# Patient Record
Sex: FEMALE | Race: WHITE | NOT HISPANIC OR LATINO | Employment: FULL TIME | URBAN - METROPOLITAN AREA
[De-identification: names, ages, dates, MRNs, and addresses within clinical notes are randomized per-mention and may not be internally consistent; named-entity substitution may affect disease eponyms.]

---

## 2018-01-02 ENCOUNTER — OFFICE VISIT (OUTPATIENT)
Dept: FAMILY MEDICINE | Facility: CLINIC | Age: 53
End: 2018-01-02
Payer: COMMERCIAL

## 2018-01-02 ENCOUNTER — DOCUMENTATION ONLY (OUTPATIENT)
Dept: FAMILY MEDICINE | Facility: CLINIC | Age: 53
End: 2018-01-02

## 2018-01-02 VITALS
BODY MASS INDEX: 21.26 KG/M2 | DIASTOLIC BLOOD PRESSURE: 64 MMHG | HEIGHT: 65 IN | WEIGHT: 127.63 LBS | SYSTOLIC BLOOD PRESSURE: 118 MMHG

## 2018-01-02 DIAGNOSIS — E03.9 HYPOTHYROIDISM, UNSPECIFIED TYPE: ICD-10-CM

## 2018-01-02 DIAGNOSIS — K21.9 GASTROESOPHAGEAL REFLUX DISEASE, ESOPHAGITIS PRESENCE NOT SPECIFIED: ICD-10-CM

## 2018-01-02 DIAGNOSIS — I10 ESSENTIAL HYPERTENSION, BENIGN: ICD-10-CM

## 2018-01-02 DIAGNOSIS — M51.37 DDD (DEGENERATIVE DISC DISEASE), LUMBOSACRAL: ICD-10-CM

## 2018-01-02 DIAGNOSIS — J30.2 SEASONAL ALLERGIC RHINITIS, UNSPECIFIED CHRONICITY, UNSPECIFIED TRIGGER: ICD-10-CM

## 2018-01-02 DIAGNOSIS — Z12.39 ENCOUNTER FOR SCREENING FOR MALIGNANT NEOPLASM OF BREAST: ICD-10-CM

## 2018-01-02 DIAGNOSIS — Z00.00 WELLNESS EXAMINATION: Primary | ICD-10-CM

## 2018-01-02 PROBLEM — M51.379 DDD (DEGENERATIVE DISC DISEASE), LUMBOSACRAL: Status: ACTIVE | Noted: 2018-01-02

## 2018-01-02 PROCEDURE — 99999 PR PBB SHADOW E&M-EST. PATIENT-LVL III: CPT | Mod: PBBFAC,,, | Performed by: FAMILY MEDICINE

## 2018-01-02 PROCEDURE — 99396 PREV VISIT EST AGE 40-64: CPT | Mod: S$GLB,,, | Performed by: FAMILY MEDICINE

## 2018-01-02 RX ORDER — FLUTICASONE PROPIONATE 50 MCG
1 SPRAY, SUSPENSION (ML) NASAL DAILY
Qty: 1 BOTTLE | Refills: 3 | Status: SHIPPED | OUTPATIENT
Start: 2018-01-02 | End: 2019-01-21 | Stop reason: SDUPTHER

## 2018-01-02 RX ORDER — OMEPRAZOLE 40 MG/1
CAPSULE, DELAYED RELEASE ORAL
COMMUNITY
Start: 2017-12-01 | End: 2018-01-08 | Stop reason: SDUPTHER

## 2018-01-02 RX ORDER — FLUTICASONE PROPIONATE 50 MCG
1 SPRAY, SUSPENSION (ML) NASAL DAILY
COMMUNITY
End: 2018-01-02 | Stop reason: SDUPTHER

## 2018-01-02 RX ORDER — IBUPROFEN 800 MG/1
800 TABLET ORAL EVERY 6 HOURS PRN
Qty: 60 TABLET | Refills: 5 | Status: ON HOLD | OUTPATIENT
Start: 2018-01-02 | End: 2023-12-19 | Stop reason: HOSPADM

## 2018-01-02 NOTE — PROGRESS NOTES
Pre-Visit Chart Review  For Appointment Scheduled on (1/2    Health Maintenance Due   Topic Date Due    Influenza Vaccine  08/01/2017

## 2018-01-02 NOTE — PROGRESS NOTES
Subjective:       Patient ID: Marylin Morel is a 52 y.o. female.    Chief Complaint: Annual Exam    HPI     Establish Care  Pt reports to the clinic to establish care.   The pt has a medical history which includes HTN and hypothyroidism.   As far as smoking is concerned, the pt denies.   The pt attempts to maintain a healthy diet and engages in regular exercise.   Consistent seatbelt usage reported.   Pt has no symptoms of depression.   Health maintenance addressed and updated in chart.    Review of Systems   Constitutional: Negative for chills and fever.   HENT: Negative for sore throat.    Eyes: Negative for visual disturbance.   Respiratory: Negative for cough and shortness of breath.    Cardiovascular: Negative for chest pain and leg swelling.   Gastrointestinal: Negative for abdominal pain, blood in stool, constipation, diarrhea and vomiting.   Genitourinary: Negative for difficulty urinating, dysuria and hematuria.   Musculoskeletal: Positive for back pain. Negative for arthralgias.   Neurological: Negative for dizziness and weakness.       Objective:      Physical Exam   Constitutional: She appears well-developed and well-nourished. No distress.   HENT:   Head: Normocephalic and atraumatic.   Mouth/Throat: Oropharynx is clear and moist. No oropharyngeal exudate.   Eyes: EOM are normal. Pupils are equal, round, and reactive to light.   Neck: Normal range of motion. Neck supple. No thyromegaly present.   Cardiovascular: Normal rate, regular rhythm, normal heart sounds and intact distal pulses.    Pulmonary/Chest: Effort normal and breath sounds normal. No respiratory distress. She has no wheezes.   Abdominal: Soft. Bowel sounds are normal. She exhibits no distension and no mass. There is no tenderness.   Musculoskeletal: She exhibits no edema.   Lymphadenopathy:     She has no cervical adenopathy.   Neurological: She is alert.   Skin: Skin is warm. No rash noted. No erythema.   Psychiatric: She has a normal  mood and affect. Her behavior is normal.   Vitals reviewed.      Assessment:       1. Wellness examination    2. Essential hypertension, benign    3. Hypothyroidism, unspecified type        Plan:       1. Wellness examination  Patient has been advised to continue to maintain a healthy lifestyle, including regular exercise and consuming a well balanced diet.   - Comprehensive metabolic panel; Future  - Lipid panel; Future  - Microalbumin/creatinine urine ratio; Future    2. Essential hypertension, benign  Condition currently stable. No changes to medication regimen on today.     3. Hypothyroidism, unspecified type  - TSH; Future    4. Seasonal allergic rhinitis, unspecified chronicity, unspecified trigger  - fluticasone (FLONASE) 50 mcg/actuation nasal spray; 1 spray by Each Nare route once daily.  Dispense: 1 Bottle; Refill: 3  Condition currently stable. No changes to medication regimen on today.     5. Gastroesophageal reflux disease, esophagitis presence not specified  - omeprazole (PRILOSEC) 40 MG capsule;   Condition currently stable. No changes to medication regimen on today.     6. Encounter for screening for malignant neoplasm of breast  - Mammo Digital Screening Bilateral With CAD; Future    7. DDD (degenerative disc disease), lumbosacral  - ibuprofen (ADVIL,MOTRIN) 800 MG tablet; Take 1 tablet (800 mg total) by mouth every 6 (six) hours as needed for Pain.  Dispense: 60 tablet; Refill: 5    Patient readiness: acceptance and barriers:none    During the course of the visit the patient was educated and counseled about the following:     Hypertension:   Dietary sodium restriction.  Regular aerobic exercise.    Goals: Hypertension: Reduce Blood Pressure    Did patient meet goals/outcomes: Yes    The following self management tools provided: blood pressure log  excercise log    Patient Instructions (the written plan) was given to the patient/family.     Time spent with patient: 15 minutes    Portions of this  note were created using Dragon voice recognition software. There may be voice recognition errors found in the text, and attempts were made to correct these errors prior to signature    Doug Marvin MD    Family Medicine  1/2/2018

## 2018-01-06 ENCOUNTER — LAB VISIT (OUTPATIENT)
Dept: LAB | Facility: HOSPITAL | Age: 53
End: 2018-01-06
Attending: FAMILY MEDICINE
Payer: COMMERCIAL

## 2018-01-06 DIAGNOSIS — Z00.00 WELLNESS EXAMINATION: ICD-10-CM

## 2018-01-06 DIAGNOSIS — E03.9 HYPOTHYROIDISM, UNSPECIFIED TYPE: ICD-10-CM

## 2018-01-06 LAB
ALBUMIN SERPL BCP-MCNC: 3.5 G/DL
ALP SERPL-CCNC: 39 U/L
ALT SERPL W/O P-5'-P-CCNC: 10 U/L
ANION GAP SERPL CALC-SCNC: 12 MMOL/L
AST SERPL-CCNC: 20 U/L
BILIRUB SERPL-MCNC: 0.7 MG/DL
BUN SERPL-MCNC: 18 MG/DL
CALCIUM SERPL-MCNC: 9.5 MG/DL
CHLORIDE SERPL-SCNC: 104 MMOL/L
CHOLEST SERPL-MCNC: 197 MG/DL
CHOLEST/HDLC SERPL: 2 {RATIO}
CO2 SERPL-SCNC: 22 MMOL/L
CREAT SERPL-MCNC: 0.9 MG/DL
EST. GFR  (AFRICAN AMERICAN): >60 ML/MIN/1.73 M^2
EST. GFR  (NON AFRICAN AMERICAN): >60 ML/MIN/1.73 M^2
GLUCOSE SERPL-MCNC: 82 MG/DL
HDLC SERPL-MCNC: 97 MG/DL
HDLC SERPL: 49.2 %
LDLC SERPL CALC-MCNC: 75 MG/DL
NONHDLC SERPL-MCNC: 100 MG/DL
POTASSIUM SERPL-SCNC: 3.9 MMOL/L
PROT SERPL-MCNC: 7.1 G/DL
SODIUM SERPL-SCNC: 138 MMOL/L
TRIGL SERPL-MCNC: 125 MG/DL
TSH SERPL DL<=0.005 MIU/L-ACNC: 2.3 UIU/ML

## 2018-01-06 PROCEDURE — 84443 ASSAY THYROID STIM HORMONE: CPT

## 2018-01-06 PROCEDURE — 80053 COMPREHEN METABOLIC PANEL: CPT

## 2018-01-06 PROCEDURE — 36415 COLL VENOUS BLD VENIPUNCTURE: CPT | Mod: PO

## 2018-01-06 PROCEDURE — 80061 LIPID PANEL: CPT

## 2018-01-08 DIAGNOSIS — K21.9 GASTROESOPHAGEAL REFLUX DISEASE, ESOPHAGITIS PRESENCE NOT SPECIFIED: ICD-10-CM

## 2018-01-08 RX ORDER — OMEPRAZOLE 40 MG/1
40 CAPSULE, DELAYED RELEASE ORAL EVERY MORNING
Qty: 90 CAPSULE | Refills: 3 | Status: SHIPPED | OUTPATIENT
Start: 2018-01-08 | End: 2018-08-24

## 2018-01-09 ENCOUNTER — TELEPHONE (OUTPATIENT)
Dept: FAMILY MEDICINE | Facility: CLINIC | Age: 53
End: 2018-01-09

## 2018-01-09 RX ORDER — LISINOPRIL AND HYDROCHLOROTHIAZIDE 20; 25 MG/1; MG/1
1 TABLET ORAL DAILY
Qty: 90 TABLET | Refills: 3 | Status: SHIPPED | OUTPATIENT
Start: 2018-01-09 | End: 2019-01-21 | Stop reason: SDUPTHER

## 2018-01-09 NOTE — TELEPHONE ENCOUNTER
Please inform pt that I have discontinued Uniretic and started another blood pressure medication called Prinzide. If she has issues with this medication, she should contact the clinic and inform of this.

## 2018-01-09 NOTE — TELEPHONE ENCOUNTER
----- Message from Jeannette Torres LPN sent at 1/9/2018  4:00 PM CST -----      ----- Message -----  From: Omayra Aguirre  Sent: 1/9/2018   8:47 AM  To: [facility] Staff    Patient states that her pharmacy cannot get her blood pressure medication moexipril-hydrochlorothiazide (UNIRETIC) 7.5-12.5 mg per tablet. Pharmacy states that they cannot get this in any form so office needs to prescribe something different. Please remit to:      [id number] - [place] - [address]  [address] [address]  Phone: [phone] Fax: [phone]    Please call with questions or when completed at [phone].

## 2018-01-09 NOTE — TELEPHONE ENCOUNTER
----- Message from Jeannette Torres LPN sent at 1/9/2018  4:00 PM CST -----      ----- Message -----  From: Omayra Aguirre  Sent: 1/9/2018   8:47 AM  To: [facility] Staff    Patient states that her pharmacy cannot get her blood pressure medication moexipril-hydrochlorothiazide (UNIRETIC) 7.5-12.5 mg per tablet. Pharmacy states that they cannot get this in any form so office needs to prescribe something different. Please remit to:      [id number] - [place] - [address]  [address] [address]  Phone: [phone] Fax: [phone]    Please call with questions or when completed at [phone].

## 2018-01-09 NOTE — TELEPHONE ENCOUNTER
----- Message from Jeannette Torres LPN sent at 1/9/2018  4:00 PM CST -----      ----- Message -----  From: Omayra Aguirre  Sent: 1/9/2018   8:47 AM  To: [facility] Staff    Patient states that her pharmacy cannot get her blood pressure medication moexipril-hydrochlorothiazide (UNIRETIC) 7.5-12.5 mg per tablet. Pharmacy states that they cannot get this in any form so office needs to prescribe something different. Please remit to:      [id number] - [place] - [address]  [address] [address]  Phone: [phone] Fax: [phone]    Please call with questions or when completed at [phone].

## 2018-01-22 ENCOUNTER — TELEPHONE (OUTPATIENT)
Dept: FAMILY MEDICINE | Facility: CLINIC | Age: 53
End: 2018-01-22

## 2018-01-22 NOTE — TELEPHONE ENCOUNTER
Informed patient orders for mammo has been faxed to [facility]. Pt. Verbalized understanding.

## 2018-01-22 NOTE — TELEPHONE ENCOUNTER
----- Message from Cassie Dent sent at 1/22/2018 10:42 AM CST -----  Contact: Self  Patient wants to have her mammo done at [facility].  Please fax the order over to them so she can have it done and call the patient back to let her know that it is in place at [phone] (home).  Thanks

## 2018-02-12 ENCOUNTER — TELEPHONE (OUTPATIENT)
Dept: FAMILY MEDICINE | Facility: CLINIC | Age: 53
End: 2018-02-12

## 2018-02-12 NOTE — TELEPHONE ENCOUNTER
Patient informed refills for Omeprazole was sent to pharmacy 1/8/18 with 3 RF to [facility].  Voiced understanding.

## 2018-02-12 NOTE — TELEPHONE ENCOUNTER
----- Message from Deirdre Velazquez sent at 2/12/2018 12:27 PM CST -----  Contact: self  Patient called regarding refill of medication. Please contact [phone] (home)     omeprazole (PRILOSEC) 40 MG capsule      [id number] - [place] - [address]  [address] [address]  Phone: [phone] Fax: [phone]

## 2018-02-23 RX ORDER — LEVOTHYROXINE SODIUM 25 UG/1
25 TABLET ORAL DAILY
Qty: 30 TABLET | Refills: 11 | Status: SHIPPED | OUTPATIENT
Start: 2018-02-23 | End: 2019-01-21 | Stop reason: SDUPTHER

## 2018-02-23 NOTE — TELEPHONE ENCOUNTER
----- Message from Samir Stephen sent at 2/23/2018  9:26 AM CST -----  Contact: [phone]  Patient requesting a refill on synthroid.    Patient will be using   [id number] - [place] - [address]  [address] [address]  Phone: [phone] Fax: [phone]    Please call patient at [phone].     Thanks!

## 2018-03-05 DIAGNOSIS — I10 ESSENTIAL HYPERTENSION, BENIGN: ICD-10-CM

## 2018-03-05 RX ORDER — AMLODIPINE BESYLATE 5 MG/1
5 TABLET ORAL DAILY
Qty: 30 TABLET | Refills: 11 | Status: SHIPPED | OUTPATIENT
Start: 2018-03-05 | End: 2019-01-21 | Stop reason: SDUPTHER

## 2018-03-05 RX ORDER — AMLODIPINE BESYLATE 5 MG/1
TABLET ORAL
COMMUNITY
Start: 2018-01-29 | End: 2018-07-03 | Stop reason: SDUPTHER

## 2018-03-05 RX ORDER — BENZONATATE 100 MG/1
CAPSULE ORAL
COMMUNITY
Start: 2018-01-10 | End: 2018-07-03 | Stop reason: ALTCHOICE

## 2018-03-05 NOTE — TELEPHONE ENCOUNTER
Pt requesting medication refill on Amlodipine 5 mg  Last refill: 18 outside provider  Last visit: 18  Follow Up: 18    ----- Message from Dia Balbuena sent at 3/5/2018  9:22 AM CST -----  Contact: Patient  Marylin, patient [phone] calling because she needs a new prescription for amlodipine (NORVASC) 5 MG tablet () sent to pharmacy. Please advise. Thanks.    [id number] [address] [address]  Phone: [phone] Fax: [phone]

## 2018-07-02 ENCOUNTER — DOCUMENTATION ONLY (OUTPATIENT)
Dept: FAMILY MEDICINE | Facility: CLINIC | Age: 53
End: 2018-07-02

## 2018-07-02 NOTE — PROGRESS NOTES
Pre-Visit Chart Review  For Appointment Scheduled on (7/3/2018)  There are no preventive care reminders to display for this patient.

## 2018-07-03 ENCOUNTER — OFFICE VISIT (OUTPATIENT)
Dept: FAMILY MEDICINE | Facility: CLINIC | Age: 53
End: 2018-07-03
Payer: COMMERCIAL

## 2018-07-03 VITALS
WEIGHT: 122.13 LBS | TEMPERATURE: 98 F | BODY MASS INDEX: 20.35 KG/M2 | SYSTOLIC BLOOD PRESSURE: 103 MMHG | HEART RATE: 71 BPM | DIASTOLIC BLOOD PRESSURE: 75 MMHG | HEIGHT: 65 IN

## 2018-07-03 DIAGNOSIS — I10 ESSENTIAL HYPERTENSION, BENIGN: ICD-10-CM

## 2018-07-03 DIAGNOSIS — M51.37 DDD (DEGENERATIVE DISC DISEASE), LUMBOSACRAL: Primary | ICD-10-CM

## 2018-07-03 PROBLEM — M54.12 CERVICAL RADICULOPATHY: Status: ACTIVE | Noted: 2018-07-03

## 2018-07-03 PROCEDURE — 3074F SYST BP LT 130 MM HG: CPT | Mod: CPTII,S$GLB,, | Performed by: PHYSICIAN ASSISTANT

## 2018-07-03 PROCEDURE — 99999 PR PBB SHADOW E&M-EST. PATIENT-LVL III: CPT | Mod: PBBFAC,,, | Performed by: PHYSICIAN ASSISTANT

## 2018-07-03 PROCEDURE — 3008F BODY MASS INDEX DOCD: CPT | Mod: CPTII,S$GLB,, | Performed by: PHYSICIAN ASSISTANT

## 2018-07-03 PROCEDURE — 3078F DIAST BP <80 MM HG: CPT | Mod: CPTII,S$GLB,, | Performed by: PHYSICIAN ASSISTANT

## 2018-07-03 PROCEDURE — 99213 OFFICE O/P EST LOW 20 MIN: CPT | Mod: S$GLB,,, | Performed by: PHYSICIAN ASSISTANT

## 2018-07-03 RX ORDER — GABAPENTIN 100 MG/1
100 CAPSULE ORAL 3 TIMES DAILY
Qty: 90 CAPSULE | Refills: 11 | Status: SHIPPED | OUTPATIENT
Start: 2018-07-03 | End: 2018-07-10 | Stop reason: SDUPTHER

## 2018-07-03 NOTE — PROGRESS NOTES
Subjective:       Patient ID: Marylin Morel is a 53 y.o. female.    Chief Complaint: Hypertension    Patient is here for 6 month follow up of HTN.  She is well controlled and does not need any medications.  Lab work done in January was normal.  She would like to be started on Neurontin as she has chronic neck and back pain and has had several epidural injections and has not been getting much relief.        Review of Systems   Constitutional: Negative for activity change and appetite change.   HENT: Negative for nosebleeds.    Eyes: Negative for pain and visual disturbance.   Respiratory: Negative for chest tightness and shortness of breath.    Cardiovascular: Negative for chest pain, palpitations and leg swelling.   Genitourinary: Negative for difficulty urinating, dysuria and frequency.   Musculoskeletal: Negative for arthralgias, back pain, gait problem, joint swelling and neck pain.   Neurological: Positive for numbness and headaches. Negative for dizziness, tremors, weakness and light-headedness.       Objective:      Physical Exam   Constitutional: She is oriented to person, place, and time. She appears well-developed and well-nourished.   HENT:   Head: Normocephalic and atraumatic.   Eyes: Conjunctivae are normal. Pupils are equal, round, and reactive to light.   Neck: Normal range of motion. Neck supple. No JVD present.   Cardiovascular: Normal rate and regular rhythm.  Exam reveals no gallop and no friction rub.    No murmur heard.  Pulmonary/Chest: Effort normal and breath sounds normal. No respiratory distress. She has no wheezes. She has no rales.   Neurological: She is alert and oriented to person, place, and time.   Skin: Skin is warm and dry.   Psychiatric: She has a normal mood and affect. Her behavior is normal. Judgment and thought content normal.       Assessment:       1. DDD (degenerative disc disease), lumbosacral    2. Essential hypertension, benign        Plan:       Marylin was seen  today for hypertension.    Diagnoses and all orders for this visit:    DDD (degenerative disc disease), lumbosacral    Essential hypertension, benign    Other orders  -     gabapentin (NEURONTIN) 100 MG capsule; Take 1 capsule (100 mg total) by mouth 3 (three) times daily.    Continue all meds    Patient readiness: acceptance and barriers:none    During the course of the visit the patient was educated and counseled about the following:     Hypertension:   Regular aerobic exercise.    Goals: Hypertension: Reduce Blood Pressure    Did patient meet goals/outcomes: Yes    The following self management tools provided: declined    Patient Instructions (the written plan) was given to the patient/family.     Time spent with patient: 15 minutes    Barriers to medications present (no )    Adverse reactions to current medications (no)    Over the counter medications reviewed (Yes)

## 2018-07-10 ENCOUNTER — PATIENT MESSAGE (OUTPATIENT)
Dept: FAMILY MEDICINE | Facility: CLINIC | Age: 53
End: 2018-07-10

## 2018-07-10 RX ORDER — GABAPENTIN 300 MG/1
300 CAPSULE ORAL 3 TIMES DAILY
Qty: 90 CAPSULE | Refills: 11 | Status: SHIPPED | OUTPATIENT
Start: 2018-07-10 | End: 2018-08-24

## 2018-07-12 ENCOUNTER — PATIENT MESSAGE (OUTPATIENT)
Dept: FAMILY MEDICINE | Facility: CLINIC | Age: 53
End: 2018-07-12

## 2018-08-24 ENCOUNTER — PATIENT MESSAGE (OUTPATIENT)
Dept: FAMILY MEDICINE | Facility: CLINIC | Age: 53
End: 2018-08-24

## 2018-08-24 DIAGNOSIS — K21.9 GASTROESOPHAGEAL REFLUX DISEASE, ESOPHAGITIS PRESENCE NOT SPECIFIED: ICD-10-CM

## 2018-08-24 DIAGNOSIS — G62.9 NEUROPATHY: Primary | ICD-10-CM

## 2018-08-24 RX ORDER — PANTOPRAZOLE SODIUM 40 MG/1
40 TABLET, DELAYED RELEASE ORAL DAILY
Qty: 30 TABLET | Refills: 11 | Status: SHIPPED | OUTPATIENT
Start: 2018-08-24 | End: 2019-02-04

## 2018-08-24 RX ORDER — GABAPENTIN 300 MG/1
600 CAPSULE ORAL 3 TIMES DAILY
Qty: 180 CAPSULE | Refills: 11 | Status: SHIPPED | OUTPATIENT
Start: 2018-08-24 | End: 2019-06-08 | Stop reason: SDUPTHER

## 2018-08-30 ENCOUNTER — PATIENT MESSAGE (OUTPATIENT)
Dept: FAMILY MEDICINE | Facility: CLINIC | Age: 53
End: 2018-08-30

## 2018-09-18 ENCOUNTER — PATIENT MESSAGE (OUTPATIENT)
Dept: FAMILY MEDICINE | Facility: CLINIC | Age: 53
End: 2018-09-18

## 2018-12-04 ENCOUNTER — DOCUMENTATION ONLY (OUTPATIENT)
Dept: FAMILY MEDICINE | Facility: CLINIC | Age: 53
End: 2018-12-04

## 2018-12-04 NOTE — PROGRESS NOTES
Pre-Visit Chart Review  For Appointment Scheduled on 12/04/2018    Health Maintenance Due   Topic Date Due    Influenza Vaccine  08/01/2018

## 2019-01-19 ENCOUNTER — PATIENT MESSAGE (OUTPATIENT)
Dept: FAMILY MEDICINE | Facility: CLINIC | Age: 54
End: 2019-01-19

## 2019-01-21 ENCOUNTER — OFFICE VISIT (OUTPATIENT)
Dept: FAMILY MEDICINE | Facility: CLINIC | Age: 54
End: 2019-01-21
Payer: COMMERCIAL

## 2019-01-21 VITALS
DIASTOLIC BLOOD PRESSURE: 88 MMHG | TEMPERATURE: 100 F | BODY MASS INDEX: 21.19 KG/M2 | WEIGHT: 127.19 LBS | HEART RATE: 74 BPM | OXYGEN SATURATION: 97 % | HEIGHT: 65 IN | SYSTOLIC BLOOD PRESSURE: 143 MMHG

## 2019-01-21 DIAGNOSIS — I10 ESSENTIAL HYPERTENSION, BENIGN: ICD-10-CM

## 2019-01-21 DIAGNOSIS — E03.9 HYPOTHYROIDISM, UNSPECIFIED TYPE: ICD-10-CM

## 2019-01-21 DIAGNOSIS — J06.9 UPPER RESPIRATORY TRACT INFECTION, UNSPECIFIED TYPE: ICD-10-CM

## 2019-01-21 DIAGNOSIS — Z00.00 ANNUAL PHYSICAL EXAM: Primary | ICD-10-CM

## 2019-01-21 PROCEDURE — 3077F PR MOST RECENT SYSTOLIC BLOOD PRESSURE >= 140 MM HG: ICD-10-PCS | Mod: CPTII,S$GLB,, | Performed by: NURSE PRACTITIONER

## 2019-01-21 PROCEDURE — 99999 PR PBB SHADOW E&M-EST. PATIENT-LVL III: ICD-10-PCS | Mod: PBBFAC,,, | Performed by: NURSE PRACTITIONER

## 2019-01-21 PROCEDURE — 3079F DIAST BP 80-89 MM HG: CPT | Mod: CPTII,S$GLB,, | Performed by: NURSE PRACTITIONER

## 2019-01-21 PROCEDURE — 99999 PR PBB SHADOW E&M-EST. PATIENT-LVL III: CPT | Mod: PBBFAC,,, | Performed by: NURSE PRACTITIONER

## 2019-01-21 PROCEDURE — 3079F PR MOST RECENT DIASTOLIC BLOOD PRESSURE 80-89 MM HG: ICD-10-PCS | Mod: CPTII,S$GLB,, | Performed by: NURSE PRACTITIONER

## 2019-01-21 PROCEDURE — 3077F SYST BP >= 140 MM HG: CPT | Mod: CPTII,S$GLB,, | Performed by: NURSE PRACTITIONER

## 2019-01-21 PROCEDURE — 99396 PR PREVENTIVE VISIT,EST,40-64: ICD-10-PCS | Mod: S$GLB,,, | Performed by: NURSE PRACTITIONER

## 2019-01-21 PROCEDURE — 99396 PREV VISIT EST AGE 40-64: CPT | Mod: S$GLB,,, | Performed by: NURSE PRACTITIONER

## 2019-01-21 RX ORDER — AMLODIPINE BESYLATE 5 MG/1
5 TABLET ORAL DAILY
Qty: 30 TABLET | Refills: 6 | Status: SHIPPED | OUTPATIENT
Start: 2019-01-21 | End: 2019-11-21 | Stop reason: SDUPTHER

## 2019-01-21 RX ORDER — LISINOPRIL AND HYDROCHLOROTHIAZIDE 20; 25 MG/1; MG/1
1 TABLET ORAL DAILY
Qty: 90 TABLET | Refills: 2 | Status: SHIPPED | OUTPATIENT
Start: 2019-01-21 | End: 2020-01-20

## 2019-01-21 RX ORDER — LEVOTHYROXINE SODIUM 25 UG/1
25 TABLET ORAL DAILY
Qty: 30 TABLET | Refills: 6 | Status: SHIPPED | OUTPATIENT
Start: 2019-01-21 | End: 2019-10-30 | Stop reason: SDUPTHER

## 2019-01-21 RX ORDER — FLUTICASONE PROPIONATE 50 MCG
1 SPRAY, SUSPENSION (ML) NASAL DAILY
Qty: 1 BOTTLE | Refills: 3 | Status: SHIPPED | OUTPATIENT
Start: 2019-01-21 | End: 2020-03-03

## 2019-01-21 NOTE — PATIENT INSTRUCTIONS
Flonase and plain mucin ex with large glass of water twice daily   Will follow up with lab results   Follow up with PCP in 6 months

## 2019-01-21 NOTE — PROGRESS NOTES
Subjective:       Patient ID: Marylin Morel is a 53 y.o. female.    Chief Complaint: Medication Refill    HPI     Ms. Morel is a 52 yo female who presents to clinic today for medication refills today.  She is a patient of Dr. Anderson.  She has a history of hypertension, GERD, hypothyroid, and sciatica.      Hypertension- compliant with BP Amlodipine and lisinopril-hctz, she does not take her BPs at home.  Slightly elevated today, but reports taking a decongestant today.  Denies headache, SOB, visual disturbances.   GERD- Well managed on protonix   Hypothyroid-  Takes synthroid daily, well managed  Sciatica- Treating with gapapentin, and reports success with this.   Review of Systems   Constitutional: Negative for activity change and unexpected weight change.   HENT: Positive for congestion, hearing loss (ongoing) and rhinorrhea. Negative for sinus pressure, sinus pain and trouble swallowing.    Eyes: Negative for discharge and visual disturbance.   Respiratory: Negative for cough, chest tightness, shortness of breath and wheezing.    Cardiovascular: Negative for chest pain and palpitations.   Gastrointestinal: Negative for blood in stool, constipation, diarrhea, nausea and vomiting.   Endocrine: Negative for polydipsia and polyuria.   Genitourinary: Negative for difficulty urinating, dysuria, hematuria and menstrual problem.   Musculoskeletal: Positive for neck pain (DDD). Negative for joint swelling.   Neurological: Negative for dizziness, weakness, light-headedness and headaches.   Psychiatric/Behavioral: Negative for confusion and dysphoric mood.       Objective:      Physical Exam   Constitutional: She is oriented to person, place, and time. She appears well-developed and well-nourished.   HENT:   Head: Normocephalic and atraumatic.   Eyes: Conjunctivae are normal. Pupils are equal, round, and reactive to light. Right eye exhibits no discharge. Left eye exhibits no discharge.   Neck: Normal range of  motion. Neck supple. No thyromegaly present.   Cardiovascular: Normal rate, regular rhythm, normal heart sounds and intact distal pulses.   Pulmonary/Chest: Breath sounds normal. No respiratory distress. She has no wheezes.   Abdominal: Soft. Bowel sounds are normal. She exhibits no distension. There is no tenderness. There is no rebound.   Musculoskeletal: Normal range of motion. She exhibits no edema or deformity.   Lymphadenopathy:     She has no cervical adenopathy.   Neurological: She is alert and oriented to person, place, and time. No cranial nerve deficit or sensory deficit.   Skin: Skin is warm and dry. No rash noted.   Psychiatric: She has a normal mood and affect.       Assessment:       1. Annual physical exam    2. Essential hypertension, benign    3. Upper respiratory tract infection, unspecified type    4. Hypothyroidism, unspecified type        Plan:       Annual physical exam  -     TSH; Future; Expected date: 01/21/2019  -     CBC auto differential; Future; Expected date: 01/21/2019  -     Comprehensive metabolic panel; Future; Expected date: 01/21/2019  -     Lipid panel; Future; Expected date: 01/21/2019          Essential hypertension, benign  -     amLODIPine (NORVASC) 5 MG tablet; Take 1 tablet (5 mg total) by mouth once daily.  Dispense: 30 tablet; Refill: 6  -     lisinopril-hydrochlorothiazide (PRINZIDE,ZESTORETIC) 20-25 mg Tab; Take 1 tablet by mouth once daily.  Dispense: 90 tablet; Refill: 2        -     Slightly elevated today, patient to discontinue decongestant use  Upper respiratory tract infection, unspecified type  -     fluticasone (FLONASE) 50 mcg/actuation nasal spray; 1 spray (50 mcg total) by Each Nare route once daily.  Dispense: 1 Bottle; Refill: 3        -     Mucinex plain, with large glass of water twice  Daily        -     Increase rest and vitamin C  Hypothyroidism, unspecified type  -     levothyroxine (SYNTHROID) 25 MCG tablet; Take 1 tablet (25 mcg total) by mouth  once daily.  Dispense: 30 tablet; Refill: 6        -     TSH today     Patient readiness: acceptance and barriers:none    During the course of the visit the patient was educated and counseled about the following:     Hypertension:   Dietary sodium restriction.  Follow up: 6 months and as needed.    Goals: Hypertension: Reduce Blood Pressure    Did patient meet goals/outcomes: Yes    The following self management tools provided: declined    Patient Instructions (the written plan) was given to the patient/family.     Time spent with patient: 30 minutes    Barriers to medications present (no )    Adverse reactions to current medications (no)    Over the counter medications reviewed (Yes)

## 2019-01-23 ENCOUNTER — PATIENT MESSAGE (OUTPATIENT)
Dept: FAMILY MEDICINE | Facility: CLINIC | Age: 54
End: 2019-01-23

## 2019-01-23 NOTE — TELEPHONE ENCOUNTER
Patient states she is taking Prilosec not Protonix. You just saw her and your note says protonix. Not sure if you would want to change it so I did not add it.

## 2019-01-26 ENCOUNTER — LAB VISIT (OUTPATIENT)
Dept: LAB | Facility: HOSPITAL | Age: 54
End: 2019-01-26
Attending: NURSE PRACTITIONER
Payer: COMMERCIAL

## 2019-01-26 DIAGNOSIS — Z00.00 ANNUAL PHYSICAL EXAM: ICD-10-CM

## 2019-01-26 LAB
ALBUMIN SERPL BCP-MCNC: 4.4 G/DL
ALP SERPL-CCNC: 40 U/L
ALT SERPL W/O P-5'-P-CCNC: 16 U/L
ANION GAP SERPL CALC-SCNC: 12 MMOL/L
AST SERPL-CCNC: 23 U/L
BASOPHILS # BLD AUTO: 0.1 K/UL
BASOPHILS NFR BLD: 1.8 %
BILIRUB SERPL-MCNC: 0.6 MG/DL
BUN SERPL-MCNC: 18 MG/DL
CALCIUM SERPL-MCNC: 10.5 MG/DL
CHLORIDE SERPL-SCNC: 97 MMOL/L
CHOLEST SERPL-MCNC: 238 MG/DL
CHOLEST/HDLC SERPL: 2.6 {RATIO}
CO2 SERPL-SCNC: 28 MMOL/L
CREAT SERPL-MCNC: 0.9 MG/DL
DIFFERENTIAL METHOD: ABNORMAL
EOSINOPHIL # BLD AUTO: 0.4 K/UL
EOSINOPHIL NFR BLD: 6.5 %
ERYTHROCYTE [DISTWIDTH] IN BLOOD BY AUTOMATED COUNT: 12 %
EST. GFR  (AFRICAN AMERICAN): >60 ML/MIN/1.73 M^2
EST. GFR  (NON AFRICAN AMERICAN): >60 ML/MIN/1.73 M^2
GLUCOSE SERPL-MCNC: 81 MG/DL
HCT VFR BLD AUTO: 40.4 %
HDLC SERPL-MCNC: 90 MG/DL
HDLC SERPL: 37.8 %
HGB BLD-MCNC: 13 G/DL
IMM GRANULOCYTES # BLD AUTO: 0.01 K/UL
IMM GRANULOCYTES NFR BLD AUTO: 0.2 %
LDLC SERPL CALC-MCNC: 134.2 MG/DL
LYMPHOCYTES # BLD AUTO: 2.2 K/UL
LYMPHOCYTES NFR BLD: 38.5 %
MCH RBC QN AUTO: 31.1 PG
MCHC RBC AUTO-ENTMCNC: 32.2 G/DL
MCV RBC AUTO: 97 FL
MONOCYTES # BLD AUTO: 0.4 K/UL
MONOCYTES NFR BLD: 7.7 %
NEUTROPHILS # BLD AUTO: 2.6 K/UL
NEUTROPHILS NFR BLD: 45.3 %
NONHDLC SERPL-MCNC: 148 MG/DL
NRBC BLD-RTO: 0 /100 WBC
PLATELET # BLD AUTO: 287 K/UL
PMV BLD AUTO: 10.3 FL
POTASSIUM SERPL-SCNC: 4 MMOL/L
PROT SERPL-MCNC: 7.7 G/DL
RBC # BLD AUTO: 4.18 M/UL
SODIUM SERPL-SCNC: 137 MMOL/L
TRIGL SERPL-MCNC: 69 MG/DL
TSH SERPL DL<=0.005 MIU/L-ACNC: 1.96 UIU/ML
WBC # BLD AUTO: 5.69 K/UL

## 2019-01-26 PROCEDURE — 84443 ASSAY THYROID STIM HORMONE: CPT

## 2019-01-26 PROCEDURE — 36415 COLL VENOUS BLD VENIPUNCTURE: CPT | Mod: PO

## 2019-01-26 PROCEDURE — 85025 COMPLETE CBC W/AUTO DIFF WBC: CPT

## 2019-01-26 PROCEDURE — 80053 COMPREHEN METABOLIC PANEL: CPT

## 2019-01-26 PROCEDURE — 80061 LIPID PANEL: CPT

## 2019-01-30 ENCOUNTER — PATIENT MESSAGE (OUTPATIENT)
Dept: FAMILY MEDICINE | Facility: CLINIC | Age: 54
End: 2019-01-30

## 2019-02-04 ENCOUNTER — TELEPHONE (OUTPATIENT)
Dept: FAMILY MEDICINE | Facility: CLINIC | Age: 54
End: 2019-02-04

## 2019-02-04 DIAGNOSIS — K21.9 GASTROESOPHAGEAL REFLUX DISEASE, ESOPHAGITIS PRESENCE NOT SPECIFIED: Primary | ICD-10-CM

## 2019-02-04 RX ORDER — OMEPRAZOLE 40 MG/1
40 CAPSULE, DELAYED RELEASE ORAL DAILY
Qty: 30 CAPSULE | Refills: 11 | Status: SHIPPED | OUTPATIENT
Start: 2019-02-04 | End: 2020-02-17

## 2019-04-19 ENCOUNTER — PATIENT MESSAGE (OUTPATIENT)
Dept: FAMILY MEDICINE | Facility: CLINIC | Age: 54
End: 2019-04-19

## 2019-04-22 RX ORDER — EPINEPHRINE 0.3 MG/.3ML
1 INJECTION SUBCUTANEOUS ONCE
Qty: 0.3 ML | Refills: 0 | Status: SHIPPED | OUTPATIENT
Start: 2019-04-22 | End: 2019-06-10 | Stop reason: SDUPTHER

## 2019-05-29 ENCOUNTER — PATIENT MESSAGE (OUTPATIENT)
Dept: FAMILY MEDICINE | Facility: CLINIC | Age: 54
End: 2019-05-29

## 2019-06-08 ENCOUNTER — PATIENT MESSAGE (OUTPATIENT)
Dept: FAMILY MEDICINE | Facility: CLINIC | Age: 54
End: 2019-06-08

## 2019-06-08 DIAGNOSIS — Z91.038 ALLERGIC REACTION TO INSECT BITE: Primary | ICD-10-CM

## 2019-06-08 DIAGNOSIS — G62.9 NEUROPATHY: ICD-10-CM

## 2019-06-10 RX ORDER — GABAPENTIN 300 MG/1
CAPSULE ORAL
Qty: 90 CAPSULE | Refills: 10 | Status: SHIPPED | OUTPATIENT
Start: 2019-06-10 | End: 2019-07-11 | Stop reason: SDUPTHER

## 2019-06-10 RX ORDER — EPINEPHRINE 0.3 MG/.3ML
1 INJECTION SUBCUTANEOUS ONCE
Qty: 0.3 ML | Refills: 0 | Status: SHIPPED | OUTPATIENT
Start: 2019-06-10 | End: 2020-11-11

## 2019-07-02 ENCOUNTER — PATIENT MESSAGE (OUTPATIENT)
Dept: FAMILY MEDICINE | Facility: CLINIC | Age: 54
End: 2019-07-02

## 2019-07-03 ENCOUNTER — TELEPHONE (OUTPATIENT)
Dept: FAMILY MEDICINE | Facility: CLINIC | Age: 54
End: 2019-07-03

## 2019-07-03 NOTE — TELEPHONE ENCOUNTER
----- Message from Zayra Walls sent at 7/3/2019  2:44 PM CDT -----  Type:  Pharmacy Calling to Clarify an RX    Name of Caller: Roger with RX Benefits  Best Call Back Number:  [phone]  Additional Information:  Please contact regarding prior authorization on the EPI Pen,

## 2019-07-09 ENCOUNTER — PATIENT MESSAGE (OUTPATIENT)
Dept: FAMILY MEDICINE | Facility: CLINIC | Age: 54
End: 2019-07-09

## 2019-07-10 ENCOUNTER — DOCUMENTATION ONLY (OUTPATIENT)
Dept: FAMILY MEDICINE | Facility: CLINIC | Age: 54
End: 2019-07-10

## 2019-07-10 ENCOUNTER — TELEPHONE (OUTPATIENT)
Dept: FAMILY MEDICINE | Facility: CLINIC | Age: 54
End: 2019-07-10

## 2019-07-10 NOTE — PROGRESS NOTES
Pre-Visit Chart Review  For Appointment Scheduled on 7/11/19    Health Maintenance Due   Topic Date Due    Mammogram  02/12/2019

## 2019-07-11 ENCOUNTER — OFFICE VISIT (OUTPATIENT)
Dept: FAMILY MEDICINE | Facility: CLINIC | Age: 54
End: 2019-07-11
Payer: COMMERCIAL

## 2019-07-11 VITALS
HEIGHT: 65 IN | BODY MASS INDEX: 21.45 KG/M2 | OXYGEN SATURATION: 97 % | HEART RATE: 75 BPM | WEIGHT: 128.75 LBS | DIASTOLIC BLOOD PRESSURE: 62 MMHG | SYSTOLIC BLOOD PRESSURE: 98 MMHG | TEMPERATURE: 99 F

## 2019-07-11 DIAGNOSIS — E03.9 HYPOTHYROIDISM, UNSPECIFIED TYPE: Primary | ICD-10-CM

## 2019-07-11 DIAGNOSIS — I10 ESSENTIAL HYPERTENSION, BENIGN: ICD-10-CM

## 2019-07-11 DIAGNOSIS — G62.9 NEUROPATHY: ICD-10-CM

## 2019-07-11 PROCEDURE — 3074F PR MOST RECENT SYSTOLIC BLOOD PRESSURE < 130 MM HG: ICD-10-PCS | Mod: CPTII,S$GLB,, | Performed by: FAMILY MEDICINE

## 2019-07-11 PROCEDURE — 99396 PR PREVENTIVE VISIT,EST,40-64: ICD-10-PCS | Mod: S$GLB,,, | Performed by: FAMILY MEDICINE

## 2019-07-11 PROCEDURE — 99999 PR PBB SHADOW E&M-EST. PATIENT-LVL III: CPT | Mod: PBBFAC,,, | Performed by: FAMILY MEDICINE

## 2019-07-11 PROCEDURE — 99999 PR PBB SHADOW E&M-EST. PATIENT-LVL III: ICD-10-PCS | Mod: PBBFAC,,, | Performed by: FAMILY MEDICINE

## 2019-07-11 PROCEDURE — 3074F SYST BP LT 130 MM HG: CPT | Mod: CPTII,S$GLB,, | Performed by: FAMILY MEDICINE

## 2019-07-11 PROCEDURE — 3078F DIAST BP <80 MM HG: CPT | Mod: CPTII,S$GLB,, | Performed by: FAMILY MEDICINE

## 2019-07-11 PROCEDURE — 3078F PR MOST RECENT DIASTOLIC BLOOD PRESSURE < 80 MM HG: ICD-10-PCS | Mod: CPTII,S$GLB,, | Performed by: FAMILY MEDICINE

## 2019-07-11 PROCEDURE — 99396 PREV VISIT EST AGE 40-64: CPT | Mod: S$GLB,,, | Performed by: FAMILY MEDICINE

## 2019-07-11 RX ORDER — GABAPENTIN 300 MG/1
CAPSULE ORAL
Qty: 120 CAPSULE | Refills: 11
Start: 2019-07-11 | End: 2020-01-20

## 2019-07-11 RX ORDER — ESTRADIOL/NORETHINDRONE ACETATE TRANSDERMAL SYSTEM .05; .14 MG/D; MG/D
PATCH, EXTENDED RELEASE TRANSDERMAL
COMMUNITY
Start: 2019-06-06

## 2019-07-11 RX ORDER — CHOLECALCIFEROL (VITAMIN D3) 25 MCG
1000 TABLET ORAL DAILY
COMMUNITY

## 2019-07-13 NOTE — PROGRESS NOTES
Subjective:   Patient ID: Marylin Morel is a 54 y.o. female     Chief Complaint:[facility]      Patient here for annual checkup in [facility].  Patient doing well.    Review of Systems   Constitutional: Negative for chills and fever.   HENT: Negative for sore throat.    Eyes: Negative for visual disturbance.   Respiratory: Negative for shortness of breath.    Cardiovascular: Negative for chest pain.   Gastrointestinal: Negative for abdominal pain.   Endocrine: Negative for polyuria.   Genitourinary: Negative for dysuria.   Musculoskeletal: Negative for back pain.   Skin: Negative for color change.   Neurological: Negative for headaches.   Psychiatric/Behavioral: Negative for agitation and confusion.     Past Medical History:   Diagnosis Date    Allergy     ants    GERD (gastroesophageal reflux disease)     Hypertension     Thyroid disease      Objective:     Vitals:    07/11/19 1521   BP: 98/62   Pulse: 75   Temp: 98.5 °F (36.9 °C)     Body mass index is 21.42 kg/m².  Physical Exam   Constitutional: No distress.   HENT:   Head: Normocephalic and atraumatic.   Eyes: EOM are normal.   Cardiovascular: Normal rate and normal heart sounds.   Pulmonary/Chest: Effort normal.   Abdominal: Bowel sounds are normal.   Musculoskeletal: Normal range of motion.   Neurological: She is alert.   Psychiatric: She has a normal mood and affect.     Assessment:     1. Hypothyroidism, unspecified type    2. Essential hypertension, benign    3. Neuropathy      Plan:   Hypothyroidism, unspecified type  -     TSH; Future; Expected date: 07/11/2019    Essential hypertension, benign  -     Comprehensive metabolic panel; Future; Expected date: 10/11/2019  -     CBC auto differential; Future; Expected date: 07/11/2019  -     Hemoglobin A1c; Future; Expected date: 07/11/2019    Neuropathy  -     gabapentin (NEURONTIN) 300 MG capsule; Take 1 morning, 1 afternoon and 2 at night  Dispense: 120 capsule; Refill: 11            Time  spent with patient: 30 minutes and over half of that time was spent on counseling an coordination of care.    Gabriel Mccabe MD  07/12/2019    Portions of this note have been dictated with MERVIN Munoz.

## 2019-07-22 ENCOUNTER — PATIENT OUTREACH (OUTPATIENT)
Dept: ADMINISTRATIVE | Facility: HOSPITAL | Age: 54
End: 2019-07-22

## 2019-07-29 ENCOUNTER — TELEPHONE (OUTPATIENT)
Dept: FAMILY MEDICINE | Facility: CLINIC | Age: 54
End: 2019-07-29

## 2019-07-29 NOTE — TELEPHONE ENCOUNTER
Pharmacy never rcvd order for gabapentin. Verbal order called in for Rx sent in on 07/11/19, spoke to Jeannette.

## 2019-07-29 NOTE — TELEPHONE ENCOUNTER
----- Message from Yossi Mack sent at 7/29/2019 10:04 AM CDT -----  Contact: Leopoldo  Type:  Pharmacy Calling to Clarify an RX    Name of Caller:  Leopoldo  [facility]armacy Name:  [facility]  Prescription Name:  gabapentin (NEURONTIN) 300 MG capsule  What do they need to clarify?:  Directions on dosage and qualitity  Best Call Back Number:  [phone]  Additional Information:  Requesting a call back to advise

## 2019-08-15 DIAGNOSIS — M51.06 INTERVERTEBRAL LUMBAR DISC DISORDER WITH MYELOPATHY, LUMBAR REGION: ICD-10-CM

## 2019-08-15 DIAGNOSIS — M47.897 OTHER SPONDYLOSIS, LUMBOSACRAL REGION: ICD-10-CM

## 2019-08-15 DIAGNOSIS — M47.896 OTHER SPONDYLOSIS, LUMBAR REGION: ICD-10-CM

## 2019-08-15 DIAGNOSIS — M51.17 INTERVERTEBRAL DISC DISORDERS WITH RADICULOPATHY, LUMBOSACRAL REGION: Primary | ICD-10-CM

## 2019-08-15 DIAGNOSIS — M54.17 RADICULOPATHY, LUMBOSACRAL REGION: ICD-10-CM

## 2019-09-04 ENCOUNTER — HOSPITAL ENCOUNTER (OUTPATIENT)
Dept: RADIOLOGY | Facility: HOSPITAL | Age: 54
Discharge: HOME | End: 2019-09-04
Attending: PAIN MEDICINE
Payer: COMMERCIAL

## 2019-09-04 VITALS — OXYGEN SATURATION: 100 % | DIASTOLIC BLOOD PRESSURE: 66 MMHG | HEART RATE: 64 BPM | SYSTOLIC BLOOD PRESSURE: 112 MMHG

## 2019-09-04 DIAGNOSIS — M47.896 OTHER SPONDYLOSIS, LUMBAR REGION: ICD-10-CM

## 2019-09-04 DIAGNOSIS — M51.17 INTERVERTEBRAL DISC DISORDERS WITH RADICULOPATHY, LUMBOSACRAL REGION: ICD-10-CM

## 2019-09-04 DIAGNOSIS — M54.17 RADICULOPATHY, LUMBOSACRAL REGION: ICD-10-CM

## 2019-09-04 DIAGNOSIS — M51.06 INTERVERTEBRAL LUMBAR DISC DISORDER WITH MYELOPATHY, LUMBAR REGION: ICD-10-CM

## 2019-09-04 DIAGNOSIS — M47.897 OTHER SPONDYLOSIS, LUMBOSACRAL REGION: ICD-10-CM

## 2019-09-04 DIAGNOSIS — M51.06 INTERVERTEBRAL DISC DISORDERS WITH MYELOPATHY OF LUMBAR REGION: ICD-10-CM

## 2019-09-04 PROCEDURE — 72148 MRI LUMBAR SPINE WITHOUT CONTRAST: ICD-10-PCS | Mod: 26,,, | Performed by: RADIOLOGY

## 2019-09-04 PROCEDURE — 72148 MRI LUMBAR SPINE W/O DYE: CPT | Mod: 26,,, | Performed by: RADIOLOGY

## 2019-09-04 PROCEDURE — 72148 MRI LUMBAR SPINE W/O DYE: CPT | Mod: TC

## 2019-09-04 PROCEDURE — 63600175 PHARM REV CODE 636 W HCPCS: Performed by: STUDENT IN AN ORGANIZED HEALTH CARE EDUCATION/TRAINING PROGRAM

## 2019-09-04 PROCEDURE — 72120 XR LUMBAR SPINE FLEXION AND EXTENSION ONLY: ICD-10-PCS | Mod: 26,,, | Performed by: RADIOLOGY

## 2019-09-04 PROCEDURE — 72120 X-RAY BEND ONLY L-S SPINE: CPT | Mod: 26,,, | Performed by: RADIOLOGY

## 2019-09-04 PROCEDURE — 72120 X-RAY BEND ONLY L-S SPINE: CPT | Mod: TC

## 2019-09-04 RX ORDER — MIDAZOLAM HYDROCHLORIDE 1 MG/ML
2 INJECTION, SOLUTION INTRAMUSCULAR; INTRAVENOUS ONCE
Status: COMPLETED | OUTPATIENT
Start: 2019-09-04 | End: 2019-09-04

## 2019-09-04 RX ADMIN — MIDAZOLAM HYDROCHLORIDE 2 MG: 1 INJECTION, SOLUTION INTRAMUSCULAR; INTRAVENOUS at 10:09

## 2019-09-04 NOTE — PROGRESS NOTES
Patient AAOX3, ambulatory,  Verified 2 Pt. Identifier, pre-procedure instruction given to patient and patient's accompanied ,(Spouse) patient instructed not to drive, make major decisions, not to take any medication with S/E of drowsiness(anti-anxiety or muscle relaxant) within 24 hours, verbalized understanding,  verified allergies, patient's ride for post procedure verified,  Midazolam 2mg  Will be given as ordered, patient will be  monitored during MRI exam.

## 2019-09-04 NOTE — PROGRESS NOTES
Patient completed MRI exam, patient stable, vitals returned to baseline, printed and verbal D/C instruction given to patient and accompanied  (Spouse, Alon), reminded not to take any medication with S/E of drowsiness within 24 hours, Verbalized understanding of given instructions, pt. D/C via W/C accompanied by , Alon in NAD.

## 2019-10-30 DIAGNOSIS — E03.9 HYPOTHYROIDISM, UNSPECIFIED TYPE: ICD-10-CM

## 2019-10-30 RX ORDER — LEVOTHYROXINE SODIUM 25 UG/1
TABLET ORAL
Qty: 30 TABLET | Refills: 5 | Status: SHIPPED | OUTPATIENT
Start: 2019-10-30 | End: 2020-05-07

## 2019-11-21 DIAGNOSIS — I10 ESSENTIAL HYPERTENSION, BENIGN: ICD-10-CM

## 2019-11-21 RX ORDER — AMLODIPINE BESYLATE 5 MG/1
TABLET ORAL
Qty: 30 TABLET | Refills: 5 | Status: SHIPPED | OUTPATIENT
Start: 2019-11-21 | End: 2020-06-19

## 2020-01-16 ENCOUNTER — DOCUMENTATION ONLY (OUTPATIENT)
Dept: FAMILY MEDICINE | Facility: CLINIC | Age: 55
End: 2020-01-16

## 2020-01-16 NOTE — PROGRESS NOTES
Pre-Visit Chart Review  For Appointment Scheduled on 1/20/2020    There are no preventive care reminders to display for this patient.

## 2020-01-18 ENCOUNTER — LAB VISIT (OUTPATIENT)
Dept: LAB | Facility: HOSPITAL | Age: 55
End: 2020-01-18
Attending: FAMILY MEDICINE
Payer: COMMERCIAL

## 2020-01-18 DIAGNOSIS — I10 ESSENTIAL HYPERTENSION, BENIGN: ICD-10-CM

## 2020-01-18 DIAGNOSIS — E03.9 HYPOTHYROIDISM, UNSPECIFIED TYPE: ICD-10-CM

## 2020-01-18 LAB
ALBUMIN SERPL BCP-MCNC: 4.5 G/DL (ref 3.5–5.2)
ALP SERPL-CCNC: 34 U/L (ref 55–135)
ALT SERPL W/O P-5'-P-CCNC: 19 U/L (ref 10–44)
ANION GAP SERPL CALC-SCNC: 9 MMOL/L (ref 8–16)
AST SERPL-CCNC: 22 U/L (ref 10–40)
BASOPHILS # BLD AUTO: 0.11 K/UL (ref 0–0.2)
BASOPHILS NFR BLD: 2 % (ref 0–1.9)
BILIRUB SERPL-MCNC: 0.4 MG/DL (ref 0.1–1)
BUN SERPL-MCNC: 13 MG/DL (ref 6–20)
CALCIUM SERPL-MCNC: 9.7 MG/DL (ref 8.7–10.5)
CHLORIDE SERPL-SCNC: 104 MMOL/L (ref 95–110)
CO2 SERPL-SCNC: 27 MMOL/L (ref 23–29)
CREAT SERPL-MCNC: 0.8 MG/DL (ref 0.5–1.4)
DIFFERENTIAL METHOD: ABNORMAL
EOSINOPHIL # BLD AUTO: 0.4 K/UL (ref 0–0.5)
EOSINOPHIL NFR BLD: 7 % (ref 0–8)
ERYTHROCYTE [DISTWIDTH] IN BLOOD BY AUTOMATED COUNT: 11.5 % (ref 11.5–14.5)
EST. GFR  (AFRICAN AMERICAN): >60 ML/MIN/1.73 M^2
EST. GFR  (NON AFRICAN AMERICAN): >60 ML/MIN/1.73 M^2
ESTIMATED AVG GLUCOSE: 97 MG/DL (ref 68–131)
GLUCOSE SERPL-MCNC: 78 MG/DL (ref 70–110)
HBA1C MFR BLD HPLC: 5 % (ref 4–5.6)
HCT VFR BLD AUTO: 40.5 % (ref 37–48.5)
HGB BLD-MCNC: 13 G/DL (ref 12–16)
IMM GRANULOCYTES # BLD AUTO: 0.01 K/UL (ref 0–0.04)
IMM GRANULOCYTES NFR BLD AUTO: 0.2 % (ref 0–0.5)
LYMPHOCYTES # BLD AUTO: 2 K/UL (ref 1–4.8)
LYMPHOCYTES NFR BLD: 36.5 % (ref 18–48)
MCH RBC QN AUTO: 32.9 PG (ref 27–31)
MCHC RBC AUTO-ENTMCNC: 32.1 G/DL (ref 32–36)
MCV RBC AUTO: 103 FL (ref 82–98)
MONOCYTES # BLD AUTO: 0.4 K/UL (ref 0.3–1)
MONOCYTES NFR BLD: 7 % (ref 4–15)
NEUTROPHILS # BLD AUTO: 2.7 K/UL (ref 1.8–7.7)
NEUTROPHILS NFR BLD: 47.3 % (ref 38–73)
NRBC BLD-RTO: 0 /100 WBC
PLATELET # BLD AUTO: 301 K/UL (ref 150–350)
PMV BLD AUTO: 9.8 FL (ref 9.2–12.9)
POTASSIUM SERPL-SCNC: 4.2 MMOL/L (ref 3.5–5.1)
PROT SERPL-MCNC: 7.3 G/DL (ref 6–8.4)
RBC # BLD AUTO: 3.95 M/UL (ref 4–5.4)
SODIUM SERPL-SCNC: 140 MMOL/L (ref 136–145)
TSH SERPL DL<=0.005 MIU/L-ACNC: 1.82 UIU/ML (ref 0.4–4)
WBC # BLD AUTO: 5.59 K/UL (ref 3.9–12.7)

## 2020-01-18 PROCEDURE — 83036 HEMOGLOBIN GLYCOSYLATED A1C: CPT

## 2020-01-18 PROCEDURE — 80053 COMPREHEN METABOLIC PANEL: CPT

## 2020-01-18 PROCEDURE — 85025 COMPLETE CBC W/AUTO DIFF WBC: CPT

## 2020-01-18 PROCEDURE — 36415 COLL VENOUS BLD VENIPUNCTURE: CPT | Mod: PO

## 2020-01-18 PROCEDURE — 84443 ASSAY THYROID STIM HORMONE: CPT

## 2020-01-19 DIAGNOSIS — I10 ESSENTIAL HYPERTENSION, BENIGN: ICD-10-CM

## 2020-01-20 ENCOUNTER — OFFICE VISIT (OUTPATIENT)
Dept: FAMILY MEDICINE | Facility: CLINIC | Age: 55
End: 2020-01-20
Payer: COMMERCIAL

## 2020-01-20 VITALS
TEMPERATURE: 98 F | SYSTOLIC BLOOD PRESSURE: 110 MMHG | BODY MASS INDEX: 21.79 KG/M2 | HEART RATE: 68 BPM | DIASTOLIC BLOOD PRESSURE: 70 MMHG | OXYGEN SATURATION: 96 % | HEIGHT: 65 IN | WEIGHT: 130.75 LBS

## 2020-01-20 DIAGNOSIS — Z00.00 HEALTH CARE MAINTENANCE: Primary | ICD-10-CM

## 2020-01-20 PROCEDURE — 3074F PR MOST RECENT SYSTOLIC BLOOD PRESSURE < 130 MM HG: ICD-10-PCS | Mod: CPTII,S$GLB,, | Performed by: FAMILY MEDICINE

## 2020-01-20 PROCEDURE — 99396 PREV VISIT EST AGE 40-64: CPT | Mod: S$GLB,,, | Performed by: FAMILY MEDICINE

## 2020-01-20 PROCEDURE — 3078F DIAST BP <80 MM HG: CPT | Mod: CPTII,S$GLB,, | Performed by: FAMILY MEDICINE

## 2020-01-20 PROCEDURE — 99396 PR PREVENTIVE VISIT,EST,40-64: ICD-10-PCS | Mod: S$GLB,,, | Performed by: FAMILY MEDICINE

## 2020-01-20 PROCEDURE — 99999 PR PBB SHADOW E&M-EST. PATIENT-LVL IV: CPT | Mod: PBBFAC,,, | Performed by: FAMILY MEDICINE

## 2020-01-20 PROCEDURE — 3074F SYST BP LT 130 MM HG: CPT | Mod: CPTII,S$GLB,, | Performed by: FAMILY MEDICINE

## 2020-01-20 PROCEDURE — 3078F PR MOST RECENT DIASTOLIC BLOOD PRESSURE < 80 MM HG: ICD-10-PCS | Mod: CPTII,S$GLB,, | Performed by: FAMILY MEDICINE

## 2020-01-20 PROCEDURE — 99999 PR PBB SHADOW E&M-EST. PATIENT-LVL IV: ICD-10-PCS | Mod: PBBFAC,,, | Performed by: FAMILY MEDICINE

## 2020-01-20 RX ORDER — OLOPATADINE HYDROCHLORIDE 2 MG/ML
SOLUTION/ DROPS OPHTHALMIC DAILY
COMMUNITY
Start: 2020-01-03

## 2020-01-20 RX ORDER — LISINOPRIL AND HYDROCHLOROTHIAZIDE 20; 25 MG/1; MG/1
TABLET ORAL
Qty: 90 TABLET | Refills: 1 | Status: SHIPPED | OUTPATIENT
Start: 2020-01-20 | End: 2020-07-22

## 2020-02-01 ENCOUNTER — LAB VISIT (OUTPATIENT)
Dept: LAB | Facility: HOSPITAL | Age: 55
End: 2020-02-01
Attending: FAMILY MEDICINE
Payer: COMMERCIAL

## 2020-02-01 DIAGNOSIS — Z00.00 HEALTH CARE MAINTENANCE: ICD-10-CM

## 2020-02-01 LAB
CHOLEST SERPL-MCNC: 250 MG/DL (ref 120–199)
CHOLEST/HDLC SERPL: 3 {RATIO} (ref 2–5)
HDLC SERPL-MCNC: 82 MG/DL (ref 40–75)
HDLC SERPL: 32.8 % (ref 20–50)
LDLC SERPL CALC-MCNC: 151.2 MG/DL (ref 63–159)
NONHDLC SERPL-MCNC: 168 MG/DL
TRIGL SERPL-MCNC: 84 MG/DL (ref 30–150)

## 2020-02-01 PROCEDURE — 36415 COLL VENOUS BLD VENIPUNCTURE: CPT | Mod: PO

## 2020-02-01 PROCEDURE — 80061 LIPID PANEL: CPT

## 2020-02-17 DIAGNOSIS — K21.9 GASTROESOPHAGEAL REFLUX DISEASE, ESOPHAGITIS PRESENCE NOT SPECIFIED: ICD-10-CM

## 2020-02-17 RX ORDER — OMEPRAZOLE 40 MG/1
CAPSULE, DELAYED RELEASE ORAL
Qty: 30 CAPSULE | Refills: 10 | Status: SHIPPED | OUTPATIENT
Start: 2020-02-17 | End: 2020-04-28

## 2020-03-02 DIAGNOSIS — J06.9 UPPER RESPIRATORY TRACT INFECTION, UNSPECIFIED TYPE: ICD-10-CM

## 2020-03-03 RX ORDER — FLUTICASONE PROPIONATE 50 MCG
SPRAY, SUSPENSION (ML) NASAL
Qty: 16 G | Refills: 2 | Status: SHIPPED | OUTPATIENT
Start: 2020-03-03 | End: 2021-04-16

## 2020-04-19 ENCOUNTER — PATIENT MESSAGE (OUTPATIENT)
Dept: FAMILY MEDICINE | Facility: CLINIC | Age: 55
End: 2020-04-19

## 2020-04-23 NOTE — TELEPHONE ENCOUNTER
Patient is having increased issure with GERD. Medication is not helping. Requesting medication change.  Could you help?

## 2020-04-27 ENCOUNTER — PATIENT MESSAGE (OUTPATIENT)
Dept: FAMILY MEDICINE | Facility: CLINIC | Age: 55
End: 2020-04-27

## 2020-04-28 RX ORDER — PANTOPRAZOLE SODIUM 40 MG/1
40 TABLET, DELAYED RELEASE ORAL DAILY
Qty: 30 TABLET | Refills: 11 | Status: SHIPPED | OUTPATIENT
Start: 2020-04-28 | End: 2021-07-16

## 2020-05-05 ENCOUNTER — PATIENT MESSAGE (OUTPATIENT)
Dept: ADMINISTRATIVE | Facility: HOSPITAL | Age: 55
End: 2020-05-05

## 2020-05-06 DIAGNOSIS — E03.9 HYPOTHYROIDISM, UNSPECIFIED TYPE: ICD-10-CM

## 2020-05-07 RX ORDER — LEVOTHYROXINE SODIUM 25 UG/1
TABLET ORAL
Qty: 30 TABLET | Refills: 4 | Status: SHIPPED | OUTPATIENT
Start: 2020-05-07 | End: 2020-10-05

## 2020-06-26 DIAGNOSIS — Z12.31 ENCOUNTER FOR SCREENING MAMMOGRAM FOR MALIGNANT NEOPLASM OF BREAST: Primary | ICD-10-CM

## 2020-07-08 ENCOUNTER — HOSPITAL ENCOUNTER (OUTPATIENT)
Dept: RADIOLOGY | Facility: HOSPITAL | Age: 55
Discharge: HOME | End: 2020-07-08
Attending: SPECIALIST
Payer: COMMERCIAL

## 2020-07-08 VITALS — HEIGHT: 65 IN | WEIGHT: 130.75 LBS | BODY MASS INDEX: 21.79 KG/M2

## 2020-07-08 DIAGNOSIS — Z12.31 ENCOUNTER FOR SCREENING MAMMOGRAM FOR MALIGNANT NEOPLASM OF BREAST: ICD-10-CM

## 2020-07-08 PROCEDURE — 77067 SCR MAMMO BI INCL CAD: CPT | Mod: TC,PO

## 2020-07-10 ENCOUNTER — OFFICE VISIT (OUTPATIENT)
Dept: FAMILY MEDICINE | Facility: CLINIC | Age: 55
End: 2020-07-10
Payer: COMMERCIAL

## 2020-07-10 VITALS
WEIGHT: 132.94 LBS | TEMPERATURE: 97 F | OXYGEN SATURATION: 96 % | DIASTOLIC BLOOD PRESSURE: 60 MMHG | SYSTOLIC BLOOD PRESSURE: 110 MMHG | HEART RATE: 92 BPM | BODY MASS INDEX: 22.15 KG/M2 | HEIGHT: 65 IN

## 2020-07-10 DIAGNOSIS — E03.9 HYPOTHYROIDISM, UNSPECIFIED TYPE: ICD-10-CM

## 2020-07-10 DIAGNOSIS — I10 ESSENTIAL HYPERTENSION, BENIGN: Primary | ICD-10-CM

## 2020-07-10 DIAGNOSIS — M51.37 DDD (DEGENERATIVE DISC DISEASE), LUMBOSACRAL: ICD-10-CM

## 2020-07-10 PROCEDURE — 99999 PR PBB SHADOW E&M-EST. PATIENT-LVL IV: CPT | Mod: PBBFAC,,, | Performed by: FAMILY MEDICINE

## 2020-07-10 PROCEDURE — 3074F SYST BP LT 130 MM HG: CPT | Mod: CPTII,S$GLB,, | Performed by: FAMILY MEDICINE

## 2020-07-10 PROCEDURE — 3078F DIAST BP <80 MM HG: CPT | Mod: CPTII,S$GLB,, | Performed by: FAMILY MEDICINE

## 2020-07-10 PROCEDURE — 99396 PR PREVENTIVE VISIT,EST,40-64: ICD-10-PCS | Mod: S$GLB,,, | Performed by: FAMILY MEDICINE

## 2020-07-10 PROCEDURE — 99999 PR PBB SHADOW E&M-EST. PATIENT-LVL IV: ICD-10-PCS | Mod: PBBFAC,,, | Performed by: FAMILY MEDICINE

## 2020-07-10 PROCEDURE — 3078F PR MOST RECENT DIASTOLIC BLOOD PRESSURE < 80 MM HG: ICD-10-PCS | Mod: CPTII,S$GLB,, | Performed by: FAMILY MEDICINE

## 2020-07-10 PROCEDURE — 3074F PR MOST RECENT SYSTOLIC BLOOD PRESSURE < 130 MM HG: ICD-10-PCS | Mod: CPTII,S$GLB,, | Performed by: FAMILY MEDICINE

## 2020-07-10 PROCEDURE — 99396 PREV VISIT EST AGE 40-64: CPT | Mod: S$GLB,,, | Performed by: FAMILY MEDICINE

## 2020-07-10 NOTE — PROGRESS NOTES
Subjective:   Patient ID: Marylin Morel is a 55 y.o. female     Chief Complaint:Annual Exam      Pt here for checkup. Having lower back discomfort but notes decreased exercise due to pandemic. Otherwise overall doing well with no new complaints.    Review of Systems   Respiratory: Negative for shortness of breath.    Cardiovascular: Negative for chest pain.   Gastrointestinal: Negative for abdominal pain.   Genitourinary: Negative for dysuria.     Past Medical History:   Diagnosis Date    Allergy     ants    GERD (gastroesophageal reflux disease)     Hypertension     Thyroid disease      Past Surgical History:   Procedure Laterality Date    BREAST SURGERY      COSMETIC SURGERY      NECK SURGERY       Objective:     Vitals:    07/10/20 0947   BP: 110/60   Pulse: 92   Temp: 97 °F (36.1 °C)     Body mass index is 22.12 kg/m².  Physical Exam  Vitals signs and nursing note reviewed.   Constitutional:       Appearance: She is well-developed.   HENT:      Head: Normocephalic and atraumatic.   Eyes:      General: No scleral icterus.     Conjunctiva/sclera: Conjunctivae normal.   Neck:      Musculoskeletal: Normal range of motion and neck supple.   Cardiovascular:      Heart sounds: No murmur.   Pulmonary:      Effort: Pulmonary effort is normal. No respiratory distress.   Musculoskeletal: Normal range of motion.         General: No deformity.   Skin:     Coloration: Skin is not pale.      Findings: No rash.   Neurological:      Mental Status: She is alert and oriented to person, place, and time.   Psychiatric:         Behavior: Behavior normal.         Thought Content: Thought content normal.         Judgment: Judgment normal.       Assessment:     1. Essential hypertension, benign    2. Hypothyroidism, unspecified type    3. DDD (degenerative disc disease), lumbosacral      Plan:   Essential hypertension, benign  Well controlled on triple therapy  Hypothyroidism, unspecified type  Review of bloodwork form  January 2020 shows TSH within normal range  DDD (degenerative disc disease), lumbosacral  Worsening discomfort. Suggest increased exercise in hours not peak for temperatures    Health maintenance up to date      Gabriel Mccabe MD  07/10/2020    Portions of this note have been dictated with MERVIN Munoz.

## 2020-07-13 ENCOUNTER — OFFICE VISIT (OUTPATIENT)
Dept: PRIMARY CARE CLINIC | Facility: CLINIC | Age: 55
End: 2020-07-13
Payer: COMMERCIAL

## 2020-07-13 VITALS
SYSTOLIC BLOOD PRESSURE: 105 MMHG | DIASTOLIC BLOOD PRESSURE: 64 MMHG | HEART RATE: 73 BPM | RESPIRATION RATE: 20 BRPM | OXYGEN SATURATION: 98 % | TEMPERATURE: 98 F

## 2020-07-13 DIAGNOSIS — R05.9 COUGH: ICD-10-CM

## 2020-07-13 DIAGNOSIS — U07.1 COVID-19: Primary | ICD-10-CM

## 2020-07-13 PROCEDURE — 99213 PR OFFICE/OUTPT VISIT, EST, LEVL III, 20-29 MIN: ICD-10-PCS | Mod: S$GLB,CS,, | Performed by: EMERGENCY MEDICINE

## 2020-07-13 PROCEDURE — U0003 INFECTIOUS AGENT DETECTION BY NUCLEIC ACID (DNA OR RNA); SEVERE ACUTE RESPIRATORY SYNDROME CORONAVIRUS 2 (SARS-COV-2) (CORONAVIRUS DISEASE [COVID-19]), AMPLIFIED PROBE TECHNIQUE, MAKING USE OF HIGH THROUGHPUT TECHNOLOGIES AS DESCRIBED BY CMS-2020-01-R: HCPCS

## 2020-07-13 PROCEDURE — 99213 OFFICE O/P EST LOW 20 MIN: CPT | Mod: S$GLB,CS,, | Performed by: EMERGENCY MEDICINE

## 2020-07-13 NOTE — PROGRESS NOTES
Subjective:        Time seen by provider: 10:36 AM on 07/13/2020    Marylin Morel is a 55 y.o. female with PMHx of HTN who presents for an evaluation of possible COVID-19. The patient c/o sore throat, cough, runny nose, fatigue, loss of appetite, and abdominal discomfort that began yesterday. She denies N/V/D or any other symptoms at this time. Patient reports exposure to  who tested positive for COVID-19 3 days ago. No pertinent PSHx.     Review of Systems   Constitutional: Positive for appetite change and fatigue. Negative for activity change and fever.   HENT: Positive for rhinorrhea and sore throat. Negative for congestion.    Respiratory: Positive for cough. Negative for chest tightness, shortness of breath and wheezing.    Cardiovascular: Negative for chest pain and palpitations.   Gastrointestinal: Negative for diarrhea, nausea and vomiting.        Positive for abdominal discomfort.   Musculoskeletal: Negative for arthralgias and myalgias.   Skin: Negative for rash.   Neurological: Negative for weakness, light-headedness, numbness and headaches.       Objective:      Physical Exam  Vitals signs and nursing note reviewed.   Constitutional:       General: She is not in acute distress.     Appearance: She is well-developed. She is not diaphoretic.   HENT:      Head: Normocephalic and atraumatic.      Nose: Nose normal.      Mouth/Throat:      Mouth: Mucous membranes are moist.      Pharynx: Oropharynx is clear. Uvula midline. No pharyngeal swelling, oropharyngeal exudate, posterior oropharyngeal erythema or uvula swelling.   Eyes:      Conjunctiva/sclera: Conjunctivae normal.   Neck:      Musculoskeletal: Normal range of motion.   Cardiovascular:      Rate and Rhythm: Normal rate and regular rhythm.      Heart sounds: Normal heart sounds. No murmur.   Pulmonary:      Effort: No respiratory distress.      Breath sounds: Normal breath sounds. No wheezing.   Musculoskeletal: Normal range of motion.    Skin:     General: Skin is warm and dry.   Neurological:      Mental Status: She is alert and oriented to person, place, and time.         Assessment:       1. COVID-19      Plan:       1. Discharge home and await results.   2. Return to clinic or ED for new or worsening symptoms.   3. Follow-up with PCP as needed.     Scribe Attestation:   I, Lori Parry, am scribing for, and in the presence of, Jaeme Ambriz PA-C. I performed the above scribed service and the documentation accurately describes the services I performed. I attest to the accuracy of the note.    I, Tesha Jones PA-C, personally performed the services described in this documentation. All medical record entries made by the scribe were at my direction and in my presence.  I have reviewed the chart and agree that the record reflects my personal performance and is accurate and complete. Tesha Jones PA-C.  11:17 AM 07/13/2020

## 2020-07-15 DIAGNOSIS — U07.1 COVID-19 VIRUS DETECTED: ICD-10-CM

## 2020-07-15 LAB — SARS-COV-2 RNA RESP QL NAA+PROBE: DETECTED

## 2020-07-21 DIAGNOSIS — I10 ESSENTIAL HYPERTENSION, BENIGN: ICD-10-CM

## 2020-07-22 RX ORDER — AMLODIPINE BESYLATE 5 MG/1
TABLET ORAL
Qty: 30 TABLET | Refills: 5 | Status: SHIPPED | OUTPATIENT
Start: 2020-07-22 | End: 2021-01-21

## 2020-12-17 ENCOUNTER — PATIENT MESSAGE (OUTPATIENT)
Dept: FAMILY MEDICINE | Facility: CLINIC | Age: 55
End: 2020-12-17

## 2021-01-11 ENCOUNTER — PATIENT MESSAGE (OUTPATIENT)
Dept: FAMILY MEDICINE | Facility: CLINIC | Age: 56
End: 2021-01-11

## 2021-02-13 ENCOUNTER — PATIENT MESSAGE (OUTPATIENT)
Dept: FAMILY MEDICINE | Facility: CLINIC | Age: 56
End: 2021-02-13

## 2021-02-25 RX ORDER — OMEPRAZOLE 40 MG/1
CAPSULE, DELAYED RELEASE ORAL
Qty: 30 CAPSULE | Refills: 0 | Status: SHIPPED | OUTPATIENT
Start: 2021-02-25 | End: 2021-03-24

## 2021-04-29 ENCOUNTER — PATIENT MESSAGE (OUTPATIENT)
Dept: RESEARCH | Facility: HOSPITAL | Age: 56
End: 2021-04-29

## 2021-06-28 ENCOUNTER — PATIENT MESSAGE (OUTPATIENT)
Dept: FAMILY MEDICINE | Facility: CLINIC | Age: 56
End: 2021-06-28

## 2021-07-14 DIAGNOSIS — Z12.31 OTHER SCREENING MAMMOGRAM: ICD-10-CM

## 2021-07-14 LAB — PAP SMEAR: NORMAL

## 2021-07-16 ENCOUNTER — OFFICE VISIT (OUTPATIENT)
Dept: FAMILY MEDICINE | Facility: CLINIC | Age: 56
End: 2021-07-16
Payer: COMMERCIAL

## 2021-07-16 VITALS
BODY MASS INDEX: 22.44 KG/M2 | HEIGHT: 65 IN | OXYGEN SATURATION: 97 % | TEMPERATURE: 98 F | SYSTOLIC BLOOD PRESSURE: 122 MMHG | DIASTOLIC BLOOD PRESSURE: 70 MMHG | WEIGHT: 134.69 LBS | HEART RATE: 75 BPM

## 2021-07-16 DIAGNOSIS — I10 ESSENTIAL HYPERTENSION, BENIGN: Primary | ICD-10-CM

## 2021-07-16 DIAGNOSIS — E03.9 HYPOTHYROIDISM, UNSPECIFIED TYPE: ICD-10-CM

## 2021-07-16 PROCEDURE — 3008F BODY MASS INDEX DOCD: CPT | Mod: CPTII,S$GLB,, | Performed by: FAMILY MEDICINE

## 2021-07-16 PROCEDURE — 3074F SYST BP LT 130 MM HG: CPT | Mod: CPTII,S$GLB,, | Performed by: FAMILY MEDICINE

## 2021-07-16 PROCEDURE — 3078F PR MOST RECENT DIASTOLIC BLOOD PRESSURE < 80 MM HG: ICD-10-PCS | Mod: CPTII,S$GLB,, | Performed by: FAMILY MEDICINE

## 2021-07-16 PROCEDURE — 3074F PR MOST RECENT SYSTOLIC BLOOD PRESSURE < 130 MM HG: ICD-10-PCS | Mod: CPTII,S$GLB,, | Performed by: FAMILY MEDICINE

## 2021-07-16 PROCEDURE — 99396 PREV VISIT EST AGE 40-64: CPT | Mod: S$GLB,,, | Performed by: FAMILY MEDICINE

## 2021-07-16 PROCEDURE — 1126F PR PAIN SEVERITY QUANTIFIED, NO PAIN PRESENT: ICD-10-PCS | Mod: S$GLB,,, | Performed by: FAMILY MEDICINE

## 2021-07-16 PROCEDURE — 99999 PR PBB SHADOW E&M-EST. PATIENT-LVL IV: CPT | Mod: PBBFAC,,, | Performed by: FAMILY MEDICINE

## 2021-07-16 PROCEDURE — 99999 PR PBB SHADOW E&M-EST. PATIENT-LVL IV: ICD-10-PCS | Mod: PBBFAC,,, | Performed by: FAMILY MEDICINE

## 2021-07-16 PROCEDURE — 1126F AMNT PAIN NOTED NONE PRSNT: CPT | Mod: S$GLB,,, | Performed by: FAMILY MEDICINE

## 2021-07-16 PROCEDURE — 3078F DIAST BP <80 MM HG: CPT | Mod: CPTII,S$GLB,, | Performed by: FAMILY MEDICINE

## 2021-07-16 PROCEDURE — 3008F PR BODY MASS INDEX (BMI) DOCUMENTED: ICD-10-PCS | Mod: CPTII,S$GLB,, | Performed by: FAMILY MEDICINE

## 2021-07-16 PROCEDURE — 99396 PR PREVENTIVE VISIT,EST,40-64: ICD-10-PCS | Mod: S$GLB,,, | Performed by: FAMILY MEDICINE

## 2021-10-01 ENCOUNTER — PATIENT OUTREACH (OUTPATIENT)
Dept: ADMINISTRATIVE | Facility: HOSPITAL | Age: 56
End: 2021-10-01

## 2021-11-22 NOTE — PROGRESS NOTES
Subjective:   Patient ID: Marylin Morel is a 54 y.o. female     Chief Complaint:Follow-up (6 months)      Patient for checkup.  Patient doing well.    Review of Systems   Constitutional: Negative for chills and fever.   HENT: Negative for sore throat.    Eyes: Negative for visual disturbance.   Respiratory: Negative for shortness of breath.    Cardiovascular: Negative for chest pain.   Gastrointestinal: Negative for abdominal pain.   Endocrine: Negative for polyuria.   Genitourinary: Negative for dysuria.   Musculoskeletal: Negative for back pain.   Skin: Negative for color change.   Neurological: Negative for headaches.   Psychiatric/Behavioral: Negative for agitation and confusion.     Past Medical History:   Diagnosis Date    Allergy     ants    GERD (gastroesophageal reflux disease)     Hypertension     Thyroid disease      Objective:     Vitals:    01/20/20 0918   BP: 110/70   Pulse: 68   Temp: 98 °F (36.7 °C)     Body mass index is 21.76 kg/m².  Physical Exam   Constitutional: No distress.   HENT:   Head: Normocephalic and atraumatic.   Eyes: EOM are normal.   Cardiovascular: Normal rate and normal heart sounds.   Pulmonary/Chest: Effort normal.   Abdominal: Bowel sounds are normal.   Musculoskeletal: Normal range of motion.   Neurological: She is alert.   Psychiatric: She has a normal mood and affect.     Assessment:     1. Health care maintenance      Plan:   Health care maintenance  -     Lipid panel; Future; Expected date: 01/20/2020        Gabriel Mccabe MD  01/22/2020    Portions of this note have been dictated with MERVIN Hurtado     22-Nov-2021 18:18

## 2022-01-03 ENCOUNTER — HOSPITAL ENCOUNTER (OUTPATIENT)
Dept: RADIOLOGY | Facility: HOSPITAL | Age: 57
Discharge: HOME | End: 2022-01-03
Attending: PAIN MEDICINE
Payer: COMMERCIAL

## 2022-01-03 ENCOUNTER — HOSPITAL ENCOUNTER (OUTPATIENT)
Dept: RADIOLOGY | Facility: HOSPITAL | Age: 57
Discharge: HOME | End: 2022-01-03
Attending: FAMILY MEDICINE
Payer: COMMERCIAL

## 2022-01-03 DIAGNOSIS — M47.897 OTHER OSTEOARTHRITIS OF SPINE, LUMBOSACRAL REGION: ICD-10-CM

## 2022-01-03 DIAGNOSIS — M47.896 OTHER OSTEOARTHRITIS OF SPINE, LUMBAR REGION: ICD-10-CM

## 2022-01-03 DIAGNOSIS — M25.552 LEFT HIP PAIN: Primary | ICD-10-CM

## 2022-01-03 DIAGNOSIS — M25.552 LEFT HIP PAIN: ICD-10-CM

## 2022-01-03 DIAGNOSIS — M51.27 LUMBAGO-SCIATICA DUE TO DISPLACEMENT OF LUMBAR INTERVERTEBRAL DISC: ICD-10-CM

## 2022-01-03 DIAGNOSIS — M48.061 SPINAL STENOSIS, LUMBAR REGION, WITHOUT NEUROGENIC CLAUDICATION: ICD-10-CM

## 2022-01-03 DIAGNOSIS — Z12.31 OTHER SCREENING MAMMOGRAM: ICD-10-CM

## 2022-01-03 DIAGNOSIS — M51.26 DISPLACEMENT OF LUMBAR INTERVERTEBRAL DISC WITHOUT MYELOPATHY: ICD-10-CM

## 2022-01-03 PROCEDURE — 73502 X-RAY EXAM HIP UNI 2-3 VIEWS: CPT | Mod: TC,PO,LT

## 2022-01-03 PROCEDURE — 77063 BREAST TOMOSYNTHESIS BI: CPT | Mod: TC,PO

## 2022-01-21 ENCOUNTER — PATIENT MESSAGE (OUTPATIENT)
Dept: FAMILY MEDICINE | Facility: CLINIC | Age: 57
End: 2022-01-21
Payer: COMMERCIAL

## 2022-01-21 DIAGNOSIS — I10 ESSENTIAL HYPERTENSION, BENIGN: ICD-10-CM

## 2022-01-21 RX ORDER — AMLODIPINE BESYLATE 5 MG/1
5 TABLET ORAL DAILY
Qty: 30 TABLET | Refills: 5 | Status: SHIPPED | OUTPATIENT
Start: 2022-01-21 | End: 2022-07-25

## 2022-01-21 NOTE — TELEPHONE ENCOUNTER
No new care gaps identified.  Powered by [employer]. Reference number: [id number].   1/21/2022 1:28:26 PM CST

## 2022-01-23 DIAGNOSIS — I10 ESSENTIAL HYPERTENSION, BENIGN: ICD-10-CM

## 2022-01-23 NOTE — TELEPHONE ENCOUNTER
No new care gaps identified.  Powered by [employer]. Reference number: [id number].   1/23/2022 8:12:28 AM CST

## 2022-01-24 RX ORDER — LISINOPRIL AND HYDROCHLOROTHIAZIDE 20; 25 MG/1; MG/1
TABLET ORAL
Qty: 90 TABLET | Refills: 1 | Status: SHIPPED | OUTPATIENT
Start: 2022-01-24 | End: 2022-07-27 | Stop reason: SDUPTHER

## 2022-02-25 ENCOUNTER — PATIENT MESSAGE (OUTPATIENT)
Dept: FAMILY MEDICINE | Facility: CLINIC | Age: 57
End: 2022-02-25
Payer: COMMERCIAL

## 2022-04-24 NOTE — TELEPHONE ENCOUNTER
Care Due:                  Date            Visit Type   Department     Provider  --------------------------------------------------------------------------------                                EP -                              PRIMARY      [facility]  Last Visit: 07-      CARE ([facility])   MEDICINE       Gabriel Mccabe  Next Visit: None Scheduled  None         None Found                                                            Last  Test          Frequency    Reason                     Performed    Due Date  --------------------------------------------------------------------------------    Office Visit  12 months..  amLODIPine,                07- 07-                             lisinopriL-hydrochlorothi                             azide, omeprazole........    CMP.........  12 months..  lisinopriL-hydrochlorothi  Not Found    Overdue                             azide....................    Powered by [employer] Reference number: [id number].   4/24/2022 8:12:09 AM CDT

## 2022-04-25 RX ORDER — OMEPRAZOLE 40 MG/1
CAPSULE, DELAYED RELEASE ORAL
Qty: 30 CAPSULE | Refills: 0 | Status: SHIPPED | OUTPATIENT
Start: 2022-04-25 | End: 2022-05-28

## 2022-04-25 NOTE — TELEPHONE ENCOUNTER
Refill Authorization Note   Marylin Morel  is requesting a refill authorization.  Brief Assessment and Rationale for Refill:  Approve    -Medication-Related Problems Identified:   Requires labs  Requires appointment  Medication Therapy Plan:       Medication Reconciliation Completed: No   Comments:     No Care Gaps recommended.     Note composed:3:20 PM 04/25/2022

## 2022-05-11 ENCOUNTER — HOSPITAL ENCOUNTER (OUTPATIENT)
Dept: RADIOLOGY | Facility: CLINIC | Age: 57
Discharge: HOME | End: 2022-05-11
Payer: COMMERCIAL

## 2022-05-11 ENCOUNTER — OFFICE VISIT (OUTPATIENT)
Dept: FAMILY MEDICINE | Facility: CLINIC | Age: 57
End: 2022-05-11
Payer: COMMERCIAL

## 2022-05-11 VITALS
OXYGEN SATURATION: 94 % | SYSTOLIC BLOOD PRESSURE: 124 MMHG | RESPIRATION RATE: 14 BRPM | HEIGHT: 65 IN | HEART RATE: 103 BPM | WEIGHT: 136.25 LBS | TEMPERATURE: 99 F | BODY MASS INDEX: 22.7 KG/M2 | DIASTOLIC BLOOD PRESSURE: 68 MMHG

## 2022-05-11 DIAGNOSIS — J04.0 LARYNGITIS, ACUTE: ICD-10-CM

## 2022-05-11 DIAGNOSIS — R09.89 CHEST CONGESTION: ICD-10-CM

## 2022-05-11 DIAGNOSIS — R06.02 SHORTNESS OF BREATH: ICD-10-CM

## 2022-05-11 DIAGNOSIS — R05.9 COUGH: Primary | ICD-10-CM

## 2022-05-11 DIAGNOSIS — E03.9 HYPOTHYROIDISM, UNSPECIFIED TYPE: ICD-10-CM

## 2022-05-11 DIAGNOSIS — I10 ESSENTIAL HYPERTENSION, BENIGN: ICD-10-CM

## 2022-05-11 DIAGNOSIS — R05.9 COUGH: ICD-10-CM

## 2022-05-11 PROCEDURE — 1159F MED LIST DOCD IN RCRD: CPT | Mod: CPTII,S$GLB,,

## 2022-05-11 PROCEDURE — 1159F PR MEDICATION LIST DOCUMENTED IN MEDICAL RECORD: ICD-10-PCS | Mod: CPTII,S$GLB,,

## 2022-05-11 PROCEDURE — 99999 PR PBB SHADOW E&M-EST. PATIENT-LVL V: CPT | Mod: PBBFAC,,,

## 2022-05-11 PROCEDURE — 3078F PR MOST RECENT DIASTOLIC BLOOD PRESSURE < 80 MM HG: ICD-10-PCS | Mod: CPTII,S$GLB,,

## 2022-05-11 PROCEDURE — 4010F PR ACE/ARB THEARPY RXD/TAKEN: ICD-10-PCS | Mod: CPTII,S$GLB,,

## 2022-05-11 PROCEDURE — 71046 X-RAY EXAM CHEST 2 VIEWS: CPT | Mod: TC,FY,PO

## 2022-05-11 PROCEDURE — 3078F DIAST BP <80 MM HG: CPT | Mod: CPTII,S$GLB,,

## 2022-05-11 PROCEDURE — 99214 OFFICE O/P EST MOD 30 MIN: CPT | Mod: S$GLB,,,

## 2022-05-11 PROCEDURE — 99214 PR OFFICE/OUTPT VISIT, EST, LEVL IV, 30-39 MIN: ICD-10-PCS | Mod: S$GLB,,,

## 2022-05-11 PROCEDURE — 4010F ACE/ARB THERAPY RXD/TAKEN: CPT | Mod: CPTII,S$GLB,,

## 2022-05-11 PROCEDURE — 3074F PR MOST RECENT SYSTOLIC BLOOD PRESSURE < 130 MM HG: ICD-10-PCS | Mod: CPTII,S$GLB,,

## 2022-05-11 PROCEDURE — 3008F BODY MASS INDEX DOCD: CPT | Mod: CPTII,S$GLB,,

## 2022-05-11 PROCEDURE — 3008F PR BODY MASS INDEX (BMI) DOCUMENTED: ICD-10-PCS | Mod: CPTII,S$GLB,,

## 2022-05-11 PROCEDURE — 1160F RVW MEDS BY RX/DR IN RCRD: CPT | Mod: CPTII,S$GLB,,

## 2022-05-11 PROCEDURE — 71046 X-RAY EXAM CHEST 2 VIEWS: CPT | Mod: 26,,, | Performed by: RADIOLOGY

## 2022-05-11 PROCEDURE — 71046 XR CHEST PA AND LATERAL: ICD-10-PCS | Mod: 26,,, | Performed by: RADIOLOGY

## 2022-05-11 PROCEDURE — 1160F PR REVIEW ALL MEDS BY PRESCRIBER/CLIN PHARMACIST DOCUMENTED: ICD-10-PCS | Mod: CPTII,S$GLB,,

## 2022-05-11 PROCEDURE — 99999 PR PBB SHADOW E&M-EST. PATIENT-LVL V: ICD-10-PCS | Mod: PBBFAC,,,

## 2022-05-11 PROCEDURE — 3074F SYST BP LT 130 MM HG: CPT | Mod: CPTII,S$GLB,,

## 2022-05-11 RX ORDER — AZELASTINE 1 MG/ML
SPRAY, METERED NASAL
COMMUNITY
Start: 2022-05-02 | End: 2022-07-08 | Stop reason: ALTCHOICE

## 2022-05-11 RX ORDER — BENZONATATE 100 MG/1
200 CAPSULE ORAL 2 TIMES DAILY
COMMUNITY
Start: 2022-05-02 | End: 2022-05-11 | Stop reason: SDUPTHER

## 2022-05-11 RX ORDER — AMOXICILLIN AND CLAVULANATE POTASSIUM 875; 125 MG/1; MG/1
1 TABLET, FILM COATED ORAL 2 TIMES DAILY
Qty: 14 TABLET | Refills: 0 | Status: SHIPPED | OUTPATIENT
Start: 2022-05-11 | End: 2022-05-18

## 2022-05-11 RX ORDER — AZITHROMYCIN 500 MG/1
500 TABLET, FILM COATED ORAL DAILY
Qty: 7 TABLET | Refills: 0 | Status: SHIPPED | OUTPATIENT
Start: 2022-05-11 | End: 2022-05-18

## 2022-05-11 RX ORDER — PROMETHAZINE HYDROCHLORIDE AND DEXTROMETHORPHAN HYDROBROMIDE 6.25; 15 MG/5ML; MG/5ML
5 SYRUP ORAL NIGHTLY PRN
Qty: 180 ML | Refills: 0 | Status: SHIPPED | OUTPATIENT
Start: 2022-05-11 | End: 2022-07-08 | Stop reason: ALTCHOICE

## 2022-05-11 RX ORDER — BENZONATATE 100 MG/1
200 CAPSULE ORAL 3 TIMES DAILY PRN
Qty: 60 CAPSULE | Refills: 0 | Status: SHIPPED | OUTPATIENT
Start: 2022-05-11 | End: 2022-07-08 | Stop reason: ALTCHOICE

## 2022-05-11 NOTE — PATIENT INSTRUCTIONS
Mucinex DM if not taking the cough syrup during the day.       Luther Coulter,     If you are due for any health screening(s) below please notify me so we can arrange them to be ordered and scheduled to maintain your health. Most healthy patients complete it. Don't lose out on improving your health.     Health Maintenance   Topic Date Due    Mammogram  01/03/2023    Lipid Panel  02/01/2025    TETANUS VACCINE  03/14/2026    Hepatitis C Screening  Completed          Colon Cancer Screening    Of cancers affecting both men and women, colorectal cancer is the third leading cancer killer in the [place]. But it doesnt have to be. Screening can prevent colorectal cancer or find it at an early stage when treatment often leads to a cure.    A colonoscopy is the preferred test for detecting colon cancer. It is needed only once every 10 years if results are negative. While sedated, a flexible, lighted tube with a tiny camera is inserted into the rectum and advanced through the colon to look for cancers. An alternative screening test that is used at home and returned to the lab may also be used. It detects hidden blood in bowel movements which could indicate cancer in the colon. If results are positive, you will need a colonoscopy to determine if the blood is a sign of cancer. This type of follow up (diagnostic) colonoscopy usually requires additional copays as required by your insurance provider. Please contact your PCP if you have any questions.    Although you are still overdue for this important screening, due to the COVID-19 pandemic, we recommend scheduling it in the near future.           May 11, 2022       Marylin Morel  [address]  [place] [address]           Dear Marylin:    Your [facility] care is dedicated to helping you stay healthy with regular scheduled recommended screenings.  Scheduling routine screenings is important to maintaining good health. Our records indicate that you may be overdue  for your screening pap smear.  Pap smear screening can help identify patients at risk for developing cervical cancer at an early stage, when it is most likely to be successfully treated.    The current recommendation for Pap smear screening is every 3-5 years.  We encourage you to schedule your appointment with your [facility] provider.  Many women see a Gynecologist for this screening but some primary care providers also provide Pap screening  If you recently had your pap smear screening performed outside of [facility], please let your Health care team know so that they can update your health record.      If you have questions or want to schedule your screening elsewhere, please call [phone] to speak to a [facility] coordinator.    Sincerely,    Your [facility] Care Team

## 2022-05-11 NOTE — PROGRESS NOTES
Subjective:       Patient ID: Marylin Morel is a 56 y.o. female.    Chief Complaint: Cough and Nasal Congestion    Marylin Morel is a 55 yo F pt w/ Mhx listed below that presents to the clinic w/ complaints of worsening cough, chest congestion and shortness of breath. She was seen at  a week ago and started on steroid and antihistamine spray for noninfectious sinusitis symptoms. Patient had improvement while taking the steroid therapy but worsened after completing. Now has chest congestion and shortness of breath that has been worsening over the last few days. Cough is productive. Has been using chloraseptic and tessalon with some relief.       Past Medical History:   Diagnosis Date    Allergy     ants    GERD (gastroesophageal reflux disease)     Hypertension     Thyroid disease        Review of patient's allergies indicates:   Allergen Reactions    Insect venom Anaphylaxis     Ant bites         Current Outpatient Medications:     amLODIPine (NORVASC) 5 MG tablet, Take 1 tablet (5 mg total) by mouth once daily., Disp: 30 tablet, Rfl: 5    azelastine (ASTELIN) 137 mcg (0.1 %) nasal spray, SMARTSIG:Both Nares, Disp: , Rfl:     cetirizine (ZYRTEC) 10 MG tablet, Take 10 mg by mouth once daily., Disp: , Rfl:     chlorzoxazone (PARAFON FORTE) 500 mg Tab, , Disp: , Rfl:     COMBIPATCH 0.05-0.14 mg/24 hr, , Disp: , Rfl:     fluticasone propionate (FLONASE) 50 mcg/actuation nasal spray, INSTILL ONE SPRAY INTO EACH NOSTRIL ONCE DAILY, Disp: 16 g, Rfl: 2    ibuprofen (ADVIL,MOTRIN) 800 MG tablet, Take 1 tablet (800 mg total) by mouth every 6 (six) hours as needed for Pain., Disp: 60 tablet, Rfl: 5    levothyroxine (SYNTHROID) 25 MCG tablet, TAKE ONE TABLET BY MOUTH ONCE DAILY, Disp: 90 tablet, Rfl: 3    lisinopriL-hydrochlorothiazide (PRINZIDE,ZESTORETIC) 20-25 mg Tab, TAKE ONE TABLET BY MOUTH ONCE DAILY, Disp: 90 tablet, Rfl: 1    olopatadine (PATADAY) 0.2 % Drop, , Disp: , Rfl:     omeprazole  (PRILOSEC) 40 MG capsule, TAKE ONE CAPSULE BY MOUTH ONCE DAILY, Disp: 30 capsule, Rfl: 0    vitamin D (VITAMIN D3) 1000 units Tab, Take 1,000 Units by mouth once daily., Disp: , Rfl:     amoxicillin-clavulanate 875-125mg (AUGMENTIN) 875-125 mg per tablet, Take 1 tablet by mouth 2 (two) times a day. for 7 days, Disp: 14 tablet, Rfl: 0    azithromycin (ZITHROMAX) 500 MG tablet, Take 1 tablet (500 mg total) by mouth once daily. for 7 days, Disp: 7 tablet, Rfl: 0    benzonatate (TESSALON) 100 MG capsule, Take 2 capsules (200 mg total) by mouth 3 (three) times daily as needed for Cough., Disp: 60 capsule, Rfl: 0    EPINEPHrine (EPIPEN) 0.3 mg/0.3 mL AtIn, INJECT 0.3 MLS (0.3 MG TOTAL) INTO THE MUSCLE ONCE. FOR 1 DOSE , Disp: 2 each, Rfl: 1    HYDROcodone-acetaminophen (NORCO) 5-325 mg per tablet, Take 1 tablet by mouth every 6 (six) hours as needed., Disp: , Rfl:     promethazine-dextromethorphan (PROMETHAZINE-DM) 6.25-15 mg/5 mL Syrp, Take 5 mLs by mouth nightly as needed (cough)., Disp: 180 mL, Rfl: 0    Review of Systems   Constitutional: Positive for activity change and fatigue. Negative for appetite change, diaphoresis and unexpected weight change.   HENT: Positive for congestion and voice change. Negative for sinus pressure, sneezing and trouble swallowing.    Eyes: Negative for pain, itching and visual disturbance.   Respiratory: Negative for chest tightness.    Cardiovascular: Negative for palpitations and leg swelling.   Gastrointestinal: Negative for abdominal distention, abdominal pain, blood in stool, constipation, diarrhea, nausea and vomiting.   Endocrine: Negative for cold intolerance and heat intolerance.   Genitourinary: Negative for difficulty urinating, dysuria, frequency, hematuria and urgency.   Musculoskeletal: Negative for arthralgias, back pain and neck pain.   Skin: Negative for color change, pallor and wound.   Neurological: Negative for dizziness, syncope, weakness and numbness.  "  Hematological: Negative for adenopathy.   Psychiatric/Behavioral: Negative for behavioral problems. The patient is not nervous/anxious.        Objective:      /68 (BP Location: Right arm, Patient Position: Sitting, BP Method: Medium (Manual))   Pulse 103   Temp 99.4 °F (37.4 °C) (Oral)   Resp 14   Ht 5' 5" (1.651 m)   Wt 61.8 kg (136 lb 3.9 oz)   LMP 02/05/2015 (Approximate)   SpO2 (!) 94%   BMI 22.67 kg/m²   Physical Exam  Vitals reviewed.   Constitutional:       General: She is not in acute distress.     Appearance: Normal appearance. She is normal weight. She is not ill-appearing, toxic-appearing or diaphoretic.   HENT:      Head: Normocephalic.      Right Ear: External ear normal.      Left Ear: External ear normal.      Nose: Nose normal. No congestion or rhinorrhea.      Mouth/Throat:      Mouth: Mucous membranes are moist.      Pharynx: Oropharynx is clear.   Eyes:      General: No scleral icterus.        Right eye: No discharge.         Left eye: No discharge.      Extraocular Movements: Extraocular movements intact.      Conjunctiva/sclera: Conjunctivae normal.   Cardiovascular:      Rate and Rhythm: Normal rate and regular rhythm.      Pulses: Normal pulses.      Heart sounds: Normal heart sounds. No murmur heard.    No friction rub. No gallop.   Pulmonary:      Effort: Pulmonary effort is normal. No respiratory distress.      Breath sounds: Rales present. No wheezing or rhonchi.      Comments: Patient has change in voice. Has to catch breath occasionally.   Chest:      Chest wall: No tenderness.   Abdominal:      General: There is no distension.      Palpations: Abdomen is soft. There is no mass.      Tenderness: There is no abdominal tenderness. There is no guarding.   Musculoskeletal:         General: No swelling, tenderness or deformity. Normal range of motion.      Cervical back: Normal range of motion.      Right lower leg: No edema.      Left lower leg: No edema.   Skin:     General: " Skin is warm and dry.      Capillary Refill: Capillary refill takes less than 2 seconds.      Coloration: Skin is not jaundiced.      Findings: No bruising, erythema, lesion or rash.   Neurological:      Mental Status: She is alert and oriented to person, place, and time.      Gait: Gait normal.   Psychiatric:         Mood and Affect: Mood normal.         Behavior: Behavior normal.         Thought Content: Thought content normal.         Judgment: Judgment normal.         Assessment:       1. Cough    2. Laryngitis, acute    3. Chest congestion    4. Shortness of breath    5. Essential hypertension, benign    6. Hypothyroidism, unspecified type        Plan:       Cough  -     amoxicillin-clavulanate 875-125mg (AUGMENTIN) 875-125 mg per tablet; Take 1 tablet by mouth 2 (two) times a day. for 7 days  Dispense: 14 tablet; Refill: 0  -     azithromycin (ZITHROMAX) 500 MG tablet; Take 1 tablet (500 mg total) by mouth once daily. for 7 days  Dispense: 7 tablet; Refill: 0  -     promethazine-dextromethorphan (PROMETHAZINE-DM) 6.25-15 mg/5 mL Syrp; Take 5 mLs by mouth nightly as needed (cough).  Dispense: 180 mL; Refill: 0  -     benzonatate (TESSALON) 100 MG capsule; Take 2 capsules (200 mg total) by mouth 3 (three) times daily as needed for Cough.  Dispense: 60 capsule; Refill: 0  -     X-Ray Chest PA And Lateral; Future; Expected date: 05/11/2022    Laryngitis, acute        -     Chloraseptic spray.     Chest congestion  -     amoxicillin-clavulanate 875-125mg (AUGMENTIN) 875-125 mg per tablet; Take 1 tablet by mouth 2 (two) times a day. for 7 days  Dispense: 14 tablet; Refill: 0  -     azithromycin (ZITHROMAX) 500 MG tablet; Take 1 tablet (500 mg total) by mouth once daily. for 7 days  Dispense: 7 tablet; Refill: 0  -     X-Ray Chest PA And Lateral; Future; Expected date: 05/11/2022        -    Mucinex DM during the day when not taking promethazine DM.     Shortness of breath  -     amoxicillin-clavulanate 875-125mg  (AUGMENTIN) 875-125 mg per tablet; Take 1 tablet by mouth 2 (two) times a day. for 7 days  Dispense: 14 tablet; Refill: 0  -     azithromycin (ZITHROMAX) 500 MG tablet; Take 1 tablet (500 mg total) by mouth once daily. for 7 days  Dispense: 7 tablet; Refill: 0  -     X-Ray Chest PA And Lateral; Future; Expected date: 05/11/2022    Essential hypertension, benign         -    Stable. Continue meds as prescribed. Will continue to monitor.     Hypothyroidism, unspecified type         -     Continue meds. Will continue to monitor.           Patient instructed to contact clinic immediately if she has worsening of her shortness of breath. I recommend ED if after hours.   Patient to follow up routinely in 6 months w/ Dr. Mccabe.        Avelino Ko PA-C  Family Medicine Physician Assistant       I spent a total of 20 minutes on the day of the visit.This includes face to face time and non-face to face time preparing to see the patient (eg, review of tests), obtaining and/or reviewing separately obtained history, documenting clinical information in the electronic or other health record, independently interpreting results and communicating results to the patient/family/caregiver, or care coordinator.      We have addressed [4] Moderate: 1 or more chronic illnesses with exacerbation, progression, or side effects of treatment / 2 or more stable chronic illnesses / 1 undiagnosed new problem with uncertain prognosis / 1 acute illness with systemic symptoms / 1 acute complicated injury  The complexity of the data reviewed and analyzed for this visit was [3] Limited (Reviewed prior external note, ordered unique testing or reviewed the results of each unique test)   The risk of complications and/or morbidity or mortality are [4] Moderate risk (I.e. prescription drug management / decision regarding minor surgery with identified pt or procedure risk factors / decision regarding elective major surgery without identified pt or  procedure risk factors / diagnosis or treatment significantly limited by social determinants of health)   The level of Medical Decision Making for this visit is [4] Moderate

## 2022-05-15 ENCOUNTER — PATIENT MESSAGE (OUTPATIENT)
Dept: FAMILY MEDICINE | Facility: CLINIC | Age: 57
End: 2022-05-15

## 2022-05-15 DIAGNOSIS — J38.7 THROAT ULCER: ICD-10-CM

## 2022-05-15 DIAGNOSIS — B37.0 ORAL CANDIDIASIS: Primary | ICD-10-CM

## 2022-05-16 RX ORDER — CLOTRIMAZOLE 10 MG/1
10 LOZENGE ORAL; TOPICAL
Qty: 35 TABLET | Refills: 0 | Status: SHIPPED | OUTPATIENT
Start: 2022-05-16 | End: 2022-05-23

## 2022-05-16 NOTE — TELEPHONE ENCOUNTER
This could be thrush or it could be ulcerations 2/2 the current illness the patient is recovering from. We will be conservative and treat for thrush just in case it is that. I am sending a medication to the patient's pharmacy.

## 2022-05-18 ENCOUNTER — PATIENT MESSAGE (OUTPATIENT)
Dept: FAMILY MEDICINE | Facility: CLINIC | Age: 57
End: 2022-05-18

## 2022-05-18 DIAGNOSIS — B37.0 ORAL CANDIDIASIS: Primary | ICD-10-CM

## 2022-05-18 RX ORDER — FLUCONAZOLE 100 MG/1
TABLET ORAL
Qty: 8 TABLET | Refills: 0 | Status: SHIPPED | OUTPATIENT
Start: 2022-05-18 | End: 2022-05-25

## 2022-05-28 RX ORDER — OMEPRAZOLE 40 MG/1
40 CAPSULE, DELAYED RELEASE ORAL DAILY
Qty: 90 CAPSULE | Refills: 0 | Status: SHIPPED | OUTPATIENT
Start: 2022-05-28 | End: 2022-08-25

## 2022-05-28 NOTE — TELEPHONE ENCOUNTER
Refill Authorization Note   Marylin Morel  is requesting a refill authorization.  Brief Assessment and Rationale for Refill:        Medication Therapy Plan:           Comments:     No Care Gaps recommended.     Note composed:3:04 PM 05/28/2022

## 2022-05-28 NOTE — TELEPHONE ENCOUNTER
No new care gaps identified.  [facility] Embedded Care Gaps. Reference number: [id number]. 5/28/2022   7:48:40 AM CDT

## 2022-07-08 ENCOUNTER — OFFICE VISIT (OUTPATIENT)
Dept: FAMILY MEDICINE | Facility: CLINIC | Age: 57
End: 2022-07-08
Payer: COMMERCIAL

## 2022-07-08 VITALS
HEART RATE: 92 BPM | HEIGHT: 65 IN | BODY MASS INDEX: 22.63 KG/M2 | DIASTOLIC BLOOD PRESSURE: 72 MMHG | TEMPERATURE: 100 F | OXYGEN SATURATION: 99 % | WEIGHT: 135.81 LBS | SYSTOLIC BLOOD PRESSURE: 120 MMHG

## 2022-07-08 DIAGNOSIS — J40 BRONCHITIS: ICD-10-CM

## 2022-07-08 DIAGNOSIS — R06.02 SOB (SHORTNESS OF BREATH): ICD-10-CM

## 2022-07-08 DIAGNOSIS — R05.9 COUGH: Primary | ICD-10-CM

## 2022-07-08 LAB
CTP QC/QA: YES
SARS-COV-2 RDRP RESP QL NAA+PROBE: NEGATIVE

## 2022-07-08 PROCEDURE — 3078F DIAST BP <80 MM HG: CPT | Mod: CPTII,S$GLB,, | Performed by: NURSE PRACTITIONER

## 2022-07-08 PROCEDURE — 1159F MED LIST DOCD IN RCRD: CPT | Mod: CPTII,S$GLB,, | Performed by: NURSE PRACTITIONER

## 2022-07-08 PROCEDURE — 99999 PR PBB SHADOW E&M-EST. PATIENT-LVL IV: ICD-10-PCS | Mod: PBBFAC,,, | Performed by: NURSE PRACTITIONER

## 2022-07-08 PROCEDURE — 3078F PR MOST RECENT DIASTOLIC BLOOD PRESSURE < 80 MM HG: ICD-10-PCS | Mod: CPTII,S$GLB,, | Performed by: NURSE PRACTITIONER

## 2022-07-08 PROCEDURE — 99213 PR OFFICE/OUTPT VISIT, EST, LEVL III, 20-29 MIN: ICD-10-PCS | Mod: S$GLB,,, | Performed by: NURSE PRACTITIONER

## 2022-07-08 PROCEDURE — 4010F ACE/ARB THERAPY RXD/TAKEN: CPT | Mod: CPTII,S$GLB,, | Performed by: NURSE PRACTITIONER

## 2022-07-08 PROCEDURE — 4010F PR ACE/ARB THEARPY RXD/TAKEN: ICD-10-PCS | Mod: CPTII,S$GLB,, | Performed by: NURSE PRACTITIONER

## 2022-07-08 PROCEDURE — 1160F RVW MEDS BY RX/DR IN RCRD: CPT | Mod: CPTII,S$GLB,, | Performed by: NURSE PRACTITIONER

## 2022-07-08 PROCEDURE — U0002 COVID-19 LAB TEST NON-CDC: HCPCS | Mod: QW,S$GLB,, | Performed by: NURSE PRACTITIONER

## 2022-07-08 PROCEDURE — 1159F PR MEDICATION LIST DOCUMENTED IN MEDICAL RECORD: ICD-10-PCS | Mod: CPTII,S$GLB,, | Performed by: NURSE PRACTITIONER

## 2022-07-08 PROCEDURE — 3074F PR MOST RECENT SYSTOLIC BLOOD PRESSURE < 130 MM HG: ICD-10-PCS | Mod: CPTII,S$GLB,, | Performed by: NURSE PRACTITIONER

## 2022-07-08 PROCEDURE — U0002: ICD-10-PCS | Mod: QW,S$GLB,, | Performed by: NURSE PRACTITIONER

## 2022-07-08 PROCEDURE — 3008F BODY MASS INDEX DOCD: CPT | Mod: CPTII,S$GLB,, | Performed by: NURSE PRACTITIONER

## 2022-07-08 PROCEDURE — 3074F SYST BP LT 130 MM HG: CPT | Mod: CPTII,S$GLB,, | Performed by: NURSE PRACTITIONER

## 2022-07-08 PROCEDURE — 1160F PR REVIEW ALL MEDS BY PRESCRIBER/CLIN PHARMACIST DOCUMENTED: ICD-10-PCS | Mod: CPTII,S$GLB,, | Performed by: NURSE PRACTITIONER

## 2022-07-08 PROCEDURE — 99213 OFFICE O/P EST LOW 20 MIN: CPT | Mod: S$GLB,,, | Performed by: NURSE PRACTITIONER

## 2022-07-08 PROCEDURE — 3008F PR BODY MASS INDEX (BMI) DOCUMENTED: ICD-10-PCS | Mod: CPTII,S$GLB,, | Performed by: NURSE PRACTITIONER

## 2022-07-08 PROCEDURE — 99999 PR PBB SHADOW E&M-EST. PATIENT-LVL IV: CPT | Mod: PBBFAC,,, | Performed by: NURSE PRACTITIONER

## 2022-07-08 RX ORDER — ALBUTEROL SULFATE 90 UG/1
2 AEROSOL, METERED RESPIRATORY (INHALATION) EVERY 6 HOURS PRN
Qty: 18 G | Refills: 0 | Status: SHIPPED | OUTPATIENT
Start: 2022-07-08 | End: 2022-09-08

## 2022-07-08 RX ORDER — AZITHROMYCIN 250 MG/1
TABLET, FILM COATED ORAL
Qty: 6 TABLET | Refills: 0 | Status: SHIPPED | OUTPATIENT
Start: 2022-07-08 | End: 2022-07-13

## 2022-07-08 RX ORDER — AMOXICILLIN AND CLAVULANATE POTASSIUM 875; 125 MG/1; MG/1
1 TABLET, FILM COATED ORAL 2 TIMES DAILY
COMMUNITY
Start: 2022-07-01 | End: 2022-09-08

## 2022-07-08 NOTE — PROGRESS NOTES
Subjective:       Patient ID: Marylin Morel is a 57 y.o. female.    Chief Complaint: Sinus Problem and Cough    HPI   58 y/o female patient with medical problems listed below presents for sinus pressure, clear nasal discharges, clear productive cough, chest congestion, wheezing at night for a week. Denies chest pain, nausea, fever, chills, urinary or bowel symptoms. Denies recent travel or sick contacts. Patient states had bronchitis in 5/2022. Denies smoking. Denies asthma or copd. Patient reports home O2sat was 96%.     Patient states she went to an urgent care on 7/1 for dog bite, given tetanus vaccine and provided Augmentin for 7 days. Patient states today is last day to complete 7 days course.     S/p 3 doses of covid vaccination in 2021.    Patient Active Problem List   Diagnosis    Essential hypertension, benign    Hypothyroidism    Seasonal allergies    GERD (gastroesophageal reflux disease)    DDD (degenerative disc disease), lumbosacral    Cervical radiculopathy      Review of patient's allergies indicates:   Allergen Reactions    Insect venom Anaphylaxis     Ant bites     Past Surgical History:   Procedure Laterality Date    AUGMENTATION OF BREAST      BREAST SURGERY      COSMETIC SURGERY      NECK SURGERY          Current Outpatient Medications:     amLODIPine (NORVASC) 5 MG tablet, Take 1 tablet (5 mg total) by mouth once daily., Disp: 30 tablet, Rfl: 5    amoxicillin-clavulanate 875-125mg (AUGMENTIN) 875-125 mg per tablet, Take 1 tablet by mouth 2 (two) times daily., Disp: , Rfl:     cetirizine (ZYRTEC) 10 MG tablet, Take 10 mg by mouth once daily., Disp: , Rfl:     chlorzoxazone (PARAFON FORTE) 500 mg Tab, , Disp: , Rfl:     COMBIPATCH 0.05-0.14 mg/24 hr, , Disp: , Rfl:     EPINEPHrine (EPIPEN) 0.3 mg/0.3 mL AtIn, Inject 0.3 mLs (0.3 mg total) into the muscle once. for 1 dose, Disp: 2 each, Rfl: 1    fluticasone propionate (FLONASE) 50 mcg/actuation nasal spray, INSTILL ONE SPRAY  INTO EACH NOSTRIL ONCE DAILY, Disp: 16 g, Rfl: 2    ibuprofen (ADVIL,MOTRIN) 800 MG tablet, Take 1 tablet (800 mg total) by mouth every 6 (six) hours as needed for Pain., Disp: 60 tablet, Rfl: 5    levothyroxine (SYNTHROID) 25 MCG tablet, TAKE ONE TABLET BY MOUTH ONCE DAILY, Disp: 90 tablet, Rfl: 3    lisinopriL-hydrochlorothiazide (PRINZIDE,ZESTORETIC) 20-25 mg Tab, TAKE ONE TABLET BY MOUTH ONCE DAILY, Disp: 90 tablet, Rfl: 1    olopatadine (PATADAY) 0.2 % Drop, , Disp: , Rfl:     omeprazole (PRILOSEC) 40 MG capsule, Take 1 capsule (40 mg total) by mouth once daily., Disp: 90 capsule, Rfl: 0    vitamin D (VITAMIN D3) 1000 units Tab, Take 1,000 Units by mouth once daily., Disp: , Rfl:     albuterol (PROVENTIL HFA) 90 mcg/actuation inhaler, Inhale 2 puffs into the lungs every 6 (six) hours as needed for Wheezing. Rescue, Disp: 18 g, Rfl: 0    azithromycin (Z-SAHARA) 250 MG tablet, Take 2 tablets by mouth on day 1; Take 1 tablet by mouth on days 2-5, Disp: 6 tablet, Rfl: 0    Lab Results   Component Value Date    WBC 5.59 01/18/2020    HGB 13.0 01/18/2020    HCT 40.5 01/18/2020     01/18/2020    CHOL 250 (H) 02/01/2020    TRIG 84 02/01/2020    HDL 82 (H) 02/01/2020    ALT 19 01/18/2020    AST 22 01/18/2020     01/18/2020    K 4.2 01/18/2020     01/18/2020    CREATININE 0.8 01/18/2020    BUN 13 01/18/2020    CO2 27 01/18/2020    TSH 1.818 01/18/2020    HGBA1C 5.0 01/18/2020     Review of Systems   Constitutional: Negative for chills and fever.   HENT: Positive for sinus pressure.    Respiratory: Positive for cough. Negative for chest tightness and shortness of breath.    Cardiovascular: Negative for chest pain and palpitations.   Gastrointestinal: Negative for abdominal pain and nausea.   Neurological: Negative for dizziness and headaches.       Objective:   /72 (BP Location: Right arm, Patient Position: Sitting, BP Method: Medium (Manual))   Pulse 92   Temp 100 °F (37.8 °C) (Oral)   Ht  "5' 5" (1.651 m)   Wt 61.6 kg (135 lb 12.9 oz)   LMP 02/05/2015 (Approximate)   SpO2 99%   BMI 22.60 kg/m²       Physical Exam  Constitutional:       General: She is not in acute distress.     Appearance: Normal appearance.   HENT:      Head: Atraumatic.      Mouth/Throat:      Mouth: Mucous membranes are moist.   Cardiovascular:      Rate and Rhythm: Normal rate and regular rhythm.      Pulses: Normal pulses.      Heart sounds: Normal heart sounds.   Pulmonary:      Effort: Pulmonary effort is normal.      Breath sounds: Normal breath sounds.   Abdominal:      General: Abdomen is flat. Bowel sounds are normal.      Palpations: Abdomen is soft.   Skin:     General: Skin is warm and dry.   Neurological:      General: No focal deficit present.      Mental Status: She is alert and oriented to person, place, and time.   Psychiatric:         Mood and Affect: Mood normal.         Assessment:       1. Cough    2. Bronchitis    3. SOB (shortness of breath)        Plan:       1. Cough  - POCT COVID-19 Rapid Screening: Negative  - Advised to take tessalon for cough and guaifenesin for chest congestion (patient reports has medications)  - Advised to increase hydration    2. Bronchitis  - CTAB on exam, O2sat 99%, BT 37.8C  - azithromycin (Z-SAHARA) 250 MG tablet; Take 2 tablets by mouth on day 1; Take 1 tablet by mouth on days 2-5  Dispense: 6 tablet; Refill: 0  - Advised to call us if no improvement or worsening symptoms  - s&s discussed with patient when to seek emergent care    3. SOB (shortness of breath)  - albuterol (PROVENTIL HFA) 90 mcg/actuation inhaler; Inhale 2 puffs into the lungs every 6 (six) hours as needed for Wheezing. Rescue  Dispense: 18 g; Refill: 0    Patient with be reevaluated in 3 days or sooner prn  Christian SANTIAGO    "

## 2022-07-24 DIAGNOSIS — I10 ESSENTIAL HYPERTENSION, BENIGN: ICD-10-CM

## 2022-07-24 NOTE — TELEPHONE ENCOUNTER
Care Due:                  Date            Visit Type   Department     Provider  --------------------------------------------------------------------------------                                EP -                              PRIMARY      [facility]  Last Visit: 07-      CARE ([facility])   NAEL Mccabe                              EP -                              PRIMARY      [facility]  Next Visit: 11-      CARE ([facility])   NAEL Mccabe                                                            Last  Test          Frequency    Reason                     Performed    Due Date  --------------------------------------------------------------------------------    Office Visit  12 months..  amLODIPine,                07- 07-                             lisinopriL-hydrochlorothi                             azide, omeprazole........    CMP.........  12 months..  lisinopriL-hydrochlorothi  Not Found    Overdue                             azide....................    [facility] Embedded Care Gaps. Reference number: [id number]. 7/24/2022   10:01:57 AM CDT

## 2022-07-25 RX ORDER — AMLODIPINE BESYLATE 5 MG/1
TABLET ORAL
Qty: 90 TABLET | Refills: 0 | Status: SHIPPED | OUTPATIENT
Start: 2022-07-25 | End: 2022-10-25

## 2022-07-26 ENCOUNTER — PATIENT MESSAGE (OUTPATIENT)
Dept: FAMILY MEDICINE | Facility: CLINIC | Age: 57
End: 2022-07-26
Payer: COMMERCIAL

## 2022-07-26 DIAGNOSIS — I10 ESSENTIAL HYPERTENSION, BENIGN: ICD-10-CM

## 2022-07-26 RX ORDER — LISINOPRIL AND HYDROCHLOROTHIAZIDE 20; 25 MG/1; MG/1
TABLET ORAL
Qty: 90 TABLET | Refills: 1 | OUTPATIENT
Start: 2022-07-26

## 2022-07-26 NOTE — TELEPHONE ENCOUNTER
Refill Routing Note   Medication(s) are not appropriate for processing by [facility] for the following reason(s):      - Required laboratory values are outdated    [facility] action(s):  Defer  Approve          --->Care Gap information included in message below if applicable.   Medication reconciliation completed: No   Automatic Epic Generated Protocol Data:    Orders Placed This Encounter    amLODIPine (NORVASC) 5 MG tablet      Requested Prescriptions   Pending Prescriptions Disp Refills    lisinopriL-hydrochlorothiazide (PRINZIDE,ZESTORETIC) 20-25 mg Tab [Pharmacy Med Name: LISINOPRIL/HCTZ 20-25 MG TAB] 90 tablet 1     Sig: TAKE ONE TABLET BY MOUTH ONCE DAILY       Cardiovascular:  ACEI + Diuretic Combos Failed - 7/24/2022 10:01 AM        Failed - Na is between 130 and 148 and within 360 days     Sodium   Date Value Ref Range Status   01/18/2020 140 136 - 145 mmol/L Final   01/26/2019 137 136 - 145 mmol/L Final   01/06/2018 138 136 - 145 mmol/L Final              Failed - K in normal range and within 360 days     Potassium   Date Value Ref Range Status   01/18/2020 4.2 3.5 - 5.1 mmol/L Final   01/26/2019 4.0 3.5 - 5.1 mmol/L Final   01/06/2018 3.9 3.5 - 5.1 mmol/L Final              Failed - Cr is 1.4 or below and within 360 days     Lab Results   Component Value Date    CREATININE 0.8 01/18/2020    CREATININE 0.9 01/26/2019    CREATININE 0.9 01/06/2018              Failed - eGFR within 360 days     Lab Results   Component Value Date    EGFRNONAA >60.0 01/18/2020    EGFRNONAA >60.0 01/26/2019    EGFRNONAA >60.0 01/06/2018                Passed - Patient is at least 18 years old        Passed - Last BP in normal range within 360 days     BP Readings from Last 3 Encounters:   07/08/22 120/72   05/11/22 124/68   07/16/21 122/70               Passed - Valid encounter within last 15 months     Recent Visits  Date Type Provider Dept   07/16/21 Office Visit Gabriel Mccabe MD [facility]   Showing  recent visits within past 720 days and meeting all other requirements  Future Appointments  No visits were found meeting these conditions.  Showing future appointments within next 150 days and meeting all other requirements      Future Appointments              In 3 months MD [place] S[place] - [facility], [place]                Passed - Matches previous order       Previous Authorizing Provider: Gabriel Mccabe MD (amLODIPine (NORVASC) 5 MG tablet)  Previous Authorizing Provider: Gabriel Mccabe MD (lisinopriL-hydrochlorothiazide (PRINZIDE,ZESTORETIC) 20-25 mg Tab)  Previous Pharmacy: [id number] - [place] - [address]  Previous Pharmacy: [id number] - [place] - [address]            Passed - No ED/Admission Since Last PCP visit                Signed Prescriptions Disp Refills    amLODIPine (NORVASC) 5 MG tablet 90 tablet 0     Sig: TAKE ONE TABLET BY MOUTH ONCE DAILY       Cardiovascular:  Calcium Channel Blockers Passed - 7/24/2022 10:01 AM        Passed - Patient is at least 18 years old        Passed - Last BP in normal range within 360 days     BP Readings from Last 3 Encounters:   07/08/22 120/72   05/11/22 124/68   07/16/21 122/70               Passed - Valid encounter within last 15 months     Recent Visits  Date Type Provider Dept   07/16/21 Office Visit Gabriel Mccabe MD [facility]   Showing recent visits within past 720 days and meeting all other requirements  Future Appointments  No visits were found meeting these conditions.  Showing future appointments within next 150 days and meeting all other requirements      Future Appointments              In 3 months MD [place] [facility]                Passed - Matches previous order       Previous Authorizing Provider: Gabriel Mccabe MD (amLODIPine (NORVASC) 5 MG tablet)  Previous Authorizing Provider: Gabriel Mccabe MD  (lisinopriL-hydrochlorothiazide (PRINZIDE,ZESTORETIC) 20-25 mg Tab)  Previous Pharmacy: [id number] - [place] - [address]  Previous Pharmacy: [id number] - [place] - [address]            Passed - No ED/Admission Since Last PCP visit                     Appointments  past 12m or future 3m with PCP    Date Provider   Last Visit   7/16/2021 Gabriel Mccabe MD   Next Visit   11/11/2022 Gabriel Mccabe MD   ED visits in past 90 days: 0        Note composed:8:39 PM 07/25/2022

## 2022-07-27 RX ORDER — LISINOPRIL AND HYDROCHLOROTHIAZIDE 20; 25 MG/1; MG/1
1 TABLET ORAL DAILY
Qty: 90 TABLET | Refills: 0 | Status: SHIPPED | OUTPATIENT
Start: 2022-07-27 | End: 2022-11-01

## 2022-07-27 NOTE — TELEPHONE ENCOUNTER
Must be seen yearly. Please assist with sooner appointment. Can also direct refills to provider seen more recently

## 2022-07-27 NOTE — TELEPHONE ENCOUNTER
Please see the attached refill request.  LOV:07/08/22  NOV:11/11/22-establishing care w/Dr. Mccabe

## 2022-07-27 NOTE — TELEPHONE ENCOUNTER
No new care gaps identified.  [facility] Embedded Care Gaps. Reference number: [id number]. 7/27/2022   12:05:57 AM AFTAB

## 2022-07-28 ENCOUNTER — TELEPHONE (OUTPATIENT)
Dept: FAMILY MEDICINE | Facility: CLINIC | Age: 57
End: 2022-07-28
Payer: COMMERCIAL

## 2022-07-28 DIAGNOSIS — I10 ESSENTIAL HYPERTENSION, BENIGN: ICD-10-CM

## 2022-07-28 NOTE — TELEPHONE ENCOUNTER
----- Message from Elisabeth Cagle sent at 7/28/2022  4:07 PM CDT -----  Contact: pt  Type: Needs Medical Advice    Who Called: pt  Best Call Back Number: [phone]  Inquiry/Question: pt calling to speak with nurse about getting medication refill as she was advised that she needs to be seen but can not be seen until 11 per office, please advise pt as she is completley out of medication Thank you~

## 2022-07-28 NOTE — TELEPHONE ENCOUNTER
Provider Staff:     Action required for this patient.    Please note Refusal of medication.            Requested Prescriptions     Signed Prescriptions Disp Refills    amLODIPine (NORVASC) 5 MG tablet 90 tablet 0     Sig: TAKE ONE TABLET BY MOUTH ONCE DAILY     Authorizing Provider: PARTHA GAXIOLA.     Ordering User: CARLENE JORGE     Refused Prescriptions Disp Refills    lisinopriL-hydrochlorothiazide (PRINZIDE,ZESTORETIC) 20-25 mg Tab [Pharmacy Med Name: LISINOPRIL/HCTZ 20-25 MG TAB] 90 tablet 1     Sig: TAKE ONE TABLET BY MOUTH ONCE DAILY     Refused By: PARTHA GAXIOLA     Reason for Refusal: Patient needs an appointment      Thanks!  [facility]   Note composed: 07/27/2022 9:46 PM

## 2022-07-31 DIAGNOSIS — I10 ESSENTIAL HYPERTENSION, BENIGN: ICD-10-CM

## 2022-07-31 RX ORDER — LISINOPRIL AND HYDROCHLOROTHIAZIDE 20; 25 MG/1; MG/1
TABLET ORAL
Qty: 90 TABLET | Refills: 0 | OUTPATIENT
Start: 2022-07-31

## 2022-07-31 NOTE — TELEPHONE ENCOUNTER
No new care gaps identified.  [facility] Embedded Care Gaps. Reference number: [id number]. 7/31/2022   12:54:06 PM CDT

## 2022-08-01 ENCOUNTER — PATIENT MESSAGE (OUTPATIENT)
Dept: FAMILY MEDICINE | Facility: CLINIC | Age: 57
End: 2022-08-01
Payer: COMMERCIAL

## 2022-08-01 NOTE — TELEPHONE ENCOUNTER
Refill Decision Note   Marylin Morel  is requesting a refill authorization.  Brief Assessment and Rationale for Refill:  Quick Discontinue     Medication Therapy Plan:  ORDERED 7/27/22    Medication Reconciliation Completed: No   Comments:     No Care Gaps recommended.     Note composed:7:38 PM 07/31/2022

## 2022-08-09 ENCOUNTER — PATIENT MESSAGE (OUTPATIENT)
Dept: FAMILY MEDICINE | Facility: CLINIC | Age: 57
End: 2022-08-09
Payer: COMMERCIAL

## 2022-08-10 ENCOUNTER — PATIENT MESSAGE (OUTPATIENT)
Dept: FAMILY MEDICINE | Facility: CLINIC | Age: 57
End: 2022-08-10
Payer: COMMERCIAL

## 2022-08-11 ENCOUNTER — OFFICE VISIT (OUTPATIENT)
Dept: FAMILY MEDICINE | Facility: CLINIC | Age: 57
End: 2022-08-11
Payer: COMMERCIAL

## 2022-08-11 VITALS
SYSTOLIC BLOOD PRESSURE: 122 MMHG | OXYGEN SATURATION: 97 % | TEMPERATURE: 99 F | DIASTOLIC BLOOD PRESSURE: 76 MMHG | WEIGHT: 133.63 LBS | BODY MASS INDEX: 22.26 KG/M2 | HEART RATE: 92 BPM | HEIGHT: 65 IN

## 2022-08-11 DIAGNOSIS — K21.9 GASTROESOPHAGEAL REFLUX DISEASE, UNSPECIFIED WHETHER ESOPHAGITIS PRESENT: Primary | ICD-10-CM

## 2022-08-11 DIAGNOSIS — R10.9 STOMACH PAIN: ICD-10-CM

## 2022-08-11 DIAGNOSIS — E03.9 HYPOTHYROIDISM, UNSPECIFIED TYPE: ICD-10-CM

## 2022-08-11 PROCEDURE — 99213 OFFICE O/P EST LOW 20 MIN: CPT | Mod: S$GLB,,, | Performed by: NURSE PRACTITIONER

## 2022-08-11 PROCEDURE — 3078F DIAST BP <80 MM HG: CPT | Mod: CPTII,S$GLB,, | Performed by: NURSE PRACTITIONER

## 2022-08-11 PROCEDURE — 3074F SYST BP LT 130 MM HG: CPT | Mod: CPTII,S$GLB,, | Performed by: NURSE PRACTITIONER

## 2022-08-11 PROCEDURE — 99213 PR OFFICE/OUTPT VISIT, EST, LEVL III, 20-29 MIN: ICD-10-PCS | Mod: S$GLB,,, | Performed by: NURSE PRACTITIONER

## 2022-08-11 PROCEDURE — 3074F PR MOST RECENT SYSTOLIC BLOOD PRESSURE < 130 MM HG: ICD-10-PCS | Mod: CPTII,S$GLB,, | Performed by: NURSE PRACTITIONER

## 2022-08-11 PROCEDURE — 1159F PR MEDICATION LIST DOCUMENTED IN MEDICAL RECORD: ICD-10-PCS | Mod: CPTII,S$GLB,, | Performed by: NURSE PRACTITIONER

## 2022-08-11 PROCEDURE — 3078F PR MOST RECENT DIASTOLIC BLOOD PRESSURE < 80 MM HG: ICD-10-PCS | Mod: CPTII,S$GLB,, | Performed by: NURSE PRACTITIONER

## 2022-08-11 PROCEDURE — 99999 PR PBB SHADOW E&M-EST. PATIENT-LVL IV: CPT | Mod: PBBFAC,,, | Performed by: NURSE PRACTITIONER

## 2022-08-11 PROCEDURE — 99999 PR PBB SHADOW E&M-EST. PATIENT-LVL IV: ICD-10-PCS | Mod: PBBFAC,,, | Performed by: NURSE PRACTITIONER

## 2022-08-11 PROCEDURE — 1159F MED LIST DOCD IN RCRD: CPT | Mod: CPTII,S$GLB,, | Performed by: NURSE PRACTITIONER

## 2022-08-11 PROCEDURE — 3008F BODY MASS INDEX DOCD: CPT | Mod: CPTII,S$GLB,, | Performed by: NURSE PRACTITIONER

## 2022-08-11 PROCEDURE — 4010F ACE/ARB THERAPY RXD/TAKEN: CPT | Mod: CPTII,S$GLB,, | Performed by: NURSE PRACTITIONER

## 2022-08-11 PROCEDURE — 3008F PR BODY MASS INDEX (BMI) DOCUMENTED: ICD-10-PCS | Mod: CPTII,S$GLB,, | Performed by: NURSE PRACTITIONER

## 2022-08-11 PROCEDURE — 4010F PR ACE/ARB THEARPY RXD/TAKEN: ICD-10-PCS | Mod: CPTII,S$GLB,, | Performed by: NURSE PRACTITIONER

## 2022-08-11 RX ORDER — SIMETHICONE 125 MG
125 CAPSULE ORAL 4 TIMES DAILY PRN
Qty: 30 CAPSULE | Refills: 1 | Status: SHIPPED | OUTPATIENT
Start: 2022-08-11

## 2022-08-11 RX ORDER — FAMOTIDINE 20 MG/1
20 TABLET, FILM COATED ORAL NIGHTLY PRN
Qty: 30 TABLET | Refills: 2 | Status: SHIPPED | OUTPATIENT
Start: 2022-08-11 | End: 2022-09-08

## 2022-08-11 NOTE — TELEPHONE ENCOUNTER
No new care gaps identified.  [facility] Embedded Care Gaps. Reference number: [id number]. 8/11/2022   5:53:21 PM CDT

## 2022-08-11 NOTE — TELEPHONE ENCOUNTER
Refill Routing Note   Medication(s) are not appropriate for processing by [facility] for the following reason(s):      - Required laboratory values are outdated    [facility] action(s):  Defer          Medication reconciliation completed: No     Appointments  past 12m or future 3m with PCP    Date Provider   Last Visit   7/16/2021 Gabriel Mccabe MD   Next Visit   9/8/2022 Gabriel Mccabe MD   ED visits in past 90 days: 0        Note composed:6:26 PM 08/11/2022

## 2022-08-11 NOTE — PROGRESS NOTES
Subjective:       Patient ID: Marylin Morel is a 57 y.o. female.    Chief Complaint: GI Problem and Cough    HPI   56 y/o female patient with medical problems listed below presents for acid reflux and dry cough which is chronic. Patient states has taken prilosec but w/o much relief. Patient states also feels more stomach discomfort, nausea, bloating after eating the food. States has daily BM. Denies chest pain, sob, fever, chills, generalized body ache, urinary or bowel symptoms. Patient states has taken ibuprofen for lower back pain.     Patient Active Problem List   Diagnosis    Essential hypertension, benign    Hypothyroidism    Seasonal allergies    GERD (gastroesophageal reflux disease)    DDD (degenerative disc disease), lumbosacral    Cervical radiculopathy        Review of patient's allergies indicates:   Allergen Reactions    Insect venom Anaphylaxis     Ant bites       Past Surgical History:   Procedure Laterality Date    AUGMENTATION OF BREAST      BREAST SURGERY      COSMETIC SURGERY      NECK SURGERY            Current Outpatient Medications:     albuterol (PROVENTIL HFA) 90 mcg/actuation inhaler, Inhale 2 puffs into the lungs every 6 (six) hours as needed for Wheezing. Rescue, Disp: 18 g, Rfl: 0    amLODIPine (NORVASC) 5 MG tablet, TAKE ONE TABLET BY MOUTH ONCE DAILY, Disp: 90 tablet, Rfl: 0    amoxicillin-clavulanate 875-125mg (AUGMENTIN) 875-125 mg per tablet, Take 1 tablet by mouth 2 (two) times daily., Disp: , Rfl:     cetirizine (ZYRTEC) 10 MG tablet, Take 10 mg by mouth once daily., Disp: , Rfl:     chlorzoxazone (PARAFON FORTE) 500 mg Tab, , Disp: , Rfl:     COMBIPATCH 0.05-0.14 mg/24 hr, , Disp: , Rfl:     fluticasone propionate (FLONASE) 50 mcg/actuation nasal spray, INSTILL ONE SPRAY INTO EACH NOSTRIL ONCE DAILY, Disp: 16 g, Rfl: 2    ibuprofen (ADVIL,MOTRIN) 800 MG tablet, Take 1 tablet (800 mg total) by mouth every 6 (six) hours as needed for Pain., Disp: 60 tablet,  Rfl: 5    levothyroxine (SYNTHROID) 25 MCG tablet, TAKE ONE TABLET BY MOUTH ONCE DAILY, Disp: 90 tablet, Rfl: 3    lisinopriL-hydrochlorothiazide (PRINZIDE,ZESTORETIC) 20-25 mg Tab, Take 1 tablet by mouth once daily., Disp: 90 tablet, Rfl: 0    olopatadine (PATADAY) 0.2 % Drop, , Disp: , Rfl:     omeprazole (PRILOSEC) 40 MG capsule, Take 1 capsule (40 mg total) by mouth once daily., Disp: 90 capsule, Rfl: 0    vitamin D (VITAMIN D3) 1000 units Tab, Take 1,000 Units by mouth once daily., Disp: , Rfl:     EPINEPHrine (EPIPEN) 0.3 mg/0.3 mL AtIn, Inject 0.3 mLs (0.3 mg total) into the muscle once. for 1 dose, Disp: 2 each, Rfl: 1    famotidine (PEPCID) 20 MG tablet, Take 1 tablet (20 mg total) by mouth nightly as needed for Heartburn., Disp: 30 tablet, Rfl: 2    simethicone (GAS-X EXTRA STRENGTH) 125 mg Cap capsule, Take 1 capsule (125 mg total) by mouth 4 (four) times daily as needed for Flatulence., Disp: 30 capsule, Rfl: 1    Lab Results   Component Value Date    WBC 5.59 01/18/2020    HGB 13.0 01/18/2020    HCT 40.5 01/18/2020     01/18/2020    CHOL 250 (H) 02/01/2020    TRIG 84 02/01/2020    HDL 82 (H) 02/01/2020    ALT 19 01/18/2020    AST 22 01/18/2020     01/18/2020    K 4.2 01/18/2020     01/18/2020    CREATININE 0.8 01/18/2020    BUN 13 01/18/2020    CO2 27 01/18/2020    TSH 1.818 01/18/2020    HGBA1C 5.0 01/18/2020     Above labs reviewed    Review of Systems   Constitutional: Negative for appetite change, chills and fever.   HENT: Positive for sore throat.    Respiratory: Positive for cough. Negative for chest tightness and shortness of breath.    Cardiovascular: Negative for chest pain and palpitations.   Gastrointestinal: Positive for abdominal pain and nausea. Negative for blood in stool and diarrhea.   Neurological: Negative for dizziness and headaches.       Objective:   /76 (BP Location: Right arm, Patient Position: Sitting, BP Method: Medium (Manual))   Pulse 92    "Temp 98.6 °F (37 °C) (Oral)   Ht 5' 5" (1.651 m)   Wt 60.6 kg (133 lb 9.6 oz)   LMP 02/05/2015 (Approximate)   SpO2 97%   BMI 22.23 kg/m²       Physical Exam  Constitutional:       General: She is not in acute distress.     Appearance: Normal appearance.   HENT:      Head: Atraumatic.   Cardiovascular:      Rate and Rhythm: Normal rate and regular rhythm.      Heart sounds: Normal heart sounds.   Pulmonary:      Effort: Pulmonary effort is normal.      Breath sounds: Normal breath sounds.   Abdominal:      General: Abdomen is flat. Bowel sounds are normal.      Palpations: Abdomen is soft.      Tenderness: There is no abdominal tenderness. There is no rebound.   Skin:     General: Skin is warm and dry.   Neurological:      General: No focal deficit present.      Mental Status: She is alert and oriented to person, place, and time.   Psychiatric:         Mood and Affect: Mood normal.         Assessment:       1. Gastroesophageal reflux disease, unspecified whether esophagitis present    2. Stomach pain        Plan:       1. Gastroesophageal reflux disease, unspecified whether esophagitis present  - discussed regarding dietary precautions and advised to avoid NSAIDs  - famotidine (PEPCID) 20 MG tablet; Take 1 tablet (20 mg total) by mouth nightly as needed for Heartburn.  Dispense: 30 tablet; Refill: 2  - simethicone (GAS-X EXTRA STRENGTH) 125 mg Cap capsule; Take 1 capsule (125 mg total) by mouth 4 (four) times daily as needed for Flatulence.  Dispense: 30 capsule; Refill: 1    2. Stomach pain  - H. pylori antigen, stool; Future  - simethicone (GAS-X EXTRA STRENGTH) 125 mg Cap capsule; Take 1 capsule (125 mg total) by mouth 4 (four) times daily as needed for Flatulence.  Dispense: 30 capsule; Refill: 1      Patient with be reevaluated in as scheduled or sooner abigail Gonzales"

## 2022-08-12 ENCOUNTER — LAB VISIT (OUTPATIENT)
Dept: LAB | Facility: HOSPITAL | Age: 57
End: 2022-08-12
Attending: NURSE PRACTITIONER
Payer: COMMERCIAL

## 2022-08-12 DIAGNOSIS — R10.9 STOMACH PAIN: ICD-10-CM

## 2022-08-12 PROCEDURE — 87338 HPYLORI STOOL AG IA: CPT | Performed by: NURSE PRACTITIONER

## 2022-08-12 RX ORDER — LEVOTHYROXINE SODIUM 25 UG/1
TABLET ORAL
Qty: 30 TABLET | Refills: 0 | Status: SHIPPED | OUTPATIENT
Start: 2022-08-12 | End: 2022-09-15 | Stop reason: SDUPTHER

## 2022-08-15 ENCOUNTER — PATIENT MESSAGE (OUTPATIENT)
Dept: FAMILY MEDICINE | Facility: CLINIC | Age: 57
End: 2022-08-15
Payer: COMMERCIAL

## 2022-08-19 ENCOUNTER — PATIENT MESSAGE (OUTPATIENT)
Dept: FAMILY MEDICINE | Facility: CLINIC | Age: 57
End: 2022-08-19
Payer: COMMERCIAL

## 2022-08-23 ENCOUNTER — PATIENT MESSAGE (OUTPATIENT)
Dept: FAMILY MEDICINE | Facility: CLINIC | Age: 57
End: 2022-08-23
Payer: COMMERCIAL

## 2022-08-23 LAB
H PYLORI AG STL QL IA: NOT DETECTED
SPECIMEN SOURCE: NORMAL

## 2022-08-25 RX ORDER — OMEPRAZOLE 40 MG/1
CAPSULE, DELAYED RELEASE ORAL
Qty: 90 CAPSULE | Refills: 0 | Status: SHIPPED | OUTPATIENT
Start: 2022-08-25 | End: 2022-11-25

## 2022-08-25 NOTE — TELEPHONE ENCOUNTER
Refill Decision Note   Marylin Morel  is requesting a refill authorization.  Brief Assessment and Rationale for Refill:  Approve     Medication Therapy Plan:           Comments:     No Care Gaps recommended.     Note composed:10:52 AM 08/25/2022

## 2022-08-25 NOTE — TELEPHONE ENCOUNTER
No new care gaps identified.  [facility] Embedded Care Gaps. Reference number: [id number]. 8/25/2022   5:16:31 AM CDT

## 2022-08-28 ENCOUNTER — HOSPITAL ENCOUNTER (EMERGENCY)
Facility: HOSPITAL | Age: 57
Discharge: HOME | End: 2022-08-28
Attending: EMERGENCY MEDICINE
Payer: COMMERCIAL

## 2022-08-28 ENCOUNTER — PATIENT MESSAGE (OUTPATIENT)
Dept: FAMILY MEDICINE | Facility: CLINIC | Age: 57
End: 2022-08-28
Payer: COMMERCIAL

## 2022-08-28 VITALS
DIASTOLIC BLOOD PRESSURE: 78 MMHG | WEIGHT: 131 LBS | BODY MASS INDEX: 21.83 KG/M2 | HEIGHT: 65 IN | SYSTOLIC BLOOD PRESSURE: 136 MMHG | RESPIRATION RATE: 18 BRPM | TEMPERATURE: 99 F | HEART RATE: 101 BPM | OXYGEN SATURATION: 98 %

## 2022-08-28 DIAGNOSIS — R79.89 ELEVATED LFTS: ICD-10-CM

## 2022-08-28 DIAGNOSIS — E86.0 DEHYDRATION: ICD-10-CM

## 2022-08-28 DIAGNOSIS — R00.0 TACHYCARDIA: Primary | ICD-10-CM

## 2022-08-28 DIAGNOSIS — E87.6 HYPOKALEMIA: ICD-10-CM

## 2022-08-28 DIAGNOSIS — E83.42 HYPOMAGNESEMIA: ICD-10-CM

## 2022-08-28 LAB
ALBUMIN SERPL BCP-MCNC: 4.2 G/DL (ref 3.5–5.2)
ALP SERPL-CCNC: 55 U/L (ref 55–135)
ALT SERPL W/O P-5'-P-CCNC: 81 U/L (ref 10–44)
ANION GAP SERPL CALC-SCNC: 15 MMOL/L (ref 8–16)
AST SERPL-CCNC: 68 U/L (ref 10–40)
BASOPHILS # BLD AUTO: 0.06 K/UL (ref 0–0.2)
BASOPHILS NFR BLD: 0.7 % (ref 0–1.9)
BILIRUB SERPL-MCNC: 0.5 MG/DL (ref 0.1–1)
BUN SERPL-MCNC: 13 MG/DL (ref 6–20)
CALCIUM SERPL-MCNC: 9.6 MG/DL (ref 8.7–10.5)
CHLORIDE SERPL-SCNC: 96 MMOL/L (ref 95–110)
CO2 SERPL-SCNC: 23 MMOL/L (ref 23–29)
CREAT SERPL-MCNC: 0.8 MG/DL (ref 0.5–1.4)
D DIMER PPP IA.FEU-MCNC: 0.96 MG/L FEU
DIFFERENTIAL METHOD: ABNORMAL
EOSINOPHIL # BLD AUTO: 0.1 K/UL (ref 0–0.5)
EOSINOPHIL NFR BLD: 0.5 % (ref 0–8)
ERYTHROCYTE [DISTWIDTH] IN BLOOD BY AUTOMATED COUNT: 12.1 % (ref 11.5–14.5)
EST. GFR  (NO RACE VARIABLE): >60 ML/MIN/1.73 M^2
GLUCOSE SERPL-MCNC: 119 MG/DL (ref 70–110)
HCT VFR BLD AUTO: 38.8 % (ref 37–48.5)
HGB BLD-MCNC: 13.8 G/DL (ref 12–16)
IMM GRANULOCYTES # BLD AUTO: 0.02 K/UL (ref 0–0.04)
IMM GRANULOCYTES NFR BLD AUTO: 0.2 % (ref 0–0.5)
LIPASE SERPL-CCNC: 15 U/L (ref 4–60)
LYMPHOCYTES # BLD AUTO: 1.6 K/UL (ref 1–4.8)
LYMPHOCYTES NFR BLD: 17.3 % (ref 18–48)
MAGNESIUM SERPL-MCNC: 1.4 MG/DL (ref 1.6–2.6)
MCH RBC QN AUTO: 35.5 PG (ref 27–31)
MCHC RBC AUTO-ENTMCNC: 35.6 G/DL (ref 32–36)
MCV RBC AUTO: 100 FL (ref 82–98)
MONOCYTES # BLD AUTO: 0.7 K/UL (ref 0.3–1)
MONOCYTES NFR BLD: 7.1 % (ref 4–15)
NEUTROPHILS # BLD AUTO: 6.8 K/UL (ref 1.8–7.7)
NEUTROPHILS NFR BLD: 74.2 % (ref 38–73)
NRBC BLD-RTO: 0 /100 WBC
PLATELET # BLD AUTO: 287 K/UL (ref 150–450)
PMV BLD AUTO: 9.1 FL (ref 9.2–12.9)
POTASSIUM SERPL-SCNC: 3 MMOL/L (ref 3.5–5.1)
PROT SERPL-MCNC: 7.4 G/DL (ref 6–8.4)
RBC # BLD AUTO: 3.89 M/UL (ref 4–5.4)
SODIUM SERPL-SCNC: 134 MMOL/L (ref 136–145)
TROPONIN I SERPL DL<=0.01 NG/ML-MCNC: 0.01 NG/ML (ref 0–0.03)
WBC # BLD AUTO: 9.14 K/UL (ref 3.9–12.7)

## 2022-08-28 PROCEDURE — 93010 EKG 12-LEAD: ICD-10-PCS | Mod: ,,, | Performed by: SPECIALIST

## 2022-08-28 PROCEDURE — 85379 FIBRIN DEGRADATION QUANT: CPT | Performed by: PHYSICIAN ASSISTANT

## 2022-08-28 PROCEDURE — 93005 ELECTROCARDIOGRAM TRACING: CPT

## 2022-08-28 PROCEDURE — 85025 COMPLETE CBC W/AUTO DIFF WBC: CPT | Performed by: PHYSICIAN ASSISTANT

## 2022-08-28 PROCEDURE — 96360 HYDRATION IV INFUSION INIT: CPT

## 2022-08-28 PROCEDURE — 93010 ELECTROCARDIOGRAM REPORT: CPT | Mod: ,,, | Performed by: SPECIALIST

## 2022-08-28 PROCEDURE — 84484 ASSAY OF TROPONIN QUANT: CPT | Performed by: PHYSICIAN ASSISTANT

## 2022-08-28 PROCEDURE — 83690 ASSAY OF LIPASE: CPT | Performed by: PHYSICIAN ASSISTANT

## 2022-08-28 PROCEDURE — 25500020 PHARM REV CODE 255

## 2022-08-28 PROCEDURE — 80053 COMPREHEN METABOLIC PANEL: CPT | Performed by: PHYSICIAN ASSISTANT

## 2022-08-28 PROCEDURE — 36415 COLL VENOUS BLD VENIPUNCTURE: CPT | Performed by: PHYSICIAN ASSISTANT

## 2022-08-28 PROCEDURE — 99285 EMERGENCY DEPT VISIT HI MDM: CPT | Mod: 25

## 2022-08-28 PROCEDURE — 25000003 PHARM REV CODE 250: Performed by: PHYSICIAN ASSISTANT

## 2022-08-28 PROCEDURE — 83735 ASSAY OF MAGNESIUM: CPT | Performed by: PHYSICIAN ASSISTANT

## 2022-08-28 RX ORDER — POTASSIUM CHLORIDE 20 MEQ/1
40 TABLET, EXTENDED RELEASE ORAL
Status: COMPLETED | OUTPATIENT
Start: 2022-08-28 | End: 2022-08-28

## 2022-08-28 RX ORDER — LANOLIN ALCOHOL/MO/W.PET/CERES
400 CREAM (GRAM) TOPICAL ONCE
Status: COMPLETED | OUTPATIENT
Start: 2022-08-28 | End: 2022-08-28

## 2022-08-28 RX ADMIN — POTASSIUM CHLORIDE 40 MEQ: 1500 TABLET, EXTENDED RELEASE ORAL at 05:08

## 2022-08-28 RX ADMIN — SODIUM CHLORIDE 1000 ML: 0.9 INJECTION, SOLUTION INTRAVENOUS at 04:08

## 2022-08-28 RX ADMIN — Medication 400 MG: at 05:08

## 2022-08-28 RX ADMIN — IOHEXOL 75 ML: 350 INJECTION, SOLUTION INTRAVENOUS at 05:08

## 2022-08-28 NOTE — ED NOTES
Assumed care:  Marylin Morel is awake, alert and oriented x 3, skin warm and dry, in NAD with family at bedside.  Patient CO tachycardia.  States that for the last month, she has had problems with eating and drinking (feelings of fullness, nausea, and gas) feels she may have GB problems and has not been eating or drinking.  Patient placed on cardiac monitor and continuous pulse ox.      Patient identifiers for Marylin Morel checked and correct.  LOC:  Marylin Morel is awake, alert, and aware of environment with an appropriate affect. She is oriented x 3 and speaking appropriately.  APPEARANCE:  She is resting comfortably and in no acute distress. She is clean and well groomed, patient's clothing is properly fastened.  SKIN:  The skin is warm and dry. She has normal skin turgor and moist mucus membranes. Skin is intact; no bruising or breakdown noted.  MUSCULOSKELETAL:  She is moving all extremities well, no obvious deformities noted. Pulses intact.   RESPIRATORY:  Airway is open and patent. Respirations are spontaneous and non-labored with normal effort and rate.  CARDIAC:  tachycardia No peripheral edema noted. Capillary refill < 3 seconds.  ABDOMEN:  No distention noted.  Soft and non-tender upon palpation. nausea  NEUROLOGICAL:  PERRL. Facial expression is symmetrical. Hand grasps are equal bilaterally. Normal sensation in all extremities when touched with finger.  Allergies reported:    Review of patient's allergies indicates:   Allergen Reactions    Insect venom Anaphylaxis     Ant bites     OTHER NOTES:

## 2022-08-28 NOTE — ED PROVIDER NOTES
"Encounter Date: 8/28/2022    SCRIBE #1 NOTE: IJamee, am scribing for, and in the presence of,  Tesha Jones PA-C.     History     Chief Complaint   Patient presents with    Tachycardia     Over past few hours; denies CP      Time seen by provider: 3:52 PM on 08/28/2022    Marylin Morel is a 57 y.o. female who presents to the ED with an onset of tremors and "heart racing" which began this morning when the patient woke up. She reports associated Sx of abdominal pain. The patient states eating did not improve Sx. Patient states she has been following with GI for decreased appetite, nausea, and bloating for about 5 weeks now. The patient denies CP, SOB, leg swelling, or any other symptoms at this time. She denies regent surgery or hospitalization. She denies recent travel or medication changes. PMHx of HTN, GERD, and thyroid disease. SPHx of breast augmentation.       The history is provided by the patient.   Review of patient's allergies indicates:   Allergen Reactions    Insect venom Anaphylaxis     Ant bites     Past Medical History:   Diagnosis Date    Allergy     ants    GERD (gastroesophageal reflux disease)     Hypertension     Thyroid disease      Past Surgical History:   Procedure Laterality Date    AUGMENTATION OF BREAST      BREAST SURGERY      COSMETIC SURGERY      NECK SURGERY       Family History   Problem Relation Age of Onset    COPD Mother     Heart disease Father         MI    Hypertension Brother      Social History     Tobacco Use    Smoking status: Never    Smokeless tobacco: Never   Substance Use Topics    Alcohol use: Yes     Alcohol/week: 1.0 standard drink     Types: 1 Glasses of wine per week     Comment: 1 glass of wine daily    Drug use: No     Review of Systems   Constitutional:  Negative for activity change, appetite change, chills and fever.   HENT:  Negative for congestion, rhinorrhea and sore throat.    Eyes:  Negative for redness and visual disturbance.   Respiratory: " " Negative for cough, chest tightness and shortness of breath.    Cardiovascular:  Negative for chest pain and leg swelling.        Positive for "heart racing"   Gastrointestinal:  Positive for abdominal pain. Negative for diarrhea, nausea and vomiting.   Genitourinary:  Negative for dysuria and frequency.   Musculoskeletal:  Negative for back pain, neck pain and neck stiffness.   Skin:  Negative for rash.   Neurological:  Positive for tremors. Negative for dizziness, syncope, numbness and headaches.     Physical Exam     Initial Vitals [08/28/22 1534]   BP Pulse Resp Temp SpO2   130/77 107 18 98.5 °F (36.9 °C) 99 %      MAP       --         Physical Exam    Nursing note and vitals reviewed.  Constitutional: Vital signs are normal. She appears well-developed and well-nourished. She is cooperative.  Non-toxic appearance. She does not have a sickly appearance.   HENT:   Head: Normocephalic and atraumatic.   Right Ear: External ear normal.   Left Ear: External ear normal.   Nose: Nose normal.   Eyes: Conjunctivae and lids are normal.   Neck: Neck supple.   Normal range of motion.   Full passive range of motion without pain.     Cardiovascular:  Normal rate, regular rhythm and normal heart sounds.     Exam reveals no gallop and no friction rub.       No murmur heard.  Pulmonary/Chest: Breath sounds normal. She has no wheezes. She has no rhonchi. She has no rales.   Abdominal: Abdomen is soft. There is no abdominal tenderness. There is no rebound and no guarding.   Musculoskeletal:      Cervical back: Full passive range of motion without pain, normal range of motion and neck supple.     Neurological: She is alert.   Skin: Skin is warm, dry and intact. No rash noted.       ED Course   Procedures  Labs Reviewed   CBC W/ AUTO DIFFERENTIAL - Abnormal; Notable for the following components:       Result Value    RBC 3.89 (*)      (*)     MCH 35.5 (*)     MPV 9.1 (*)     Gran % 74.2 (*)     Lymph % 17.3 (*)     All other " components within normal limits   COMPREHENSIVE METABOLIC PANEL - Abnormal; Notable for the following components:    Sodium 134 (*)     Potassium 3.0 (*)     Glucose 119 (*)     AST 68 (*)     ALT 81 (*)     All other components within normal limits   D DIMER, QUANTITATIVE - Abnormal; Notable for the following components:    D-Dimer 0.96 (*)     All other components within normal limits   MAGNESIUM - Abnormal; Notable for the following components:    Magnesium 1.4 (*)     All other components within normal limits   TROPONIN I   LIPASE     EKG Readings: (Independently Interpreted)   Initial Reading: No STEMI. Previous EKG: Compared with most recent EKG Rhythm: Normal Sinus Rhythm. Heart Rate: 95. Ectopy: No Ectopy. Conduction: Normal. Axis: Right Axis Deviation.   Non-specific t wave abnormalities      Imaging Results              CTA Chest Non-Coronary (PE Study) (In process)                      X-Ray Chest PA And Lateral (Final result)  Result time 08/28/22 16:31:59      Final result by Raul Aldrich MD (08/28/22 16:31:59)                   Impression:      No acute abnormality.      Electronically signed by: Raul Aldrich  Date:    08/28/2022  Time:    16:31               Narrative:    EXAMINATION:  XR CHEST PA AND LATERAL    CLINICAL HISTORY:  Chest Pain;    TECHNIQUE:  PA and lateral views of the chest were performed.    COMPARISON:  05/11/2022.    FINDINGS:  The lungs are clear, with normal appearance of pulmonary vasculature and no pleural effusion or pneumothorax.    The cardiac silhouette is normal in size. The hilar and mediastinal contours are unremarkable.    Bones are intact.                                       Medications   sodium chloride 0.9% bolus 1,000 mL (0 mLs Intravenous Stopped 8/28/22 1707)   iohexoL (OMNIPAQUE 350) 350 mg iodine/mL injection (75 mLs Intravenous Given 8/28/22 1732)   potassium chloride SA CR tablet 40 mEq (40 mEq Oral Given 8/28/22 1738)   magnesium oxide tablet 400 mg  "( Oral Canceled Entry 8/28/22 7133)     Medical Decision Making:   History:   Old Medical Records: I decided to obtain old medical records.  Independently Interpreted Test(s):   I have ordered and independently interpreted X-rays - see prior notes.  I have ordered and independently interpreted EKG Reading(s) - see prior notes  Clinical Tests:   Lab Tests: Ordered and Reviewed  Radiological Study: Ordered and Reviewed  Medical Tests: Ordered and Reviewed     APC / Resident Notes:   Emergent evaluation of a well appearing 57 year old female who presents with "feeling shaky and fast heart rate". She reports recent GI upset and colonoscopy prep. She denied abdominal pain. She has stable vital signs. Mildly tachycardic. No abdominal tenderness to palpation to the abdomen. EKG shows no acute changes. Labs show hypokalemia and hypomagnesemia. She was given supplemental potassium and mag as well as IVF hydration. She is noted to be dehydrated. CXR is normal. CTA reviewed which showed no PE. Incidental findings of dilation of arch as well as pulmonary nodule discussed. Recommend follow up with PCP. Low concern for ACS. Troponin is negative. Discussed good hydration and routine meals. Mildly elevated LFT's discussed. No RUQ tenderness. Normal bilirubin. Discussed results with patient. Return precautions given. Based on my clinical evaluation, I do not appreciate any immediate, emergent, or life threatening condition or etiology that warrants additional workup today and feel that the patient can be discharged with close follow up care.  Patient is to follow up with their primary care provider. Case was discussed with Dr. Multani who is in agreement with the plan of care. All questions answered.      Scribe Attestation:   Scribe #1: I performed the above scribed service and the documentation accurately describes the services I performed. I attest to the accuracy of the note.    Attending Attestation:     Physician Attestation " Statement for NP/PA:   I discussed this assessment and plan of this patient with the NP/PA, but I did not personally examine the patient. The face to face encounter was performed by the NP/PA.    Other NP/PA Attestation Additions:    History of Present Illness: 57-year-old female presented with multiple complaints.    Medical Decision Making: Initial differential diagnosis included but not limited to electrolyte abnormality, dehydration, and pulmonary embolism.  I am in agreement with the physician assistant's  assessment, treatment, and plan of care.                  I, Tesha Jones PA-C, personally performed the services described in this documentation. All medical record entries made by the scribe were at my direction and in my presence.  I have reviewed the chart and agree that the record reflects my personal performance and is accurate and complete. Tesha Jones PA-C.  8:22 PM 08/28/2022    Clinical Impression:   Final diagnoses:  [R00.0] Tachycardia (Primary)  [E87.6] Hypokalemia  [E83.42] Hypomagnesemia  [E86.0] Dehydration  [R79.89] Elevated LFTs        ED Disposition Condition    Discharge Stable          ED Prescriptions    None       Follow-up Information       Follow up With Specialties Details Why Contact Info    Gabriel Mccabe MD Family Medicine   [address] [facility] [address]  [phone]      [facility] Emergency Dept Emergency Medicine  As needed [address] [place] [id number] [phone]             Tesha Jones PA-C  08/28/22 2030       Chano Multani MD  08/28/22 1505

## 2022-08-28 NOTE — DISCHARGE INSTRUCTIONS
Drink plenty of fluids.   Eat routine meals.  Follow up closely with GI.  Have repeat liver tests. Annual monitoring of pulmonary nodules and dilation at arch.   For worsening symptoms, chest pain, shortness of breath, increased abdominal pain, high grade fever, stroke or stroke like symptoms, immediately go to the nearest Emergency Room or call 911 as soon as possible.

## 2022-08-30 DIAGNOSIS — R74.8 ELEVATED LIVER ENZYMES: Primary | ICD-10-CM

## 2022-09-01 ENCOUNTER — HOSPITAL ENCOUNTER (OUTPATIENT)
Dept: RADIOLOGY | Facility: HOSPITAL | Age: 57
Discharge: HOME | End: 2022-09-01
Attending: INTERNAL MEDICINE
Payer: COMMERCIAL

## 2022-09-01 DIAGNOSIS — R74.8 ELEVATED LIVER ENZYMES: ICD-10-CM

## 2022-09-01 PROCEDURE — 76705 ECHO EXAM OF ABDOMEN: CPT | Mod: 26,,, | Performed by: RADIOLOGY

## 2022-09-01 PROCEDURE — 76705 ECHO EXAM OF ABDOMEN: CPT | Mod: TC,PO

## 2022-09-01 PROCEDURE — 76705 US ABDOMEN LIMITED: ICD-10-PCS | Mod: 26,,, | Performed by: RADIOLOGY

## 2022-09-08 ENCOUNTER — OFFICE VISIT (OUTPATIENT)
Dept: FAMILY MEDICINE | Facility: CLINIC | Age: 57
End: 2022-09-08
Payer: COMMERCIAL

## 2022-09-08 VITALS
BODY MASS INDEX: 21.89 KG/M2 | RESPIRATION RATE: 17 BRPM | OXYGEN SATURATION: 96 % | WEIGHT: 131.38 LBS | DIASTOLIC BLOOD PRESSURE: 62 MMHG | HEART RATE: 81 BPM | SYSTOLIC BLOOD PRESSURE: 110 MMHG | TEMPERATURE: 99 F | HEIGHT: 65 IN

## 2022-09-08 DIAGNOSIS — E03.9 HYPOTHYROIDISM, UNSPECIFIED TYPE: ICD-10-CM

## 2022-09-08 DIAGNOSIS — Z00.00 WELLNESS EXAMINATION: Primary | ICD-10-CM

## 2022-09-08 DIAGNOSIS — R74.01 TRANSAMINITIS: ICD-10-CM

## 2022-09-08 PROCEDURE — 3008F PR BODY MASS INDEX (BMI) DOCUMENTED: ICD-10-PCS | Mod: CPTII,S$GLB,, | Performed by: FAMILY MEDICINE

## 2022-09-08 PROCEDURE — 4010F PR ACE/ARB THEARPY RXD/TAKEN: ICD-10-PCS | Mod: CPTII,S$GLB,, | Performed by: FAMILY MEDICINE

## 2022-09-08 PROCEDURE — 3074F SYST BP LT 130 MM HG: CPT | Mod: CPTII,S$GLB,, | Performed by: FAMILY MEDICINE

## 2022-09-08 PROCEDURE — 3008F BODY MASS INDEX DOCD: CPT | Mod: CPTII,S$GLB,, | Performed by: FAMILY MEDICINE

## 2022-09-08 PROCEDURE — 4010F ACE/ARB THERAPY RXD/TAKEN: CPT | Mod: CPTII,S$GLB,, | Performed by: FAMILY MEDICINE

## 2022-09-08 PROCEDURE — 99396 PR PREVENTIVE VISIT,EST,40-64: ICD-10-PCS | Mod: S$GLB,,, | Performed by: FAMILY MEDICINE

## 2022-09-08 PROCEDURE — 1159F MED LIST DOCD IN RCRD: CPT | Mod: CPTII,S$GLB,, | Performed by: FAMILY MEDICINE

## 2022-09-08 PROCEDURE — 99999 PR PBB SHADOW E&M-EST. PATIENT-LVL V: ICD-10-PCS | Mod: PBBFAC,,, | Performed by: FAMILY MEDICINE

## 2022-09-08 PROCEDURE — 1159F PR MEDICATION LIST DOCUMENTED IN MEDICAL RECORD: ICD-10-PCS | Mod: CPTII,S$GLB,, | Performed by: FAMILY MEDICINE

## 2022-09-08 PROCEDURE — 99396 PREV VISIT EST AGE 40-64: CPT | Mod: S$GLB,,, | Performed by: FAMILY MEDICINE

## 2022-09-08 PROCEDURE — 3074F PR MOST RECENT SYSTOLIC BLOOD PRESSURE < 130 MM HG: ICD-10-PCS | Mod: CPTII,S$GLB,, | Performed by: FAMILY MEDICINE

## 2022-09-08 PROCEDURE — 3078F DIAST BP <80 MM HG: CPT | Mod: CPTII,S$GLB,, | Performed by: FAMILY MEDICINE

## 2022-09-08 PROCEDURE — 99999 PR PBB SHADOW E&M-EST. PATIENT-LVL V: CPT | Mod: PBBFAC,,, | Performed by: FAMILY MEDICINE

## 2022-09-08 PROCEDURE — 3078F PR MOST RECENT DIASTOLIC BLOOD PRESSURE < 80 MM HG: ICD-10-PCS | Mod: CPTII,S$GLB,, | Performed by: FAMILY MEDICINE

## 2022-09-08 NOTE — PROGRESS NOTES
Subjective:   Patient ID: Marylin Morel is a 57 y.o. female     Chief Complaint:Annual Exam      Here for checkup    Review of Systems   Constitutional:  Negative for chills and fever.   HENT:  Negative for sore throat and trouble swallowing.    Respiratory:  Negative for cough and shortness of breath.    Cardiovascular:  Negative for chest pain and leg swelling.   Gastrointestinal:  Negative for abdominal distention and abdominal pain.   Genitourinary:  Negative for dysuria and flank pain.   Musculoskeletal:  Negative for arthralgias and back pain.   Skin:  Negative for color change and pallor.   Neurological:  Negative for weakness and headaches.   Psychiatric/Behavioral:  Negative for agitation and confusion.    Past Medical History:   Diagnosis Date    Allergy     ants    GERD (gastroesophageal reflux disease)     Hypertension     Thyroid disease      Past Surgical History:   Procedure Laterality Date    AUGMENTATION OF BREAST      BREAST SURGERY      COSMETIC SURGERY      NECK SURGERY       Objective:     Vitals:    09/08/22 1303   BP: 110/62   Pulse: 81   Resp: 17   Temp: 98.8 °F (37.1 °C)     Body mass index is 21.87 kg/m².  Physical Exam  Vitals and nursing note reviewed.   Constitutional:       Appearance: She is well-developed.   HENT:      Head: Normocephalic and atraumatic.   Eyes:      General: No scleral icterus.     Conjunctiva/sclera: Conjunctivae normal.   Cardiovascular:      Heart sounds: No murmur heard.  Pulmonary:      Effort: Pulmonary effort is normal. No respiratory distress.   Musculoskeletal:         General: No deformity. Normal range of motion.      Cervical back: Normal range of motion and neck supple.   Skin:     Coloration: Skin is not pale.      Findings: No rash.   Neurological:      Mental Status: She is alert and oriented to person, place, and time.   Psychiatric:         Behavior: Behavior normal.         Thought Content: Thought content normal.         Judgment: Judgment  normal.     Assessment:     1. Wellness examination    2. Transaminitis    3. Hypothyroidism, unspecified type      Plan:   Wellness examination  Counseled on healthy diet. Up to date on cancer screening.  Transaminitis  -     Comprehensive Metabolic Panel; Future; Expected date: 10/08/2022  -     HEPATITIS PANEL, ACUTE; Future; Expected date: 10/08/2022  -     Hemoglobin A1C; Future; Expected date: 10/08/2022  -     CBC Auto Differential; Future; Expected date: 10/08/2022  Counseled suspect ED could be due to reported increased ETOH consumption. Advised cessation or very limited intake. Will repeat labs in 2-4 weeks. Advised f/u if symptoms worsen or persist.  Hypothyroidism, unspecified type  -     TSH; Future; Expected date: 09/08/2022        Established patient with me has been instructed that must see me at least 1 time yearly for refills of medications. Seeing other providers in this clinic is fine but expectation is to see me yearly.    Gabriel Mccabe MD  09/08/2022    Portions of this note have been dictated with M Tammy.

## 2022-09-08 NOTE — PATIENT INSTRUCTIONS
Luther Coulter,     If you are due for any health screening(s) below please notify me so we can arrange them to be ordered and scheduled to maintain your health. Most healthy patients complete it. Don't lose out on improving your health.     All of your core healthy metrics are met.                         September 8, 2022       Marylin Morel  [id number]  [place] [address]         Dear Marylin:    Your [facility] Care Team is dedicated to helping you stay healthy with regularly scheduled recommended screenings.  Scheduling routine screenings is important to maintaining good health. Our records indicate that you may be overdue for your screening pap smear. A pap smear screening can help identify patients at risk for developing cervical cancer at an early stage, when it is most likely to be successfully treated.    We encourage you to schedule your appointment with your [facility] health provider or some primary care providers also perform this screening.    If you have completed or scheduled your pap smear screening outside of [facility], please notify your primary care team so we can update your health record.      If you have questions or would like to schedule your screening, please contact your primary care clinic.    Sincerely,    Gabriel Mccabe MD and your [facility] Team

## 2022-09-15 ENCOUNTER — PATIENT MESSAGE (OUTPATIENT)
Dept: FAMILY MEDICINE | Facility: CLINIC | Age: 57
End: 2022-09-15
Payer: COMMERCIAL

## 2022-09-15 DIAGNOSIS — E03.9 HYPOTHYROIDISM, UNSPECIFIED TYPE: ICD-10-CM

## 2022-09-15 RX ORDER — LEVOTHYROXINE SODIUM 25 UG/1
25 TABLET ORAL DAILY
Qty: 30 TABLET | Refills: 6 | Status: SHIPPED | OUTPATIENT
Start: 2022-09-15 | End: 2023-04-28 | Stop reason: SDUPTHER

## 2022-09-15 NOTE — TELEPHONE ENCOUNTER
No new care gaps identified.  [facility] Embedded Care Gaps. Reference number: [id number]. 9/15/2022   7:35:22 AM CDT

## 2022-10-06 ENCOUNTER — LAB VISIT (OUTPATIENT)
Dept: LAB | Facility: HOSPITAL | Age: 57
End: 2022-10-06
Attending: FAMILY MEDICINE
Payer: COMMERCIAL

## 2022-10-06 DIAGNOSIS — E03.9 HYPOTHYROIDISM, UNSPECIFIED TYPE: ICD-10-CM

## 2022-10-06 DIAGNOSIS — R74.01 TRANSAMINITIS: ICD-10-CM

## 2022-10-06 LAB
ALBUMIN SERPL BCP-MCNC: 4.2 G/DL (ref 3.5–5.2)
ALP SERPL-CCNC: 46 U/L (ref 55–135)
ALT SERPL W/O P-5'-P-CCNC: 37 U/L (ref 10–44)
ANION GAP SERPL CALC-SCNC: 11 MMOL/L (ref 8–16)
AST SERPL-CCNC: 33 U/L (ref 10–40)
BASOPHILS # BLD AUTO: 0.07 K/UL (ref 0–0.2)
BASOPHILS NFR BLD: 0.8 % (ref 0–1.9)
BILIRUB SERPL-MCNC: 0.4 MG/DL (ref 0.1–1)
BUN SERPL-MCNC: 15 MG/DL (ref 6–20)
CALCIUM SERPL-MCNC: 9.6 MG/DL (ref 8.7–10.5)
CHLORIDE SERPL-SCNC: 101 MMOL/L (ref 95–110)
CO2 SERPL-SCNC: 23 MMOL/L (ref 23–29)
CREAT SERPL-MCNC: 0.8 MG/DL (ref 0.5–1.4)
DIFFERENTIAL METHOD: ABNORMAL
EOSINOPHIL # BLD AUTO: 0.2 K/UL (ref 0–0.5)
EOSINOPHIL NFR BLD: 2.3 % (ref 0–8)
ERYTHROCYTE [DISTWIDTH] IN BLOOD BY AUTOMATED COUNT: 11.8 % (ref 11.5–14.5)
EST. GFR  (NO RACE VARIABLE): >60 ML/MIN/1.73 M^2
ESTIMATED AVG GLUCOSE: 103 MG/DL (ref 68–131)
GLUCOSE SERPL-MCNC: 100 MG/DL (ref 70–110)
HBA1C MFR BLD: 5.2 % (ref 4–5.6)
HCT VFR BLD AUTO: 35.6 % (ref 37–48.5)
HGB BLD-MCNC: 12.2 G/DL (ref 12–16)
IMM GRANULOCYTES # BLD AUTO: 0.02 K/UL (ref 0–0.04)
IMM GRANULOCYTES NFR BLD AUTO: 0.2 % (ref 0–0.5)
LYMPHOCYTES # BLD AUTO: 2.5 K/UL (ref 1–4.8)
LYMPHOCYTES NFR BLD: 27.4 % (ref 18–48)
MCH RBC QN AUTO: 34.4 PG (ref 27–31)
MCHC RBC AUTO-ENTMCNC: 34.3 G/DL (ref 32–36)
MCV RBC AUTO: 100 FL (ref 82–98)
MONOCYTES # BLD AUTO: 0.6 K/UL (ref 0.3–1)
MONOCYTES NFR BLD: 6.7 % (ref 4–15)
NEUTROPHILS # BLD AUTO: 5.6 K/UL (ref 1.8–7.7)
NEUTROPHILS NFR BLD: 62.6 % (ref 38–73)
NRBC BLD-RTO: 0 /100 WBC
PLATELET # BLD AUTO: 276 K/UL (ref 150–450)
PMV BLD AUTO: 9.8 FL (ref 9.2–12.9)
POTASSIUM SERPL-SCNC: 4 MMOL/L (ref 3.5–5.1)
PROT SERPL-MCNC: 7 G/DL (ref 6–8.4)
RBC # BLD AUTO: 3.55 M/UL (ref 4–5.4)
SODIUM SERPL-SCNC: 135 MMOL/L (ref 136–145)
WBC # BLD AUTO: 8.95 K/UL (ref 3.9–12.7)

## 2022-10-06 PROCEDURE — 80074 ACUTE HEPATITIS PANEL: CPT | Performed by: FAMILY MEDICINE

## 2022-10-06 PROCEDURE — 36415 COLL VENOUS BLD VENIPUNCTURE: CPT | Mod: PO | Performed by: FAMILY MEDICINE

## 2022-10-06 PROCEDURE — 84443 ASSAY THYROID STIM HORMONE: CPT | Performed by: FAMILY MEDICINE

## 2022-10-06 PROCEDURE — 85025 COMPLETE CBC W/AUTO DIFF WBC: CPT | Performed by: FAMILY MEDICINE

## 2022-10-06 PROCEDURE — 80053 COMPREHEN METABOLIC PANEL: CPT | Performed by: FAMILY MEDICINE

## 2022-10-06 PROCEDURE — 83036 HEMOGLOBIN GLYCOSYLATED A1C: CPT | Performed by: FAMILY MEDICINE

## 2022-10-07 ENCOUNTER — PATIENT MESSAGE (OUTPATIENT)
Dept: FAMILY MEDICINE | Facility: CLINIC | Age: 57
End: 2022-10-07
Payer: COMMERCIAL

## 2022-10-07 LAB
HAV IGM SERPL QL IA: NORMAL
HBV CORE IGM SERPL QL IA: NORMAL
HBV SURFACE AG SERPL QL IA: NORMAL
HCV AB SERPL QL IA: NORMAL
TSH SERPL DL<=0.005 MIU/L-ACNC: 2.2 UIU/ML (ref 0.4–4)

## 2022-10-13 ENCOUNTER — OFFICE VISIT (OUTPATIENT)
Dept: FAMILY MEDICINE | Facility: CLINIC | Age: 57
End: 2022-10-13
Payer: COMMERCIAL

## 2022-10-13 ENCOUNTER — HOSPITAL ENCOUNTER (OUTPATIENT)
Dept: RADIOLOGY | Facility: CLINIC | Age: 57
Discharge: HOME | End: 2022-10-13
Attending: PHYSICIAN ASSISTANT
Payer: COMMERCIAL

## 2022-10-13 VITALS
HEART RATE: 88 BPM | SYSTOLIC BLOOD PRESSURE: 126 MMHG | HEIGHT: 65 IN | TEMPERATURE: 99 F | WEIGHT: 130.5 LBS | BODY MASS INDEX: 21.74 KG/M2 | DIASTOLIC BLOOD PRESSURE: 68 MMHG | OXYGEN SATURATION: 95 %

## 2022-10-13 DIAGNOSIS — Z01.818 PREOP GENERAL PHYSICAL EXAM: ICD-10-CM

## 2022-10-13 DIAGNOSIS — Z01.818 PREOP GENERAL PHYSICAL EXAM: Primary | ICD-10-CM

## 2022-10-13 PROCEDURE — 93005 EKG 12-LEAD: ICD-10-PCS | Mod: S$GLB,,, | Performed by: PHYSICIAN ASSISTANT

## 2022-10-13 PROCEDURE — 4010F PR ACE/ARB THEARPY RXD/TAKEN: ICD-10-PCS | Mod: CPTII,S$GLB,, | Performed by: PHYSICIAN ASSISTANT

## 2022-10-13 PROCEDURE — 93005 ELECTROCARDIOGRAM TRACING: CPT | Mod: S$GLB,,, | Performed by: PHYSICIAN ASSISTANT

## 2022-10-13 PROCEDURE — 3078F DIAST BP <80 MM HG: CPT | Mod: CPTII,S$GLB,, | Performed by: PHYSICIAN ASSISTANT

## 2022-10-13 PROCEDURE — 3074F SYST BP LT 130 MM HG: CPT | Mod: CPTII,S$GLB,, | Performed by: PHYSICIAN ASSISTANT

## 2022-10-13 PROCEDURE — 71046 XR CHEST PA AND LATERAL: ICD-10-PCS | Mod: 26,,, | Performed by: RADIOLOGY

## 2022-10-13 PROCEDURE — 93010 ELECTROCARDIOGRAM REPORT: CPT | Mod: S$GLB,,, | Performed by: INTERNAL MEDICINE

## 2022-10-13 PROCEDURE — 71046 X-RAY EXAM CHEST 2 VIEWS: CPT | Mod: 26,,, | Performed by: RADIOLOGY

## 2022-10-13 PROCEDURE — 99999 PR PBB SHADOW E&M-EST. PATIENT-LVL IV: CPT | Mod: PBBFAC,,, | Performed by: PHYSICIAN ASSISTANT

## 2022-10-13 PROCEDURE — 3044F HG A1C LEVEL LT 7.0%: CPT | Mod: CPTII,S$GLB,, | Performed by: PHYSICIAN ASSISTANT

## 2022-10-13 PROCEDURE — 3074F PR MOST RECENT SYSTOLIC BLOOD PRESSURE < 130 MM HG: ICD-10-PCS | Mod: CPTII,S$GLB,, | Performed by: PHYSICIAN ASSISTANT

## 2022-10-13 PROCEDURE — 99214 OFFICE O/P EST MOD 30 MIN: CPT | Mod: S$GLB,,, | Performed by: PHYSICIAN ASSISTANT

## 2022-10-13 PROCEDURE — 4010F ACE/ARB THERAPY RXD/TAKEN: CPT | Mod: CPTII,S$GLB,, | Performed by: PHYSICIAN ASSISTANT

## 2022-10-13 PROCEDURE — 93010 EKG 12-LEAD: ICD-10-PCS | Mod: S$GLB,,, | Performed by: INTERNAL MEDICINE

## 2022-10-13 PROCEDURE — 71046 X-RAY EXAM CHEST 2 VIEWS: CPT | Mod: TC,FY,PO

## 2022-10-13 PROCEDURE — 99214 PR OFFICE/OUTPT VISIT, EST, LEVL IV, 30-39 MIN: ICD-10-PCS | Mod: S$GLB,,, | Performed by: PHYSICIAN ASSISTANT

## 2022-10-13 PROCEDURE — 3044F PR MOST RECENT HEMOGLOBIN A1C LEVEL <7.0%: ICD-10-PCS | Mod: CPTII,S$GLB,, | Performed by: PHYSICIAN ASSISTANT

## 2022-10-13 PROCEDURE — 1159F PR MEDICATION LIST DOCUMENTED IN MEDICAL RECORD: ICD-10-PCS | Mod: CPTII,S$GLB,, | Performed by: PHYSICIAN ASSISTANT

## 2022-10-13 PROCEDURE — 99999 PR PBB SHADOW E&M-EST. PATIENT-LVL IV: ICD-10-PCS | Mod: PBBFAC,,, | Performed by: PHYSICIAN ASSISTANT

## 2022-10-13 PROCEDURE — 3078F PR MOST RECENT DIASTOLIC BLOOD PRESSURE < 80 MM HG: ICD-10-PCS | Mod: CPTII,S$GLB,, | Performed by: PHYSICIAN ASSISTANT

## 2022-10-13 PROCEDURE — 1159F MED LIST DOCD IN RCRD: CPT | Mod: CPTII,S$GLB,, | Performed by: PHYSICIAN ASSISTANT

## 2022-10-13 NOTE — PROGRESS NOTES
Patient ID: Marylin Morel is a 57 y.o. female.    Chief Complaint: Pre-op Exam      Marylin Morel is in the office for medical optimization prior to hip replacement   Active Problem List with Overview Notes    Diagnosis Date Noted    Cervical radiculopathy 07/03/2018    Seasonal allergies 01/02/2018    GERD (gastroesophageal reflux disease) 01/02/2018    DDD (degenerative disc disease), lumbosacral 01/02/2018    Hypothyroidism 12/27/2013    Essential hypertension, benign 10/02/2013         Past Medical History:   Diagnosis Date    Allergy     ants    GERD (gastroesophageal reflux disease)     Hypertension     Thyroid disease        STOP BANG Questionnaire  Patient diagnosed with Obstructive Sleep Apnea?: No  Has loud snoring: No  Disturbed sleep, daytime fatigue, daytime somnolence: No  Observed to have interrupted breathing during sleep: Yes  Takes medication for high blood pressure: Yes  Not taking BP medication but supposed to be: No  Recent BMI (Calculated): 21.7  Is BMI greater than 35 kg/m2?: 0=No  Age older than 50 years old?: 1=Yes  Has large neck size >40cm (15.7in., large male shirt size, large male collar size >16): No  Gender - Male: 0=No  STOP-Bang Total Score: 3        Current Outpatient Medications:     amLODIPine (NORVASC) 5 MG tablet, TAKE ONE TABLET BY MOUTH ONCE DAILY, Disp: 90 tablet, Rfl: 0    cetirizine (ZYRTEC) 10 MG tablet, Take 10 mg by mouth once daily., Disp: , Rfl:     chlorzoxazone (PARAFON FORTE) 500 mg Tab, , Disp: , Rfl:     COMBIPATCH 0.05-0.14 mg/24 hr, , Disp: , Rfl:     fluticasone propionate (FLONASE) 50 mcg/actuation nasal spray, INSTILL ONE SPRAY INTO EACH NOSTRIL ONCE DAILY, Disp: 16 g, Rfl: 2    ibuprofen (ADVIL,MOTRIN) 800 MG tablet, Take 1 tablet (800 mg total) by mouth every 6 (six) hours as needed for Pain., Disp: 60 tablet, Rfl: 5    levothyroxine (SYNTHROID) 25 MCG tablet, Take 1 tablet (25 mcg total) by mouth once daily., Disp: 30 tablet, Rfl: 6     lisinopriL-hydrochlorothiazide (PRINZIDE,ZESTORETIC) 20-25 mg Tab, Take 1 tablet by mouth once daily., Disp: 90 tablet, Rfl: 0    olopatadine (PATADAY) 0.2 % Drop, , Disp: , Rfl:     omeprazole (PRILOSEC) 40 MG capsule, TAKE ONE CAPSULE BY MOUTH ONCE DAILY, Disp: 90 capsule, Rfl: 0    simethicone (GAS-X EXTRA STRENGTH) 125 mg Cap capsule, Take 1 capsule (125 mg total) by mouth 4 (four) times daily as needed for Flatulence., Disp: 30 capsule, Rfl: 1    vitamin D (VITAMIN D3) 1000 units Tab, Take 1,000 Units by mouth once daily., Disp: , Rfl:     EPINEPHrine (EPIPEN) 0.3 mg/0.3 mL AtIn, Inject 0.3 mLs (0.3 mg total) into the muscle once. for 1 dose, Disp: 2 each, Rfl: 1    The 10-year ASCVD risk score (Luisa COE, et al., 2019) is: 3.1%    Values used to calculate the score:      Age: 57 years      Sex: Female      Is Non- : No      Diabetic: No      Tobacco smoker: No      Systolic Blood Pressure: 126 mmHg      Is BP treated: Yes      HDL Cholesterol: 82 mg/dL      Total Cholesterol: 250 mg/dL     Wt Readings from Last 3 Encounters:   10/13/22 59.2 kg (130 lb 8.2 oz)   09/08/22 59.6 kg (131 lb 6.3 oz)   08/28/22 59.4 kg (131 lb)     Temp Readings from Last 3 Encounters:   10/13/22 98.8 °F (37.1 °C)   09/08/22 98.8 °F (37.1 °C) (Oral)   08/28/22 98.5 °F (36.9 °C) (Oral)     BP Readings from Last 3 Encounters:   10/13/22 126/68   09/08/22 110/62   08/28/22 136/78     Pulse Readings from Last 3 Encounters:   10/13/22 88   09/08/22 81   08/28/22 101     Resp Readings from Last 3 Encounters:   09/08/22 17   08/28/22 18   05/11/22 14     PF Readings from Last 3 Encounters:   No data found for PF     SpO2 Readings from Last 3 Encounters:   10/13/22 95%   09/08/22 96%   08/28/22 98%        Lab Results   Component Value Date    HGBA1C 5.2 10/06/2022    HGBA1C 5.0 01/18/2020     Lab Results   Component Value Date    LDLCALC 151.2 02/01/2020    CREATININE 0.8 10/06/2022       Review of Systems    Constitutional:  Negative for activity change, appetite change, fatigue and unexpected weight change.   Eyes:  Negative for visual disturbance.   Respiratory:  Negative for cough and shortness of breath.    Cardiovascular:  Negative for chest pain, palpitations and leg swelling.   Gastrointestinal:  Negative for abdominal pain, constipation, diarrhea and nausea.   Endocrine: Negative for polydipsia and polyuria.   Genitourinary:  Negative for difficulty urinating.   Musculoskeletal:  Negative for arthralgias.   Skin:  Negative for rash.   Neurological:  Negative for dizziness, light-headedness and headaches.   Psychiatric/Behavioral:  Negative for dysphoric mood and sleep disturbance. The patient is not nervous/anxious.    All other systems reviewed and are negative.        Objective:      Physical Exam  Vitals reviewed.   Constitutional:       General: She is not in acute distress.     Appearance: Normal appearance. She is well-developed. She is not ill-appearing.   HENT:      Head: Normocephalic and atraumatic.   Eyes:      General: No scleral icterus.     Conjunctiva/sclera: Conjunctivae normal.      Pupils: Pupils are equal, round, and reactive to light.   Neck:      Vascular: No carotid bruit.   Cardiovascular:      Rate and Rhythm: Normal rate and regular rhythm.      Heart sounds: Normal heart sounds.   Pulmonary:      Effort: Pulmonary effort is normal.      Breath sounds: Normal breath sounds.   Abdominal:      General: Bowel sounds are normal.      Palpations: Abdomen is soft.   Musculoskeletal:      Cervical back: No tenderness.      Right lower leg: No edema.      Left lower leg: No edema.   Skin:     General: Skin is warm and dry.      Capillary Refill: Capillary refill takes less than 2 seconds.   Neurological:      General: No focal deficit present.      Mental Status: She is alert.   Psychiatric:         Mood and Affect: Mood normal.         Speech: Speech normal.         Behavior: Behavior is  cooperative.         Judgment: Judgment normal.           Screening recommendations appropriate to age and health status were reviewed.    Preop general physical exam  -     IN OFFICE EKG 12-LEAD (to Muse)  -     X-Ray Chest PA And Lateral; Future; Expected date: 10/13/2022    Recent lab  H/H 12.2  35.6    CMP WNL     RCRI risk factors include: no known RCRI risk factors. As such, per RCRI the risk of cardiac death, nonfatal myocardial infarction, or nonfatal cardiac arrest is 0.4% and the risk of myocardial infarction, pulmonary edema, ventricular fibrillation, primary cardiac arrest, or complete heart block is 0.5%.  Overall this patient can be considered low risk for this low risk procedure. No further cardiac testing is recommended at this time.     Patient denies any symptoms (as per HPI) concerning for undiagnosed lung disease including THADDEUS. Would not recommend obtaining chest X-ray, sleep study, or PFTs at this time. Patient is a non-smoker. We discussed the benefits of early mobilization and deep breathing after surgery.      Screened patient for alcohol misuse, use of illicit drugs, and personal or family history of anesthetic complications or bleeding diathesis and no substantial concerns were identified.     All current medications were reviewed and at this time no changes to medications are recommended prior to surgery.     I recommend use of standard pre-op and post-op precautions for this patient. In my opinion, she is medically optimized for this procedure, and can proceed without further evaluation.

## 2022-10-25 DIAGNOSIS — I10 ESSENTIAL HYPERTENSION, BENIGN: ICD-10-CM

## 2022-10-25 RX ORDER — AMLODIPINE BESYLATE 5 MG/1
TABLET ORAL
Qty: 90 TABLET | Refills: 3 | Status: SHIPPED | OUTPATIENT
Start: 2022-10-25 | End: 2023-02-10 | Stop reason: ALTCHOICE

## 2022-10-26 NOTE — TELEPHONE ENCOUNTER
Refill Decision Note   Marylin Morel  is requesting a refill authorization.  Brief Assessment and Rationale for Refill:  Approve     Medication Therapy Plan:       Medication Reconciliation Completed: No   Comments:     No Care Gaps recommended.     Note composed:11:05 PM 10/25/2022

## 2022-10-26 NOTE — TELEPHONE ENCOUNTER
No new care gaps identified.  [facility] Embedded Care Gaps. Reference number: [id number]. 10/25/2022   8:00:17 PM CDT

## 2022-11-02 DIAGNOSIS — R10.84 ABDOMINAL PAIN, GENERALIZED: Primary | ICD-10-CM

## 2022-11-23 ENCOUNTER — HOSPITAL ENCOUNTER (OUTPATIENT)
Dept: RADIOLOGY | Facility: HOSPITAL | Age: 57
Discharge: HOME | End: 2022-11-23
Attending: INTERNAL MEDICINE
Payer: COMMERCIAL

## 2022-11-23 VITALS — WEIGHT: 130 LBS | BODY MASS INDEX: 21.63 KG/M2

## 2022-11-23 DIAGNOSIS — R10.84 ABDOMINAL PAIN, GENERALIZED: ICD-10-CM

## 2022-11-23 PROCEDURE — 63600175 PHARM REV CODE 636 W HCPCS: Performed by: INTERNAL MEDICINE

## 2022-11-23 PROCEDURE — A9537 TC99M MEBROFENIN: HCPCS

## 2022-11-23 RX ORDER — SINCALIDE 5 UG/5ML
0.02 INJECTION, POWDER, LYOPHILIZED, FOR SOLUTION INTRAVENOUS ONCE
Status: COMPLETED | OUTPATIENT
Start: 2022-11-23 | End: 2022-11-23

## 2022-11-23 RX ADMIN — SINCALIDE 1.2 MCG: 5 INJECTION, POWDER, LYOPHILIZED, FOR SOLUTION INTRAVENOUS at 09:11

## 2022-11-25 RX ORDER — OMEPRAZOLE 40 MG/1
CAPSULE, DELAYED RELEASE ORAL
Qty: 90 CAPSULE | Refills: 3 | Status: SHIPPED | OUTPATIENT
Start: 2022-11-25 | End: 2023-11-02 | Stop reason: SDUPTHER

## 2022-11-25 NOTE — TELEPHONE ENCOUNTER
No new care gaps identified.  [facility] Embedded Care Gaps. Reference number: [id number]. 11/25/2022   10:56:17 AM CST

## 2022-11-25 NOTE — TELEPHONE ENCOUNTER
Refill Decision Note   Marylin Morel  is requesting a refill authorization.  Brief Assessment and Rationale for Refill:  Approve     Medication Therapy Plan:       Medication Reconciliation Completed: No   Comments:     No Care Gaps recommended.     Note composed:2:58 PM 11/25/2022

## 2022-12-13 ENCOUNTER — E-VISIT (OUTPATIENT)
Dept: FAMILY MEDICINE | Facility: CLINIC | Age: 57
End: 2022-12-13
Payer: COMMERCIAL

## 2022-12-13 ENCOUNTER — DOCUMENTATION ONLY (OUTPATIENT)
Dept: FAMILY MEDICINE | Facility: CLINIC | Age: 57
End: 2022-12-13

## 2022-12-13 ENCOUNTER — TELEPHONE (OUTPATIENT)
Dept: FAMILY MEDICINE | Facility: CLINIC | Age: 57
End: 2022-12-13
Payer: COMMERCIAL

## 2022-12-13 ENCOUNTER — PATIENT MESSAGE (OUTPATIENT)
Dept: FAMILY MEDICINE | Facility: CLINIC | Age: 57
End: 2022-12-13

## 2022-12-13 DIAGNOSIS — U07.1 COVID-19: Primary | ICD-10-CM

## 2022-12-13 DIAGNOSIS — R05.9 COUGH, UNSPECIFIED TYPE: ICD-10-CM

## 2022-12-13 DIAGNOSIS — Z20.822 EXPOSURE TO CONFIRMED CASE OF COVID-19: ICD-10-CM

## 2022-12-13 LAB
CTP QC/QA: YES
SARS-COV-2 RDRP RESP QL NAA+PROBE: POSITIVE

## 2022-12-13 PROCEDURE — 99421 OL DIG E/M SVC 5-10 MIN: CPT | Mod: S$GLB,,,

## 2022-12-13 PROCEDURE — 99421 PR E&M, ONLINE DIGIT, EST, < 7 DAYS, 5-10 MINS: ICD-10-PCS | Mod: S$GLB,,,

## 2022-12-13 RX ORDER — PROMETHAZINE HYDROCHLORIDE AND DEXTROMETHORPHAN HYDROBROMIDE 6.25; 15 MG/5ML; MG/5ML
5 SYRUP ORAL EVERY 6 HOURS PRN
Qty: 180 ML | Refills: 0 | Status: SHIPPED | OUTPATIENT
Start: 2022-12-13 | End: 2022-12-23

## 2022-12-13 RX ORDER — BENZONATATE 100 MG/1
100 CAPSULE ORAL 3 TIMES DAILY PRN
Qty: 30 CAPSULE | Refills: 0 | Status: SHIPPED | OUTPATIENT
Start: 2022-12-13 | End: 2022-12-23

## 2022-12-13 NOTE — PROGRESS NOTES
Patient ID: Marylin Morel is a 57 y.o. female.    Chief Complaint: COVID-19 Concerns          274}  The patient initiated a request through [employer] on 12/13/2022 for evaluation and management with a chief complaint of COVID-19 Concerns     I evaluated the questionnaire submission on 12/13/2022 .    Ohs Peq Evisit Upper Respitatory/Cough Questionnaire    12/13/2022 11:21 AM CST - Filed by Patient   Do you agree to participate in an E-Visit? Yes   If you have any of the following symptoms, please present to your local ER or call 911:  I acknowledge   What is the main issue that you would like for your doctor to address today? Cough, exposed to Covid   Are you able to take your vital signs? No   What symptoms do you currently have?  Cough;  Sore throat   Have you had a fever? No   When did your symptoms first appear? 12/11/2022   In the last two weeks, have you been in close contact with someone who has COVID-19 or the Flu? Yes , Covid-19   In the last two weeks, have you worked or volunteered in a healthcare facility or as a ? Healthcare facilities include a hospital, medical or dental clinic, long-term care facility, or nursing home No   Do you live in a long-term care facility, nursing home, or homeless shelter? No   List what you have done or taken to help your symptoms. Benedryl, throat spray, gargling   How severe are your symptoms? Mild   Have you taken an at home Covid test? Yes   What were the results? Positive   Have you taken a Flu test? No   Have you been fully vaccinated for COVID? (2 Pfizer, 2 Moderna or 1 Laci & Laci vaccine injections) Yes   Have you received a booster? Yes   Have you recieved a Flu shot? Yes   When did you recieve your Flu shot? 11/16/2022   Do you have transportation to get tested for COVID if it is indicated and ordered for you at an [facility] location? Yes   Are you currently pregnant, could you be pregnant, or are you breast feeding? None of the above    Provide any information you feel is important to your history not asked above    Please attach any relevant images or files           Active Problem List with Overview Notes    Diagnosis Date Noted    Cervical radiculopathy 2018    Seasonal allergies 2018    GERD (gastroesophageal reflux disease) 2018    DDD (degenerative disc disease), lumbosacral 2018    Hypothyroidism 2013    Essential hypertension, benign 10/02/2013      Recent Labs Obtained:  Appointment on 2022   Component Date Value Ref Range Status    POC Rapid COVID 2022 Positive (A)  Negative Final     Acceptable 2022 Yes   Final       Encounter Diagnoses   Name Primary?    Exposure to confirmed case of COVID-19     Cough, unspecified type     COVID-19 Yes        Orders Placed This Encounter   Procedures    POCT COVID-19 Rapid Screening     Order Specific Question:   Is the patient symptomatic?     Answer:   Yes      Medications Ordered This Encounter   Medications    benzonatate (TESSALON) 100 MG capsule     Sig: Take 1 capsule (100 mg total) by mouth 3 (three) times daily as needed for Cough.     Dispense:  30 capsule     Refill:  0    promethazine-dextromethorphan (PROMETHAZINE-DM) 6.25-15 mg/5 mL Syrp     Sig: Take 5 mLs by mouth every 6 (six) hours as needed (cough).     Dispense:  180 mL     Refill:  0        E-Visit Time Trackin minutes                  274}

## 2022-12-14 ENCOUNTER — TELEPHONE (OUTPATIENT)
Dept: FAMILY MEDICINE | Facility: CLINIC | Age: 57
End: 2022-12-14
Payer: COMMERCIAL

## 2022-12-14 DIAGNOSIS — R10.9 ABDOMINAL PAIN, UNSPECIFIED ABDOMINAL LOCATION: Primary | ICD-10-CM

## 2022-12-14 NOTE — TELEPHONE ENCOUNTER
Spoke to patient, she states she is not currently seeing a surgeon for her gallbladder. Please place referral for patient.

## 2022-12-15 ENCOUNTER — TELEPHONE (OUTPATIENT)
Dept: SURGERY | Facility: CLINIC | Age: 57
End: 2022-12-15
Payer: COMMERCIAL

## 2022-12-15 DIAGNOSIS — R68.81 EARLY SATIETY: Primary | ICD-10-CM

## 2022-12-15 NOTE — TELEPHONE ENCOUNTER
GenSurg referral scheduled with patient: 12/20/2022  [facility]. Patient Covid+: cautioned may need to test negative pre-appointment. Should follow with GenSurg for instructions. She voices understanding.

## 2022-12-20 ENCOUNTER — OFFICE VISIT (OUTPATIENT)
Dept: SURGERY | Facility: CLINIC | Age: 57
End: 2022-12-20
Payer: COMMERCIAL

## 2022-12-20 VITALS
SYSTOLIC BLOOD PRESSURE: 153 MMHG | BODY MASS INDEX: 22.49 KG/M2 | WEIGHT: 135 LBS | TEMPERATURE: 97 F | DIASTOLIC BLOOD PRESSURE: 81 MMHG | HEIGHT: 65 IN | HEART RATE: 84 BPM

## 2022-12-20 DIAGNOSIS — R10.9 ABDOMINAL PAIN, UNSPECIFIED ABDOMINAL LOCATION: ICD-10-CM

## 2022-12-20 DIAGNOSIS — K82.8 BILIARY DYSKINESIA: Primary | ICD-10-CM

## 2022-12-20 PROCEDURE — 3077F PR MOST RECENT SYSTOLIC BLOOD PRESSURE >= 140 MM HG: ICD-10-PCS | Mod: CPTII,S$GLB,, | Performed by: SURGERY

## 2022-12-20 PROCEDURE — 3079F DIAST BP 80-89 MM HG: CPT | Mod: CPTII,S$GLB,, | Performed by: SURGERY

## 2022-12-20 PROCEDURE — 4010F ACE/ARB THERAPY RXD/TAKEN: CPT | Mod: CPTII,S$GLB,, | Performed by: SURGERY

## 2022-12-20 PROCEDURE — 3077F SYST BP >= 140 MM HG: CPT | Mod: CPTII,S$GLB,, | Performed by: SURGERY

## 2022-12-20 PROCEDURE — 3079F PR MOST RECENT DIASTOLIC BLOOD PRESSURE 80-89 MM HG: ICD-10-PCS | Mod: CPTII,S$GLB,, | Performed by: SURGERY

## 2022-12-20 PROCEDURE — 1159F PR MEDICATION LIST DOCUMENTED IN MEDICAL RECORD: ICD-10-PCS | Mod: CPTII,S$GLB,, | Performed by: SURGERY

## 2022-12-20 PROCEDURE — 3008F BODY MASS INDEX DOCD: CPT | Mod: CPTII,S$GLB,, | Performed by: SURGERY

## 2022-12-20 PROCEDURE — 1159F MED LIST DOCD IN RCRD: CPT | Mod: CPTII,S$GLB,, | Performed by: SURGERY

## 2022-12-20 PROCEDURE — 3008F PR BODY MASS INDEX (BMI) DOCUMENTED: ICD-10-PCS | Mod: CPTII,S$GLB,, | Performed by: SURGERY

## 2022-12-20 PROCEDURE — 3044F PR MOST RECENT HEMOGLOBIN A1C LEVEL <7.0%: ICD-10-PCS | Mod: CPTII,S$GLB,, | Performed by: SURGERY

## 2022-12-20 PROCEDURE — 99203 OFFICE O/P NEW LOW 30 MIN: CPT | Mod: S$GLB,,, | Performed by: SURGERY

## 2022-12-20 PROCEDURE — 99203 PR OFFICE/OUTPT VISIT, NEW, LEVL III, 30-44 MIN: ICD-10-PCS | Mod: S$GLB,,, | Performed by: SURGERY

## 2022-12-20 PROCEDURE — 3044F HG A1C LEVEL LT 7.0%: CPT | Mod: CPTII,S$GLB,, | Performed by: SURGERY

## 2022-12-20 PROCEDURE — 4010F PR ACE/ARB THEARPY RXD/TAKEN: ICD-10-PCS | Mod: CPTII,S$GLB,, | Performed by: SURGERY

## 2022-12-20 NOTE — PROGRESS NOTES
Subjective:       Patient ID: Marylin Morel is a 57 y.o. female.    Chief Complaint: Consult (Gallbladder)      HPI:  57 year old female referred to the office with abdominal symptoms. She reports crampy post prandial abdominal pain. Pain associated with nausea. Occasional diarrhea. US showed no evidence of gallstones.  Bile ducts normal in caliber.  She had fatty liver.  CT scan showed evidence of inflammatory process or mass.  HIDA scan showed good visualization of the gallbladder with elevated ejection fraction at 73%.  She states that she had reproduction of symptoms with administration of the CCK.  She has past surgical history of tummy tuck, recent left hip replacement    Past Medical History:   Diagnosis Date    Allergy     ants    GERD (gastroesophageal reflux disease)     Hypertension     Thyroid disease      Past Surgical History:   Procedure Laterality Date    AUGMENTATION OF BREAST      BREAST SURGERY      COSMETIC SURGERY      hip replacement left Left     NECK SURGERY       Review of patient's allergies indicates:   Allergen Reactions    Insect venom Anaphylaxis     Ant bites     Medication List with Changes/Refills   Current Medications    AMLODIPINE (NORVASC) 5 MG TABLET    TAKE ONE TABLET BY MOUTH ONCE DAILY    BENZONATATE (TESSALON) 100 MG CAPSULE    Take 1 capsule (100 mg total) by mouth 3 (three) times daily as needed for Cough.    CETIRIZINE (ZYRTEC) 10 MG TABLET    Take 10 mg by mouth once daily.    COMBIPATCH 0.05-0.14 MG/24 HR        EPINEPHRINE (EPIPEN) 0.3 MG/0.3 ML ATIN    Inject 0.3 mLs (0.3 mg total) into the muscle once. for 1 dose    FLUTICASONE PROPIONATE (FLONASE) 50 MCG/ACTUATION NASAL SPRAY    INSTILL ONE SPRAY INTO EACH NOSTRIL ONCE DAILY    IBUPROFEN (ADVIL,MOTRIN) 800 MG TABLET    Take 1 tablet (800 mg total) by mouth every 6 (six) hours as needed for Pain.    LEVOTHYROXINE (SYNTHROID) 25 MCG TABLET    Take 1 tablet (25 mcg total) by mouth once daily.     LISINOPRIL-HYDROCHLOROTHIAZIDE (PRINZIDE,ZESTORETIC) 20-25 MG TAB    TAKE ONE TABLET BY MOUTH ONCE DAILY    OLOPATADINE (PATADAY) 0.2 % DROP        OMEPRAZOLE (PRILOSEC) 40 MG CAPSULE    TAKE ONE CAPSULE BY MOUTH ONCE DAILY    PROMETHAZINE-DEXTROMETHORPHAN (PROMETHAZINE-DM) 6.25-15 MG/5 ML SYRP    Take 5 mLs by mouth every 6 (six) hours as needed (cough).    SIMETHICONE (GAS-X EXTRA STRENGTH) 125 MG CAP CAPSULE    Take 1 capsule (125 mg total) by mouth 4 (four) times daily as needed for Flatulence.    VITAMIN D (VITAMIN D3) 1000 UNITS TAB    Take 1,000 Units by mouth once daily.   Discontinued Medications    CHLORZOXAZONE (PARAFON FORTE) 500 MG TAB         Family History   Problem Relation Age of Onset    COPD Mother     Heart disease Father         MI    Hypertension Brother      Social History     Socioeconomic History    Marital status:    Occupational History     Employer: [employer]   Tobacco Use    Smoking status: Never    Smokeless tobacco: Never   Substance and Sexual Activity    Alcohol use: Yes     Alcohol/week: 1.0 standard drink     Types: 1 Glasses of wine per week     Comment: 1 glass of wine daily    Drug use: No    Sexual activity: Yes     Partners: Male         Review of Systems   Constitutional:  Negative for appetite change, chills, fever and unexpected weight change.   HENT:  Negative for hearing loss, rhinorrhea, sore throat and voice change.    Eyes:  Negative for photophobia and visual disturbance.   Respiratory:  Negative for cough, choking and shortness of breath.    Cardiovascular:  Negative for chest pain, palpitations and leg swelling.   Gastrointestinal:  Positive for abdominal pain and diarrhea. Negative for blood in stool, constipation, nausea and vomiting.   Endocrine: Negative for cold intolerance, heat intolerance, polydipsia and polyuria.   Musculoskeletal:  Negative for arthralgias, back pain, joint swelling and neck stiffness.   Skin:  Negative for color  change, pallor and rash.   Neurological:  Negative for dizziness, seizures, syncope and headaches.   Hematological:  Negative for adenopathy. Does not bruise/bleed easily.   Psychiatric/Behavioral:  Negative for agitation, behavioral problems and confusion.      Objective:      Physical Exam  Constitutional:       General: She is not in acute distress.     Appearance: Normal appearance. She is well-developed. She is not toxic-appearing.   HENT:      Head: Normocephalic and atraumatic. No abrasion or laceration.      Right Ear: External ear normal.      Left Ear: External ear normal.      Nose: Nose normal.   Eyes:      Pupils: Pupils are equal, round, and reactive to light.   Neck:      Trachea: Trachea normal. No tracheal deviation.   Cardiovascular:      Rate and Rhythm: Normal rate and regular rhythm.   Pulmonary:      Effort: Pulmonary effort is normal. No tachypnea, accessory muscle usage or respiratory distress.   Abdominal:      General: A surgical scar is present. There is no distension.      Palpations: Abdomen is soft.      Tenderness: There is no abdominal tenderness.      Hernia: No hernia is present.       Musculoskeletal:      Cervical back: Neck supple. Normal range of motion.   Lymphadenopathy:      Cervical: No cervical adenopathy.   Skin:     General: Skin is warm.   Neurological:      Mental Status: She is alert and oriented to person, place, and time.      Coordination: Coordination normal.      Gait: Gait normal.   Psychiatric:         Speech: Speech normal.         Behavior: Behavior normal.       Assessment/Plan:   Marylin was seen today for consult.    Diagnoses and all orders for this visit:    Biliary dyskinesia    Abdominal pain, unspecified abdominal location  -     Ambulatory referral/consult to General Surgery    Patient has abdominal pain.  Imaging fairly unremarkable.  HIDA scan showed elevated ejection fraction 73% suggesting hyperkinetic dyskinesia.  I agree with cholecystectomy.   We discussed risks and benefits of the procedure.  She would like to proceed with cholecystectomy at the end of the school year in June or July.  She will follow-up toward the summer and plan for definite date.

## 2022-12-28 ENCOUNTER — HOSPITAL ENCOUNTER (OUTPATIENT)
Dept: RADIOLOGY | Facility: HOSPITAL | Age: 57
Discharge: HOME | End: 2022-12-28
Attending: INTERNAL MEDICINE
Payer: COMMERCIAL

## 2022-12-28 DIAGNOSIS — R68.81 EARLY SATIETY: ICD-10-CM

## 2022-12-28 PROCEDURE — 78264 GASTRIC EMPTYING IMG STUDY: CPT | Mod: TC

## 2022-12-28 PROCEDURE — 78264 GASTRIC EMPTYING IMG STUDY: CPT | Mod: 26,,, | Performed by: RADIOLOGY

## 2022-12-28 PROCEDURE — 78264 NM GASTRIC EMPTYING: ICD-10-PCS | Mod: 26,,, | Performed by: RADIOLOGY

## 2022-12-28 PROCEDURE — A9541 TC99M SULFUR COLLOID: HCPCS

## 2023-01-03 DIAGNOSIS — Z12.31 ENCOUNTER FOR SCREENING MAMMOGRAM FOR MALIGNANT NEOPLASM OF BREAST: Primary | ICD-10-CM

## 2023-01-17 ENCOUNTER — PATIENT MESSAGE (OUTPATIENT)
Dept: ADMINISTRATIVE | Facility: HOSPITAL | Age: 58
End: 2023-01-17

## 2023-01-31 ENCOUNTER — HOSPITAL ENCOUNTER (OUTPATIENT)
Dept: RADIOLOGY | Facility: HOSPITAL | Age: 58
Discharge: HOME | End: 2023-01-31
Attending: SPECIALIST
Payer: COMMERCIAL

## 2023-01-31 VITALS — HEIGHT: 65 IN | BODY MASS INDEX: 22.48 KG/M2 | WEIGHT: 134.94 LBS

## 2023-01-31 DIAGNOSIS — Z12.31 ENCOUNTER FOR SCREENING MAMMOGRAM FOR MALIGNANT NEOPLASM OF BREAST: ICD-10-CM

## 2023-01-31 PROCEDURE — 77067 SCR MAMMO BI INCL CAD: CPT | Mod: TC,PO

## 2023-02-10 ENCOUNTER — TELEPHONE (OUTPATIENT)
Dept: FAMILY MEDICINE | Facility: CLINIC | Age: 58
End: 2023-02-10
Payer: COMMERCIAL

## 2023-02-10 ENCOUNTER — OFFICE VISIT (OUTPATIENT)
Dept: FAMILY MEDICINE | Facility: CLINIC | Age: 58
End: 2023-02-10
Payer: COMMERCIAL

## 2023-02-10 VITALS
DIASTOLIC BLOOD PRESSURE: 70 MMHG | HEIGHT: 65 IN | RESPIRATION RATE: 18 BRPM | SYSTOLIC BLOOD PRESSURE: 118 MMHG | BODY MASS INDEX: 22.48 KG/M2 | OXYGEN SATURATION: 98 % | TEMPERATURE: 99 F | HEART RATE: 90 BPM | WEIGHT: 134.94 LBS

## 2023-02-10 DIAGNOSIS — E03.9 HYPOTHYROIDISM, UNSPECIFIED TYPE: ICD-10-CM

## 2023-02-10 DIAGNOSIS — J02.9 SORE THROAT: Primary | ICD-10-CM

## 2023-02-10 DIAGNOSIS — I10 ESSENTIAL HYPERTENSION, BENIGN: ICD-10-CM

## 2023-02-10 DIAGNOSIS — R09.81 SINUS CONGESTION: ICD-10-CM

## 2023-02-10 PROCEDURE — 1160F RVW MEDS BY RX/DR IN RCRD: CPT | Mod: CPTII,S$GLB,,

## 2023-02-10 PROCEDURE — 3078F PR MOST RECENT DIASTOLIC BLOOD PRESSURE < 80 MM HG: ICD-10-PCS | Mod: CPTII,S$GLB,,

## 2023-02-10 PROCEDURE — 99999 PR PBB SHADOW E&M-EST. PATIENT-LVL V: CPT | Mod: PBBFAC,,,

## 2023-02-10 PROCEDURE — 3074F SYST BP LT 130 MM HG: CPT | Mod: CPTII,S$GLB,,

## 2023-02-10 PROCEDURE — 99214 PR OFFICE/OUTPT VISIT, EST, LEVL IV, 30-39 MIN: ICD-10-PCS | Mod: S$GLB,,,

## 2023-02-10 PROCEDURE — 3074F PR MOST RECENT SYSTOLIC BLOOD PRESSURE < 130 MM HG: ICD-10-PCS | Mod: CPTII,S$GLB,,

## 2023-02-10 PROCEDURE — 3008F PR BODY MASS INDEX (BMI) DOCUMENTED: ICD-10-PCS | Mod: CPTII,S$GLB,,

## 2023-02-10 PROCEDURE — 99214 OFFICE O/P EST MOD 30 MIN: CPT | Mod: S$GLB,,,

## 2023-02-10 PROCEDURE — 3008F BODY MASS INDEX DOCD: CPT | Mod: CPTII,S$GLB,,

## 2023-02-10 PROCEDURE — 3078F DIAST BP <80 MM HG: CPT | Mod: CPTII,S$GLB,,

## 2023-02-10 PROCEDURE — 1160F PR REVIEW ALL MEDS BY PRESCRIBER/CLIN PHARMACIST DOCUMENTED: ICD-10-PCS | Mod: CPTII,S$GLB,,

## 2023-02-10 PROCEDURE — 99999 PR PBB SHADOW E&M-EST. PATIENT-LVL V: ICD-10-PCS | Mod: PBBFAC,,,

## 2023-02-10 PROCEDURE — 1159F MED LIST DOCD IN RCRD: CPT | Mod: CPTII,S$GLB,,

## 2023-02-10 PROCEDURE — 1159F PR MEDICATION LIST DOCUMENTED IN MEDICAL RECORD: ICD-10-PCS | Mod: CPTII,S$GLB,,

## 2023-02-10 RX ORDER — AMOXICILLIN AND CLAVULANATE POTASSIUM 875; 125 MG/1; MG/1
1 TABLET, FILM COATED ORAL EVERY 12 HOURS
Qty: 14 TABLET | Refills: 0 | Status: SHIPPED | OUTPATIENT
Start: 2023-02-10 | End: 2023-02-17

## 2023-02-10 RX ORDER — MELOXICAM 15 MG/1
TABLET ORAL
COMMUNITY
Start: 2023-01-30 | End: 2023-11-02 | Stop reason: ALTCHOICE

## 2023-02-10 RX ORDER — METHYLPREDNISOLONE 4 MG/1
TABLET ORAL
Qty: 21 EACH | Refills: 0 | Status: SHIPPED | OUTPATIENT
Start: 2023-02-10 | End: 2023-03-03

## 2023-02-10 NOTE — PATIENT INSTRUCTIONS
Luther Coulter,     If you are due for any health screening(s) below please notify me so we can arrange them to be ordered and scheduled to maintain your health. Most healthy patients complete it. Don't lose out on improving your health.     All of your core healthy metrics are met.                         February 10, 2023       Marylin Morel  [address] [facility] [address]         Dear Marylin:    Your [facility] Care Team is dedicated to helping you stay healthy with regularly scheduled recommended screenings.  Scheduling routine screenings is important to maintaining good health. Our records indicate that you may be overdue for your screening pap smear. A pap smear screening can help identify patients at risk for developing cervical cancer at an early stage, when it is most likely to be successfully treated.    We encourage you to schedule your appointment with your [facility] health provider or some primary care providers also perform this screening.    If you have completed or scheduled your pap smear screening outside of [facility], please notify your primary care team so we can update your health record.      If you have questions or would like to schedule your screening, please contact your primary care clinic.    Sincerely,    Gabriel Mccabe MD and your [facility] Team

## 2023-02-10 NOTE — PROGRESS NOTES
Subjective:       Patient ID: Marylin Morel is a 57 y.o. female.    Chief Complaint: Nasal Congestion, Sore Throat, and Fever    Presents to the clinic w/ complaints of     Sore Throat   This is a new problem. The current episode started 1 to 4 weeks ago. The problem has been gradually worsening. Associated symptoms include congestion, coughing and swollen glands. She has tried acetaminophen (Other OTC medications (chloraseptic, zyrtec), prescription steroid) for the symptoms. The treatment provided mild relief.       Reports that she has not been taking norvasc as her pressures have been good without it. Will take off med list.   Past Medical History:   Diagnosis Date    Allergy     ants    GERD (gastroesophageal reflux disease)     Hypertension     Thyroid disease        Review of patient's allergies indicates:   Allergen Reactions    Insect venom Anaphylaxis     Ant bites         Current Outpatient Medications:     cetirizine (ZYRTEC) 10 MG tablet, Take 10 mg by mouth once daily., Disp: , Rfl:     COMBIPATCH 0.05-0.14 mg/24 hr, , Disp: , Rfl:     fluticasone propionate (FLONASE) 50 mcg/actuation nasal spray, INSTILL ONE SPRAY INTO EACH NOSTRIL ONCE DAILY, Disp: 48 g, Rfl: 2    ibuprofen (ADVIL,MOTRIN) 800 MG tablet, Take 1 tablet (800 mg total) by mouth every 6 (six) hours as needed for Pain., Disp: 60 tablet, Rfl: 5    levothyroxine (SYNTHROID) 25 MCG tablet, Take 1 tablet (25 mcg total) by mouth once daily., Disp: 30 tablet, Rfl: 6    lisinopriL-hydrochlorothiazide (PRINZIDE,ZESTORETIC) 20-25 mg Tab, TAKE ONE TABLET BY MOUTH ONCE DAILY, Disp: 90 tablet, Rfl: 3    meloxicam (MOBIC) 15 MG tablet, , Disp: , Rfl:     olopatadine (PATADAY) 0.2 % Drop, , Disp: , Rfl:     omeprazole (PRILOSEC) 40 MG capsule, TAKE ONE CAPSULE BY MOUTH ONCE DAILY, Disp: 90 capsule, Rfl: 3    simethicone (GAS-X EXTRA STRENGTH) 125 mg Cap capsule, Take 1 capsule (125 mg total) by mouth 4 (four) times daily as needed for Flatulence.,  "Disp: 30 capsule, Rfl: 1    vitamin D (VITAMIN D3) 1000 units Tab, Take 1,000 Units by mouth once daily., Disp: , Rfl:     amoxicillin-clavulanate 875-125mg (AUGMENTIN) 875-125 mg per tablet, Take 1 tablet by mouth every 12 (twelve) hours. for 7 days, Disp: 14 tablet, Rfl: 0    EPINEPHrine (EPIPEN) 0.3 mg/0.3 mL AtIn, Inject 0.3 mLs (0.3 mg total) into the muscle once. for 1 dose, Disp: 2 each, Rfl: 1    methylPREDNISolone (MEDROL DOSEPACK) 4 mg tablet, use as directed, Disp: 21 each, Rfl: 0    Review of Systems   Constitutional:  Positive for fever.   HENT:  Positive for congestion, postnasal drip, sinus pressure and sore throat.    Respiratory:  Positive for cough.      Objective:      /70 (BP Location: Right arm, Patient Position: Sitting, BP Method: Medium (Manual))   Pulse 90   Temp 99.4 °F (37.4 °C) (Oral)   Resp 18   Ht 5' 5" (1.651 m)   Wt 61.2 kg (134 lb 14.7 oz)   LMP 02/05/2015 (Approximate)   SpO2 98%   BMI 22.45 kg/m²   Physical Exam  Vitals reviewed.   Constitutional:       General: She is not in acute distress.     Appearance: Normal appearance. She is normal weight. She is not ill-appearing, toxic-appearing or diaphoretic.   HENT:      Head: Normocephalic.      Right Ear: External ear normal.      Left Ear: External ear normal.      Nose: Congestion and rhinorrhea present.      Mouth/Throat:      Mouth: Mucous membranes are moist.      Pharynx: Oropharynx is clear. Posterior oropharyngeal erythema present. No oropharyngeal exudate.   Eyes:      General: No scleral icterus.        Right eye: No discharge.         Left eye: No discharge.      Extraocular Movements: Extraocular movements intact.      Conjunctiva/sclera: Conjunctivae normal.   Cardiovascular:      Rate and Rhythm: Normal rate and regular rhythm.      Pulses: Normal pulses.      Heart sounds: Normal heart sounds. No murmur heard.    No friction rub. No gallop.   Pulmonary:      Effort: Pulmonary effort is normal. No " respiratory distress.      Breath sounds: Normal breath sounds. No wheezing, rhonchi or rales.   Chest:      Chest wall: No tenderness.   Musculoskeletal:         General: No swelling, tenderness or deformity. Normal range of motion.      Cervical back: Normal range of motion.      Right lower leg: No edema.      Left lower leg: No edema.   Skin:     General: Skin is warm and dry.      Capillary Refill: Capillary refill takes less than 2 seconds.      Coloration: Skin is not jaundiced.      Findings: No bruising, erythema, lesion or rash.   Neurological:      Mental Status: She is alert and oriented to person, place, and time.      Gait: Gait normal.   Psychiatric:         Mood and Affect: Mood normal.         Behavior: Behavior normal.         Thought Content: Thought content normal.         Judgment: Judgment normal.       Assessment:       1. Sore throat    2. Sinus congestion    3. Hypothyroidism, unspecified type    4. Essential hypertension, benign          Plan:       Sore throat  -     amoxicillin-clavulanate 875-125mg (AUGMENTIN) 875-125 mg per tablet; Take 1 tablet by mouth every 12 (twelve) hours. for 7 days  Dispense: 14 tablet; Refill: 0  -     methylPREDNISolone (MEDROL DOSEPACK) 4 mg tablet; use as directed  Dispense: 21 each; Refill: 0    Sinus congestion  -     amoxicillin-clavulanate 875-125mg (AUGMENTIN) 875-125 mg per tablet; Take 1 tablet by mouth every 12 (twelve) hours. for 7 days  Dispense: 14 tablet; Refill: 0  -     methylPREDNISolone (MEDROL DOSEPACK) 4 mg tablet; use as directed  Dispense: 21 each; Refill: 0    Hypothyroidism, unspecified type        -    Continue meds. Will continue to monitor.     Essential hypertension, benign        -    Stable. Continue meds. Will continue to monitor.           Has follow up with Dr. Mccabe scheduled        Avelino Ko PA-C  Family Medicine Physician Assistant       I spent a total of 20 minutes on the day of the visit.This includes face to  face time and non-face to face time preparing to see the patient (eg, review of tests), obtaining and/or reviewing separately obtained history, documenting clinical information in the electronic or other health record, independently interpreting results and communicating results to the patient/family/caregiver, or care coordinator.      We have addressed [4] Moderate: 1 or more chronic illnesses with exacerbation, progression, or side effects of treatment / 2 or more stable chronic illnesses / 1 undiagnosed new problem with uncertain prognosis / 1 acute illness with systemic symptoms / 1 acute complicated injury  The complexity of the data reviewed and analyzed for this visit was [2] Minimal or None  The risk of complications and/or morbidity or mortality are [4] Moderate risk (I.e. prescription drug management / decision regarding minor surgery with identified pt or procedure risk factors / decision regarding elective major surgery without identified pt or procedure risk factors / diagnosis or treatment significantly limited by social determinants of health)   The level of Medical Decision Making for this visit is [4] Moderate

## 2023-02-10 NOTE — TELEPHONE ENCOUNTER
----- Message from Julee Ricci sent at 2/10/2023 12:21 PM CST -----  Contact: [phone]  Type:  Same Day Appointment Request    Caller is requesting a same day appointment.  Caller declined first available appointment listed below.      Name of Caller:  Pt   When is the first available appointment?  Mon   Symptoms:  fever/ Cough   Best Call Back Number:  [phone] (home) [phone] (work)  Additional Information:   Pls call back and advise

## 2023-02-11 ENCOUNTER — PATIENT MESSAGE (OUTPATIENT)
Dept: ADMINISTRATIVE | Facility: HOSPITAL | Age: 58
End: 2023-02-11
Payer: COMMERCIAL

## 2023-02-11 ENCOUNTER — PATIENT MESSAGE (OUTPATIENT)
Dept: FAMILY MEDICINE | Facility: CLINIC | Age: 58
End: 2023-02-11
Payer: COMMERCIAL

## 2023-03-09 ENCOUNTER — OFFICE VISIT (OUTPATIENT)
Dept: FAMILY MEDICINE | Facility: CLINIC | Age: 58
End: 2023-03-09
Payer: COMMERCIAL

## 2023-03-09 VITALS
OXYGEN SATURATION: 99 % | SYSTOLIC BLOOD PRESSURE: 110 MMHG | WEIGHT: 134.94 LBS | HEIGHT: 65 IN | TEMPERATURE: 99 F | BODY MASS INDEX: 22.48 KG/M2 | DIASTOLIC BLOOD PRESSURE: 68 MMHG | HEART RATE: 77 BPM

## 2023-03-09 DIAGNOSIS — R30.0 DYSURIA: Primary | ICD-10-CM

## 2023-03-09 PROCEDURE — 3078F PR MOST RECENT DIASTOLIC BLOOD PRESSURE < 80 MM HG: ICD-10-PCS | Mod: CPTII,S$GLB,, | Performed by: NURSE PRACTITIONER

## 2023-03-09 PROCEDURE — 3074F PR MOST RECENT SYSTOLIC BLOOD PRESSURE < 130 MM HG: ICD-10-PCS | Mod: CPTII,S$GLB,, | Performed by: NURSE PRACTITIONER

## 2023-03-09 PROCEDURE — 3078F DIAST BP <80 MM HG: CPT | Mod: CPTII,S$GLB,, | Performed by: NURSE PRACTITIONER

## 2023-03-09 PROCEDURE — 87186 SC STD MICRODIL/AGAR DIL: CPT | Performed by: NURSE PRACTITIONER

## 2023-03-09 PROCEDURE — 1160F PR REVIEW ALL MEDS BY PRESCRIBER/CLIN PHARMACIST DOCUMENTED: ICD-10-PCS | Mod: CPTII,S$GLB,, | Performed by: NURSE PRACTITIONER

## 2023-03-09 PROCEDURE — 99213 OFFICE O/P EST LOW 20 MIN: CPT | Mod: S$GLB,,, | Performed by: NURSE PRACTITIONER

## 2023-03-09 PROCEDURE — 87088 URINE BACTERIA CULTURE: CPT | Performed by: NURSE PRACTITIONER

## 2023-03-09 PROCEDURE — 3074F SYST BP LT 130 MM HG: CPT | Mod: CPTII,S$GLB,, | Performed by: NURSE PRACTITIONER

## 2023-03-09 PROCEDURE — 99999 PR PBB SHADOW E&M-EST. PATIENT-LVL IV: CPT | Mod: PBBFAC,,, | Performed by: NURSE PRACTITIONER

## 2023-03-09 PROCEDURE — 3008F BODY MASS INDEX DOCD: CPT | Mod: CPTII,S$GLB,, | Performed by: NURSE PRACTITIONER

## 2023-03-09 PROCEDURE — 99213 PR OFFICE/OUTPT VISIT, EST, LEVL III, 20-29 MIN: ICD-10-PCS | Mod: S$GLB,,, | Performed by: NURSE PRACTITIONER

## 2023-03-09 PROCEDURE — 1159F MED LIST DOCD IN RCRD: CPT | Mod: CPTII,S$GLB,, | Performed by: NURSE PRACTITIONER

## 2023-03-09 PROCEDURE — 87086 URINE CULTURE/COLONY COUNT: CPT | Performed by: NURSE PRACTITIONER

## 2023-03-09 PROCEDURE — 1160F RVW MEDS BY RX/DR IN RCRD: CPT | Mod: CPTII,S$GLB,, | Performed by: NURSE PRACTITIONER

## 2023-03-09 PROCEDURE — 1159F PR MEDICATION LIST DOCUMENTED IN MEDICAL RECORD: ICD-10-PCS | Mod: CPTII,S$GLB,, | Performed by: NURSE PRACTITIONER

## 2023-03-09 PROCEDURE — 99999 PR PBB SHADOW E&M-EST. PATIENT-LVL IV: ICD-10-PCS | Mod: PBBFAC,,, | Performed by: NURSE PRACTITIONER

## 2023-03-09 PROCEDURE — 81001 URINALYSIS AUTO W/SCOPE: CPT | Performed by: NURSE PRACTITIONER

## 2023-03-09 PROCEDURE — 87077 CULTURE AEROBIC IDENTIFY: CPT | Performed by: NURSE PRACTITIONER

## 2023-03-09 PROCEDURE — 3008F PR BODY MASS INDEX (BMI) DOCUMENTED: ICD-10-PCS | Mod: CPTII,S$GLB,, | Performed by: NURSE PRACTITIONER

## 2023-03-09 RX ORDER — NITROFURANTOIN 25; 75 MG/1; MG/1
100 CAPSULE ORAL 2 TIMES DAILY
Qty: 14 CAPSULE | Refills: 0 | Status: SHIPPED | OUTPATIENT
Start: 2023-03-09 | End: 2023-03-16

## 2023-03-09 RX ORDER — PHENAZOPYRIDINE HYDROCHLORIDE 100 MG/1
100 TABLET, FILM COATED ORAL 3 TIMES DAILY PRN
Qty: 30 TABLET | Refills: 1 | Status: SHIPPED | OUTPATIENT
Start: 2023-03-09 | End: 2023-03-19

## 2023-03-09 NOTE — PATIENT INSTRUCTIONS
Luther Coulter,     If you are due for any health screening(s) below please notify me so we can arrange them to be ordered and scheduled to maintain your health. Most healthy patients complete it. Don't lose out on improving your health.     All of your core healthy metrics are met.                         March 9, 2023       Marylin Morel  [address] [facility] [address]         Dear Marylin:    Your [facility] Care Team is dedicated to helping you stay healthy with regularly scheduled recommended screenings.  Scheduling routine screenings is important to maintaining good health. Our records indicate that you may be overdue for your screening pap smear. A pap smear screening can help identify patients at risk for developing cervical cancer at an early stage, when it is most likely to be successfully treated.    We encourage you to schedule your appointment with your [facility] health provider or some primary care providers also perform this screening.    If you have completed or scheduled your pap smear screening outside of [facility], please notify your primary care team so we can update your health record.      If you have questions or would like to schedule your screening, please contact your primary care clinic.    Sincerely,    Gabriel Mccabe MD and your [facility] Team

## 2023-03-09 NOTE — PROGRESS NOTES
Subjective:       Patient ID: Marylin Morel is a 57 y.o. female.    Chief Complaint: Follow-up (6 month follow up)    HPI      Patient presents today for chronic conditions follow up. She is new to me. Last visit with PCP  on 9/8/22. Suprapubic pressure, burning    10/22 left hip replacement  Past Medical History:   Diagnosis Date    Allergy     ants    GERD (gastroesophageal reflux disease)     Hypertension     Thyroid disease        Review of patient's allergies indicates:   Allergen Reactions    Insect venom Anaphylaxis     Ant bites         Current Outpatient Medications:     cetirizine (ZYRTEC) 10 MG tablet, Take 10 mg by mouth once daily., Disp: , Rfl:     COMBIPATCH 0.05-0.14 mg/24 hr, , Disp: , Rfl:     fluticasone propionate (FLONASE) 50 mcg/actuation nasal spray, INSTILL ONE SPRAY INTO EACH NOSTRIL ONCE DAILY, Disp: 48 g, Rfl: 2    ibuprofen (ADVIL,MOTRIN) 800 MG tablet, Take 1 tablet (800 mg total) by mouth every 6 (six) hours as needed for Pain., Disp: 60 tablet, Rfl: 5    levothyroxine (SYNTHROID) 25 MCG tablet, Take 1 tablet (25 mcg total) by mouth once daily., Disp: 30 tablet, Rfl: 6    lisinopriL-hydrochlorothiazide (PRINZIDE,ZESTORETIC) 20-25 mg Tab, TAKE ONE TABLET BY MOUTH ONCE DAILY, Disp: 90 tablet, Rfl: 3    meloxicam (MOBIC) 15 MG tablet, , Disp: , Rfl:     olopatadine (PATADAY) 0.2 % Drop, , Disp: , Rfl:     omeprazole (PRILOSEC) 40 MG capsule, TAKE ONE CAPSULE BY MOUTH ONCE DAILY, Disp: 90 capsule, Rfl: 3    simethicone (GAS-X EXTRA STRENGTH) 125 mg Cap capsule, Take 1 capsule (125 mg total) by mouth 4 (four) times daily as needed for Flatulence., Disp: 30 capsule, Rfl: 1    vitamin D (VITAMIN D3) 1000 units Tab, Take 1,000 Units by mouth once daily., Disp: , Rfl:     EPINEPHrine (EPIPEN) 0.3 mg/0.3 mL AtIn, Inject 0.3 mLs (0.3 mg total) into the muscle once. for 1 dose, Disp: 2 each, Rfl: 1    nitrofurantoin, macrocrystal-monohydrate, (MACROBID) 100 MG capsule, Take 1 capsule  "(100 mg total) by mouth 2 (two) times daily. for 7 days, Disp: 14 capsule, Rfl: 0    phenazopyridine (PYRIDIUM) 100 MG tablet, Take 1 tablet (100 mg total) by mouth 3 (three) times daily as needed for Pain., Disp: 30 tablet, Rfl: 1    Review of Systems   Constitutional:  Negative for unexpected weight change.   HENT:  Negative for trouble swallowing.    Eyes:  Negative for visual disturbance.   Respiratory:  Negative for shortness of breath.    Cardiovascular:  Negative for chest pain, palpitations and leg swelling.   Gastrointestinal:  Negative for blood in stool.   Genitourinary:  Negative for hematuria.   Skin:  Negative for rash.   Allergic/Immunologic: Negative for immunocompromised state.   Neurological:  Negative for headaches.   Hematological:  Does not bruise/bleed easily.   Psychiatric/Behavioral:  Negative for agitation. The patient is not nervous/anxious.      Objective:      /68 (BP Location: Left arm, Patient Position: Sitting, BP Method: Small (Manual))   Pulse 77   Temp 98.7 °F (37.1 °C) (Oral)   Ht 5' 5" (1.651 m)   Wt 61.2 kg (134 lb 14.7 oz)   LMP 02/05/2015 (Approximate)   SpO2 99%   BMI 22.45 kg/m²   Physical Exam  Constitutional:       Appearance: She is well-developed.   Cardiovascular:      Rate and Rhythm: Normal rate and regular rhythm.   Pulmonary:      Effort: Pulmonary effort is normal.   Musculoskeletal:         General: Normal range of motion.      Cervical back: Normal range of motion.   Skin:     General: Skin is warm and dry.   Neurological:      Mental Status: She is alert and oriented to person, place, and time.   Psychiatric:         Behavior: Behavior normal.         Thought Content: Thought content normal.         Judgment: Judgment normal.       Assessment:       1. Dysuria        Plan:       Dysuria  -     Urine culture  -     Urinalysis  -     nitrofurantoin, macrocrystal-monohydrate, (MACROBID) 100 MG capsule; Take 1 capsule (100 mg total) by mouth 2 (two) " times daily. for 7 days  Dispense: 14 capsule; Refill: 0  -     phenazopyridine (PYRIDIUM) 100 MG tablet; Take 1 tablet (100 mg total) by mouth 3 (three) times daily as needed for Pain.  Dispense: 30 tablet; Refill: 1      Time spent with patient: 20 minutes    Patient with be reevaluated in 4 weeks or sooner prn    Greater than 50% of this visit was spent counseling as described in above documentation:Yes

## 2023-03-10 ENCOUNTER — TELEPHONE (OUTPATIENT)
Dept: FAMILY MEDICINE | Facility: CLINIC | Age: 58
End: 2023-03-10
Payer: COMMERCIAL

## 2023-03-10 LAB
BACTERIA #/AREA URNS AUTO: ABNORMAL /HPF
BILIRUB UR QL STRIP: NEGATIVE
CLARITY UR REFRACT.AUTO: CLEAR
COLOR UR AUTO: YELLOW
GLUCOSE UR QL STRIP: NEGATIVE
HGB UR QL STRIP: NEGATIVE
HYALINE CASTS UR QL AUTO: 3 /LPF
KETONES UR QL STRIP: NEGATIVE
LEUKOCYTE ESTERASE UR QL STRIP: ABNORMAL
MICROSCOPIC COMMENT: ABNORMAL
NITRITE UR QL STRIP: NEGATIVE
PH UR STRIP: 7 [PH] (ref 5–8)
PROT UR QL STRIP: ABNORMAL
RBC #/AREA URNS AUTO: 1 /HPF (ref 0–4)
SP GR UR STRIP: 1.02 (ref 1–1.03)
SQUAMOUS #/AREA URNS AUTO: 5 /HPF
URN SPEC COLLECT METH UR: ABNORMAL
WBC #/AREA URNS AUTO: 11 /HPF (ref 0–5)

## 2023-03-10 NOTE — TELEPHONE ENCOUNTER
----- Message from Cuong Echavarria sent at 3/10/2023  1:22 PM CST -----  Contact: patient  Type:  Patient Call          Who Called: patient         Does the patient know what this is regarding?: Requesting a call back about test results from yestaerday pt said she need to know if to take the medication  or not ; please advise           Would the patient rather a call back or a response via [facility]? Call           Best Call Back Number: [phone]             Additional Information:

## 2023-03-10 NOTE — TELEPHONE ENCOUNTER
----- Message from Kayla Marvin NP sent at 3/10/2023  8:14 AM CST -----  Please inform patient that the UA does show some bacteria-start the antibiotic-still waiting on the urine culture results

## 2023-03-12 LAB — BACTERIA UR CULT: ABNORMAL

## 2023-04-28 DIAGNOSIS — E03.9 HYPOTHYROIDISM, UNSPECIFIED TYPE: ICD-10-CM

## 2023-04-28 RX ORDER — LEVOTHYROXINE SODIUM 25 UG/1
25 TABLET ORAL DAILY
Qty: 90 TABLET | Refills: 1 | Status: SHIPPED | OUTPATIENT
Start: 2023-04-28 | End: 2023-11-02 | Stop reason: SDUPTHER

## 2023-04-28 NOTE — TELEPHONE ENCOUNTER
Refill Decision Note   Marylin De Jesusrobert  is requesting a refill authorization.  Brief Assessment and Rationale for Refill:  Approve     Medication Therapy Plan:         Comments:     Note composed:10:08 AM 04/28/2023

## 2023-04-28 NOTE — TELEPHONE ENCOUNTER
No care due was identified.  [facility] Embedded Care Due Messages. Reference number: [id number].   4/28/2023 5:30:51 AM CDT

## 2023-07-11 ENCOUNTER — LAB VISIT (OUTPATIENT)
Dept: LAB | Facility: HOSPITAL | Age: 58
End: 2023-07-11
Attending: NURSE PRACTITIONER
Payer: COMMERCIAL

## 2023-07-11 ENCOUNTER — OFFICE VISIT (OUTPATIENT)
Dept: FAMILY MEDICINE | Facility: CLINIC | Age: 58
End: 2023-07-11
Payer: COMMERCIAL

## 2023-07-11 VITALS
TEMPERATURE: 97 F | DIASTOLIC BLOOD PRESSURE: 70 MMHG | HEIGHT: 65 IN | SYSTOLIC BLOOD PRESSURE: 90 MMHG | HEART RATE: 92 BPM | OXYGEN SATURATION: 98 % | BODY MASS INDEX: 22.03 KG/M2 | WEIGHT: 132.25 LBS

## 2023-07-11 DIAGNOSIS — N30.90 CYSTITIS: ICD-10-CM

## 2023-07-11 DIAGNOSIS — R30.0 DYSURIA: Primary | ICD-10-CM

## 2023-07-11 DIAGNOSIS — R30.0 DYSURIA: ICD-10-CM

## 2023-07-11 LAB
BACTERIA #/AREA URNS AUTO: ABNORMAL /HPF
BILIRUB SERPL-MCNC: ABNORMAL MG/DL
BILIRUB UR QL STRIP: ABNORMAL
BLOOD URINE, POC: ABNORMAL
CLARITY UR REFRACT.AUTO: ABNORMAL
CLARITY, POC UA: CLEAR
COLOR UR AUTO: ABNORMAL
COLOR, POC UA: ABNORMAL
GLUCOSE UR QL STRIP: 250
GLUCOSE UR QL STRIP: NEGATIVE
HGB UR QL STRIP: NEGATIVE
HYALINE CASTS UR QL AUTO: 6 /LPF
KETONES UR QL STRIP: 15
KETONES UR QL STRIP: NEGATIVE
LEUKOCYTE ESTERASE UR QL STRIP: ABNORMAL
LEUKOCYTE ESTERASE URINE, POC: ABNORMAL
MICROSCOPIC COMMENT: ABNORMAL
NITRITE UR QL STRIP: POSITIVE
NITRITE, POC UA: POSITIVE
PH UR STRIP: 7 [PH] (ref 5–8)
PH, POC UA: 5
PROT UR QL STRIP: ABNORMAL
PROTEIN, POC: 300
RBC #/AREA URNS AUTO: 2 /HPF (ref 0–4)
SP GR UR STRIP: 1.01 (ref 1–1.03)
SPECIFIC GRAVITY, POC UA: 1.02
SQUAMOUS #/AREA URNS AUTO: 1 /HPF
URN SPEC COLLECT METH UR: ABNORMAL
UROBILINOGEN, POC UA: 4
WBC #/AREA URNS AUTO: >100 /HPF (ref 0–5)

## 2023-07-11 PROCEDURE — 99213 PR OFFICE/OUTPT VISIT, EST, LEVL III, 20-29 MIN: ICD-10-PCS | Mod: S$GLB,,, | Performed by: NURSE PRACTITIONER

## 2023-07-11 PROCEDURE — 3008F PR BODY MASS INDEX (BMI) DOCUMENTED: ICD-10-PCS | Mod: CPTII,S$GLB,, | Performed by: NURSE PRACTITIONER

## 2023-07-11 PROCEDURE — 3074F SYST BP LT 130 MM HG: CPT | Mod: CPTII,S$GLB,, | Performed by: NURSE PRACTITIONER

## 2023-07-11 PROCEDURE — 81001 URINALYSIS AUTO W/SCOPE: CPT | Performed by: NURSE PRACTITIONER

## 2023-07-11 PROCEDURE — 1159F PR MEDICATION LIST DOCUMENTED IN MEDICAL RECORD: ICD-10-PCS | Mod: CPTII,S$GLB,, | Performed by: NURSE PRACTITIONER

## 2023-07-11 PROCEDURE — 99213 OFFICE O/P EST LOW 20 MIN: CPT | Mod: S$GLB,,, | Performed by: NURSE PRACTITIONER

## 2023-07-11 PROCEDURE — 4010F PR ACE/ARB THEARPY RXD/TAKEN: ICD-10-PCS | Mod: CPTII,S$GLB,, | Performed by: NURSE PRACTITIONER

## 2023-07-11 PROCEDURE — 3078F PR MOST RECENT DIASTOLIC BLOOD PRESSURE < 80 MM HG: ICD-10-PCS | Mod: CPTII,S$GLB,, | Performed by: NURSE PRACTITIONER

## 2023-07-11 PROCEDURE — 87088 URINE BACTERIA CULTURE: CPT | Performed by: NURSE PRACTITIONER

## 2023-07-11 PROCEDURE — 87186 SC STD MICRODIL/AGAR DIL: CPT | Performed by: NURSE PRACTITIONER

## 2023-07-11 PROCEDURE — 3008F BODY MASS INDEX DOCD: CPT | Mod: CPTII,S$GLB,, | Performed by: NURSE PRACTITIONER

## 2023-07-11 PROCEDURE — 4010F ACE/ARB THERAPY RXD/TAKEN: CPT | Mod: CPTII,S$GLB,, | Performed by: NURSE PRACTITIONER

## 2023-07-11 PROCEDURE — 3074F PR MOST RECENT SYSTOLIC BLOOD PRESSURE < 130 MM HG: ICD-10-PCS | Mod: CPTII,S$GLB,, | Performed by: NURSE PRACTITIONER

## 2023-07-11 PROCEDURE — 1160F RVW MEDS BY RX/DR IN RCRD: CPT | Mod: CPTII,S$GLB,, | Performed by: NURSE PRACTITIONER

## 2023-07-11 PROCEDURE — 81002 URINALYSIS NONAUTO W/O SCOPE: CPT | Mod: S$GLB,,, | Performed by: NURSE PRACTITIONER

## 2023-07-11 PROCEDURE — 81002 POCT URINE DIPSTICK WITHOUT MICROSCOPE: ICD-10-PCS | Mod: S$GLB,,, | Performed by: NURSE PRACTITIONER

## 2023-07-11 PROCEDURE — 1159F MED LIST DOCD IN RCRD: CPT | Mod: CPTII,S$GLB,, | Performed by: NURSE PRACTITIONER

## 2023-07-11 PROCEDURE — 87086 URINE CULTURE/COLONY COUNT: CPT | Performed by: NURSE PRACTITIONER

## 2023-07-11 PROCEDURE — 3078F DIAST BP <80 MM HG: CPT | Mod: CPTII,S$GLB,, | Performed by: NURSE PRACTITIONER

## 2023-07-11 PROCEDURE — 99999 PR PBB SHADOW E&M-EST. PATIENT-LVL IV: CPT | Mod: PBBFAC,,, | Performed by: NURSE PRACTITIONER

## 2023-07-11 PROCEDURE — 87077 CULTURE AEROBIC IDENTIFY: CPT | Performed by: NURSE PRACTITIONER

## 2023-07-11 PROCEDURE — 99999 PR PBB SHADOW E&M-EST. PATIENT-LVL IV: ICD-10-PCS | Mod: PBBFAC,,, | Performed by: NURSE PRACTITIONER

## 2023-07-11 PROCEDURE — 1160F PR REVIEW ALL MEDS BY PRESCRIBER/CLIN PHARMACIST DOCUMENTED: ICD-10-PCS | Mod: CPTII,S$GLB,, | Performed by: NURSE PRACTITIONER

## 2023-07-11 RX ORDER — NITROFURANTOIN 25; 75 MG/1; MG/1
100 CAPSULE ORAL 2 TIMES DAILY
Qty: 14 CAPSULE | Refills: 0 | Status: SHIPPED | OUTPATIENT
Start: 2023-07-11 | End: 2023-07-18

## 2023-07-11 NOTE — PROGRESS NOTES
Subjective:       Patient ID: Marylin Morel is a 58 y.o. female.    Chief Complaint: Urinary Tract Infection     HPI   57 y/o female patient with medical problems listed below presents for dysuria and frequent urination since yesterday. Last UTI was 2/2023. Denies nausea, vomiting, hematuria, fever, chills, flank pain.     Patient Active Problem List   Diagnosis    Essential hypertension, benign    Hypothyroidism    Seasonal allergies    GERD (gastroesophageal reflux disease)    DDD (degenerative disc disease), lumbosacral    Cervical radiculopathy      Review of patient's allergies indicates:   Allergen Reactions    Insect venom Anaphylaxis     Ant bites       Past Surgical History:   Procedure Laterality Date    AUGMENTATION OF BREAST      BREAST SURGERY      COSMETIC SURGERY      hip replacement left Left     NECK SURGERY            Current Outpatient Medications:     cetirizine (ZYRTEC) 10 MG tablet, Take 10 mg by mouth once daily., Disp: , Rfl:     COMBIPATCH 0.05-0.14 mg/24 hr, , Disp: , Rfl:     fluticasone propionate (FLONASE) 50 mcg/actuation nasal spray, INSTILL ONE SPRAY INTO EACH NOSTRIL ONCE DAILY, Disp: 48 g, Rfl: 2    ibuprofen (ADVIL,MOTRIN) 800 MG tablet, Take 1 tablet (800 mg total) by mouth every 6 (six) hours as needed for Pain., Disp: 60 tablet, Rfl: 5    levothyroxine (SYNTHROID) 25 MCG tablet, Take 1 tablet (25 mcg total) by mouth once daily., Disp: 90 tablet, Rfl: 1    lisinopriL-hydrochlorothiazide (PRINZIDE,ZESTORETIC) 20-25 mg Tab, TAKE ONE TABLET BY MOUTH ONCE DAILY, Disp: 90 tablet, Rfl: 3    meloxicam (MOBIC) 15 MG tablet, , Disp: , Rfl:     olopatadine (PATADAY) 0.2 % Drop, , Disp: , Rfl:     omeprazole (PRILOSEC) 40 MG capsule, TAKE ONE CAPSULE BY MOUTH ONCE DAILY, Disp: 90 capsule, Rfl: 3    simethicone (GAS-X EXTRA STRENGTH) 125 mg Cap capsule, Take 1 capsule (125 mg total) by mouth 4 (four) times daily as needed for Flatulence., Disp: 30 capsule, Rfl: 1    vitamin D (VITAMIN  "D3) 1000 units Tab, Take 1,000 Units by mouth once daily., Disp: , Rfl:     EPINEPHrine (EPIPEN) 0.3 mg/0.3 mL AtIn, Inject 0.3 mLs (0.3 mg total) into the muscle once. for 1 dose, Disp: 2 each, Rfl: 1    nitrofurantoin, macrocrystal-monohydrate, (MACROBID) 100 MG capsule, Take 1 capsule (100 mg total) by mouth 2 (two) times daily. for 7 days, Disp: 14 capsule, Rfl: 0      Review of Systems   Constitutional:  Negative for chills and fever.   Respiratory:  Negative for cough, chest tightness and shortness of breath.    Cardiovascular:  Negative for chest pain and palpitations.   Gastrointestinal:  Negative for abdominal pain, nausea and vomiting.   Genitourinary:  Positive for dysuria and frequency. Negative for hematuria.   Neurological:  Negative for dizziness and headaches.       Objective:   BP 90/70 (BP Location: Right arm, Patient Position: Sitting, BP Method: Medium (Manual))   Pulse 92   Temp 97.4 °F (36.3 °C) (Oral)   Ht 5' 5" (1.651 m)   Wt 60 kg (132 lb 4.4 oz)   LMP 02/05/2015 (Approximate)   SpO2 98%   BMI 22.01 kg/m²         Physical Exam  Constitutional:       General: She is not in acute distress.     Appearance: Normal appearance.   HENT:      Head: Atraumatic.   Cardiovascular:      Rate and Rhythm: Normal rate and regular rhythm.      Pulses: Normal pulses.      Heart sounds: Normal heart sounds.   Pulmonary:      Effort: Pulmonary effort is normal.      Breath sounds: Normal breath sounds.   Abdominal:      General: Abdomen is flat. Bowel sounds are normal.      Palpations: Abdomen is soft.   Skin:     General: Skin is warm and dry.   Neurological:      General: No focal deficit present.      Mental Status: She is alert and oriented to person, place, and time.   Psychiatric:         Mood and Affect: Mood normal.       Assessment:       1. Dysuria    2. Cystitis        Plan:       1. Dysuria  - POCT urine dipstick without microscope  - nitrofurantoin, macrocrystal-monohydrate, (MACROBID) 100 " MG capsule; Take 1 capsule (100 mg total) by mouth 2 (two) times daily. for 7 days  Dispense: 14 capsule; Refill: 0  - Urinalysis; Future  - Urine culture; Future    2. Cystitis    Patient with be reevaluated in  as needed  or sooner prn  Christian SANTIAGO

## 2023-07-13 LAB — BACTERIA UR CULT: ABNORMAL

## 2023-10-31 ENCOUNTER — HOSPITAL ENCOUNTER (EMERGENCY)
Facility: HOSPITAL | Age: 58
Discharge: HOME | End: 2023-11-01
Attending: EMERGENCY MEDICINE
Payer: COMMERCIAL

## 2023-10-31 DIAGNOSIS — S09.90XA CLOSED HEAD INJURY, INITIAL ENCOUNTER: Primary | ICD-10-CM

## 2023-10-31 DIAGNOSIS — S00.03XA SCALP HEMATOMA, INITIAL ENCOUNTER: ICD-10-CM

## 2023-10-31 DIAGNOSIS — S06.0X1A CONCUSSION WITH LOSS OF CONSCIOUSNESS OF 30 MINUTES OR LESS, INITIAL ENCOUNTER: ICD-10-CM

## 2023-10-31 DIAGNOSIS — Q04.6 COLLOID CYST OF BRAIN: ICD-10-CM

## 2023-10-31 PROCEDURE — 25000003 PHARM REV CODE 250: Performed by: NURSE PRACTITIONER

## 2023-10-31 PROCEDURE — 99284 EMERGENCY DEPT VISIT MOD MDM: CPT | Mod: 25

## 2023-10-31 RX ORDER — LIDOCAINE HYDROCHLORIDE 10 MG/ML
10 INJECTION INFILTRATION; PERINEURAL
Status: COMPLETED | OUTPATIENT
Start: 2023-10-31 | End: 2023-10-31

## 2023-10-31 RX ORDER — ACETAMINOPHEN 325 MG/1
650 TABLET ORAL
Status: COMPLETED | OUTPATIENT
Start: 2023-10-31 | End: 2023-10-31

## 2023-10-31 RX ADMIN — ACETAMINOPHEN 650 MG: 325 TABLET ORAL at 11:10

## 2023-10-31 RX ADMIN — LIDOCAINE HYDROCHLORIDE 10 ML: 10 INJECTION, SOLUTION INFILTRATION; PERINEURAL at 11:10

## 2023-11-01 ENCOUNTER — PATIENT MESSAGE (OUTPATIENT)
Dept: FAMILY MEDICINE | Facility: CLINIC | Age: 58
End: 2023-11-01
Payer: COMMERCIAL

## 2023-11-01 VITALS
WEIGHT: 135 LBS | TEMPERATURE: 99 F | BODY MASS INDEX: 22.49 KG/M2 | SYSTOLIC BLOOD PRESSURE: 120 MMHG | HEIGHT: 65 IN | RESPIRATION RATE: 18 BRPM | HEART RATE: 77 BPM | DIASTOLIC BLOOD PRESSURE: 81 MMHG | OXYGEN SATURATION: 98 %

## 2023-11-01 DIAGNOSIS — I10 ESSENTIAL HYPERTENSION, BENIGN: ICD-10-CM

## 2023-11-01 DIAGNOSIS — Q04.6 COLLOID CYST OF BRAIN: Primary | ICD-10-CM

## 2023-11-01 NOTE — FIRST PROVIDER EVALUATION
Emergency Department TeleTriage Encounter Note      CHIEF COMPLAINT    Chief Complaint   Patient presents with    Fall     With + LOC laceration left back of head        VITAL SIGNS   Initial Vitals   BP Pulse Resp Temp SpO2   10/31/23 2223 10/31/23 2223 10/31/23 2223 10/31/23 2227 10/31/23 2223   127/82 90 18 98.9 °F (37.2 °C) 99 %      MAP       --                   ALLERGIES    Review of patient's allergies indicates:   Allergen Reactions    Insect venom Anaphylaxis     Ant bites       PROVIDER TRIAGE NOTE  This is a teletriage evaluation of a 58 y.o. female presenting to the ED with c/o fall appx 30 mins PTA, hit the back of her head on the concrete, +LOC for appx 1.5 mins. Has collar on. Occipital laceration. Does not recall pre-syncope prodrome or dizziness before fall. Tdap UTD. No Limited physical exam via telehealth: The patient is awake, alert, answering questions appropriately and is not in respiratory distress. Initial orders will be placed and care will be transferred to an alternate provider when patient is roomed for a full evaluation. Any additional orders and the final disposition will be determined by that provider.       ORDERS  Labs Reviewed - No data to display    ED Orders (720h ago, onward)      Start Ordered     Status Ordering Provider    11/01/23 0600 10/31/23 2247  Wound care routine - Clean wound  Daily        Comments: Clean Wound    Ordered MARCE HICKMAN    11/01/23 0600 10/31/23 2247  Wound care routine - Irrigate wound  Daily        Comments: Irrigate Wound    Ordered MARCE HICKMAN    11/01/23 0600 10/31/23 2247  Wound care routine - Sterile Gloves to Bedside  Daily        Comments: Provide sterile gloves to bedside    Ordered MARCE HICKMAN    10/31/23 2300 10/31/23 2247  LIDOcaine HCL 10 mg/ml (1%) injection 10 mL  ED 1 Time         Ordered MARCE HICKMAN    10/31/23 2300 10/31/23 2247  acetaminophen tablet 650 mg  ED 1 Time         Ordered MARCE HICKMAN    10/31/23  2248 10/31/23 2247  Provide suture tray to patient bedside  Once         Ordered MARCE HICKMAN.    10/31/23 2248 10/31/23 2247  Change dressing - apply dry sterile dressing  Once        Comments: Apply dry sterile dressing.    Ordered MARCE HICKMAN.    10/31/23 2247 10/31/23 2247  CT Head Without Contrast  1 time imaging         Ordered MARCE HICKMAN    10/31/23 2247 10/31/23 2247  CT Cervical Spine Without Contrast  1 time imaging         Ordered MARCE HICKMAN              Virtual Visit Note: The provider triage portion of this emergency department evaluation and documentation was performed via thereNow, a HIPAA-compliant telemedicine application, in concert with a tele-presenter in the room. A face to face patient evaluation with one of my colleagues will occur once the patient is placed in an emergency department room.      DISCLAIMER: This note was prepared with ipDatatel voice recognition transcription software. Garbled syntax, mangled pronouns, and other bizarre constructions may be attributed to that software system.

## 2023-11-01 NOTE — TELEPHONE ENCOUNTER
Patient has not seen me in over 1 year. She needs an appointment on file to see me for further refills.

## 2023-11-01 NOTE — ED PROVIDER NOTES
Encounter Date: 10/31/2023       History     Chief Complaint   Patient presents with    Fall     With + LOC laceration left back of head      HPI patient is a 58-year-old woman who presents emergency department for evaluation head trauma.  Patient reports recent alcohol use and was walking up the stairs in the house.  She was on the 1st step and appears to have fallen backwards and hit her head on the ground.  She has no recollection of going up the steps and has some short-term amnesia.  The man with her , who is a flight medic, reports that she was unconscious for about a minute and he provided C-spine precautions.  Her tetanus is up-to-date.  She does complain of laceration to the back of the head.  Review of patient's allergies indicates:   Allergen Reactions    Insect venom Anaphylaxis     Ant bites     Past Medical History:   Diagnosis Date    Allergy     ants    GERD (gastroesophageal reflux disease)     Hypertension     Thyroid disease      Past Surgical History:   Procedure Laterality Date    AUGMENTATION OF BREAST      BREAST SURGERY      COSMETIC SURGERY      hip replacement left Left     NECK SURGERY       Family History   Problem Relation Age of Onset    COPD Mother     Heart disease Father         MI    Hypertension Brother      Social History     Tobacco Use    Smoking status: Never    Smokeless tobacco: Never   Substance Use Topics    Alcohol use: Yes     Alcohol/week: 1.0 standard drink of alcohol     Types: 1 Glasses of wine per week     Comment: 1 glass of wine daily    Drug use: No     Review of Systems   Constitutional:  Negative for fever.   HENT:  Negative for sore throat.    Respiratory:  Negative for shortness of breath.    Cardiovascular:  Negative for chest pain.   Gastrointestinal:  Negative for nausea.   Genitourinary:  Negative for dysuria.   Musculoskeletal:  Negative for back pain.   Skin:  Positive for wound. Negative for rash.   Neurological:  Positive for headaches. Negative for  weakness.   Hematological:  Does not bruise/bleed easily.       Physical Exam     Initial Vitals   BP Pulse Resp Temp SpO2   10/31/23 2223 10/31/23 2223 10/31/23 2223 10/31/23 2227 10/31/23 2223   127/82 90 18 98.9 °F (37.2 °C) 99 %      MAP       --                Physical Exam    Constitutional: Vital signs are normal. She appears well-developed and well-nourished.  Non-toxic appearance. No distress.   HENT:   Head: Normocephalic.   Hematoma to the occipital scalp with small ladders laceration of the occipital scalp.   Eyes: EOM are normal. Pupils are equal, round, and reactive to light.   Neck: No JVD present.   C-collar in place with cervical tenderness.   Cardiovascular:  Normal rate, regular rhythm, normal heart sounds and intact distal pulses.     Exam reveals no gallop and no friction rub.       No murmur heard.  Pulmonary/Chest: Breath sounds normal. She has no wheezes. She has no rhonchi. She has no rales.   Abdominal: Abdomen is soft. Bowel sounds are normal. There is no abdominal tenderness. There is no rebound and no guarding.   Musculoskeletal:         General: Normal range of motion.      Cervical back: No rigidity.     Neurological: She is alert and oriented to person, place, and time. She has normal strength and normal reflexes. No cranial nerve deficit or sensory deficit. She exhibits normal muscle tone. Coordination normal. GCS eye subscore is 4. GCS verbal subscore is 5. GCS motor subscore is 6.   Skin: Skin is warm and dry.   Psychiatric: She has a normal mood and affect. Her speech is normal and behavior is normal. She is not actively hallucinating.         ED Course   Procedures  Labs Reviewed - No data to display       Imaging Results              CT Cervical Spine Without Contrast (In process)                      CT Head Without Contrast (In process)                      Medications   LIDOcaine HCL 10 mg/ml (1%) injection 10 mL (has no administration in time range)   acetaminophen tablet  650 mg (650 mg Oral Given 10/31/23 8420)     Medical Decision Making  58-year-old woman fell backwards while on the 1st step and hit her head on concrete with loss of conscious presents emergency department for evaluation of head trauma.  She has a septal scalp hematoma, C-collar in place.  CT head and cervical spine were obtained, normal neuro exam.  C-collar cleared after negative CT of the cervical spine.  No traumatic injury intracranially appreciated on the CT scan but patient did have it abnormal scan with a cyst showing some moderate dilation of the right lateral ventricle.  Discussed with neurosurgery who will provide close follow-up.  Discussed with .  Discharged improved in no acute distress.  Return precautions discussed.               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  I discussed with Neurosurgery, Dr.. 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

## 2023-11-02 ENCOUNTER — TELEPHONE (OUTPATIENT)
Dept: NEUROSURGERY | Facility: CLINIC | Age: 58
End: 2023-11-02
Payer: COMMERCIAL

## 2023-11-02 ENCOUNTER — HOSPITAL ENCOUNTER (OUTPATIENT)
Dept: RADIOLOGY | Facility: CLINIC | Age: 58
Discharge: HOME | End: 2023-11-02
Payer: COMMERCIAL

## 2023-11-02 ENCOUNTER — PATIENT MESSAGE (OUTPATIENT)
Dept: NEUROSURGERY | Facility: CLINIC | Age: 58
End: 2023-11-02
Payer: COMMERCIAL

## 2023-11-02 ENCOUNTER — OFFICE VISIT (OUTPATIENT)
Dept: FAMILY MEDICINE | Facility: CLINIC | Age: 58
End: 2023-11-02
Payer: COMMERCIAL

## 2023-11-02 VITALS
DIASTOLIC BLOOD PRESSURE: 88 MMHG | RESPIRATION RATE: 18 BRPM | TEMPERATURE: 98 F | BODY MASS INDEX: 22.89 KG/M2 | OXYGEN SATURATION: 98 % | SYSTOLIC BLOOD PRESSURE: 126 MMHG | HEIGHT: 65 IN | WEIGHT: 137.38 LBS | HEART RATE: 79 BPM

## 2023-11-02 DIAGNOSIS — M51.37 DDD (DEGENERATIVE DISC DISEASE), LUMBOSACRAL: ICD-10-CM

## 2023-11-02 DIAGNOSIS — Z00.00 HEALTHCARE MAINTENANCE: ICD-10-CM

## 2023-11-02 DIAGNOSIS — M25.552 LEFT HIP PAIN: ICD-10-CM
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
## 2023-11-02 DIAGNOSIS — I10 ESSENTIAL HYPERTENSION, BENIGN: ICD-10-CM

## 2023-11-02 DIAGNOSIS — Z91.038 ALLERGIC REACTION TO INSECT BITE: ICD-10-CM

## 2023-11-02 DIAGNOSIS — J30.2 SEASONAL ALLERGIES: ICD-10-CM

## 2023-11-02 DIAGNOSIS — E03.9 HYPOTHYROIDISM, UNSPECIFIED TYPE: Primary | ICD-10-CM

## 2023-11-02 DIAGNOSIS — K21.9 GASTROESOPHAGEAL REFLUX DISEASE, UNSPECIFIED WHETHER ESOPHAGITIS PRESENT: ICD-10-CM

## 2023-11-02 PROCEDURE — 99214 PR OFFICE/OUTPT VISIT, EST, LEVL IV, 30-39 MIN: ICD-10-PCS | Mod: S$GLB,,,

## 2023-11-02 PROCEDURE — 1160F RVW MEDS BY RX/DR IN RCRD: CPT | Mod: CPTII,S$GLB,,

## 2023-11-02 PROCEDURE — 73502 X-RAY EXAM HIP UNI 2-3 VIEWS: CPT | Mod: 26,LT,S$GLB, | Performed by: RADIOLOGY

## 2023-11-02 PROCEDURE — 73502 X-RAY EXAM HIP UNI 2-3 VIEWS: CPT | Mod: TC,FY,PO,LT

## 2023-11-02 PROCEDURE — 3079F PR MOST RECENT DIASTOLIC BLOOD PRESSURE 80-89 MM HG: ICD-10-PCS | Mod: CPTII,S$GLB,,

## 2023-11-02 PROCEDURE — 3074F PR MOST RECENT SYSTOLIC BLOOD PRESSURE < 130 MM HG: ICD-10-PCS | Mod: CPTII,S$GLB,,

## 2023-11-02 PROCEDURE — 4010F PR ACE/ARB THEARPY RXD/TAKEN: ICD-10-PCS | Mod: CPTII,S$GLB,,

## 2023-11-02 PROCEDURE — 99999 PR PBB SHADOW E&M-EST. PATIENT-LVL IV: CPT | Mod: PBBFAC,,,

## 2023-11-02 PROCEDURE — 1159F PR MEDICATION LIST DOCUMENTED IN MEDICAL RECORD: ICD-10-PCS | Mod: CPTII,S$GLB,,

## 2023-11-02 PROCEDURE — 3008F PR BODY MASS INDEX (BMI) DOCUMENTED: ICD-10-PCS | Mod: CPTII,S$GLB,,

## 2023-11-02 PROCEDURE — 99999 PR PBB SHADOW E&M-EST. PATIENT-LVL IV: ICD-10-PCS | Mod: PBBFAC,,,

## 2023-11-02 PROCEDURE — 73502 XR HIP WITH PELVIS WHEN PERFORMED, 2 OR 3 VIEWS LEFT: ICD-10-PCS | Mod: 26,LT,S$GLB, | Performed by: RADIOLOGY

## 2023-11-02 PROCEDURE — 1160F PR REVIEW ALL MEDS BY PRESCRIBER/CLIN PHARMACIST DOCUMENTED: ICD-10-PCS | Mod: CPTII,S$GLB,,

## 2023-11-02 PROCEDURE — 3008F BODY MASS INDEX DOCD: CPT | Mod: CPTII,S$GLB,,

## 2023-11-02 PROCEDURE — 99214 OFFICE O/P EST MOD 30 MIN: CPT | Mod: S$GLB,,,

## 2023-11-02 PROCEDURE — 4010F ACE/ARB THERAPY RXD/TAKEN: CPT | Mod: CPTII,S$GLB,,

## 2023-11-02 PROCEDURE — 3079F DIAST BP 80-89 MM HG: CPT | Mod: CPTII,S$GLB,,

## 2023-11-02 PROCEDURE — 1159F MED LIST DOCD IN RCRD: CPT | Mod: CPTII,S$GLB,,

## 2023-11-02 PROCEDURE — 3074F SYST BP LT 130 MM HG: CPT | Mod: CPTII,S$GLB,,

## 2023-11-02 RX ORDER — FLUTICASONE PROPIONATE 50 MCG
SPRAY, SUSPENSION (ML) NASAL
Qty: 16 G | Refills: 11 | Status: SHIPPED | OUTPATIENT
Start: 2023-11-02

## 2023-11-02 RX ORDER — LISINOPRIL AND HYDROCHLOROTHIAZIDE 20; 25 MG/1; MG/1
1 TABLET ORAL DAILY
Qty: 90 TABLET | Refills: 3 | Status: SHIPPED | OUTPATIENT
Start: 2023-11-02

## 2023-11-02 RX ORDER — METHOCARBAMOL 500 MG/1
500 TABLET, FILM COATED ORAL 2 TIMES DAILY PRN
COMMUNITY
Start: 2023-10-11 | End: 2023-11-02 | Stop reason: SDUPTHER

## 2023-11-02 RX ORDER — EPINEPHRINE 0.3 MG/.3ML
1 INJECTION SUBCUTANEOUS ONCE
Qty: 2 EACH | Refills: 0 | Status: SHIPPED | OUTPATIENT
Start: 2023-11-02 | End: 2023-12-26

## 2023-11-02 RX ORDER — TIZANIDINE 4 MG/1
4 TABLET ORAL 2 TIMES DAILY PRN
COMMUNITY
Start: 2023-09-30

## 2023-11-02 RX ORDER — OMEPRAZOLE 40 MG/1
40 CAPSULE, DELAYED RELEASE ORAL DAILY
Qty: 90 CAPSULE | Refills: 3 | Status: SHIPPED | OUTPATIENT
Start: 2023-11-02

## 2023-11-02 RX ORDER — LEVOTHYROXINE SODIUM 25 UG/1
25 TABLET ORAL DAILY
Qty: 90 TABLET | Refills: 3 | Status: SHIPPED | OUTPATIENT
Start: 2023-11-02

## 2023-11-02 RX ORDER — METHOCARBAMOL 500 MG/1
500 TABLET, FILM COATED ORAL 2 TIMES DAILY PRN
Qty: 60 TABLET | Refills: 0 | Status: SHIPPED | OUTPATIENT
Start: 2023-11-02

## 2023-11-02 NOTE — TELEPHONE ENCOUNTER
----- Message from Olinda Howell RN sent at 11/2/2023  1:06 PM CDT -----  Regarding: FW: Patient is concerned about MRI, will need a script prescribed, please advise for new date 11/6 please advise    ----- Message -----  From: Pauline Kirk  Sent: 11/2/2023  11:20 AM CDT  To: [facility] Staff  Subject: Patient is concerned about MRI, will need a #    Regarding:Patient is concerned about MRI, will need a script prescribed, please advise for new date 11/6 please advise     Contact: Marylin     Type: Call in script for MRI     Who Called: Marylin     Would the patient rather a call back or a response via [facility]? Phone call     Best Call Back Number: [phone]       Additional Information:     [address] [facility]   [address] [facility] [address]   Phone: [phone] Fax: [phone]

## 2023-11-02 NOTE — TELEPHONE ENCOUNTER
----- Message from Olinda Howell RN sent at 11/1/2023  1:05 PM CDT -----  Regarding: FW: Appt  Contact: Pt [phone]    ----- Message -----  From: John Nash  Sent: 11/1/2023   1:01 PM CDT  To: [facility] Staff  Subject: Appt                                             Alon/ calling regarding pt being taken to [facility] for head injury. States CAT scan found a cyst on her brain. Would like to schedule f/u appt with provider. Please call [phone]

## 2023-11-02 NOTE — PROGRESS NOTES
Subjective:       Patient ID: Marylin Morel is a 58 y.o. female.    Chief Complaint: Annual Exam      Marylin Morel is a 58 y.o. female with history of allergic rhinitis, GERD, HTN, DDD, hypothyroidism who presents to clinic for routine follow up. Doing well overall, she would like refills of all medications. History of anaphylaxis to ant bite, Epipen refilled. Mammogram will be due in January. Routine labs due.     She had a fall on 10/31 where she fell onto concrete steps. She was evaluated in the ED due to head injury and LOC. No traumatic injury intracranially appreciated on the CT scan but patient did have it abnormal scan with a cyst showing some moderate dilation of the right lateral ventricle. Diagnosed with concussion. Still having some mild dizziness. She has follow up scheduled with neurosurgery on 11/14. She is still having some left sided hip pain from her fall. She has history of hip replacement on the left side.         Review of Systems   Constitutional:  Negative for activity change, appetite change, fatigue and unexpected weight change.   Eyes:  Negative for visual disturbance.   Respiratory:  Negative for cough and shortness of breath.    Cardiovascular:  Negative for chest pain, palpitations and leg swelling.   Gastrointestinal:  Negative for abdominal pain, constipation, diarrhea and nausea.   Musculoskeletal:         Left hip pain   Skin:  Negative for rash.   Neurological:  Positive for dizziness. Negative for light-headedness and headaches.   Psychiatric/Behavioral:  Negative for dysphoric mood. The patient is not nervous/anxious.          Past Medical History:   Diagnosis Date    Allergy     ants    GERD (gastroesophageal reflux disease)     Hypertension     Thyroid disease        Review of patient's allergies indicates:   Allergen Reactions    Insect venom Anaphylaxis     Ant bites         Current Outpatient Medications:     cetirizine (ZYRTEC) 10 MG tablet, Take 10 mg by mouth once  daily., Disp: , Rfl:     COMBIPATCH 0.05-0.14 mg/24 hr, , Disp: , Rfl:     ibuprofen (ADVIL,MOTRIN) 800 MG tablet, Take 1 tablet (800 mg total) by mouth every 6 (six) hours as needed for Pain., Disp: 60 tablet, Rfl: 5    olopatadine (PATADAY) 0.2 % Drop, , Disp: , Rfl:     simethicone (GAS-X EXTRA STRENGTH) 125 mg Cap capsule, Take 1 capsule (125 mg total) by mouth 4 (four) times daily as needed for Flatulence., Disp: 30 capsule, Rfl: 1    tiZANidine (ZANAFLEX) 4 MG tablet, Take 4 mg by mouth 2 (two) times daily as needed., Disp: , Rfl:     vitamin D (VITAMIN D3) 1000 units Tab, Take 1,000 Units by mouth once daily., Disp: , Rfl:     EPINEPHrine (EPIPEN) 0.3 mg/0.3 mL AtIn, Inject 0.3 mLs (0.3 mg total) into the muscle once. for 1 dose, Disp: 2 each, Rfl: 0    fluticasone propionate (FLONASE) 50 mcg/actuation nasal spray, INSTILL ONE SPRAY INTO EACH NOSTRIL ONCE DAILY, Disp: 16 g, Rfl: 11    levothyroxine (SYNTHROID) 25 MCG tablet, Take 1 tablet (25 mcg total) by mouth once daily., Disp: 90 tablet, Rfl: 3    lisinopriL-hydrochlorothiazide (PRINZIDE,ZESTORETIC) 20-25 mg Tab, Take 1 tablet by mouth once daily., Disp: 90 tablet, Rfl: 3    methocarbamoL (ROBAXIN) 500 MG Tab, Take 1 tablet (500 mg total) by mouth 2 (two) times daily as needed., Disp: 60 tablet, Rfl: 0    omeprazole (PRILOSEC) 40 MG capsule, Take 1 capsule (40 mg total) by mouth once daily., Disp: 90 capsule, Rfl: 3    Objective:        Physical Exam  Vitals reviewed.   Constitutional:       General: She is not in acute distress.     Appearance: Normal appearance.   HENT:      Head: Normocephalic and atraumatic.   Eyes:      Conjunctiva/sclera: Conjunctivae normal.   Cardiovascular:      Rate and Rhythm: Normal rate and regular rhythm.      Pulses: Normal pulses.      Heart sounds: Normal heart sounds.   Pulmonary:      Effort: Pulmonary effort is normal.      Breath sounds: Normal breath sounds.   Musculoskeletal:         General: Normal range of motion.  "     Cervical back: Normal range of motion and neck supple.      Right lower leg: No edema.      Left lower leg: No edema.   Skin:     General: Skin is warm and dry.   Neurological:      Mental Status: She is alert and oriented to person, place, and time.   Psychiatric:         Mood and Affect: Mood normal.         Behavior: Behavior normal.         Thought Content: Thought content normal.         Judgment: Judgment normal.           Visit Vitals  /88 (BP Location: Left arm, Patient Position: Sitting, BP Method: Large (Manual))   Pulse 79   Temp 98.4 °F (36.9 °C) (Oral)   Resp 18   Ht 5' 5" (1.651 m)   Wt 62.3 kg (137 lb 5.6 oz)   LMP 02/05/2015 (Approximate)   SpO2 98%   BMI 22.86 kg/m²      Assessment:         1. Hypothyroidism, unspecified type    2. Allergic reaction to insect bite    3. Essential hypertension, benign    4. Seasonal allergies    5. Left hip pain    6. Gastroesophageal reflux disease, unspecified whether esophagitis present    7. DDD (degenerative disc disease), lumbosacral    8. Healthcare maintenance    9. Fall, subsequent encounter        Plan:         Marylin was seen today for annual exam.    Diagnoses and all orders for this visit:    Hypothyroidism, unspecified type  -     levothyroxine (SYNTHROID) 25 MCG tablet; Take 1 tablet (25 mcg total) by mouth once daily.  -     TSH; Future  -      Resume Synthroid and return for labs in about 1 week    Allergic reaction to insect bite  -     EPINEPHrine (EPIPEN) 0.3 mg/0.3 mL AtIn; Inject 0.3 mLs (0.3 mg total) into the muscle once. for 1 dose    Essential hypertension, benign  -     lisinopriL-hydrochlorothiazide (PRINZIDE,ZESTORETIC) 20-25 mg Tab; Take 1 tablet by mouth once daily.  -     CBC Auto Differential; Future  -     Stable, continue medications     Seasonal allergies  -     fluticasone propionate (FLONASE) 50 mcg/actuation nasal spray; INSTILL ONE SPRAY INTO EACH NOSTRIL ONCE DAILY    Left hip pain  -     X-Ray Hip 2 or 3 views " Left (with Pelvis when performed); Future  -     Tylenol 500 mg or Ibuprofen 200 mg 2 tabs every 6 hours as needed for pain     Gastroesophageal reflux disease, unspecified whether esophagitis present  -     omeprazole (PRILOSEC) 40 MG capsule; Take 1 capsule (40 mg total) by mouth once daily.    DDD (degenerative disc disease), lumbosacral  -     methocarbamoL (ROBAXIN) 500 MG Tab; Take 1 tablet (500 mg total) by mouth 2 (two) times daily as needed.  -    Follow up as scheduled with pain management     Healthcare maintenance  -     Lipid Panel; Future    Fall, subsequent encounter        -     Continue concussion precautions until symptoms resolve        Imaging will be reviewed, and further recommendations will be made based on results. Follow up with Dr. Mccabe in 6 months or sooner if needed.         Family Medicine Physician Assistant     Future Appointments       Date Provider Specialty Appt Notes    11/2/2023  Radiology xray    11/11/2023  Lab labs    11/14/2023 Clement Alamo MD Neurosurgery ed f/u incidental coloid cyst found inhse MRI    5/24/2024 Gabriel Mccabe MD Family Medicine 6 month f/u             All of your core healthy metrics are met.       I spent a total of 30 minutes on the day of the visit.This includes face to face time and non-face to face time preparing to see the patient (eg, review of tests), obtaining and/or reviewing separately obtained history, documenting clinical information in the electronic or other health record, independently interpreting results and communicating results to the patient/family/caregiver, or care coordinator.    We have addressed [4] Moderate: 1 or more chronic illnesses with exacerbation, progression, or side effects of treatment / 2 or more stable chronic illnesses / 1 undiagnosed new problem with uncertain prognosis / 1 acute illness with systemic symptoms / 1 acute complicated injury  The complexity of the data reviewed and analyzed for this visit  was [4] Moderate (Reviewed prior external note, ordered unique testing and reviewed the results of each unique test / Independently interpreted a test / Discussed management or interpretation of a test with another provider)  The risk of complications and/or morbidity or mortality are [4] Moderate risk (I.e. prescription drug management / decision regarding minor surgery with identified pt or procedure risk factors / decision regarding elective major surgery without identified pt or procedure risk factors / diagnosis or treatment significantly limited by social determinants of health)   The level of Medical Decision Making for this visit is [4] Moderate

## 2023-11-02 NOTE — TELEPHONE ENCOUNTER
RBVO per DR Alamo Valium 5mg x2 tablets , take 1 tablet 30 minutes before the MRI and the other tablet as needed- 0 refills called in to pharmacy on file

## 2023-11-06 ENCOUNTER — HOSPITAL ENCOUNTER (OUTPATIENT)
Dept: RADIOLOGY | Facility: HOSPITAL | Age: 58
Discharge: HOME | End: 2023-11-06
Attending: NEUROLOGICAL SURGERY
Payer: COMMERCIAL

## 2023-11-06 DIAGNOSIS — Q04.6 COLLOID CYST OF BRAIN: ICD-10-CM

## 2023-11-06 LAB
CREAT SERPL-MCNC: 0.7 MG/DL (ref 0.5–1.4)
SAMPLE: NORMAL

## 2023-11-06 PROCEDURE — 82565 ASSAY OF CREATININE: CPT | Mod: PO

## 2023-11-06 PROCEDURE — A9585 GADOBUTROL INJECTION: HCPCS | Mod: PO | Performed by: NEUROLOGICAL SURGERY

## 2023-11-06 PROCEDURE — 70553 MRI BRAIN STEM W/O & W/DYE: CPT | Mod: TC,PO

## 2023-11-06 PROCEDURE — 25500020 PHARM REV CODE 255: Mod: PO | Performed by: NEUROLOGICAL SURGERY

## 2023-11-06 RX ORDER — GADOBUTROL 604.72 MG/ML
6 INJECTION INTRAVENOUS
Status: COMPLETED | OUTPATIENT
Start: 2023-11-06 | End: 2023-11-06

## 2023-11-06 RX ADMIN — GADOBUTROL 6 ML: 604.72 INJECTION INTRAVENOUS at 03:11

## 2023-11-11 ENCOUNTER — LAB VISIT (OUTPATIENT)
Dept: LAB | Facility: HOSPITAL | Age: 58
End: 2023-11-11
Attending: FAMILY MEDICINE
Payer: COMMERCIAL

## 2023-11-11 DIAGNOSIS — Z00.00 HEALTHCARE MAINTENANCE: ICD-10-CM

## 2023-11-11 DIAGNOSIS — E03.9 HYPOTHYROIDISM, UNSPECIFIED TYPE: ICD-10-CM

## 2023-11-11 DIAGNOSIS — I10 ESSENTIAL HYPERTENSION, BENIGN: ICD-10-CM

## 2023-11-11 LAB
ALBUMIN SERPL BCP-MCNC: 4.4 G/DL (ref 3.5–5.2)
ALP SERPL-CCNC: 50 U/L (ref 55–135)
ALT SERPL W/O P-5'-P-CCNC: 32 U/L (ref 10–44)
ANION GAP SERPL CALC-SCNC: 11 MMOL/L (ref 8–16)
AST SERPL-CCNC: 29 U/L (ref 10–40)
BASOPHILS # BLD AUTO: 0.09 K/UL (ref 0–0.2)
BASOPHILS NFR BLD: 1.2 % (ref 0–1.9)
BILIRUB SERPL-MCNC: 0.5 MG/DL (ref 0.1–1)
BUN SERPL-MCNC: 15 MG/DL (ref 6–20)
CALCIUM SERPL-MCNC: 10 MG/DL (ref 8.7–10.5)
CHLORIDE SERPL-SCNC: 101 MMOL/L (ref 95–110)
CHOLEST SERPL-MCNC: 269 MG/DL (ref 120–199)
CHOLEST/HDLC SERPL: 4.3 {RATIO} (ref 2–5)
CO2 SERPL-SCNC: 26 MMOL/L (ref 23–29)
CREAT SERPL-MCNC: 0.8 MG/DL (ref 0.5–1.4)
DIFFERENTIAL METHOD: ABNORMAL
EOSINOPHIL # BLD AUTO: 0.2 K/UL (ref 0–0.5)
EOSINOPHIL NFR BLD: 3.1 % (ref 0–8)
ERYTHROCYTE [DISTWIDTH] IN BLOOD BY AUTOMATED COUNT: 11.4 % (ref 11.5–14.5)
EST. GFR  (NO RACE VARIABLE): >60 ML/MIN/1.73 M^2
GLUCOSE SERPL-MCNC: 84 MG/DL (ref 70–110)
HCT VFR BLD AUTO: 42.7 % (ref 37–48.5)
HDLC SERPL-MCNC: 63 MG/DL (ref 40–75)
HDLC SERPL: 23.4 % (ref 20–50)
HGB BLD-MCNC: 13.9 G/DL (ref 12–16)
IMM GRANULOCYTES # BLD AUTO: 0.02 K/UL (ref 0–0.04)
IMM GRANULOCYTES NFR BLD AUTO: 0.3 % (ref 0–0.5)
LDLC SERPL CALC-MCNC: 174.6 MG/DL (ref 63–159)
LYMPHOCYTES # BLD AUTO: 2 K/UL (ref 1–4.8)
LYMPHOCYTES NFR BLD: 27.8 % (ref 18–48)
MCH RBC QN AUTO: 33.5 PG (ref 27–31)
MCHC RBC AUTO-ENTMCNC: 32.6 G/DL (ref 32–36)
MCV RBC AUTO: 103 FL (ref 82–98)
MONOCYTES # BLD AUTO: 0.5 K/UL (ref 0.3–1)
MONOCYTES NFR BLD: 7.2 % (ref 4–15)
NEUTROPHILS # BLD AUTO: 4.4 K/UL (ref 1.8–7.7)
NEUTROPHILS NFR BLD: 60.4 % (ref 38–73)
NONHDLC SERPL-MCNC: 206 MG/DL
NRBC BLD-RTO: 0 /100 WBC
PLATELET # BLD AUTO: 354 K/UL (ref 150–450)
PMV BLD AUTO: 9.8 FL (ref 9.2–12.9)
POTASSIUM SERPL-SCNC: 4.1 MMOL/L (ref 3.5–5.1)
PROT SERPL-MCNC: 7.6 G/DL (ref 6–8.4)
RBC # BLD AUTO: 4.15 M/UL (ref 4–5.4)
SODIUM SERPL-SCNC: 138 MMOL/L (ref 136–145)
TRIGL SERPL-MCNC: 157 MG/DL (ref 30–150)
TSH SERPL DL<=0.005 MIU/L-ACNC: 1.56 UIU/ML (ref 0.4–4)
WBC # BLD AUTO: 7.33 K/UL (ref 3.9–12.7)

## 2023-11-11 PROCEDURE — 85025 COMPLETE CBC W/AUTO DIFF WBC: CPT

## 2023-11-11 PROCEDURE — 84443 ASSAY THYROID STIM HORMONE: CPT

## 2023-11-11 PROCEDURE — 80053 COMPREHEN METABOLIC PANEL: CPT | Performed by: FAMILY MEDICINE

## 2023-11-11 PROCEDURE — 36415 COLL VENOUS BLD VENIPUNCTURE: CPT | Mod: PO | Performed by: FAMILY MEDICINE

## 2023-11-11 PROCEDURE — 80061 LIPID PANEL: CPT

## 2023-11-13 ENCOUNTER — TELEPHONE (OUTPATIENT)
Dept: FAMILY MEDICINE | Facility: CLINIC | Age: 58
End: 2023-11-13
Payer: COMMERCIAL

## 2023-11-13 NOTE — TELEPHONE ENCOUNTER
----- Message from Linda Levy PA-C sent at 11/13/2023  9:26 AM CST -----  Total cholesterol and LDL elevated. Ten year risk of adverse cardiovascular event is 4.5%. No indication to begin statin therapy at this time. Recommend the patient try to improve diet by eliminating fried foods, red or processed meat, whole milk. Increase foods such as fruits, vegetables, poultry, fish, nuts. Also try to increase physical activity/ exercise to 120-150 minutes of aerobic exercise weekly.        The 10-year ASCVD risk score (Luisa COE, et al., 2019) is: 4.5%    Values used to calculate the score:      Age: 58 years      Sex: Female      Is Non- : No      Diabetic: No      Tobacco smoker: No      Systolic Blood Pressure: 126 mmHg      Is BP treated: Yes      HDL Cholesterol: 63 mg/dL      Total Cholesterol: 269 mg/dL     TSH within normal limits. Remainder of labs are stable.

## 2023-11-14 ENCOUNTER — TELEPHONE (OUTPATIENT)
Dept: NEUROSURGERY | Facility: CLINIC | Age: 58
End: 2023-11-14

## 2023-11-14 ENCOUNTER — OFFICE VISIT (OUTPATIENT)
Dept: NEUROSURGERY | Facility: CLINIC | Age: 58
End: 2023-11-14
Payer: COMMERCIAL

## 2023-11-14 ENCOUNTER — PATIENT MESSAGE (OUTPATIENT)
Dept: FAMILY MEDICINE | Facility: CLINIC | Age: 58
End: 2023-11-14

## 2023-11-14 ENCOUNTER — OFFICE VISIT (OUTPATIENT)
Dept: FAMILY MEDICINE | Facility: CLINIC | Age: 58
End: 2023-11-14
Payer: COMMERCIAL

## 2023-11-14 VITALS
BODY MASS INDEX: 23.14 KG/M2 | HEIGHT: 65 IN | DIASTOLIC BLOOD PRESSURE: 78 MMHG | RESPIRATION RATE: 16 BRPM | SYSTOLIC BLOOD PRESSURE: 116 MMHG | OXYGEN SATURATION: 97 % | TEMPERATURE: 99 F | WEIGHT: 138.88 LBS | HEART RATE: 80 BPM

## 2023-11-14 DIAGNOSIS — G91.1 OBSTRUCTIVE HYDROCEPHALUS: ICD-10-CM

## 2023-11-14 DIAGNOSIS — R30.0 DYSURIA: Primary | ICD-10-CM

## 2023-11-14 DIAGNOSIS — I10 ESSENTIAL HYPERTENSION, BENIGN: ICD-10-CM

## 2023-11-14 DIAGNOSIS — Q04.6 COLLOID CYST OF BRAIN: Primary | ICD-10-CM

## 2023-11-14 DIAGNOSIS — E03.9 HYPOTHYROIDISM, UNSPECIFIED TYPE: ICD-10-CM

## 2023-11-14 LAB
BILIRUBIN, UA POC OHS: NEGATIVE
BLOOD, UA POC OHS: ABNORMAL
CLARITY, UA POC OHS: ABNORMAL
COLOR, UA POC OHS: YELLOW
GLUCOSE, UA POC OHS: NEGATIVE
KETONES, UA POC OHS: NEGATIVE
LEUKOCYTES, UA POC OHS: ABNORMAL
NITRITE, UA POC OHS: POSITIVE
PH, UA POC OHS: 7
PROTEIN, UA POC OHS: NEGATIVE
SPECIFIC GRAVITY, UA POC OHS: 1.01
UROBILINOGEN, UA POC OHS: 0.2

## 2023-11-14 PROCEDURE — 87077 CULTURE AEROBIC IDENTIFY: CPT

## 2023-11-14 PROCEDURE — 3078F PR MOST RECENT DIASTOLIC BLOOD PRESSURE < 80 MM HG: ICD-10-PCS | Mod: CPTII,S$GLB,,

## 2023-11-14 PROCEDURE — 99203 PR OFFICE/OUTPT VISIT, NEW, LEVL III, 30-44 MIN: ICD-10-PCS | Mod: S$GLB,,, | Performed by: NEUROLOGICAL SURGERY

## 2023-11-14 PROCEDURE — 99214 PR OFFICE/OUTPT VISIT, EST, LEVL IV, 30-39 MIN: ICD-10-PCS | Mod: S$GLB,,,

## 2023-11-14 PROCEDURE — 99999 PR PBB SHADOW E&M-EST. PATIENT-LVL V: CPT | Mod: PBBFAC,,,

## 2023-11-14 PROCEDURE — 3074F PR MOST RECENT SYSTOLIC BLOOD PRESSURE < 130 MM HG: ICD-10-PCS | Mod: CPTII,S$GLB,,

## 2023-11-14 PROCEDURE — 99999 PR PBB SHADOW E&M-EST. PATIENT-LVL V: ICD-10-PCS | Mod: PBBFAC,,,

## 2023-11-14 PROCEDURE — 3008F BODY MASS INDEX DOCD: CPT | Mod: CPTII,S$GLB,,

## 2023-11-14 PROCEDURE — 4010F PR ACE/ARB THEARPY RXD/TAKEN: ICD-10-PCS | Mod: CPTII,S$GLB,,

## 2023-11-14 PROCEDURE — 87186 SC STD MICRODIL/AGAR DIL: CPT

## 2023-11-14 PROCEDURE — 4010F PR ACE/ARB THEARPY RXD/TAKEN: ICD-10-PCS | Mod: CPTII,S$GLB,, | Performed by: NEUROLOGICAL SURGERY

## 2023-11-14 PROCEDURE — 1160F RVW MEDS BY RX/DR IN RCRD: CPT | Mod: CPTII,S$GLB,,

## 2023-11-14 PROCEDURE — 87086 URINE CULTURE/COLONY COUNT: CPT

## 2023-11-14 PROCEDURE — 99999 PR PBB SHADOW E&M-EST. PATIENT-LVL III: CPT | Mod: PBBFAC,,, | Performed by: NEUROLOGICAL SURGERY

## 2023-11-14 PROCEDURE — 4010F ACE/ARB THERAPY RXD/TAKEN: CPT | Mod: CPTII,S$GLB,, | Performed by: NEUROLOGICAL SURGERY

## 2023-11-14 PROCEDURE — 1160F PR REVIEW ALL MEDS BY PRESCRIBER/CLIN PHARMACIST DOCUMENTED: ICD-10-PCS | Mod: CPTII,S$GLB,,

## 2023-11-14 PROCEDURE — 1159F MED LIST DOCD IN RCRD: CPT | Mod: CPTII,S$GLB,, | Performed by: NEUROLOGICAL SURGERY

## 2023-11-14 PROCEDURE — 99999 PR PBB SHADOW E&M-EST. PATIENT-LVL III: ICD-10-PCS | Mod: PBBFAC,,, | Performed by: NEUROLOGICAL SURGERY

## 2023-11-14 PROCEDURE — 81003 POCT URINALYSIS(INSTRUMENT): ICD-10-PCS | Mod: QW,S$GLB,,

## 2023-11-14 PROCEDURE — 3074F SYST BP LT 130 MM HG: CPT | Mod: CPTII,S$GLB,,

## 2023-11-14 PROCEDURE — 81003 URINALYSIS AUTO W/O SCOPE: CPT | Mod: QW,S$GLB,,

## 2023-11-14 PROCEDURE — 87088 URINE BACTERIA CULTURE: CPT

## 2023-11-14 PROCEDURE — 1159F MED LIST DOCD IN RCRD: CPT | Mod: CPTII,S$GLB,,

## 2023-11-14 PROCEDURE — 3008F PR BODY MASS INDEX (BMI) DOCUMENTED: ICD-10-PCS | Mod: CPTII,S$GLB,,

## 2023-11-14 PROCEDURE — 99214 OFFICE O/P EST MOD 30 MIN: CPT | Mod: S$GLB,,,

## 2023-11-14 PROCEDURE — 99203 OFFICE O/P NEW LOW 30 MIN: CPT | Mod: S$GLB,,, | Performed by: NEUROLOGICAL SURGERY

## 2023-11-14 PROCEDURE — 4010F ACE/ARB THERAPY RXD/TAKEN: CPT | Mod: CPTII,S$GLB,,

## 2023-11-14 PROCEDURE — 1159F PR MEDICATION LIST DOCUMENTED IN MEDICAL RECORD: ICD-10-PCS | Mod: CPTII,S$GLB,, | Performed by: NEUROLOGICAL SURGERY

## 2023-11-14 PROCEDURE — 1159F PR MEDICATION LIST DOCUMENTED IN MEDICAL RECORD: ICD-10-PCS | Mod: CPTII,S$GLB,,

## 2023-11-14 PROCEDURE — 3078F DIAST BP <80 MM HG: CPT | Mod: CPTII,S$GLB,,

## 2023-11-14 RX ORDER — SULFAMETHOXAZOLE AND TRIMETHOPRIM 800; 160 MG/1; MG/1
1 TABLET ORAL 2 TIMES DAILY
Qty: 6 TABLET | Refills: 0 | Status: SHIPPED | OUTPATIENT
Start: 2023-11-14 | End: 2023-11-14 | Stop reason: SDUPTHER

## 2023-11-14 RX ORDER — SULFAMETHOXAZOLE AND TRIMETHOPRIM 800; 160 MG/1; MG/1
1 TABLET ORAL 2 TIMES DAILY
Qty: 10 TABLET | Refills: 0 | Status: SHIPPED | OUTPATIENT
Start: 2023-11-14 | End: 2023-11-15

## 2023-11-14 RX ORDER — AMOXICILLIN 500 MG
CAPSULE ORAL DAILY
COMMUNITY

## 2023-11-14 NOTE — TELEPHONE ENCOUNTER
----- Message from Myra Ivan sent at 11/14/2023  1:06 PM CST -----  Regarding: Pt Advice  Contact: [phone]  Ema with Dr Aicha Collins office is calling to request a letter or note of approval for Pt to have trigger point injection Please Fax note to [phone]   Pt is waiting at office

## 2023-11-14 NOTE — PATIENT INSTRUCTIONS
Luther Coulter,     If you are due for any health screening(s) below please notify me so we can arrange them to be ordered and scheduled to maintain your health. Most healthy patients complete it. Don't lose out on improving your health.     All of your core healthy metrics are met.              Dear Ms. Adriel:    Your [facility] Care Team is dedicated to helping you stay healthy with regularly scheduled recommended screenings.  Scheduling routine screenings is important to maintaining good health. Our records indicate that you may be overdue for your screening pap smear. A pap smear screening can help identify patients at risk for developing cervical cancer at an early stage, when it is most likely to be successfully treated.    We encourage you to schedule your appointment with your [facility] provider or some primary care providers also perform this screening.    If you have completed or scheduled your pap smear screening outside of [facility], please notify your primary care team so we can update your health record.      If you have questions or would like to schedule your screening, please contact your primary care clinic.    Sincerely,    Your [facility] Team

## 2023-11-14 NOTE — PROGRESS NOTES
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  MRI scan was done which confirmed the finding of a colloid cyst was asymmetric to the right with the obstructing of the foramen of Monro in the right.  Patient denies any change in vision, denies any headache, denies any nausea vomiting in denies any recent history of headaches.  Patient has no focal deficits.    Review of patient's allergies indicates:   Allergen Reactions    Insect venom Anaphylaxis     Ant bites       Current Outpatient Medications   Medication Sig Dispense Refill    cetirizine (ZYRTEC) 10 MG tablet Take 10 mg by mouth once daily.      COMBIPATCH 0.05-0.14 mg/24 hr       fluticasone propionate (FLONASE) 50 mcg/actuation nasal spray INSTILL ONE SPRAY INTO EACH NOSTRIL ONCE DAILY 16 g 11    ibuprofen (ADVIL,MOTRIN) 800 MG tablet Take 1 tablet (800 mg total) by mouth every 6 (six) hours as needed for Pain. 60 tablet 5    levothyroxine (SYNTHROID) 25 MCG tablet Take 1 tablet (25 mcg total) by mouth once daily. 90 tablet 3    lisinopriL-hydrochlorothiazide (PRINZIDE,ZESTORETIC) 20-25 mg Tab Take 1 tablet by mouth once daily. 90 tablet 3    methocarbamoL (ROBAXIN) 500 MG Tab Take 1 tablet (500 mg total) by mouth 2 (two) times daily as needed. 60 tablet 0    multivitamin capsule Take 1 capsule by mouth once daily.      olopatadine (PATADAY) 0.2 % Drop       omega-3 fatty acids/fish oil (FISH OIL-OMEGA-3 FATTY ACIDS) 300-1,000 mg capsule Take by mouth once daily.      omeprazole (PRILOSEC) 40 MG capsule Take 1 capsule (40 mg total) by mouth once daily. 90 capsule 3     simethicone (GAS-X EXTRA STRENGTH) 125 mg Cap capsule Take 1 capsule (125 mg total) by mouth 4 (four) times daily as needed for Flatulence. 30 capsule 1    sulfamethoxazole-trimethoprim 800-160mg (BACTRIM DS) 800-160 mg Tab Take 1 tablet by mouth 2 (two) times daily. for 5 days 10 tablet 0    tiZANidine (ZANAFLEX) 4 MG tablet Take 4 mg by mouth 2 (two) times daily as needed.      vitamin D (VITAMIN D3) 1000 units Tab Take 1,000 Units by mouth once daily.      EPINEPHrine (EPIPEN) 0.3 mg/0.3 mL AtIn Inject 0.3 mLs (0.3 mg total) into the muscle once. for 1 dose (Patient not taking: Reported on 11/14/2023) 2 each 0     No current facility-administered medications for this visit.       Past Medical History:   Diagnosis Date    Allergy     ants    GERD (gastroesophageal reflux disease)     Hypertension     Thyroid disease      Past Surgical History:   Procedure Laterality Date    AUGMENTATION OF BREAST      BREAST SURGERY      COSMETIC SURGERY      hip replacement left Left     NECK SURGERY       Family History       Problem Relation (Age of Onset)    COPD Mother    Heart disease Father    Hypertension Brother          Social History     Socioeconomic History    Marital status:    Occupational History     Employer: [employer]   Tobacco Use    Smoking status: Never    Smokeless tobacco: Never   Substance and Sexual Activity    Alcohol use: Yes     Alcohol/week: 1.0 standard drink of alcohol     Types: 1 Glasses of wine per week     Comment: 1 glass of wine daily    Drug use: No    Sexual activity: Yes     Partners: Male     Social Determinants of Health     Stress: No Stress Concern Present (1/20/2020)    [employer] - Occupational Stress Questionnaire     Feeling of Stress : Not at all       Review of Systems   Musculoskeletal:  Positive for arthralgias and back pain.     OBJECTIVE:     Vital Signs  Pain Score:   4  There is no height or weight on file to calculate  BMI.      Neurosurgery Physical Exam    Patient is awake alert appropriate for age.  Her cranial nerves intact.  Her extraocular moves are full.  She is no focal deficits.  She also complains of significant lower back pain with neurogenic claudication.    Diagnostic Results:  MRI brain with and without contrast     CLINICAL DATA: Headache, recent trauma, follow-up     FINDINGS: Multiplanar pre and post infusion imaging was performed. Comparison is made to a CT study from October 31, 2023.     Again noted is mild asymmetric dilation of the right lateral ventricle with leftward deviation of the interventricular septum. Previously noted small hyperdense focus at the level of the foramen Monro on the right is not well visualized. No enhancing mass is identified at this level. No intracranial hemorrhage is demonstrated. Sulci are within normal limits. There are no pathologic extra-axial fluid collections.     There is no evidence of acute ischemic change or cerebral edema. Mild signal increase in the periventricular regions of both cerebral hemispheres is noted, likely on the basis of minimal chronic small vessel ischemic change. There is no evidence of demyelinating disease. The cerebellum and brainstem are unremarkable.     The orbits, paranasal sinuses, skull base, and sella turcica are within normal limits.     Scalp T1 hypointensity to the left of midline posteriorly is compatible with soft tissue contusion.     IMPRESSION:  1. Previously reported 6 mm CT hyperdensity at the foramen of Monro is not well visualized. Mild dilation of the right lateral ventricle and leftward deviation of the interventricular septum are unchanged. Findings again may be on the basis of a small colloid cyst with associated right ventricular dilation. Tiny focus of hemorrhage at the level of the foramen of Monro on the right could have potentially given the CT appearance, although there is no MR evidence of the residua of prior hemorrhage.  In the absence of prior studies which could confirm stability, short-term CT follow-up in 4-6 weeks is recommended.  2. Mild chronic small vessel ischemic change.  3. Mild posterior scalp contusion.     Electronically signed by:  Raúl Andrew MD  11/06/2023 04:53 PM CST Workstation: [id number]           Specimen Collected: 11/06/23 15:04 Last Resulted: 11/06/23 16:53       Order Details        View Encounter        Lab and Collection Details        Routing        Result History     View All Conversations on this Encounter           Scans on Order [id number]    Scan on 11/6/2023  3:08 PM by Xenia Song, RT: MRI Screening        Scan on 11/6/2023  3:09 PM by Xenia Song, RT: MRI Contrast History/Consent         Result Care Coordination      Patient Communication     Add Comments   Seen Back to Top           Other Results from 11/6/2023    [facility]AT CREATININE  Order: [id number]  Status: Final result       Visible to patient: Yes (seen)       Next appt: 05/24/2024 at 03:30 PM in Family Medicine (Gabriel Mccabe MD)    0 Result Notes       Component Ref Range & Units 8 d ago   POC Creatinine 0.5 - 1.4 mg/dL 0.7   Sample  VENOUS   Resulting Agency  [facility]              Specimen Collected: 11/06/23 15:04 Last Resulted: 11/06/23 15:07        Lab Flowsheet        Order Details        View Encounter        Lab and Collection Details        Routing        Result History     View All Conversations on this Encounter           Result Care Coordination      Patient Communication     Add Comments   Seen Back to Top             MRI Brain W WO Contrast: Patient Communication     Add Comments   Seen     External Result Report    External Result Report     Narrative & Impression    MRI brain with and without contrast     CLINICAL DATA: Headache, recent trauma, follow-up     FINDINGS: Multiplanar pre and post infusion imaging was performed. Comparison is made to a CT study from October 31, 2023.     Again noted is mild asymmetric  dilation of the right lateral ventricle with leftward deviation of the interventricular septum. Previously noted small hyperdense focus at the level of the foramen Monro on the right is not well visualized. No enhancing mass is identified at this level. No intracranial hemorrhage is demonstrated. Sulci are within normal limits. There are no pathologic extra-axial fluid collections.     There is no evidence of acute ischemic change or cerebral edema. Mild signal increase in the periventricular regions of both cerebral hemispheres is noted, likely on the basis of minimal chronic small vessel ischemic change. There is no evidence of demyelinating disease. The cerebellum and brainstem are unremarkable.     The orbits, paranasal sinuses, skull base, and sella turcica are within normal limits.     Scalp T1 hypointensity to the left of midline posteriorly is compatible with soft tissue contusion.     IMPRESSION:  1. Previously reported 6 mm CT hyperdensity at the foramen of Monro is not well visualized. Mild dilation of the right lateral ventricle and leftward deviation of the interventricular septum are unchanged. Findings again may be on the basis of a small colloid cyst with associated right ventricular dilation. Tiny focus of hemorrhage at the level of the foramen of Monro on the right could have potentially given the CT appearance, although there is no MR evidence of the residua of prior hemorrhage. In the absence of prior studies which could confirm stability, short-term CT follow-up in 4-6 weeks is recommended.  2. Mild chronic small vessel ischemic change.  3. 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  I am knees the patient was any symptoms but also happy that we relatively  early.  We had in-depth discussion about possibility progression for obstructive hydrocephalus in the danger of sudden death colloid cyst.  This point I think that this can be addressed with the endoscopic approach not be open craniotomy.  We would plan for endoscopic resection cyst with a septostomy.      I have discussed the risks/benefits, indications, and alternatives for the proposed procedure in detail. I have answered all of their questions and patient wish to proceed with surgery. We will schedule patient.  We will need preoperative navigation scan for intraoperative navigation          Note dictated with voice recognition software, please excuse any grammatical errors.

## 2023-11-14 NOTE — PROGRESS NOTES
Subjective:       Patient ID: Marylin Morel is a 58 y.o. female.    Chief Complaint: Urinary Frequency and Dysuria    Urinary Frequency   This is a new problem. The current episode started in the past 7 days. The problem has been unchanged. The quality of the pain is described as burning. The pain is moderate. There has been no fever. Associated symptoms include frequency. Treatments tried: Piridium. The treatment provided mild relief. Her past medical history is significant for hypertension.   Leaving on a trip soon.     Past Medical History:   Diagnosis Date    Allergy     ants    GERD (gastroesophageal reflux disease)     Hypertension     Thyroid disease        Review of patient's allergies indicates:   Allergen Reactions    Insect venom Anaphylaxis     Ant bites         Current Outpatient Medications:     cetirizine (ZYRTEC) 10 MG tablet, Take 10 mg by mouth once daily., Disp: , Rfl:     COMBIPATCH 0.05-0.14 mg/24 hr, , Disp: , Rfl:     fluticasone propionate (FLONASE) 50 mcg/actuation nasal spray, INSTILL ONE SPRAY INTO EACH NOSTRIL ONCE DAILY, Disp: 16 g, Rfl: 11    ibuprofen (ADVIL,MOTRIN) 800 MG tablet, Take 1 tablet (800 mg total) by mouth every 6 (six) hours as needed for Pain., Disp: 60 tablet, Rfl: 5    levothyroxine (SYNTHROID) 25 MCG tablet, Take 1 tablet (25 mcg total) by mouth once daily., Disp: 90 tablet, Rfl: 3    lisinopriL-hydrochlorothiazide (PRINZIDE,ZESTORETIC) 20-25 mg Tab, Take 1 tablet by mouth once daily., Disp: 90 tablet, Rfl: 3    methocarbamoL (ROBAXIN) 500 MG Tab, Take 1 tablet (500 mg total) by mouth 2 (two) times daily as needed., Disp: 60 tablet, Rfl: 0    olopatadine (PATADAY) 0.2 % Drop, , Disp: , Rfl:     omeprazole (PRILOSEC) 40 MG capsule, Take 1 capsule (40 mg total) by mouth once daily., Disp: 90 capsule, Rfl: 3    simethicone (GAS-X EXTRA STRENGTH) 125 mg Cap capsule, Take 1 capsule (125 mg total) by mouth 4 (four) times daily as needed for Flatulence., Disp: 30 capsule,  "Rfl: 1    tiZANidine (ZANAFLEX) 4 MG tablet, Take 4 mg by mouth 2 (two) times daily as needed., Disp: , Rfl:     vitamin D (VITAMIN D3) 1000 units Tab, Take 1,000 Units by mouth once daily., Disp: , Rfl:     EPINEPHrine (EPIPEN) 0.3 mg/0.3 mL AtIn, Inject 0.3 mLs (0.3 mg total) into the muscle once. for 1 dose, Disp: 2 each, Rfl: 0    sulfamethoxazole-trimethoprim 800-160mg (BACTRIM DS) 800-160 mg Tab, Take 1 tablet by mouth 2 (two) times daily. for 5 days, Disp: 10 tablet, Rfl: 0    Review of Systems   Genitourinary:  Positive for dysuria and frequency.       Objective:      /78 (BP Location: Right arm, Patient Position: Sitting, BP Method: Medium (Manual))   Pulse 80   Temp 99.4 °F (37.4 °C) (Oral)   Resp 16   Ht 5' 5" (1.651 m)   Wt 63 kg (138 lb 14.2 oz)   LMP 02/05/2015 (Approximate)   SpO2 97%   BMI 23.11 kg/m²   Physical Exam  Vitals reviewed.   Constitutional:       General: She is not in acute distress.     Appearance: Normal appearance. She is normal weight. She is not ill-appearing, toxic-appearing or diaphoretic.   HENT:      Head: Normocephalic.      Right Ear: External ear normal.      Left Ear: External ear normal.      Nose: Nose normal. No congestion or rhinorrhea.      Mouth/Throat:      Mouth: Mucous membranes are moist.      Pharynx: Oropharynx is clear.   Eyes:      General: No scleral icterus.        Right eye: No discharge.         Left eye: No discharge.      Extraocular Movements: Extraocular movements intact.      Conjunctiva/sclera: Conjunctivae normal.   Cardiovascular:      Rate and Rhythm: Normal rate and regular rhythm.      Pulses: Normal pulses.      Heart sounds: Normal heart sounds. No murmur heard.     No friction rub. No gallop.   Pulmonary:      Effort: Pulmonary effort is normal. No respiratory distress.      Breath sounds: Normal breath sounds. No wheezing, rhonchi or rales.   Chest:      Chest wall: No tenderness.   Musculoskeletal:         General: No swelling, " tenderness or deformity. Normal range of motion.      Cervical back: Normal range of motion.      Right lower leg: No edema.      Left lower leg: No edema.   Skin:     General: Skin is warm and dry.      Capillary Refill: Capillary refill takes less than 2 seconds.      Coloration: Skin is not jaundiced.      Findings: No bruising, erythema, lesion or rash.   Neurological:      Mental Status: She is alert and oriented to person, place, and time.      Gait: Gait normal.   Psychiatric:         Mood and Affect: Mood normal.         Behavior: Behavior normal.         Thought Content: Thought content normal.         Judgment: Judgment normal.         Assessment:       1. Dysuria    2. Essential hypertension, benign    3. Hypothyroidism, unspecified type        Plan:       Dysuria  -     POCT Urinalysis(Instrument)  -     Urine culture  -     Discontinue: sulfamethoxazole-trimethoprim 800-160mg (BACTRIM DS) 800-160 mg Tab; Take 1 tablet by mouth 2 (two) times daily. for 3 days  Dispense: 6 tablet; Refill: 0  -     sulfamethoxazole-trimethoprim 800-160mg (BACTRIM DS) 800-160 mg Tab; Take 1 tablet by mouth 2 (two) times daily. for 5 days  Dispense: 10 tablet; Refill: 0    Essential hypertension, benign        -    Stable. Continue meds. Will continue to monitor.     Hypothyroidism, unspecified type        -    Stable. Continue meds. Will continue to monitor.                  Avelino Ko PA-C  Family Medicine Physician Assistant       Future Appointments       Date Provider Specialty Appt Notes    11/14/2023 Clement Alamo MD Neurosurgery ed f/u incidental coloid cyst found inhse MRI    5/24/2024 Gabriel Mccabe MD Family Medicine 6 month f/u               I spent a total of 20 minutes on the day of the visit.This includes face to face time and non-face to face time preparing to see the patient (eg, review of tests), obtaining and/or reviewing separately obtained history, documenting clinical information in the  electronic or other health record, independently interpreting results and communicating results to the patient/family/caregiver, or care coordinator.      We have addressed [4] Moderate: 1 or more chronic illnesses with exacerbation, progression, or side effects of treatment / 2 or more stable chronic illnesses / 1 undiagnosed new problem with uncertain prognosis / 1 acute illness with systemic symptoms / 1 acute complicated injury  The complexity of the data reviewed and analyzed for this visit was [3] Limited (Reviewed prior external note, ordered unique testing or reviewed the results of each unique test)   The risk of complications and/or morbidity or mortality are [4] Moderate risk (I.e. prescription drug management / decision regarding minor surgery with identified pt or procedure risk factors / decision regarding elective major surgery without identified pt or procedure risk factors / diagnosis or treatment significantly limited by social determinants of health)   The level of Medical Decision Making for this visit is [4] Moderate

## 2023-11-15 ENCOUNTER — PATIENT MESSAGE (OUTPATIENT)
Dept: NEUROSURGERY | Facility: CLINIC | Age: 58
End: 2023-11-15
Payer: COMMERCIAL

## 2023-11-15 DIAGNOSIS — G91.1 OBSTRUCTIVE HYDROCEPHALUS: ICD-10-CM

## 2023-11-15 DIAGNOSIS — G91.1 OBSTRUCTIVE HYDROCEPHALUS: Primary | ICD-10-CM

## 2023-11-15 DIAGNOSIS — Q04.6 COLLOID CYST OF BRAIN: Primary | ICD-10-CM

## 2023-11-15 RX ORDER — NITROFURANTOIN 25; 75 MG/1; MG/1
100 CAPSULE ORAL 2 TIMES DAILY
Qty: 10 CAPSULE | Refills: 0 | Status: SHIPPED | OUTPATIENT
Start: 2023-11-15 | End: 2023-11-20

## 2023-11-16 LAB — BACTERIA UR CULT: ABNORMAL

## 2023-11-21 NOTE — TELEPHONE ENCOUNTER
RBVO per Dr Alamo valium 5mg PO # 2- 0 refills- take 1 30 minutes prior to test, the other tablet PRN. Called in to pharmacy on file

## 2023-11-29 ENCOUNTER — PATIENT MESSAGE (OUTPATIENT)
Dept: FAMILY MEDICINE | Facility: CLINIC | Age: 58
End: 2023-11-29
Payer: COMMERCIAL

## 2023-11-29 ENCOUNTER — TELEPHONE (OUTPATIENT)
Dept: PREADMISSION TESTING | Facility: HOSPITAL | Age: 58
End: 2023-11-29
Payer: COMMERCIAL

## 2023-11-29 DIAGNOSIS — Z01.818 PREOPERATIVE TESTING: Primary | ICD-10-CM

## 2023-11-29 NOTE — ANESTHESIA PAT ROS NOTE
12/11/2023  Marylin Morel is a 58 y.o., female with COLLOID BRAIN CYST, OBSTRUCTIVE HYDROCEPHALUS, presents to periop for anesthesia visit and preop instructions.      Pre-op Assessment    I have reviewed the Patient Summary Reports.     I have reviewed the Nursing Notes. I have reviewed the NPO Status.   I have reviewed the Medications.     Review of Systems  Anesthesia Hx:  No problems with previous Anesthesia   History of prior surgery of interest to airway management or planning: cervical fusion. Previous anesthesia: MAC       12/8/2014 COLONOSCOPY with MAC.  Procedure performed at an [facility].  Denies Family Hx of Anesthesia complications.    Denies Personal Hx of Anesthesia complications.                    Social:  Non-Smoker, Alcohol Use 1.0 standard drink of alcohol/week        Hematology/Oncology:    Oncology Normal                                   EENT/Dental:  chronic allergic rhinitis Seasonal allergies   Eyes:            Eye Symptoms: of redness, itching.           Cardiovascular:  Exercise tolerance: good   Denies Pacemaker. Hypertension       Denies Angina.     hyperlipidemia     Functional Capacity 4 METS                         Pulmonary:       Denies Shortness of breath.  Denies Recent URI.  Denies Sleep Apnea.                Renal/:  Renal/ Normal                 Hepatic/GI:   Denies PUD.  GERD, well controlled Denies Liver Disease.  Denies Hepatitis.           Musculoskeletal:  Arthritis    Musculoskeletal General/Symptoms: low back pain, neck pain.  Date Unknown  hip replacement left [Other] (Left)  Date Unknown  Neck surgery      Joint Disease:  Arthritis, Osteoarthritis      Cervical Spine Disorder, Cervical Disc Disease, S/P Cervical Fusion   Lumbar Spine Disorders, Lumbar Disc Disease   Neurological:  Denies TIA.  Denies CVA. Neuromuscular Disease,   Denies Seizures.     COLLOID BRAIN CYST,   OBSTRUCTIVE HYDROCEPHALUS    Osteoarthritis                           Endocrine:  Denies Diabetes. Hypothyroidism    Thyroid Disease   Hypothyroidism          Dermatological:  Augmentation of breast  Date Unknown  Breast surgery  Cosmetic surgery   Psych:  Psychiatric Normal                    Physical Exam  General: Well nourished, Cooperative, Alert and Oriented    Airway:  Mouth Opening: Normal  Tongue: Normal  Neck ROM: Flexion Decreased, Extension Decreased    Dental:  Caps / Implants, Intact  Bottom permanent retainer  Chest/Lungs:  Clear to auscultation, Normal Respiratory Rate    Heart:  Rate: Normal  Rhythm: Regular Rhythm  Sounds: Normal          Anesthesia Assessment: Preoperative EQUATION    Planned Procedure: Procedure(s) (LRB):  FENESTRATION, CYST, ARACHNOID, ENDOSCOPIC- terence hole for colloid cyst resection and septostomy (N/A)  Requested Anesthesia Type:General  Surgeon: Clement Alamo MD  Service: Neurosurgery  Known or anticipated Date of Surgery:12/18/2023    Surgeon notes: reviewed    Electronic QUestionnaire Assessment completed via nurse interview with patient.        Triage considerations:       Previous anesthesia records:MAC and No problems  12/8/2014   COLONOSCOPY     Last PCP note: 3-6 months ago , within [facility]   Subspecialty notes: Neurosurgery    Other important co-morbidities: PER EPIC: GERD, HTN, Hypothyroid, and COLLOID CYST OF BRAIN OBSTRUCTIVE HYDROCEPHALUS       Tests already available:  Available tests,  within 3 months , within [facility] .   11/11/2023 TSH, CBC, CMP, 11/6/2023 MRI BRAIN W W/O CONTRAST, 10/31/2023 CT HEAD W/O CONTRAST          Instructions given. (See in Nurse's note)    Optimization:  Anesthesia Preop Clinic Assessment  Indicated    Medical Opinion Indicated           Plan:    Testing:  EKG, PT/INR, PTT, T&C, T&S, and UA   Pre-anesthesia  visit       Visit focus: concerns in complex and/or prolonged anesthesia     Consultation:Patient's  PCP for re-evaluation     Patient  has previously scheduled Medical Appointment:12/11 MRI BRAIN STEALTH    Navigation: Tests Scheduled. TBD             Consults scheduled.TBD             Results will be tracked by Preop Clinic.  11/28 Discussed case with Dr. Rhonda Leopold. Recommended to treat as Chrissy 5.  12/4 Medical clearance given by Avelino LAWTON on 12/1:   All current medications were reviewed and at this time no changes to medications are recommended prior to surgery. Instructed to hold NSAIDs one week prior    I recommend use of standard pre-op and post-op precautions for this patient. In my opinion, she is medically optimized for this procedure, and can proceed without further evaluation.   Alexandrea Arellano RN BSN

## 2023-11-29 NOTE — PRE-PROCEDURE INSTRUCTIONS
Patient  stated has not had any problem with anesthesia in the past.Will need medical clearance from your PCP, Dr. Gabriel Mccabe.  She will make an appt. Will need poc appt, labs, ua, and EKG.  Our  will call to set up these appts.    Preop instructions given. Hold aspirin, aspirin containing products, nsaids(aleve, advil, motrin, ibuprofen, naprosyn, naproxen, voltaren, diclofenac), vitamins ( Multivitamin, Vitamin D) and supplements ( Omega 3 Fish oil)one week prior to surgery.      May take Tylenol.( Also sent to [facility])  Verbalizes understanding.

## 2023-12-01 ENCOUNTER — OFFICE VISIT (OUTPATIENT)
Dept: FAMILY MEDICINE | Facility: CLINIC | Age: 58
End: 2023-12-01
Payer: COMMERCIAL

## 2023-12-01 VITALS
HEART RATE: 65 BPM | TEMPERATURE: 99 F | DIASTOLIC BLOOD PRESSURE: 80 MMHG | RESPIRATION RATE: 18 BRPM | BODY MASS INDEX: 22.63 KG/M2 | HEIGHT: 65 IN | SYSTOLIC BLOOD PRESSURE: 130 MMHG | WEIGHT: 135.81 LBS | OXYGEN SATURATION: 97 %

## 2023-12-01 DIAGNOSIS — M51.37 DDD (DEGENERATIVE DISC DISEASE), LUMBOSACRAL: ICD-10-CM

## 2023-12-01 DIAGNOSIS — E03.9 HYPOTHYROIDISM, UNSPECIFIED TYPE: ICD-10-CM

## 2023-12-01 DIAGNOSIS — I10 ESSENTIAL HYPERTENSION, BENIGN: ICD-10-CM

## 2023-12-01 DIAGNOSIS — Z01.818 PREOP EXAMINATION: Primary | ICD-10-CM

## 2023-12-01 PROCEDURE — 3008F BODY MASS INDEX DOCD: CPT | Mod: CPTII,S$GLB,,

## 2023-12-01 PROCEDURE — 4010F ACE/ARB THERAPY RXD/TAKEN: CPT | Mod: CPTII,S$GLB,,

## 2023-12-01 PROCEDURE — 1159F MED LIST DOCD IN RCRD: CPT | Mod: CPTII,S$GLB,,

## 2023-12-01 PROCEDURE — 99214 PR OFFICE/OUTPT VISIT, EST, LEVL IV, 30-39 MIN: ICD-10-PCS | Mod: S$GLB,,,

## 2023-12-01 PROCEDURE — 1160F RVW MEDS BY RX/DR IN RCRD: CPT | Mod: CPTII,S$GLB,,

## 2023-12-01 PROCEDURE — 3079F DIAST BP 80-89 MM HG: CPT | Mod: CPTII,S$GLB,,

## 2023-12-01 PROCEDURE — 3075F SYST BP GE 130 - 139MM HG: CPT | Mod: CPTII,S$GLB,,

## 2023-12-01 PROCEDURE — 99214 OFFICE O/P EST MOD 30 MIN: CPT | Mod: S$GLB,,,

## 2023-12-01 PROCEDURE — 3075F PR MOST RECENT SYSTOLIC BLOOD PRESS GE 130-139MM HG: ICD-10-PCS | Mod: CPTII,S$GLB,,

## 2023-12-01 PROCEDURE — 99999 PR PBB SHADOW E&M-EST. PATIENT-LVL V: CPT | Mod: PBBFAC,,,

## 2023-12-01 PROCEDURE — 4010F PR ACE/ARB THEARPY RXD/TAKEN: ICD-10-PCS | Mod: CPTII,S$GLB,,

## 2023-12-01 PROCEDURE — 1160F PR REVIEW ALL MEDS BY PRESCRIBER/CLIN PHARMACIST DOCUMENTED: ICD-10-PCS | Mod: CPTII,S$GLB,,

## 2023-12-01 PROCEDURE — 3008F PR BODY MASS INDEX (BMI) DOCUMENTED: ICD-10-PCS | Mod: CPTII,S$GLB,,

## 2023-12-01 PROCEDURE — 3079F PR MOST RECENT DIASTOLIC BLOOD PRESSURE 80-89 MM HG: ICD-10-PCS | Mod: CPTII,S$GLB,,

## 2023-12-01 PROCEDURE — 99999 PR PBB SHADOW E&M-EST. PATIENT-LVL V: ICD-10-PCS | Mod: PBBFAC,,,

## 2023-12-01 PROCEDURE — 1159F PR MEDICATION LIST DOCUMENTED IN MEDICAL RECORD: ICD-10-PCS | Mod: CPTII,S$GLB,,

## 2023-12-01 RX ORDER — DIAZEPAM 5 MG/1
TABLET ORAL
COMMUNITY
Start: 2023-11-22 | End: 2023-12-20 | Stop reason: ALTCHOICE

## 2023-12-01 NOTE — PROGRESS NOTES
Patient ID: Marylin Morel is a 58 y.o. female.    Chief Complaint: Pre-op Exam        Denies adverse reaction to general anesthesia previously. No excessive bleeding or bruising. Reviewed meds. Instructed to hold specific meds already by her surgical team. No experienced CP or SOB. She could walk city blocks or flights of stairs without experiencing CP or SOB. Denies issues with THADDEUS in the past.          Past Medical History:   Diagnosis Date    Allergy     ants    GERD (gastroesophageal reflux disease)     Hypertension     Thyroid disease                 Current Outpatient Medications:     cetirizine (ZYRTEC) 10 MG tablet, Take 10 mg by mouth once daily., Disp: , Rfl:     COMBIPATCH 0.05-0.14 mg/24 hr, , Disp: , Rfl:     diazePAM (VALIUM) 5 MG tablet, TAKE 1 TABLET BY MOUTH 30 MINUTES PRIOR TO TEST AND TAKE OTHER AS NEEDED, Disp: , Rfl:     fluticasone propionate (FLONASE) 50 mcg/actuation nasal spray, INSTILL ONE SPRAY INTO EACH NOSTRIL ONCE DAILY, Disp: 16 g, Rfl: 11    ibuprofen (ADVIL,MOTRIN) 800 MG tablet, Take 1 tablet (800 mg total) by mouth every 6 (six) hours as needed for Pain., Disp: 60 tablet, Rfl: 5    levothyroxine (SYNTHROID) 25 MCG tablet, Take 1 tablet (25 mcg total) by mouth once daily., Disp: 90 tablet, Rfl: 3    lisinopriL-hydrochlorothiazide (PRINZIDE,ZESTORETIC) 20-25 mg Tab, Take 1 tablet by mouth once daily., Disp: 90 tablet, Rfl: 3    methocarbamoL (ROBAXIN) 500 MG Tab, Take 1 tablet (500 mg total) by mouth 2 (two) times daily as needed., Disp: 60 tablet, Rfl: 0    multivitamin capsule, Take 1 capsule by mouth once daily., Disp: , Rfl:     olopatadine (PATADAY) 0.2 % Drop, , Disp: , Rfl:     omega-3 fatty acids/fish oil (FISH OIL-OMEGA-3 FATTY ACIDS) 300-1,000 mg capsule, Take by mouth once daily., Disp: , Rfl:     omeprazole (PRILOSEC) 40 MG capsule, Take 1 capsule (40 mg total) by mouth once daily., Disp: 90 capsule, Rfl: 3    simethicone (GAS-X EXTRA STRENGTH) 125 mg Cap capsule,  Take 1 capsule (125 mg total) by mouth 4 (four) times daily as needed for Flatulence., Disp: 30 capsule, Rfl: 1    tiZANidine (ZANAFLEX) 4 MG tablet, Take 4 mg by mouth 2 (two) times daily as needed., Disp: , Rfl:     vitamin D (VITAMIN D3) 1000 units Tab, Take 1,000 Units by mouth once daily., Disp: , Rfl:     EPINEPHrine (EPIPEN) 0.3 mg/0.3 mL AtIn, Inject 0.3 mLs (0.3 mg total) into the muscle once. for 1 dose (Patient not taking: Reported on 11/14/2023), Disp: 2 each, Rfl: 0    The 10-year ASCVD risk score (Luisa COE, et al., 2019) is: 4.8%    Values used to calculate the score:      Age: 58 years      Sex: Female      Is Non- : No      Diabetic: No      Tobacco smoker: No      Systolic Blood Pressure: 130 mmHg      Is BP treated: Yes      HDL Cholesterol: 63 mg/dL      Total Cholesterol: 269 mg/dL     Wt Readings from Last 3 Encounters:   12/01/23 61.6 kg (135 lb 12.9 oz)   11/14/23 63 kg (138 lb 14.2 oz)   11/02/23 62.3 kg (137 lb 5.6 oz)     Temp Readings from Last 3 Encounters:   12/01/23 98.8 °F (37.1 °C) (Oral)   11/14/23 99.4 °F (37.4 °C) (Oral)   11/02/23 98.4 °F (36.9 °C) (Oral)     BP Readings from Last 3 Encounters:   12/01/23 130/80   11/14/23 116/78   11/02/23 126/88     Pulse Readings from Last 3 Encounters:   12/01/23 65   11/14/23 80   11/02/23 79     Resp Readings from Last 3 Encounters:   12/01/23 18   11/14/23 16   11/02/23 18     PF Readings from Last 3 Encounters:   No data found for PF     SpO2 Readings from Last 3 Encounters:   12/01/23 97%   11/14/23 97%   11/02/23 98%        Lab Results   Component Value Date    HGBA1C 5.2 10/06/2022    HGBA1C 5.0 01/18/2020     Lab Results   Component Value Date    LDLCALC 174.6 (H) 11/11/2023    CREATININE 0.8 11/11/2023       Review of Systems   Constitutional:  Negative for activity change, appetite change, chills, diaphoresis, fatigue, fever and unexpected weight change.   HENT:  Negative for congestion, ear pain, postnasal  drip, rhinorrhea, sinus pressure, sneezing, sore throat and trouble swallowing.    Eyes:  Negative for pain, itching and visual disturbance.   Respiratory:  Negative for cough, chest tightness, shortness of breath and wheezing.    Cardiovascular:  Negative for chest pain, palpitations and leg swelling.   Gastrointestinal:  Negative for abdominal distention, abdominal pain, blood in stool, constipation, diarrhea, nausea and vomiting.   Endocrine: Negative for cold intolerance and heat intolerance.   Genitourinary:  Negative for difficulty urinating, dysuria, frequency, hematuria and urgency.   Musculoskeletal:  Positive for back pain. Negative for arthralgias, myalgias and neck pain.   Skin:  Negative for color change, pallor, rash and wound.   Neurological:  Negative for dizziness, syncope, weakness, numbness and headaches.   Hematological:  Negative for adenopathy.   Psychiatric/Behavioral:  Negative for behavioral problems. The patient is not nervous/anxious.            Objective:      Physical Exam  Vitals reviewed.   Constitutional:       General: She is not in acute distress.     Appearance: Normal appearance. She is normal weight. She is not ill-appearing, toxic-appearing or diaphoretic.   HENT:      Head: Normocephalic.      Right Ear: External ear normal.      Left Ear: External ear normal.      Nose: Nose normal. No congestion or rhinorrhea.      Mouth/Throat:      Mouth: Mucous membranes are moist.      Pharynx: Oropharynx is clear.   Eyes:      General: No scleral icterus.        Right eye: No discharge.         Left eye: No discharge.      Extraocular Movements: Extraocular movements intact.      Conjunctiva/sclera: Conjunctivae normal.   Cardiovascular:      Rate and Rhythm: Normal rate and regular rhythm.      Pulses: Normal pulses.      Heart sounds: Normal heart sounds. No murmur heard.     No friction rub. No gallop.   Pulmonary:      Effort: Pulmonary effort is normal. No respiratory distress.       Breath sounds: Normal breath sounds. No wheezing, rhonchi or rales.   Chest:      Chest wall: No tenderness.   Musculoskeletal:         General: No swelling, tenderness or deformity. Normal range of motion.      Cervical back: Normal range of motion.      Right lower leg: No edema.      Left lower leg: No edema.   Skin:     General: Skin is warm and dry.      Capillary Refill: Capillary refill takes less than 2 seconds.      Coloration: Skin is not jaundiced.      Findings: No bruising, erythema, lesion or rash.   Neurological:      Mental Status: She is alert and oriented to person, place, and time.      Gait: Gait normal.   Psychiatric:         Mood and Affect: Mood normal.         Behavior: Behavior normal.         Thought Content: Thought content normal.         Judgment: Judgment normal.             Screening recommendations appropriate to age and health status were reviewed.    STOP BAN points  Low Risk for moderate to severe THADDEUS    Preop examination        -    Pt is medically optimized for upcoming procedure. Proceed as scheduled.   Essential hypertension, benign        -    Stable. Continue meds. Will continue to monitor.     Hypothyroidism, unspecified type        -    Stable. Continue meds. Will continue to monitor.       DDD (degenerative disc disease), lumbosacral      -    Continue muscle relaxer. Can take tylenol PRN       RCRI risk factors include: no known RCRI risk factors. As such, per RCRI the risk of cardiac death, nonfatal myocardial infarction, or nonfatal cardiac arrest is 0.4% and the risk of myocardial infarction, pulmonary edema, ventricular fibrillation, primary cardiac arrest, or complete heart block is 0.5%.  Overall this patient can be considered low risk for this intermediate risk procedure. No further cardiac testing is recommended at this time.     Patient denies any symptoms (as per HPI) concerning for undiagnosed lung disease including THADDEUS. Would not recommend obtaining  chest X-ray, sleep study, or PFTs at this time. Patient is a non-smoker. We discussed the benefits of early mobilization and deep breathing after surgery.      Screened patient for alcohol misuse, use of illicit drugs, and personal or family history of anesthetic complications or bleeding diathesis and no substantial concerns were identified.     All current medications were reviewed and at this time no changes to medications are recommended prior to surgery. Instructed to hold NSAIDs one week prior     I recommend use of standard pre-op and post-op precautions for this patient. In my opinion, she is medically optimized for this procedure, and can proceed without further evaluation.

## 2023-12-01 NOTE — PATIENT INSTRUCTIONS
Luther Coulter,     If you are due for any health screening(s) below please notify me so we can arrange them to be ordered and scheduled to maintain your health. Most healthy patients complete it. Don't lose out on improving your health.     All of your core healthy metrics are met.              Dear Ms. Adriel:    Your [facility] Care Team is dedicated to helping you stay healthy with regularly scheduled recommended screenings.  Scheduling routine screenings is important to maintaining good health. Our records indicate that you may be overdue for your screening pap smear. A pap smear screening can help identify patients at risk for developing cervical cancer at an early stage, when it is most likely to be successfully treated.    We encourage you to schedule your appointment with your [facility] provider or some primary care providers also perform this screening.    If you have completed or scheduled your pap smear screening outside of [facility], please notify your primary care team so we can update your health record.      If you have questions or would like to schedule your screening, please contact your primary care clinic.    Sincerely,    Your [facility] Team

## 2023-12-03 ENCOUNTER — PATIENT MESSAGE (OUTPATIENT)
Dept: NEUROSURGERY | Facility: CLINIC | Age: 58
End: 2023-12-03
Payer: COMMERCIAL

## 2023-12-05 ENCOUNTER — OFFICE VISIT (OUTPATIENT)
Dept: NEUROSURGERY | Facility: CLINIC | Age: 58
End: 2023-12-05
Payer: COMMERCIAL

## 2023-12-05 DIAGNOSIS — Q04.6 COLLOID CYST OF BRAIN: Primary | ICD-10-CM

## 2023-12-05 DIAGNOSIS — G91.1 OBSTRUCTIVE HYDROCEPHALUS: ICD-10-CM

## 2023-12-05 PROCEDURE — 4010F PR ACE/ARB THEARPY RXD/TAKEN: ICD-10-PCS | Mod: CPTII,95,, | Performed by: NEUROLOGICAL SURGERY

## 2023-12-05 PROCEDURE — 99214 OFFICE O/P EST MOD 30 MIN: CPT | Mod: 95,,, | Performed by: NEUROLOGICAL SURGERY

## 2023-12-05 PROCEDURE — 99214 PR OFFICE/OUTPT VISIT, EST, LEVL IV, 30-39 MIN: ICD-10-PCS | Mod: 95,,, | Performed by: NEUROLOGICAL SURGERY

## 2023-12-05 PROCEDURE — 4010F ACE/ARB THERAPY RXD/TAKEN: CPT | Mod: CPTII,95,, | Performed by: NEUROLOGICAL SURGERY

## 2023-12-05 NOTE — H&P (VIEW-ONLY)
Neurosurgery  Established Patient    SCRIBE #1 NOTE: IJenny, am scribing for, and in the presence of,  Clement Alamo MD. I have scribed the entire note.        SUBJECTIVE:     History of Present Illness:  Patient is here to see us for follow-up after labs and last evaluation on 11/14/2023. 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 We have plan for interscopic resection and fenestration of the septum. She primarily arrives for a lot of follow-up questions for surgery.    Review of patient's allergies indicates:   Allergen Reactions    Insect venom Anaphylaxis     Ant bites    Bactrim [sulfamethoxazole-trimethoprim] Blisters     Blisters/rash on torso and extremities        Current Outpatient Medications   Medication Sig Dispense Refill    cetirizine (ZYRTEC) 10 MG tablet Take 10 mg by mouth once daily.      COMBIPATCH 0.05-0.14 mg/24 hr       diazePAM (VALIUM) 5 MG tablet TAKE 1 TABLET BY MOUTH 30 MINUTES PRIOR TO TEST AND TAKE OTHER AS NEEDED      EPINEPHrine (EPIPEN) 0.3 mg/0.3 mL AtIn Inject 0.3 mLs (0.3 mg total) into the muscle once. for 1 dose (Patient not taking: Reported on 11/14/2023) 2 each 0    fluticasone propionate (FLONASE) 50 mcg/actuation nasal spray INSTILL ONE SPRAY INTO EACH NOSTRIL ONCE DAILY 16 g 11    ibuprofen (ADVIL,MOTRIN) 800 MG tablet Take 1 tablet (800 mg total) by mouth every 6 (six) hours as needed for Pain. 60 tablet 5    levothyroxine (SYNTHROID) 25 MCG tablet Take 1 tablet (25 mcg total) by mouth once daily. 90 tablet 3    lisinopriL-hydrochlorothiazide (PRINZIDE,ZESTORETIC) 20-25 mg Tab Take 1 tablet by mouth once daily. 90 tablet 3    methocarbamoL (ROBAXIN) 500 MG Tab Take 1 tablet (500 mg total) by mouth 2 (two) times daily as needed. 60 tablet 0    multivitamin capsule Take 1 capsule by mouth once daily.      olopatadine (PATADAY) 0.2 % Drop       omega-3 fatty acids/fish oil (FISH OIL-OMEGA-3 FATTY ACIDS)  300-1,000 mg capsule Take by mouth once daily.      omeprazole (PRILOSEC) 40 MG capsule Take 1 capsule (40 mg total) by mouth once daily. 90 capsule 3    simethicone (GAS-X EXTRA STRENGTH) 125 mg Cap capsule Take 1 capsule (125 mg total) by mouth 4 (four) times daily as needed for Flatulence. 30 capsule 1    tiZANidine (ZANAFLEX) 4 MG tablet Take 4 mg by mouth 2 (two) times daily as needed.      vitamin D (VITAMIN D3) 1000 units Tab Take 1,000 Units by mouth once daily.       No current facility-administered medications for this visit.       Past Medical History:   Diagnosis Date    Allergy     ants    GERD (gastroesophageal reflux disease)     Hypertension     Thyroid disease      Past Surgical History:   Procedure Laterality Date    AUGMENTATION OF BREAST      BREAST SURGERY      COSMETIC SURGERY      hip replacement left Left     NECK SURGERY       Family History       Problem Relation (Age of Onset)    COPD Mother    Heart disease Father    Hypertension Brother          Social History     Socioeconomic History    Marital status:    Occupational History     Employer: [employer]   Tobacco Use    Smoking status: Never    Smokeless tobacco: Never   Substance and Sexual Activity    Alcohol use: Yes     Alcohol/week: 1.0 standard drink of alcohol     Types: 1 Glasses of wine per week     Comment: 1 glass of wine daily    Drug use: No    Sexual activity: Yes     Partners: Male     Social Determinants of Health     Stress: No Stress Concern Present (1/20/2020)    [employer] - Occupational Stress Questionnaire     Feeling of Stress : Not at all       Review of Systems    OBJECTIVE:     Vital Signs     There is no height or weight on file to calculate BMI.    Physical Exam:  Nursing note and vitals reviewed.    Constitutional: She appears well-developed and well-nourished. She is not diaphoretic. No distress.     Psych/Behavior: She is alert. She is oriented to person,  place, and time. She has a normal mood and affect.         Diagnostic Results:  I reviewed MRI w/o contrast w/ fiducials from 11/1/2023.    FINDINGS: Multiplanar pre and post infusion imaging was performed. Comparison is made to a CT study from October 31, 2023.     Again noted is mild asymmetric dilation of the right lateral ventricle with leftward deviation of the interventricular septum. Previously noted small hyperdense focus at the level of the foramen Monro on the right is not well visualized. No enhancing mass is identified at this level. No intracranial hemorrhage is demonstrated. Sulci are within normal limits. There are no pathologic extra-axial fluid collections.     There is no evidence of acute ischemic change or cerebral edema. Mild signal increase in the periventricular regions of both cerebral hemispheres is noted, likely on the basis of minimal chronic small vessel ischemic change. There is no evidence of demyelinating disease. The cerebellum and brainstem are unremarkable.     The orbits, paranasal sinuses, skull base, and sella turcica are within normal limits.     Scalp T1 hypointensity to the left of midline posteriorly is compatible with soft tissue contusion.     IMPRESSION:  1. Previously reported 6 mm CT hyperdensity at the foramen of Monro is not well visualized. Mild dilation of the right lateral ventricle and leftward deviation of the interventricular septum are unchanged. Findings again may be on the basis of a small colloid cyst with associated right ventricular dilation. Tiny focus of hemorrhage at the level of the foramen of Monro on the right could have potentially given the CT appearance, although there is no MR evidence of the residua of prior hemorrhage. In the absence of prior studies which could confirm stability, short-term CT follow-up in 4-6 weeks is recommended.  2. Mild chronic small vessel ischemic change.  3. Mild posterior scalp contusion.    ASSESSMENT/PLAN:     We have  answered all question still plan for right frontal approach for intersection of colloid cyst and septostomy.         Note dictated with voice recognition software, please excuse any grammatical errors.      Scribe Attestation:   Scribe #1: I performed the above scribed service and the documentation accurately describes the services I performed. I attest to the accuracy of the note.    Physician Attestation for Scribe: I, Clement Alamo, reviewed documentation as scribed in my presence, which is both accurate and complete.

## 2023-12-05 NOTE — PROGRESS NOTES
Neurosurgery  Established Patient    SCRIBE #1 NOTE: IJenny, am scribing for, and in the presence of,  Clement Alamo MD. I have scribed the entire note.        SUBJECTIVE:     History of Present Illness:  Patient is here to see us for follow-up after labs and last evaluation on 11/14/2023. 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 We have plan for interscopic resection and fenestration of the septum. She primarily arrives for a lot of follow-up questions for surgery.    Review of patient's allergies indicates:   Allergen Reactions    Insect venom Anaphylaxis     Ant bites    Bactrim [sulfamethoxazole-trimethoprim] Blisters     Blisters/rash on torso and extremities        Current Outpatient Medications   Medication Sig Dispense Refill    cetirizine (ZYRTEC) 10 MG tablet Take 10 mg by mouth once daily.      COMBIPATCH 0.05-0.14 mg/24 hr       diazePAM (VALIUM) 5 MG tablet TAKE 1 TABLET BY MOUTH 30 MINUTES PRIOR TO TEST AND TAKE OTHER AS NEEDED      EPINEPHrine (EPIPEN) 0.3 mg/0.3 mL AtIn Inject 0.3 mLs (0.3 mg total) into the muscle once. for 1 dose (Patient not taking: Reported on 11/14/2023) 2 each 0    fluticasone propionate (FLONASE) 50 mcg/actuation nasal spray INSTILL ONE SPRAY INTO EACH NOSTRIL ONCE DAILY 16 g 11    ibuprofen (ADVIL,MOTRIN) 800 MG tablet Take 1 tablet (800 mg total) by mouth every 6 (six) hours as needed for Pain. 60 tablet 5    levothyroxine (SYNTHROID) 25 MCG tablet Take 1 tablet (25 mcg total) by mouth once daily. 90 tablet 3    lisinopriL-hydrochlorothiazide (PRINZIDE,ZESTORETIC) 20-25 mg Tab Take 1 tablet by mouth once daily. 90 tablet 3    methocarbamoL (ROBAXIN) 500 MG Tab Take 1 tablet (500 mg total) by mouth 2 (two) times daily as needed. 60 tablet 0    multivitamin capsule Take 1 capsule by mouth once daily.      olopatadine (PATADAY) 0.2 % Drop       omega-3 fatty acids/fish oil (FISH OIL-OMEGA-3 FATTY ACIDS)  300-1,000 mg capsule Take by mouth once daily.      omeprazole (PRILOSEC) 40 MG capsule Take 1 capsule (40 mg total) by mouth once daily. 90 capsule 3    simethicone (GAS-X EXTRA STRENGTH) 125 mg Cap capsule Take 1 capsule (125 mg total) by mouth 4 (four) times daily as needed for Flatulence. 30 capsule 1    tiZANidine (ZANAFLEX) 4 MG tablet Take 4 mg by mouth 2 (two) times daily as needed.      vitamin D (VITAMIN D3) 1000 units Tab Take 1,000 Units by mouth once daily.       No current facility-administered medications for this visit.       Past Medical History:   Diagnosis Date    Allergy     ants    GERD (gastroesophageal reflux disease)     Hypertension     Thyroid disease      Past Surgical History:   Procedure Laterality Date    AUGMENTATION OF BREAST      BREAST SURGERY      COSMETIC SURGERY      hip replacement left Left     NECK SURGERY       Family History       Problem Relation (Age of Onset)    COPD Mother    Heart disease Father    Hypertension Brother          Social History     Socioeconomic History    Marital status:    Occupational History     Employer: [employer]   Tobacco Use    Smoking status: Never    Smokeless tobacco: Never   Substance and Sexual Activity    Alcohol use: Yes     Alcohol/week: 1.0 standard drink of alcohol     Types: 1 Glasses of wine per week     Comment: 1 glass of wine daily    Drug use: No    Sexual activity: Yes     Partners: Male     Social Determinants of Health     Stress: No Stress Concern Present (1/20/2020)    [employer] - Occupational Stress Questionnaire     Feeling of Stress : Not at all       Review of Systems    OBJECTIVE:     Vital Signs     There is no height or weight on file to calculate BMI.    Physical Exam:  Nursing note and vitals reviewed.    Constitutional: She appears well-developed and well-nourished. She is not diaphoretic. No distress.     Psych/Behavior: She is alert. She is oriented to person,  place, and time. She has a normal mood and affect.         Diagnostic Results:  I reviewed MRI w/o contrast w/ fiducials from 11/1/2023.    FINDINGS: Multiplanar pre and post infusion imaging was performed. Comparison is made to a CT study from October 31, 2023.     Again noted is mild asymmetric dilation of the right lateral ventricle with leftward deviation of the interventricular septum. Previously noted small hyperdense focus at the level of the foramen Monro on the right is not well visualized. No enhancing mass is identified at this level. No intracranial hemorrhage is demonstrated. Sulci are within normal limits. There are no pathologic extra-axial fluid collections.     There is no evidence of acute ischemic change or cerebral edema. Mild signal increase in the periventricular regions of both cerebral hemispheres is noted, likely on the basis of minimal chronic small vessel ischemic change. There is no evidence of demyelinating disease. The cerebellum and brainstem are unremarkable.     The orbits, paranasal sinuses, skull base, and sella turcica are within normal limits.     Scalp T1 hypointensity to the left of midline posteriorly is compatible with soft tissue contusion.     IMPRESSION:  1. Previously reported 6 mm CT hyperdensity at the foramen of Monro is not well visualized. Mild dilation of the right lateral ventricle and leftward deviation of the interventricular septum are unchanged. Findings again may be on the basis of a small colloid cyst with associated right ventricular dilation. Tiny focus of hemorrhage at the level of the foramen of Monro on the right could have potentially given the CT appearance, although there is no MR evidence of the residua of prior hemorrhage. In the absence of prior studies which could confirm stability, short-term CT follow-up in 4-6 weeks is recommended.  2. Mild chronic small vessel ischemic change.  3. Mild posterior scalp contusion.    ASSESSMENT/PLAN:     We have  answered all question still plan for right frontal approach for intersection of colloid cyst and septostomy.         Note dictated with voice recognition software, please excuse any grammatical errors.      Scribe Attestation:   Scribe #1: I performed the above scribed service and the documentation accurately describes the services I performed. I attest to the accuracy of the note.    Physician Attestation for Scribe: I, Clement Alamo, reviewed documentation as scribed in my presence, which is both accurate and complete.

## 2023-12-08 ENCOUNTER — TELEPHONE (OUTPATIENT)
Dept: PREADMISSION TESTING | Facility: HOSPITAL | Age: 58
End: 2023-12-08
Payer: COMMERCIAL

## 2023-12-08 NOTE — DISCHARGE INSTRUCTIONS
Your surgery has been scheduled for:______12/18/2023____________________________________    You should report to:  ____[facility], located on the [place] side of the first floor of the           [facility] ([phone])  __X__[facility], located on the [facility] side of the            Second floor of the [facility] ([phone])  ____3rd Floor [facility] located on the [facility] side of the [facility] [phone]  ____[facility]: located at [address] [place] [address]. [address]     Please Note   Tell your doctor if you take Aspirin, products containing Aspirin, herbal medications  or blood thinners, such as Coumadin, Ticlid, or Plavix.  (Consult your provider regarding holding or stopping before surgery).  Arrange for someone to drive you home following surgery.  You will not be allowed to leave the surgical facility alone or drive yourself home following sedation and anesthesia.    Before Surgery  Stop taking all herbal medications, vitamins, and supplements 7 days prior to surgery  No Motrin/Advil (Ibuprofen) 7 days before surgery  No Aleve (Naproxen) 7 days before surgery  Stop Taking Asprin, products containing Asprin _7____days before surgery  Stop taking blood thinners_______days before surgery  No Goody's/BC  Powder 7 days before surgery  Refrain from drinking alcoholic beverages for 24hours before and after surgery  Stop or limit smoking ___7______days before surgery  You may take Tylenol for pain    Night before Surgery  Do not eat or drink after midnight  Take a shower or bath (shower is recommended).  Bathe with Hibiclens soap or an antibacterial soap from the neck down.  If not supplied by your surgeon, hibiclens soap will need to be purchased over the counter in pharmacy.  Rinse soap off thoroughly.  Shampoo your hair with your regular shampoo    The Day of  Surgery  Take another bath or shower with hibiclens or any antibacterial soap, to reduce the chance of infection.  Take heart and blood pressure medications with a small sip of water, as advised by the perioperative team.  Do not take fluid pills  You may brush your teeth and rinse your mouth, but do not swall any additional water.   Do not apply perfumes, powder, body lotions or deodorant on the day of surgery.  Nail polish should be removed.  Do not wear makeup or moisturizer  Wear comfortable clothes, such as a button front shirt and loose fitting pants.  Leave all jewelry, including body piercings, and valuables at home.    Bring any devices you will neeed after surgery such as crutches or canes.  If you have sleep apnea, please bring your CPAP machine  In the event that your physical condition changes including the onset of a cold or respiratory illness, or if you have to delay or cancel your surgery, please notify your surgeon.         Anesthesia: General Anesthesia     You are watched continuously during your procedure by your anesthesia provider.     Youre due to have surgery. During surgery, youll be given medicine called anesthesia or anesthetic. This will keep you comfortable and pain-free. Your anesthesia provider will use general anesthesia.  What is general anesthesia?  General anesthesia puts you into a state like deep sleep. It goes into the bloodstream (IV anesthetics), into the lungs (gas anesthetics), or both. You feel nothing during the procedure. You will not remember it. During the procedure, the anesthesia provider monitors you continuously. He or she checks your heart rate and rhythm, blood pressure, breathing, and blood oxygen.  IV anesthetics. IV anesthetics are given through an IV line in your arm. Theyre often given first. This is so you are asleep before a gas anesthetic is started. Some kinds of IV anesthetics relieve pain. Others relax you. Your doctor will decide which kind is best  in your case.  Gas anesthetics. Gas anesthetics are breathed into the lungs. They are often used to keep you asleep. They can be given through a facemask or a tube placed in your larynx or trachea (breathing tube).  If you have a facemask, your anesthesia provider will most likely place it over your nose and mouth while youre still awake. Youll breathe oxygen through the mask as your IV anesthetic is started. Gas anesthetic may be added through the mask.  If you have a tube in the larynx or trachea, it will be inserted into your throat after youre asleep.  Anesthesia tools and medicines  You will likely have:  IV anesthetics. These are put into an IV line into your bloodstream.  Gas anesthetics. You breathe these anesthetics into your lungs, where they pass into your bloodstream.  Pulse oximeter. This is a small clip that is attached to the end of your finger. This measures your blood oxygen level.  Electrocardiography leads (electrodes). These are small sticky pads that are placed on your chest. They record your heart rate and rhythm.  Blood pressure cuff. This reads your blood pressure.  Risks and possible complications  General anesthesia has some risks. These include:  Breathing problems  Nausea and vomiting  Sore throat or hoarseness (usually temporary)  Allergic reaction to the anesthetic  Irregular heartbeat (rare)  Cardiac arrest (rare)   Anesthesia safety  Follow all instructions you are given for how long not to eat or drink before your procedure.  Be sure your doctor knows what medicines and drugs you take. This includes over-the-counter medicines, herbs, supplements, alcohol or other drugs. You will be asked when those were last taken.  Have an adult family member or friend drive you home after the procedure.  For the first 24 hours after your surgery:  Do not drive or use heavy equipment.  Do not make important decisions or sign legal documents. If important decisions or signing legal documents is  necessary during the first 24 hours after surgery, have a trusted family member or spouse act on your behalf.  Avoid alcohol.  Have a responsible adult stay with you. He or she can watch for problems and help keep you safe.  Date Last Reviewed: 12/1/2016 © 2000-2017 [employer]. [address] [place] [address], [place] [address]. All rights reserved. This information is not intended as a substitute for professional medical care. Always follow your healthcare professional's instructions.

## 2023-12-11 ENCOUNTER — HOSPITAL ENCOUNTER (OUTPATIENT)
Dept: RADIOLOGY | Facility: HOSPITAL | Age: 58
Discharge: HOME | End: 2023-12-11
Attending: NEUROLOGICAL SURGERY
Payer: COMMERCIAL

## 2023-12-11 ENCOUNTER — HOSPITAL ENCOUNTER (OUTPATIENT)
Dept: PREADMISSION TESTING | Facility: HOSPITAL | Age: 58
Discharge: HOME | End: 2023-12-11
Attending: NEUROLOGICAL SURGERY
Payer: COMMERCIAL

## 2023-12-11 ENCOUNTER — HOSPITAL ENCOUNTER (OUTPATIENT)
Dept: CARDIOLOGY | Facility: CLINIC | Age: 58
Discharge: HOME | End: 2023-12-11
Payer: COMMERCIAL

## 2023-12-11 DIAGNOSIS — Q04.6 COLLOID CYST OF BRAIN: ICD-10-CM

## 2023-12-11 DIAGNOSIS — Z01.818 PREOPERATIVE TESTING: ICD-10-CM

## 2023-12-11 PROCEDURE — 93005 ELECTROCARDIOGRAM TRACING: CPT | Mod: S$GLB,,, | Performed by: ANESTHESIOLOGY

## 2023-12-11 PROCEDURE — A9585 GADOBUTROL INJECTION: HCPCS | Performed by: NEUROLOGICAL SURGERY

## 2023-12-11 PROCEDURE — 70552 MRI BRAIN STEM W/DYE: CPT | Mod: TC

## 2023-12-11 PROCEDURE — 70552 MRI BRAIN STEM W/DYE: CPT | Mod: 26,,, | Performed by: RADIOLOGY

## 2023-12-11 PROCEDURE — 70552 MRI BRAIN STEALTH W/O FIDUCIALS WITH CONTRAST: ICD-10-PCS | Mod: 26,,, | Performed by: RADIOLOGY

## 2023-12-11 PROCEDURE — 93010 ELECTROCARDIOGRAM REPORT: CPT | Mod: S$GLB,,, | Performed by: INTERNAL MEDICINE

## 2023-12-11 PROCEDURE — 93010 EKG 12-LEAD: ICD-10-PCS | Mod: S$GLB,,, | Performed by: INTERNAL MEDICINE

## 2023-12-11 PROCEDURE — 93005 EKG 12-LEAD: ICD-10-PCS | Mod: S$GLB,,, | Performed by: ANESTHESIOLOGY

## 2023-12-11 PROCEDURE — 25500020 PHARM REV CODE 255: Performed by: NEUROLOGICAL SURGERY

## 2023-12-11 RX ORDER — GADOBUTROL 604.72 MG/ML
6 INJECTION INTRAVENOUS
Status: COMPLETED | OUTPATIENT
Start: 2023-12-11 | End: 2023-12-11

## 2023-12-11 RX ADMIN — GADOBUTROL 6 ML: 604.72 INJECTION INTRAVENOUS at 02:12

## 2023-12-17 ENCOUNTER — ANESTHESIA EVENT (OUTPATIENT)
Dept: SURGERY | Facility: HOSPITAL | Age: 58
DRG: 027 | End: 2023-12-17
Payer: COMMERCIAL

## 2023-12-18 ENCOUNTER — ANESTHESIA (OUTPATIENT)
Dept: SURGERY | Facility: HOSPITAL | Age: 58
DRG: 027 | End: 2023-12-18
Payer: COMMERCIAL

## 2023-12-18 ENCOUNTER — HOSPITAL ENCOUNTER (INPATIENT)
Facility: HOSPITAL | Age: 58
LOS: 1 days | Discharge: HOME | DRG: 027 | End: 2023-12-19
Attending: NEUROLOGICAL SURGERY | Admitting: NEUROLOGICAL SURGERY
Payer: COMMERCIAL

## 2023-12-18 DIAGNOSIS — G93.0 ARACHNOID CYST: ICD-10-CM

## 2023-12-18 DIAGNOSIS — Z01.818 PREOPERATIVE TESTING: ICD-10-CM

## 2023-12-18 DIAGNOSIS — Q04.6 COLLOID CYST OF BRAIN: Primary | ICD-10-CM

## 2023-12-18 LAB
ABO + RH BLD: NORMAL
ALBUMIN SERPL BCP-MCNC: 3.5 G/DL (ref 3.5–5.2)
ALP SERPL-CCNC: 46 U/L (ref 55–135)
ALT SERPL W/O P-5'-P-CCNC: 23 U/L (ref 10–44)
ANION GAP SERPL CALC-SCNC: 10 MMOL/L (ref 8–16)
AST SERPL-CCNC: 24 U/L (ref 10–40)
BASOPHILS # BLD AUTO: 0.05 K/UL (ref 0–0.2)
BASOPHILS NFR BLD: 0.7 % (ref 0–1.9)
BILIRUB SERPL-MCNC: 0.5 MG/DL (ref 0.1–1)
BLD GP AB SCN CELLS X3 SERPL QL: NORMAL
BUN SERPL-MCNC: 15 MG/DL (ref 6–20)
CALCIUM SERPL-MCNC: 8.2 MG/DL (ref 8.7–10.5)
CHLORIDE SERPL-SCNC: 107 MMOL/L (ref 95–110)
CO2 SERPL-SCNC: 20 MMOL/L (ref 23–29)
CREAT SERPL-MCNC: 0.7 MG/DL (ref 0.5–1.4)
DIFFERENTIAL METHOD: ABNORMAL
EOSINOPHIL # BLD AUTO: 0 K/UL (ref 0–0.5)
EOSINOPHIL NFR BLD: 0.3 % (ref 0–8)
ERYTHROCYTE [DISTWIDTH] IN BLOOD BY AUTOMATED COUNT: 12.1 % (ref 11.5–14.5)
EST. GFR  (NO RACE VARIABLE): >60 ML/MIN/1.73 M^2
GLUCOSE SERPL-MCNC: 177 MG/DL (ref 70–110)
HCT VFR BLD AUTO: 35.6 % (ref 37–48.5)
HGB BLD-MCNC: 12.4 G/DL (ref 12–16)
IMM GRANULOCYTES # BLD AUTO: 0.03 K/UL (ref 0–0.04)
IMM GRANULOCYTES NFR BLD AUTO: 0.4 % (ref 0–0.5)
LYMPHOCYTES # BLD AUTO: 0.8 K/UL (ref 1–4.8)
LYMPHOCYTES NFR BLD: 12.1 % (ref 18–48)
MAGNESIUM SERPL-MCNC: 1.6 MG/DL (ref 1.6–2.6)
MCH RBC QN AUTO: 33.4 PG (ref 27–31)
MCHC RBC AUTO-ENTMCNC: 34.8 G/DL (ref 32–36)
MCV RBC AUTO: 96 FL (ref 82–98)
MONOCYTES # BLD AUTO: 0.2 K/UL (ref 0.3–1)
MONOCYTES NFR BLD: 2.4 % (ref 4–15)
NEUTROPHILS # BLD AUTO: 5.6 K/UL (ref 1.8–7.7)
NEUTROPHILS NFR BLD: 84.1 % (ref 38–73)
NRBC BLD-RTO: 0 /100 WBC
PHOSPHATE SERPL-MCNC: 1.5 MG/DL (ref 2.7–4.5)
PLATELET # BLD AUTO: 242 K/UL (ref 150–450)
PMV BLD AUTO: 8.8 FL (ref 9.2–12.9)
POCT GLUCOSE: 135 MG/DL (ref 70–110)
POTASSIUM SERPL-SCNC: 3.1 MMOL/L (ref 3.5–5.1)
PROT SERPL-MCNC: 6.2 G/DL (ref 6–8.4)
RBC # BLD AUTO: 3.71 M/UL (ref 4–5.4)
SODIUM SERPL-SCNC: 137 MMOL/L (ref 136–145)
WBC # BLD AUTO: 6.7 K/UL (ref 3.9–12.7)

## 2023-12-18 PROCEDURE — 63600175 PHARM REV CODE 636 W HCPCS: Performed by: STUDENT IN AN ORGANIZED HEALTH CARE EDUCATION/TRAINING PROGRAM

## 2023-12-18 PROCEDURE — 88304 TISSUE EXAM BY PATHOLOGIST: CPT | Mod: 26,,, | Performed by: STUDENT IN AN ORGANIZED HEALTH CARE EDUCATION/TRAINING PROGRAM

## 2023-12-18 PROCEDURE — D9220A PRA ANESTHESIA: ICD-10-PCS | Mod: ,,, | Performed by: STUDENT IN AN ORGANIZED HEALTH CARE EDUCATION/TRAINING PROGRAM

## 2023-12-18 PROCEDURE — 62162 REMOVE COLLOID CYST W/SCOPE: CPT | Mod: ,,, | Performed by: NEUROLOGICAL SURGERY

## 2023-12-18 PROCEDURE — 62162 PR NEUROENDOSCOP,W/FENESTRAT/EXCIS COLLOID: ICD-10-PCS | Mod: ,,, | Performed by: NEUROLOGICAL SURGERY

## 2023-12-18 PROCEDURE — 25000003 PHARM REV CODE 250: Performed by: STUDENT IN AN ORGANIZED HEALTH CARE EDUCATION/TRAINING PROGRAM

## 2023-12-18 PROCEDURE — 86920 COMPATIBILITY TEST SPIN: CPT | Performed by: ANESTHESIOLOGY

## 2023-12-18 PROCEDURE — 27201423 OPTIME MED/SURG SUP & DEVICES STERILE SUPPLY: Performed by: NEUROLOGICAL SURGERY

## 2023-12-18 PROCEDURE — 84100 ASSAY OF PHOSPHORUS: CPT | Performed by: STUDENT IN AN ORGANIZED HEALTH CARE EDUCATION/TRAINING PROGRAM

## 2023-12-18 PROCEDURE — 99291 CRITICAL CARE FIRST HOUR: CPT | Mod: ,,, | Performed by: INTERNAL MEDICINE

## 2023-12-18 PROCEDURE — 25000003 PHARM REV CODE 250

## 2023-12-18 PROCEDURE — 36000709 HC OR TIME LEV III EA ADD 15 MIN: Performed by: NEUROLOGICAL SURGERY

## 2023-12-18 PROCEDURE — 36000708 HC OR TIME LEV III 1ST 15 MIN: Performed by: NEUROLOGICAL SURGERY

## 2023-12-18 PROCEDURE — 36620 INSERTION CATHETER ARTERY: CPT | Mod: 59,,, | Performed by: ANESTHESIOLOGY

## 2023-12-18 PROCEDURE — D9220A PRA ANESTHESIA: Mod: ,,, | Performed by: STUDENT IN AN ORGANIZED HEALTH CARE EDUCATION/TRAINING PROGRAM

## 2023-12-18 PROCEDURE — 25000003 PHARM REV CODE 250: Performed by: NEUROLOGICAL SURGERY

## 2023-12-18 PROCEDURE — 36620 ARTERIAL: ICD-10-PCS | Mod: 59,,, | Performed by: ANESTHESIOLOGY

## 2023-12-18 PROCEDURE — 80053 COMPREHEN METABOLIC PANEL: CPT | Performed by: STUDENT IN AN ORGANIZED HEALTH CARE EDUCATION/TRAINING PROGRAM

## 2023-12-18 PROCEDURE — 85025 COMPLETE CBC W/AUTO DIFF WBC: CPT | Performed by: STUDENT IN AN ORGANIZED HEALTH CARE EDUCATION/TRAINING PROGRAM

## 2023-12-18 PROCEDURE — 37000009 HC ANESTHESIA EA ADD 15 MINS: Performed by: NEUROLOGICAL SURGERY

## 2023-12-18 PROCEDURE — 99291 PR CRITICAL CARE, E/M 30-74 MINUTES: ICD-10-PCS | Mod: ,,, | Performed by: INTERNAL MEDICINE

## 2023-12-18 PROCEDURE — 20000000 HC ICU ROOM

## 2023-12-18 PROCEDURE — 86850 RBC ANTIBODY SCREEN: CPT | Performed by: NEUROLOGICAL SURGERY

## 2023-12-18 PROCEDURE — 88304 PR  SURG PATH,LEVEL III: ICD-10-PCS | Mod: 26,,, | Performed by: STUDENT IN AN ORGANIZED HEALTH CARE EDUCATION/TRAINING PROGRAM

## 2023-12-18 PROCEDURE — 37000008 HC ANESTHESIA 1ST 15 MINUTES: Performed by: NEUROLOGICAL SURGERY

## 2023-12-18 PROCEDURE — 61781 SCAN PROC CRANIAL INTRA: CPT | Mod: ,,, | Performed by: NEUROLOGICAL SURGERY

## 2023-12-18 PROCEDURE — 88304 TISSUE EXAM BY PATHOLOGIST: CPT | Performed by: STUDENT IN AN ORGANIZED HEALTH CARE EDUCATION/TRAINING PROGRAM

## 2023-12-18 PROCEDURE — 83735 ASSAY OF MAGNESIUM: CPT | Performed by: STUDENT IN AN ORGANIZED HEALTH CARE EDUCATION/TRAINING PROGRAM

## 2023-12-18 PROCEDURE — 61781 PR STEREOTACTIC COMP ASSIST PROC,CRANIAL,INTRADURAL: ICD-10-PCS | Mod: ,,, | Performed by: NEUROLOGICAL SURGERY

## 2023-12-18 RX ORDER — LIDOCAINE HYDROCHLORIDE 10 MG/ML
1 INJECTION, SOLUTION EPIDURAL; INFILTRATION; INTRACAUDAL; PERINEURAL ONCE AS NEEDED
Status: DISCONTINUED | OUTPATIENT
Start: 2023-12-18 | End: 2023-12-18 | Stop reason: HOSPADM

## 2023-12-18 RX ORDER — PHENYLEPHRINE HYDROCHLORIDE 10 MG/ML
INJECTION INTRAVENOUS
Status: DISCONTINUED | OUTPATIENT
Start: 2023-12-18 | End: 2023-12-18

## 2023-12-18 RX ORDER — ONDANSETRON 2 MG/ML
INJECTION INTRAMUSCULAR; INTRAVENOUS
Status: DISCONTINUED | OUTPATIENT
Start: 2023-12-18 | End: 2023-12-18

## 2023-12-18 RX ORDER — LEVETIRACETAM 250 MG/1
TABLET ORAL
Status: DISCONTINUED | OUTPATIENT
Start: 2023-12-18 | End: 2023-12-18

## 2023-12-18 RX ORDER — HYDROMORPHONE HYDROCHLORIDE 1 MG/ML
1 INJECTION, SOLUTION INTRAMUSCULAR; INTRAVENOUS; SUBCUTANEOUS EVERY 6 HOURS PRN
Status: DISCONTINUED | OUTPATIENT
Start: 2023-12-18 | End: 2023-12-19 | Stop reason: HOSPADM

## 2023-12-18 RX ORDER — CETIRIZINE HYDROCHLORIDE 5 MG/1
10 TABLET ORAL DAILY
Status: DISCONTINUED | OUTPATIENT
Start: 2023-12-18 | End: 2023-12-19 | Stop reason: HOSPADM

## 2023-12-18 RX ORDER — ONDANSETRON 2 MG/ML
4 INJECTION INTRAMUSCULAR; INTRAVENOUS EVERY 12 HOURS PRN
Status: DISCONTINUED | OUTPATIENT
Start: 2023-12-18 | End: 2023-12-19 | Stop reason: HOSPADM

## 2023-12-18 RX ORDER — NICARDIPINE HYDROCHLORIDE 0.2 MG/ML
0-15 INJECTION INTRAVENOUS CONTINUOUS
Status: DISCONTINUED | OUTPATIENT
Start: 2023-12-18 | End: 2023-12-19

## 2023-12-18 RX ORDER — SODIUM CHLORIDE 0.9 % (FLUSH) 0.9 %
10 SYRINGE (ML) INJECTION
Status: DISCONTINUED | OUTPATIENT
Start: 2023-12-18 | End: 2023-12-19 | Stop reason: HOSPADM

## 2023-12-18 RX ORDER — HYDROMORPHONE HYDROCHLORIDE 1 MG/ML
0.2 INJECTION, SOLUTION INTRAMUSCULAR; INTRAVENOUS; SUBCUTANEOUS EVERY 5 MIN PRN
Status: DISCONTINUED | OUTPATIENT
Start: 2023-12-18 | End: 2023-12-19

## 2023-12-18 RX ORDER — OXYCODONE HYDROCHLORIDE 5 MG/1
5 TABLET ORAL
Status: DISCONTINUED | OUTPATIENT
Start: 2023-12-18 | End: 2023-12-19 | Stop reason: HOSPADM

## 2023-12-18 RX ORDER — METHOCARBAMOL 500 MG/1
1000 TABLET, FILM COATED ORAL 2 TIMES DAILY PRN
Status: DISCONTINUED | OUTPATIENT
Start: 2023-12-18 | End: 2023-12-19 | Stop reason: HOSPADM

## 2023-12-18 RX ORDER — MUPIROCIN 20 MG/G
1 OINTMENT TOPICAL 2 TIMES DAILY
Status: DISCONTINUED | OUTPATIENT
Start: 2023-12-18 | End: 2023-12-18 | Stop reason: HOSPADM

## 2023-12-18 RX ORDER — ONDANSETRON 2 MG/ML
INJECTION INTRAMUSCULAR; INTRAVENOUS
Status: DISPENSED
Start: 2023-12-18 | End: 2023-12-19

## 2023-12-18 RX ORDER — ENOXAPARIN SODIUM 100 MG/ML
40 INJECTION SUBCUTANEOUS EVERY 24 HOURS
Status: DISCONTINUED | OUTPATIENT
Start: 2023-12-19 | End: 2023-12-19 | Stop reason: HOSPADM

## 2023-12-18 RX ORDER — MUPIROCIN 20 MG/G
OINTMENT TOPICAL
Status: DISCONTINUED | OUTPATIENT
Start: 2023-12-18 | End: 2023-12-18 | Stop reason: HOSPADM

## 2023-12-18 RX ORDER — SODIUM CHLORIDE 9 MG/ML
INJECTION, SOLUTION INTRAVENOUS CONTINUOUS
Status: DISCONTINUED | OUTPATIENT
Start: 2023-12-18 | End: 2023-12-18

## 2023-12-18 RX ORDER — ROCURONIUM BROMIDE 10 MG/ML
INJECTION, SOLUTION INTRAVENOUS
Status: DISCONTINUED | OUTPATIENT
Start: 2023-12-18 | End: 2023-12-18

## 2023-12-18 RX ORDER — PROCHLORPERAZINE EDISYLATE 5 MG/ML
5 INJECTION INTRAMUSCULAR; INTRAVENOUS EVERY 30 MIN PRN
Status: DISCONTINUED | OUTPATIENT
Start: 2023-12-18 | End: 2023-12-19

## 2023-12-18 RX ORDER — LABETALOL HCL 20 MG/4 ML
10 SYRINGE (ML) INTRAVENOUS ONCE
Status: COMPLETED | OUTPATIENT
Start: 2023-12-18 | End: 2023-12-18

## 2023-12-18 RX ORDER — DEXMEDETOMIDINE HYDROCHLORIDE 100 UG/ML
INJECTION, SOLUTION INTRAVENOUS
Status: DISCONTINUED | OUTPATIENT
Start: 2023-12-18 | End: 2023-12-18

## 2023-12-18 RX ORDER — DEXAMETHASONE SODIUM PHOSPHATE 4 MG/ML
INJECTION, SOLUTION INTRA-ARTICULAR; INTRALESIONAL; INTRAMUSCULAR; INTRAVENOUS; SOFT TISSUE
Status: DISCONTINUED | OUTPATIENT
Start: 2023-12-18 | End: 2023-12-18

## 2023-12-18 RX ORDER — ACETAMINOPHEN 500 MG
1000 TABLET ORAL EVERY 8 HOURS
Status: DISCONTINUED | OUTPATIENT
Start: 2023-12-18 | End: 2023-12-19 | Stop reason: HOSPADM

## 2023-12-18 RX ORDER — METOCLOPRAMIDE HYDROCHLORIDE 5 MG/ML
5 INJECTION INTRAMUSCULAR; INTRAVENOUS EVERY 6 HOURS PRN
Status: DISCONTINUED | OUTPATIENT
Start: 2023-12-18 | End: 2023-12-19 | Stop reason: HOSPADM

## 2023-12-18 RX ORDER — LIDOCAINE HYDROCHLORIDE 20 MG/ML
INJECTION, SOLUTION EPIDURAL; INFILTRATION; INTRACAUDAL; PERINEURAL
Status: DISCONTINUED | OUTPATIENT
Start: 2023-12-18 | End: 2023-12-18

## 2023-12-18 RX ORDER — PROPOFOL 10 MG/ML
VIAL (ML) INTRAVENOUS
Status: DISCONTINUED | OUTPATIENT
Start: 2023-12-18 | End: 2023-12-18

## 2023-12-18 RX ORDER — MIDAZOLAM HYDROCHLORIDE 1 MG/ML
INJECTION, SOLUTION INTRAMUSCULAR; INTRAVENOUS
Status: DISCONTINUED | OUTPATIENT
Start: 2023-12-18 | End: 2023-12-18

## 2023-12-18 RX ORDER — ACETAMINOPHEN 500 MG
1000 TABLET ORAL
Status: COMPLETED | OUTPATIENT
Start: 2023-12-18 | End: 2023-12-18

## 2023-12-18 RX ORDER — AMOXICILLIN 250 MG
2 CAPSULE ORAL 2 TIMES DAILY
Status: DISCONTINUED | OUTPATIENT
Start: 2023-12-18 | End: 2023-12-19 | Stop reason: HOSPADM

## 2023-12-18 RX ORDER — NICARDIPINE HYDROCHLORIDE 0.2 MG/ML
INJECTION INTRAVENOUS
Status: COMPLETED
Start: 2023-12-18 | End: 2023-12-18

## 2023-12-18 RX ORDER — MUPIROCIN 20 MG/G
OINTMENT TOPICAL 2 TIMES DAILY
Status: DISCONTINUED | OUTPATIENT
Start: 2023-12-18 | End: 2023-12-19 | Stop reason: HOSPADM

## 2023-12-18 RX ORDER — SIMETHICONE 80 MG
1 TABLET,CHEWABLE ORAL 3 TIMES DAILY PRN
Status: DISCONTINUED | OUTPATIENT
Start: 2023-12-18 | End: 2023-12-19 | Stop reason: HOSPADM

## 2023-12-18 RX ORDER — KETAMINE HCL IN 0.9 % NACL 50 MG/5 ML
SYRINGE (ML) INTRAVENOUS
Status: DISCONTINUED | OUTPATIENT
Start: 2023-12-18 | End: 2023-12-18

## 2023-12-18 RX ORDER — FLUTICASONE PROPIONATE 50 MCG
1 SPRAY, SUSPENSION (ML) NASAL DAILY
Status: DISCONTINUED | OUTPATIENT
Start: 2023-12-19 | End: 2023-12-19 | Stop reason: HOSPADM

## 2023-12-18 RX ORDER — LISINOPRIL AND HYDROCHLOROTHIAZIDE 10; 12.5 MG/1; MG/1
2 TABLET ORAL DAILY
Status: DISCONTINUED | OUTPATIENT
Start: 2023-12-18 | End: 2023-12-19 | Stop reason: HOSPADM

## 2023-12-18 RX ORDER — HYDRALAZINE HYDROCHLORIDE 20 MG/ML
10 INJECTION INTRAMUSCULAR; INTRAVENOUS EVERY 6 HOURS PRN
Status: DISCONTINUED | OUTPATIENT
Start: 2023-12-18 | End: 2023-12-19 | Stop reason: HOSPADM

## 2023-12-18 RX ORDER — PANTOPRAZOLE SODIUM 40 MG/1
40 TABLET, DELAYED RELEASE ORAL DAILY
Status: DISCONTINUED | OUTPATIENT
Start: 2023-12-18 | End: 2023-12-19 | Stop reason: HOSPADM

## 2023-12-18 RX ORDER — POLYETHYLENE GLYCOL 3350 17 G/17G
17 POWDER, FOR SOLUTION ORAL DAILY
Status: DISCONTINUED | OUTPATIENT
Start: 2023-12-18 | End: 2023-12-19 | Stop reason: HOSPADM

## 2023-12-18 RX ORDER — LEVOTHYROXINE SODIUM 25 UG/1
25 TABLET ORAL DAILY
Status: DISCONTINUED | OUTPATIENT
Start: 2023-12-18 | End: 2023-12-19 | Stop reason: HOSPADM

## 2023-12-18 RX ORDER — FENTANYL CITRATE 50 UG/ML
INJECTION, SOLUTION INTRAMUSCULAR; INTRAVENOUS
Status: DISCONTINUED | OUTPATIENT
Start: 2023-12-18 | End: 2023-12-18

## 2023-12-18 RX ORDER — LIDOCAINE HYDROCHLORIDE AND EPINEPHRINE 10; 10 MG/ML; UG/ML
INJECTION, SOLUTION INFILTRATION; PERINEURAL
Status: DISCONTINUED | OUTPATIENT
Start: 2023-12-18 | End: 2023-12-18 | Stop reason: HOSPADM

## 2023-12-18 RX ADMIN — ROCURONIUM BROMIDE 30 MG: 10 INJECTION INTRAVENOUS at 01:12

## 2023-12-18 RX ADMIN — MIDAZOLAM HYDROCHLORIDE 2 MG: 1 INJECTION, SOLUTION INTRAMUSCULAR; INTRAVENOUS at 11:12

## 2023-12-18 RX ADMIN — LABETALOL HYDROCHLORIDE 10 MG: 5 INJECTION, SOLUTION INTRAVENOUS at 03:12

## 2023-12-18 RX ADMIN — DEXMEDETOMIDINE 12 MCG: 100 INJECTION, SOLUTION, CONCENTRATE INTRAVENOUS at 01:12

## 2023-12-18 RX ADMIN — PHENYLEPHRINE HYDROCHLORIDE 100 MCG: 10 INJECTION INTRAVENOUS at 12:12

## 2023-12-18 RX ADMIN — MUPIROCIN: 20 OINTMENT TOPICAL at 09:12

## 2023-12-18 RX ADMIN — NICARDIPINE HYDROCHLORIDE 2.5 MG/HR: 0.2 INJECTION, SOLUTION INTRAVENOUS at 03:12

## 2023-12-18 RX ADMIN — SUGAMMADEX 600 MG: 100 INJECTION, SOLUTION INTRAVENOUS at 02:12

## 2023-12-18 RX ADMIN — ROCURONIUM BROMIDE 50 MG: 10 INJECTION INTRAVENOUS at 12:12

## 2023-12-18 RX ADMIN — HYDROMORPHONE HYDROCHLORIDE 1 MG: 1 INJECTION, SOLUTION INTRAMUSCULAR; INTRAVENOUS; SUBCUTANEOUS at 10:12

## 2023-12-18 RX ADMIN — METOCLOPRAMIDE 5 MG: 5 INJECTION, SOLUTION INTRAMUSCULAR; INTRAVENOUS at 03:12

## 2023-12-18 RX ADMIN — PROPOFOL 50 MG: 10 INJECTION, EMULSION INTRAVENOUS at 01:12

## 2023-12-18 RX ADMIN — FENTANYL CITRATE 50 MCG: 50 INJECTION INTRAMUSCULAR; INTRAVENOUS at 01:12

## 2023-12-18 RX ADMIN — SENNOSIDES AND DOCUSATE SODIUM 2 TABLET: 8.6; 5 TABLET ORAL at 09:12

## 2023-12-18 RX ADMIN — ACETAMINOPHEN 1000 MG: 500 TABLET ORAL at 11:12

## 2023-12-18 RX ADMIN — ONDANSETRON 4 MG: 2 INJECTION INTRAMUSCULAR; INTRAVENOUS at 02:12

## 2023-12-18 RX ADMIN — SODIUM CHLORIDE: 0.9 INJECTION, SOLUTION INTRAVENOUS at 11:12

## 2023-12-18 RX ADMIN — FENTANYL CITRATE 25 MCG: 50 INJECTION INTRAMUSCULAR; INTRAVENOUS at 01:12

## 2023-12-18 RX ADMIN — ACETAMINOPHEN 1000 MG: 500 TABLET ORAL at 09:12

## 2023-12-18 RX ADMIN — LEVETIRACETAM 1000 MG: 250 TABLET, FILM COATED ORAL at 12:12

## 2023-12-18 RX ADMIN — DEXAMETHASONE SODIUM PHOSPHATE 4 MG: 4 INJECTION, SOLUTION INTRAMUSCULAR; INTRAVENOUS at 12:12

## 2023-12-18 RX ADMIN — OXYCODONE HYDROCHLORIDE 5 MG: 5 TABLET ORAL at 07:12

## 2023-12-18 RX ADMIN — PROPOFOL 200 MG: 10 INJECTION, EMULSION INTRAVENOUS at 12:12

## 2023-12-18 RX ADMIN — FENTANYL CITRATE 25 MCG: 50 INJECTION INTRAMUSCULAR; INTRAVENOUS at 02:12

## 2023-12-18 RX ADMIN — PROCHLORPERAZINE EDISYLATE 5 MG: 5 INJECTION INTRAMUSCULAR; INTRAVENOUS at 10:12

## 2023-12-18 RX ADMIN — MUPIROCIN: 20 OINTMENT TOPICAL at 11:12

## 2023-12-18 RX ADMIN — SODIUM CHLORIDE, SODIUM GLUCONATE, SODIUM ACETATE, POTASSIUM CHLORIDE, MAGNESIUM CHLORIDE, SODIUM PHOSPHATE, DIBASIC, AND POTASSIUM PHOSPHATE: .53; .5; .37; .037; .03; .012; .00082 INJECTION, SOLUTION INTRAVENOUS at 12:12

## 2023-12-18 RX ADMIN — HYDRALAZINE HYDROCHLORIDE 10 MG: 20 INJECTION, SOLUTION INTRAMUSCULAR; INTRAVENOUS at 03:12

## 2023-12-18 RX ADMIN — PHENYLEPHRINE HYDROCHLORIDE 100 MCG: 10 INJECTION INTRAVENOUS at 01:12

## 2023-12-18 RX ADMIN — LIDOCAINE HYDROCHLORIDE 100 MG: 20 INJECTION, SOLUTION EPIDURAL; INFILTRATION; INTRACAUDAL at 12:12

## 2023-12-18 RX ADMIN — Medication 10 MG: at 01:12

## 2023-12-18 RX ADMIN — FENTANYL CITRATE 100 MCG: 50 INJECTION INTRAMUSCULAR; INTRAVENOUS at 11:12

## 2023-12-18 RX ADMIN — CEFTRIAXONE SODIUM 2 G: 2 INJECTION, POWDER, FOR SOLUTION INTRAMUSCULAR; INTRAVENOUS at 12:12

## 2023-12-18 RX ADMIN — FENTANYL CITRATE 50 MCG: 50 INJECTION INTRAMUSCULAR; INTRAVENOUS at 02:12

## 2023-12-18 RX ADMIN — ROCURONIUM BROMIDE 20 MG: 10 INJECTION INTRAVENOUS at 01:12

## 2023-12-18 RX ADMIN — Medication 30 MG: at 12:12

## 2023-12-18 NOTE — TRANSFER OF CARE
"Anesthesia Transfer of Care Note    Patient: Marylin Morel    Procedure(s) Performed: Procedure(s) (LRB):  FENESTRATION, CYST, ARACHNOID, ENDOSCOPIC- terence hole for colloid cyst resection and septostomy (N/A)    Patient location: ICU    Anesthesia Type: general    Transport from OR: Transported from OR on 6-10 L/min O2 by face mask with adequate spontaneous ventilation    Post pain: adequate analgesia    Post assessment: no apparent anesthetic complications    Post vital signs: stable    Level of consciousness: alert and awake    Nausea/Vomiting: no nausea/vomiting    Complications: none    Transfer of care protocol was followed      Last vitals: Visit Vitals  BP (!) 174/91   Pulse 84   Temp 36.9 °C (98.4 °F) (Oral)   Resp 17   Ht 5' 5" (1.651 m)   Wt 61.6 kg (135 lb 12.9 oz)   LMP 02/05/2015 (Approximate)   SpO2 98%   Breastfeeding No   BMI 22.60 kg/m²     "

## 2023-12-18 NOTE — ANESTHESIA PROCEDURE NOTES
Arterial    Diagnosis: colloid cyst    Patient location during procedure: done in OR    Staffing  Authorizing Provider: Emmanuel Mendez MD  Performing Provider: Charisma Alejo MD    Staffing  Performed by: Charisma Alejo MD  Authorized by: Emmanuel Mendez MD    Anesthesiologist was present at the time of the procedure.    Preanesthetic Checklist  Completed: patient identified, IV checked, site marked, risks and benefits discussed, surgical consent, monitors and equipment checked, pre-op evaluation, timeout performed and anesthesia consent givenArterial  Skin Prep: chlorhexidine gluconate  Orientation: left  Location: radial    Catheter Size: 20 G  Catheter placement by Anatomical landmarks. Heme positive aspiration all ports. Insertion Attempts: 1  Assessment  Dressing: secured with tape and tegaderm  Patient: Tolerated well

## 2023-12-18 NOTE — HPI
Marylin Morel is a 58 year old female with a medical history significant for HTN and a known colloid cyst who is admitted to [facility] for post operative monitoring following endoscopic colloid cyst resection and fenestration.

## 2023-12-18 NOTE — NURSING
Patient arrived to [facility] from OR by ICU bed    Report received from: Charisma HALL    Type of stroke/diagnosis:  colloid cyst of brain S/p Riddhi hole    Current symptoms: c/o nausea; AAOx4,moves x 4 spontaneously, no numbness/tingling    Skin Assessment done: Y  Wounds noted: head incision  *If wounds noted, was Wound Care consulted? N  *If wounds noted, LDA placed? N  Skin Assessment Verified by:  SARAH Butler Completed? pending    Patient Belongings on Admit: N/A    NCC notified: MD Ofelia

## 2023-12-18 NOTE — H&P
[facility] - Neuro Critical Care  Neurocritical Care  History & Physical    Admit Date: 12/18/2023  Service Date: 12/18/2023  Length of Stay: 0    Subjective:     Chief Complaint: Colloid cyst of brain    History of Present Illness: Marylin Morel is a 58 year old female with a medical history significant for HTN and a known colloid cyst who is admitted to [facility] for post operative monitoring following endoscopic colloid cyst resection and fenestration.      Past Medical History:   Diagnosis Date    Allergy     ants    GERD (gastroesophageal reflux disease)     Hypertension     Thyroid disease      Past Surgical History:   Procedure Laterality Date    AUGMENTATION OF BREAST      BREAST SURGERY      COSMETIC SURGERY      hip replacement left Left     NECK SURGERY        No current facility-administered medications on file prior to encounter.     Current Outpatient Medications on File Prior to Encounter   Medication Sig Dispense Refill    cetirizine (ZYRTEC) 10 MG tablet Take 10 mg by mouth once daily.      COMBIPATCH 0.05-0.14 mg/24 hr       fluticasone propionate (FLONASE) 50 mcg/actuation nasal spray INSTILL ONE SPRAY INTO EACH NOSTRIL ONCE DAILY 16 g 11    ibuprofen (ADVIL,MOTRIN) 800 MG tablet Take 1 tablet (800 mg total) by mouth every 6 (six) hours as needed for Pain. 60 tablet 5    levothyroxine (SYNTHROID) 25 MCG tablet Take 1 tablet (25 mcg total) by mouth once daily. 90 tablet 3    lisinopriL-hydrochlorothiazide (PRINZIDE,ZESTORETIC) 20-25 mg Tab Take 1 tablet by mouth once daily. 90 tablet 3    methocarbamoL (ROBAXIN) 500 MG Tab Take 1 tablet (500 mg total) by mouth 2 (two) times daily as needed. 60 tablet 0    olopatadine (PATADAY) 0.2 % Drop       omeprazole (PRILOSEC) 40 MG capsule Take 1 capsule (40 mg total) by mouth once daily. 90 capsule 3    simethicone (GAS-X EXTRA STRENGTH) 125 mg Cap capsule Take 1 capsule (125 mg total) by mouth 4 (four) times daily as needed for Flatulence. 30 capsule 1     EPINEPHrine (EPIPEN) 0.3 mg/0.3 mL AtIn Inject 0.3 mLs (0.3 mg total) into the muscle once. for 1 dose (Patient not taking: Reported on 11/14/2023) 2 each 0    multivitamin capsule Take 1 capsule by mouth once daily.      omega-3 fatty acids/fish oil (FISH OIL-OMEGA-3 FATTY ACIDS) 300-1,000 mg capsule Take by mouth once daily.      tiZANidine (ZANAFLEX) 4 MG tablet Take 4 mg by mouth 2 (two) times daily as needed.      vitamin D (VITAMIN D3) 1000 units Tab Take 1,000 Units by mouth once daily.        Allergies: Insect venom and Bactrim [sulfamethoxazole-trimethoprim]    N/A  Family history non-contributory to current problem     Social History     Tobacco Use    Smoking status: Never    Smokeless tobacco: Never   Substance Use Topics    Alcohol use: Yes     Alcohol/week: 1.0 standard drink of alcohol     Types: 1 Glasses of wine per week     Comment: 1 glass of wine daily    Drug use: No     Review of Systems   Constitutional:  Negative for chills, fatigue and fever.   HENT:  Negative for facial swelling.    Eyes:  Negative for photophobia and visual disturbance.   Respiratory:  Negative for shortness of breath.    Cardiovascular:  Negative for chest pain and palpitations.   Gastrointestinal:  Positive for nausea. Negative for vomiting.   Neurological:  Negative for tremors, syncope, facial asymmetry, speech difficulty, weakness and headaches.   Psychiatric/Behavioral:  Negative for agitation and confusion.      Objective:     Vitals:    Temp: 98.4 °F (36.9 °C)  Pulse: 84  BP: (!) 174/91  MAP (mmHg): 125  Resp: 17  SpO2: 98 %    Temp  Min: 98.1 °F (36.7 °C)  Max: 98.4 °F (36.9 °C)  Pulse  Min: 82  Max: 84  BP  Min: 153/83  Max: 174/91  MAP (mmHg)  Min: 111  Max: 125  Resp  Min: 17  Max: 18  SpO2  Min: 97 %  Max: 98 %    No intake/output data recorded.            Physical Exam  Vitals and nursing note reviewed.   Constitutional:       General: She is not in acute distress.     Appearance: She is diaphoretic. She is  not ill-appearing.   HENT:      Head: Normocephalic.      Comments: R frontal crani site clean, dry and intact  Eyes:      Extraocular Movements: Extraocular movements intact.      Pupils: Pupils are equal, round, and reactive to light.   Cardiovascular:      Rate and Rhythm: Regular rhythm. Tachycardia present.   Pulmonary:      Effort: Pulmonary effort is normal. No respiratory distress.   Abdominal:      General: Abdomen is flat.   Musculoskeletal:         General: No swelling or tenderness. Normal range of motion.      Cervical back: Normal range of motion.   Skin:     General: Skin is warm.      Coloration: Skin is not jaundiced.   Neurological:      Mental Status: She is alert.      Comments:   E4V5M5  Alert and oriented, follows commands in all extremities   PERRL, EOMi  Face symmetric noting R frontal crani site   Spontaneous, antigravity movement in all extremities           Today I personally reviewed pertinent medications, lines/drains/airways, imaging, cardiology results, laboratory results, microbiology results, notably:    Preoperative imaging reviewed, post op CTH pending  CBC, CMP, Mag, Phos pending  Assessment/Plan:     Neuro  * Colloid cyst of brain  58F with HTN and a known colloid cyst who is admitted to [facility] for post operative monitoring following endoscopic colloid cyst resection and fenestration.    - Admit to [facility]  - Q1h neurochecks while in ICU  - Q1h vitals while in ICU  - HOB@30  - Post operative CTH pending  - SBP <150  - PRN Hydralazine and Labetalol   - Daily CMP, Mag, Phos - replete electrolytes PRN  - Daily CBC, transfuse PRN  - Lovenox  - PT/OT/SLP as appropriate    Cardiac/Vascular  Essential hypertension, benign  SBP<150  Continue home medications    Endocrine  Hypothyroidism  Continue synthroid           The patient is being Prophylaxed for:  Venous Thromboembolism with: Chemical  Stress Ulcer with: Not Applicable   Ventilator Pneumonia with: not applicable    N/A  Family history  non-contributory to current problem     Activity Orders            Diet Adult Regular (IDDSI Level 7) Standard Tray: Regular starting at 12/18 1429    Bed rest starting at 12/18 1428          No Order    Neo Gilbert MD  Neurocritical Care  [facility] - Neuro Critical Care

## 2023-12-18 NOTE — BRIEF OP NOTE
[facility] - Surgery ([facility])  Brief Operative Note    SUMMARY     Surgery Date: 12/18/2023     Surgeon(s) and Role:     * Clement Alamo MD - Primary     * Nemesio Hernandez MD - Resident - Assisting        Pre-op Diagnosis:  Colloid cyst of brain [Q04.6]  Obstructive hydrocephalus [G91.1]    Post-op Diagnosis:  Post-Op Diagnosis Codes:     * Colloid cyst of brain [Q04.6]     * Obstructive hydrocephalus [G91.1]    Procedure(s) (LRB):  FENESTRATION, CYST, ARACHNOID, ENDOSCOPIC- terence hole for colloid cyst resection and septostomy (N/A)    Anesthesia: General    Implants:  * No implants in log *    Operative Findings: Successful fenestration and resection 3rd ventricular colloid cyst. Hemostasis achieved. Patient tolerated procedure well.    Estimated Blood Loss: see full op note    Estimated Blood Loss has been documented.         Specimens:   Specimen (24h ago, onward)       Start     Ordered    12/18/23 1353  Specimen to Pathology, Surgery Neurosurgery  Once        Comments: Pre-op Diagnosis: Colloid cyst of brain [Q04.6]Obstructive hydrocephalus [G91.1]Procedure(s):FENESTRATION, CYST, ARACHNOID, ENDOSCOPIC- terence hole for colloid cyst resection and septostomy Number of specimens: 1Name of specimens: 1. Cyst-perm     References:    Click here for ordering Quick Tip   Question Answer Comment   Procedure Type: Neurosurgery    Specimen Class: Complex case/Special    Which provider would you like to cc? NEMESIO HERNANDEZ    Release to patient Immediate        12/18/23 1352                    [id number]

## 2023-12-18 NOTE — SUBJECTIVE & OBJECTIVE
Past Medical History:   Diagnosis Date    Allergy     ants    GERD (gastroesophageal reflux disease)     Hypertension     Thyroid disease      Past Surgical History:   Procedure Laterality Date    AUGMENTATION OF BREAST      BREAST SURGERY      COSMETIC SURGERY      hip replacement left Left     NECK SURGERY        No current facility-administered medications on file prior to encounter.     Current Outpatient Medications on File Prior to Encounter   Medication Sig Dispense Refill    cetirizine (ZYRTEC) 10 MG tablet Take 10 mg by mouth once daily.      COMBIPATCH 0.05-0.14 mg/24 hr       fluticasone propionate (FLONASE) 50 mcg/actuation nasal spray INSTILL ONE SPRAY INTO EACH NOSTRIL ONCE DAILY 16 g 11    ibuprofen (ADVIL,MOTRIN) 800 MG tablet Take 1 tablet (800 mg total) by mouth every 6 (six) hours as needed for Pain. 60 tablet 5    levothyroxine (SYNTHROID) 25 MCG tablet Take 1 tablet (25 mcg total) by mouth once daily. 90 tablet 3    lisinopriL-hydrochlorothiazide (PRINZIDE,ZESTORETIC) 20-25 mg Tab Take 1 tablet by mouth once daily. 90 tablet 3    methocarbamoL (ROBAXIN) 500 MG Tab Take 1 tablet (500 mg total) by mouth 2 (two) times daily as needed. 60 tablet 0    olopatadine (PATADAY) 0.2 % Drop       omeprazole (PRILOSEC) 40 MG capsule Take 1 capsule (40 mg total) by mouth once daily. 90 capsule 3    simethicone (GAS-X EXTRA STRENGTH) 125 mg Cap capsule Take 1 capsule (125 mg total) by mouth 4 (four) times daily as needed for Flatulence. 30 capsule 1    EPINEPHrine (EPIPEN) 0.3 mg/0.3 mL AtIn Inject 0.3 mLs (0.3 mg total) into the muscle once. for 1 dose (Patient not taking: Reported on 11/14/2023) 2 each 0    multivitamin capsule Take 1 capsule by mouth once daily.      omega-3 fatty acids/fish oil (FISH OIL-OMEGA-3 FATTY ACIDS) 300-1,000 mg capsule Take by mouth once daily.      tiZANidine (ZANAFLEX) 4 MG tablet Take 4 mg by mouth 2 (two) times daily as needed.      vitamin D (VITAMIN D3) 1000 units Tab  Take 1,000 Units by mouth once daily.        Allergies: Insect venom and Bactrim [sulfamethoxazole-trimethoprim]    N/A  Family history non-contributory to current problem     Social History     Tobacco Use    Smoking status: Never    Smokeless tobacco: Never   Substance Use Topics    Alcohol use: Yes     Alcohol/week: 1.0 standard drink of alcohol     Types: 1 Glasses of wine per week     Comment: 1 glass of wine daily    Drug use: No     Review of Systems   Constitutional:  Negative for chills, fatigue and fever.   HENT:  Negative for facial swelling.    Eyes:  Negative for photophobia and visual disturbance.   Respiratory:  Negative for shortness of breath.    Cardiovascular:  Negative for chest pain and palpitations.   Gastrointestinal:  Positive for nausea. Negative for vomiting.   Neurological:  Negative for tremors, syncope, facial asymmetry, speech difficulty, weakness and headaches.   Psychiatric/Behavioral:  Negative for agitation and confusion.      Objective:     Vitals:    Temp: 98.4 °F (36.9 °C)  Pulse: 84  BP: (!) 174/91  MAP (mmHg): 125  Resp: 17  SpO2: 98 %    Temp  Min: 98.1 °F (36.7 °C)  Max: 98.4 °F (36.9 °C)  Pulse  Min: 82  Max: 84  BP  Min: 153/83  Max: 174/91  MAP (mmHg)  Min: 111  Max: 125  Resp  Min: 17  Max: 18  SpO2  Min: 97 %  Max: 98 %    No intake/output data recorded.            Physical Exam  Vitals and nursing note reviewed.   Constitutional:       General: She is not in acute distress.     Appearance: She is diaphoretic. She is not ill-appearing.   HENT:      Head: Normocephalic.      Comments: R frontal crani site clean, dry and intact  Eyes:      Extraocular Movements: Extraocular movements intact.      Pupils: Pupils are equal, round, and reactive to light.   Cardiovascular:      Rate and Rhythm: Regular rhythm. Tachycardia present.   Pulmonary:      Effort: Pulmonary effort is normal. No respiratory distress.   Abdominal:      General: Abdomen is flat.   Musculoskeletal:          General: No swelling or tenderness. Normal range of motion.      Cervical back: Normal range of motion.   Skin:     General: Skin is warm.      Coloration: Skin is not jaundiced.   Neurological:      Mental Status: She is alert.      Comments:   E4V5M5  Alert and oriented, follows commands in all extremities   PERRL, EOMi  Face symmetric noting R frontal crani site   Spontaneous, antigravity movement in all extremities           Today I personally reviewed pertinent medications, lines/drains/airways, imaging, cardiology results, laboratory results, microbiology results, notably:    Preoperative imaging reviewed, post op CTH pending  CBC, CMP, Mag, Phos pending

## 2023-12-18 NOTE — ANESTHESIA PREPROCEDURE EVALUATION
[facility]  Anesthesia Pre-Operative Evaluation         Patient Name: Marylin Morel  YOB: 1965  MRN: [id number]    SUBJECTIVE:     Pre-operative evaluation for Procedure(s) (LRB):  FENESTRATION, CYST, ARACHNOID, ENDOSCOPIC- terence hole for colloid cyst resection and septostomy (N/A)     12/17/2023    Marylin Morel is a 58 y.o. female w/ a significant PMHx of HTN and hypothyroidism presents with colloid cyst with early signs of obstructing hydrocephalus. To OR for interscopic resection and fenestration of the septum.     Patient now presents for the above procedure(s).    Prev airway: None documented.    Patient Active Problem List   Diagnosis    Essential hypertension, benign    Hypothyroidism    Seasonal allergies    GERD (gastroesophageal reflux disease)    DDD (degenerative disc disease), lumbosacral    Cervical radiculopathy       Review of patient's allergies indicates:   Allergen Reactions    Insect venom Anaphylaxis     Ant bites    Bactrim [sulfamethoxazole-trimethoprim] Blisters     Blisters/rash on torso and extremities        Current Inpatient Medications:      No current facility-administered medications on file prior to encounter.     Current Outpatient Medications on File Prior to Encounter   Medication Sig Dispense Refill    cetirizine (ZYRTEC) 10 MG tablet Take 10 mg by mouth once daily.      COMBIPATCH 0.05-0.14 mg/24 hr       EPINEPHrine (EPIPEN) 0.3 mg/0.3 mL AtIn Inject 0.3 mLs (0.3 mg total) into the muscle once. for 1 dose (Patient not taking: Reported on 11/14/2023) 2 each 0    fluticasone propionate (FLONASE) 50 mcg/actuation nasal spray INSTILL ONE SPRAY INTO EACH NOSTRIL ONCE DAILY 16 g 11    ibuprofen (ADVIL,MOTRIN) 800 MG tablet Take 1 tablet (800 mg total) by mouth every 6 (six) hours as needed for Pain. 60 tablet 5    levothyroxine (SYNTHROID) 25 MCG tablet Take 1 tablet (25 mcg  total) by mouth once daily. 90 tablet 3    lisinopriL-hydrochlorothiazide (PRINZIDE,ZESTORETIC) 20-25 mg Tab Take 1 tablet by mouth once daily. 90 tablet 3    methocarbamoL (ROBAXIN) 500 MG Tab Take 1 tablet (500 mg total) by mouth 2 (two) times daily as needed. 60 tablet 0    multivitamin capsule Take 1 capsule by mouth once daily.      olopatadine (PATADAY) 0.2 % Drop       omega-3 fatty acids/fish oil (FISH OIL-OMEGA-3 FATTY ACIDS) 300-1,000 mg capsule Take by mouth once daily.      omeprazole (PRILOSEC) 40 MG capsule Take 1 capsule (40 mg total) by mouth once daily. 90 capsule 3    simethicone (GAS-X EXTRA STRENGTH) 125 mg Cap capsule Take 1 capsule (125 mg total) by mouth 4 (four) times daily as needed for Flatulence. 30 capsule 1    tiZANidine (ZANAFLEX) 4 MG tablet Take 4 mg by mouth 2 (two) times daily as needed.      vitamin D (VITAMIN D3) 1000 units Tab Take 1,000 Units by mouth once daily.         Past Surgical History:   Procedure Laterality Date    AUGMENTATION OF BREAST      BREAST SURGERY      COSMETIC SURGERY      hip replacement left Left     NECK SURGERY         Social History     Socioeconomic History    Marital status:    Occupational History     Employer: [employer] [address]   Tobacco Use    Smoking status: Never    Smokeless tobacco: Never   Substance and Sexual Activity    Alcohol use: Yes     Alcohol/week: 1.0 standard drink of alcohol     Types: 1 Glasses of wine per week     Comment: 1 glass of wine daily    Drug use: No    Sexual activity: Yes     Partners: Male     Social Determinants of Health     Financial Resource Strain: Low Risk  (12/11/2023)    Overall Financial Resource Strain (CARDIA)     Difficulty of Paying Living Expenses: Not very hard   Food Insecurity: No Food Insecurity (12/11/2023)    Hunger Vital Sign     Worried About Running Out of Food in the Last Year: Never true     Ran Out of Food in the Last Year: Never true   Transportation Needs: No Transportation  Needs (12/11/2023)    PRAPARE - Transportation     Lack of Transportation (Medical): No     Lack of Transportation (Non-Medical): No   Physical Activity: Unknown (12/11/2023)    Exercise Vital Sign     Days of Exercise per Week: 0 days   Stress: No Stress Concern Present (12/11/2023)    [employer] [place] [employer] - Occupational Stress Questionnaire     Feeling of Stress : Only a little   Social Connections: Unknown (12/11/2023)    Social Connection and Isolation Panel [NHANES]     Frequency of Communication with Friends and Family: Twice a week     Frequency of Social Gatherings with Friends and Family: Twice a week     Active Member of Clubs or Organizations: Yes     Attends Club or Organization Meetings: 1 to 4 times per year     Marital Status:    Housing Stability: High Risk (12/11/2023)    Housing Stability Vital Sign     Unable to Pay for Housing in the Last Year: Yes     Number of Places Lived in the Last Year: 1     Unstable Housing in the Last Year: No       OBJECTIVE:     Vital Signs Range (Last 24H):         Significant Labs:  Lab Results   Component Value Date    WBC 7.33 11/11/2023    HGB 13.9 11/11/2023    HCT 42.7 11/11/2023     11/11/2023    CHOL 269 (H) 11/11/2023    TRIG 157 (H) 11/11/2023    HDL 63 11/11/2023    ALT 32 11/11/2023    AST 29 11/11/2023     11/11/2023    K 4.1 11/11/2023     11/11/2023    CREATININE 0.8 11/11/2023    BUN 15 11/11/2023    CO2 26 11/11/2023    TSH 1.562 11/11/2023    INR 0.9 12/11/2023    HGBA1C 5.2 10/06/2022       Diagnostic Studies: No relevant studies.    EKG:   Results for orders placed or performed during the hospital encounter of 12/11/23   EKG 12-lead    Collection Time: 12/11/23  2:05 PM    Narrative    Test Reason : Z01.818,    Vent. Rate : 075 BPM     Atrial Rate : 075 BPM     P-R Int : 150 ms          QRS Dur : 086 ms      QT Int : 368 ms       P-R-T Axes : 066 021 025 degrees     QTc Int : 410 ms    Normal sinus  rhythm  Normal ECG  When compared with ECG of 13-OCT-2022 15:07,  No significant change was found  Confirmed by Edgar WOODALL MD ([id number]) on 12/11/2023 5:41:30 PM    Referred By: RHONDA LEOPOLD           Confirmed By:Edgar WOODALL MD       2D ECHO:  TTE:  No results found for this or any previous visit.    KORY:  No results found for this or any previous visit.    ASSESSMENT/PLAN:           Pre-op Assessment    I have reviewed the Patient Summary Reports.     I have reviewed the Nursing Notes. I have reviewed the NPO Status.   I have reviewed the Medications.     Review of Systems  Anesthesia Hx:  No problems with previous Anesthesia   History of prior surgery of interest to airway management or planning:          Denies Family Hx of Anesthesia complications.    Denies Personal Hx of Anesthesia complications.                    Social:  Non-Smoker, No Alcohol Use       Hematology/Oncology:  Hematology Normal   Oncology Normal                                   EENT/Dental:  EENT/Dental Normal           Cardiovascular:  Exercise tolerance: good   Hypertension    Denies CAD.     Denies Dysrhythmias.                                    Pulmonary:    Denies COPD.      Denies Sleep Apnea.                Hepatic/GI:      Denies GERD.             Neurological:    Denies Neuromuscular Disease.                                   Endocrine:  Denies Diabetes. Hypothyroidism          Psych:  Denies Psychiatric History.                  Physical Exam  General: Well nourished, Cooperative, Alert and Oriented    Airway:  Mallampati: II   Mouth Opening: Normal  TM Distance: Normal  Tongue: Normal  Neck ROM: Flexion Decreased, Extension Decreased    Dental:  Caps / Implants, Intact  Bottom permanent retainer  Chest/Lungs:  Clear to auscultation, Normal Respiratory Rate    Heart:  Rate: Normal  Rhythm: Regular Rhythm  Sounds: Normal    Abdomen:  Nontender, Soft, Normal        Anesthesia Plan  Type of Anesthesia, risks & benefits  discussed:    Anesthesia Type: Gen ETT  Intra-op Monitoring Plan: Standard ASA Monitors  Post Op Pain Control Plan: multimodal analgesia  Induction:  IV  Airway Plan: Direct, Post-Induction  Informed Consent: Informed consent signed with the Patient and all parties understand the risks and agree with anesthesia plan.  All questions answered.   ASA Score: 2  Day of Surgery Review of History & Physical: H&P Update referred to the surgeon/provider.    Ready For Surgery From Anesthesia Perspective.     .

## 2023-12-18 NOTE — ANESTHESIA POSTPROCEDURE EVALUATION
Anesthesia Post Evaluation    Patient: Marylin Morel    Procedure(s) Performed: Procedure(s) (LRB):  FENESTRATION, CYST, ARACHNOID, ENDOSCOPIC- terence hole for colloid cyst resection and septostomy (N/A)    Final Anesthesia Type: general      Patient location during evaluation: PACU  Patient participation: Yes- Able to Participate  Level of consciousness: awake and alert  Post-procedure vital signs: reviewed and stable  Pain management: adequate  Airway patency: patent  THADDEUS mitigation strategies: Multimodal analgesia  PONV status at discharge: No PONV  Anesthetic complications: no      Cardiovascular status: blood pressure returned to baseline and hemodynamically stable  Respiratory status: unassisted  Hydration status: euvolemic  Follow-up not needed.              Vitals Value Taken Time   /88 12/18/23 1556   Temp 36.9 °C (98.4 °F) 12/18/23 1515   Pulse 87 12/18/23 1720   Resp 17 12/18/23 1720   SpO2 98 % 12/18/23 1720   Vitals shown include unvalidated device data.      No case tracking events are documented in the log.      Pain/Richard Score: Pain Rating Prior to Med Admin: 3 (12/18/2023 11:13 AM)

## 2023-12-18 NOTE — ANESTHESIA PROCEDURE NOTES
Intubation    Date/Time: 12/18/2023 12:04 PM    Performed by: Emmanuel Mendez MD  Authorized by: Emmanuel Mendez MD    Intubation:     Induction:  Intravenous    Intubated:  Postinduction    Mask Ventilation:  Easy mask    Attempts:  1    Attempted By:  Staff anesthesiologist    Method of Intubation:  Direct    Blade:  Martinez 2    Laryngeal View Grade: Grade I - full view of cords      Difficult Airway Encountered?: No      Complications:  None    Airway Device:  Oral endotracheal tube    Airway Device Size:  7.0    Secured at:  The lips    Placement Verified By:  Capnometry    Complicating Factors:  Anterior larynx    Findings Post-Intubation:  BS equal bilateral and atraumatic/condition of teeth unchanged

## 2023-12-18 NOTE — ASSESSMENT & PLAN NOTE
58F with HTN and a known colloid cyst who is admitted to [facility] for post operative monitoring following endoscopic colloid cyst resection and fenestration.    - Admit to [facility]  - Q1h neurochecks while in ICU  - Q1h vitals while in ICU  - HOB@30  - Post operative CTH pending  - SBP <150  - PRN Hydralazine and Labetalol   - Daily CMP, Mag, Phos - replete electrolytes PRN  - Daily CBC, transfuse PRN  - Lovenox  - PT/OT/SLP as appropriate

## 2023-12-18 NOTE — INTERVAL H&P NOTE
The patient has been examined and the H&P has been reviewed:    I concur with the findings and no changes have occurred since H&P was written.    Surgery risks, benefits and alternative options discussed and understood by patient/family.    ROS: Denies fever/chills, vision/hearing changes, dysphagia, dysarthria, nausea/vomiting, new-onset weakness or sensory change, bowel/bladder changes.           Active Hospital Problems    Diagnosis  POA    *Colloid cyst of brain [Q04.6]  Not Applicable      Resolved Hospital Problems    Diagnosis Date Resolved POA    Arachnoid cyst [G93.0] 12/18/2023 Unknown

## 2023-12-19 VITALS
RESPIRATION RATE: 22 BRPM | DIASTOLIC BLOOD PRESSURE: 79 MMHG | BODY MASS INDEX: 22.63 KG/M2 | SYSTOLIC BLOOD PRESSURE: 100 MMHG | HEART RATE: 84 BPM | TEMPERATURE: 100 F | HEIGHT: 65 IN | WEIGHT: 135.81 LBS | OXYGEN SATURATION: 98 %

## 2023-12-19 LAB
ALBUMIN SERPL BCP-MCNC: 3.2 G/DL (ref 3.5–5.2)
ALP SERPL-CCNC: 44 U/L (ref 55–135)
ALT SERPL W/O P-5'-P-CCNC: 21 U/L (ref 10–44)
ANION GAP SERPL CALC-SCNC: 14 MMOL/L (ref 8–16)
AST SERPL-CCNC: 25 U/L (ref 10–40)
BASOPHILS # BLD AUTO: 0.02 K/UL (ref 0–0.2)
BASOPHILS NFR BLD: 0.2 % (ref 0–1.9)
BILIRUB SERPL-MCNC: 0.5 MG/DL (ref 0.1–1)
BUN SERPL-MCNC: 10 MG/DL (ref 6–20)
CALCIUM SERPL-MCNC: 8 MG/DL (ref 8.7–10.5)
CHLORIDE SERPL-SCNC: 105 MMOL/L (ref 95–110)
CO2 SERPL-SCNC: 18 MMOL/L (ref 23–29)
CREAT SERPL-MCNC: 0.7 MG/DL (ref 0.5–1.4)
DIFFERENTIAL METHOD: ABNORMAL
EOSINOPHIL # BLD AUTO: 0 K/UL (ref 0–0.5)
EOSINOPHIL NFR BLD: 0 % (ref 0–8)
ERYTHROCYTE [DISTWIDTH] IN BLOOD BY AUTOMATED COUNT: 12.5 % (ref 11.5–14.5)
EST. GFR  (NO RACE VARIABLE): >60 ML/MIN/1.73 M^2
GLUCOSE SERPL-MCNC: 108 MG/DL (ref 70–110)
HCT VFR BLD AUTO: 34.6 % (ref 37–48.5)
HGB BLD-MCNC: 12.1 G/DL (ref 12–16)
IMM GRANULOCYTES # BLD AUTO: 0.03 K/UL (ref 0–0.04)
IMM GRANULOCYTES NFR BLD AUTO: 0.3 % (ref 0–0.5)
LYMPHOCYTES # BLD AUTO: 1.5 K/UL (ref 1–4.8)
LYMPHOCYTES NFR BLD: 13.9 % (ref 18–48)
MAGNESIUM SERPL-MCNC: 1.6 MG/DL (ref 1.6–2.6)
MCH RBC QN AUTO: 34.4 PG (ref 27–31)
MCHC RBC AUTO-ENTMCNC: 35 G/DL (ref 32–36)
MCV RBC AUTO: 98 FL (ref 82–98)
MONOCYTES # BLD AUTO: 0.8 K/UL (ref 0.3–1)
MONOCYTES NFR BLD: 7.8 % (ref 4–15)
NEUTROPHILS # BLD AUTO: 8.2 K/UL (ref 1.8–7.7)
NEUTROPHILS NFR BLD: 77.8 % (ref 38–73)
NRBC BLD-RTO: 0 /100 WBC
PHOSPHATE SERPL-MCNC: 3.2 MG/DL (ref 2.7–4.5)
PLATELET # BLD AUTO: 229 K/UL (ref 150–450)
PMV BLD AUTO: 8.9 FL (ref 9.2–12.9)
POTASSIUM SERPL-SCNC: 3.1 MMOL/L (ref 3.5–5.1)
POTASSIUM SERPL-SCNC: 3.8 MMOL/L (ref 3.5–5.1)
PROT SERPL-MCNC: 6.1 G/DL (ref 6–8.4)
RBC # BLD AUTO: 3.52 M/UL (ref 4–5.4)
SODIUM SERPL-SCNC: 137 MMOL/L (ref 136–145)
WBC # BLD AUTO: 10.51 K/UL (ref 3.9–12.7)

## 2023-12-19 PROCEDURE — 83735 ASSAY OF MAGNESIUM: CPT | Performed by: STUDENT IN AN ORGANIZED HEALTH CARE EDUCATION/TRAINING PROGRAM

## 2023-12-19 PROCEDURE — 80053 COMPREHEN METABOLIC PANEL: CPT | Performed by: STUDENT IN AN ORGANIZED HEALTH CARE EDUCATION/TRAINING PROGRAM

## 2023-12-19 PROCEDURE — 99024 POSTOP FOLLOW-UP VISIT: CPT | Mod: ,,, | Performed by: NEUROLOGICAL SURGERY

## 2023-12-19 PROCEDURE — 84132 ASSAY OF SERUM POTASSIUM: CPT | Performed by: REGISTERED NURSE

## 2023-12-19 PROCEDURE — 63600175 PHARM REV CODE 636 W HCPCS: Performed by: STUDENT IN AN ORGANIZED HEALTH CARE EDUCATION/TRAINING PROGRAM

## 2023-12-19 PROCEDURE — 85025 COMPLETE CBC W/AUTO DIFF WBC: CPT | Performed by: STUDENT IN AN ORGANIZED HEALTH CARE EDUCATION/TRAINING PROGRAM

## 2023-12-19 PROCEDURE — 25000003 PHARM REV CODE 250: Performed by: STUDENT IN AN ORGANIZED HEALTH CARE EDUCATION/TRAINING PROGRAM

## 2023-12-19 PROCEDURE — 84100 ASSAY OF PHOSPHORUS: CPT | Performed by: STUDENT IN AN ORGANIZED HEALTH CARE EDUCATION/TRAINING PROGRAM

## 2023-12-19 PROCEDURE — 99024 PR POST-OP FOLLOW-UP VISIT: ICD-10-PCS | Mod: ,,, | Performed by: NEUROLOGICAL SURGERY

## 2023-12-19 PROCEDURE — 99233 SBSQ HOSP IP/OBS HIGH 50: CPT | Mod: FS,,, | Performed by: PSYCHIATRY & NEUROLOGY

## 2023-12-19 PROCEDURE — 99233 PR SUBSEQUENT HOSPITAL CARE,LEVL III: ICD-10-PCS | Mod: FS,,, | Performed by: PSYCHIATRY & NEUROLOGY

## 2023-12-19 PROCEDURE — 25000242 PHARM REV CODE 250 ALT 637 W/ HCPCS: Performed by: STUDENT IN AN ORGANIZED HEALTH CARE EDUCATION/TRAINING PROGRAM

## 2023-12-19 PROCEDURE — 94761 N-INVAS EAR/PLS OXIMETRY MLT: CPT

## 2023-12-19 RX ORDER — ONDANSETRON 4 MG/1
4 TABLET, FILM COATED ORAL EVERY 8 HOURS PRN
Qty: 20 TABLET | Refills: 0 | Status: SHIPPED | OUTPATIENT
Start: 2023-12-19

## 2023-12-19 RX ORDER — OXYCODONE AND ACETAMINOPHEN 5; 325 MG/1; MG/1
1 TABLET ORAL EVERY 4 HOURS PRN
Qty: 30 TABLET | Refills: 0 | Status: SHIPPED | OUTPATIENT
Start: 2023-12-19

## 2023-12-19 RX ORDER — POTASSIUM CHLORIDE 20 MEQ/1
40 TABLET, EXTENDED RELEASE ORAL ONCE
Status: COMPLETED | OUTPATIENT
Start: 2023-12-19 | End: 2023-12-19

## 2023-12-19 RX ORDER — POTASSIUM CHLORIDE 7.45 MG/ML
10 INJECTION INTRAVENOUS
Status: COMPLETED | OUTPATIENT
Start: 2023-12-19 | End: 2023-12-19

## 2023-12-19 RX ORDER — AMOXICILLIN 250 MG
2 CAPSULE ORAL DAILY
Qty: 20 TABLET | Refills: 0 | Status: SHIPPED | OUTPATIENT
Start: 2023-12-19

## 2023-12-19 RX ADMIN — POTASSIUM CHLORIDE 10 MEQ: 7.46 INJECTION, SOLUTION INTRAVENOUS at 12:12

## 2023-12-19 RX ADMIN — POTASSIUM CHLORIDE 10 MEQ: 7.46 INJECTION, SOLUTION INTRAVENOUS at 10:12

## 2023-12-19 RX ADMIN — POTASSIUM CHLORIDE 40 MEQ: 1500 TABLET, EXTENDED RELEASE ORAL at 08:12

## 2023-12-19 RX ADMIN — OXYCODONE HYDROCHLORIDE 5 MG: 5 TABLET ORAL at 11:12

## 2023-12-19 RX ADMIN — LEVOTHYROXINE SODIUM 25 MCG: 25 TABLET ORAL at 08:12

## 2023-12-19 RX ADMIN — CEFTRIAXONE SODIUM 2 G: 2 INJECTION, POWDER, FOR SOLUTION INTRAMUSCULAR; INTRAVENOUS at 12:12

## 2023-12-19 RX ADMIN — LISINOPRIL AND HYDROCHLOROTHIAZIDE TABLETS 2 TABLET: 10; 12.5 TABLET ORAL at 08:12

## 2023-12-19 RX ADMIN — CETIRIZINE HYDROCHLORIDE 10 MG: 5 TABLET, FILM COATED ORAL at 08:12

## 2023-12-19 RX ADMIN — ACETAMINOPHEN 1000 MG: 500 TABLET ORAL at 02:12

## 2023-12-19 RX ADMIN — SENNOSIDES AND DOCUSATE SODIUM 2 TABLET: 8.6; 5 TABLET ORAL at 08:12

## 2023-12-19 RX ADMIN — POTASSIUM CHLORIDE 10 MEQ: 7.46 INJECTION, SOLUTION INTRAVENOUS at 02:12

## 2023-12-19 RX ADMIN — PANTOPRAZOLE SODIUM 40 MG: 40 TABLET, DELAYED RELEASE ORAL at 08:12

## 2023-12-19 RX ADMIN — FLUTICASONE PROPIONATE 50 MCG: 50 SPRAY, METERED NASAL at 08:12

## 2023-12-19 RX ADMIN — ACETAMINOPHEN 1000 MG: 500 TABLET ORAL at 05:12

## 2023-12-19 RX ADMIN — POLYETHYLENE GLYCOL 3350 17 G: 17 POWDER, FOR SOLUTION ORAL at 08:12

## 2023-12-19 RX ADMIN — POTASSIUM CHLORIDE 10 MEQ: 7.46 INJECTION, SOLUTION INTRAVENOUS at 08:12

## 2023-12-19 RX ADMIN — OXYCODONE HYDROCHLORIDE 5 MG: 5 TABLET ORAL at 05:12

## 2023-12-19 RX ADMIN — MUPIROCIN: 20 OINTMENT TOPICAL at 08:12

## 2023-12-19 NOTE — HOSPITAL COURSE
12/19/2023 S/p endoscopic colloid cyst resection and fenestration. NAEON. No issues walking around unit w/ nursing staff, no need for PT/OT eval. Stable for discharge home today once K+ replaced per NSGY.

## 2023-12-19 NOTE — PLAN OF CARE
[facility]  Initial Discharge Assessment       Primary Care Provider: Gabriel Mccabe MD    Admission Diagnosis: Colloid cyst of brain [Q04.6]  Obstructive hydrocephalus [G91.1]  Arachnoid cyst [G93.0]    Admission Date: 12/18/2023  Expected Discharge Date: 12/19/2023    Transition of Care Barriers: None    Payor: BLUE CROSS BLUE SHIELD / Plan: [place] / Product Type: Commercial /     Extended Emergency Contact Information  Primary Emergency Contact: Alon Morel  Address: [address] [place] [address]           SAIM[place] KHOURY [address] [facility]  Home Phone: [phone]  Work Phone: [phone]  Mobile Phone: [phone]  Relation: Spouse  Preferred language: English   needed? No    Discharge Plan A: Home with family  Discharge Plan B: Home with family      [id number] - [place] - [address]  [address] [place] [address]  Phone: [phone] Fax: [phone]    [facility] - [place] - [address]  [address]  [place] [address]  Phone: [phone] Fax: [phone]        Transferred from:     Past Medical History:   Diagnosis Date    Allergy     ants    GERD (gastroesophageal reflux disease)     Hypertension     Thyroid disease           Patient is able to answer questions.  Per patient, she lives with Alon Morel () [phone]  in a 2 story house with 1 step(s) to enter and the bed and bath on the 2nd floor.   Per patient, shew as independent with ADLS and used no dme for ambulation.  Patient will have assistance from her  upon discharge.   Discharge Planning Booklet given to patient/family and discussed.  All questions addressed.  CM will follow for needs.      Discharge Plan A and Plan B have been determined by review of patient's clinical status, future medical and therapeutic needs, and coverage/benefits for post-acute care in coordination with  multidisciplinary team members.        Initial Assessment (most recent)       Adult Discharge Assessment - 12/19/23 0841          Discharge Assessment    Assessment Type Discharge Planning Assessment     Confirmed/corrected address, phone number and insurance Yes     Confirmed Demographics Correct on Facesheet     Source of Information patient     If unable to respond/provide information was family/caregiver contacted? Other (see comments)     Communicated EDILMA with patient/caregiver Yes     Reason For Admission Colloid Cyst of the brain     People in Home spouse     Facility Arrived From: home     Do you expect to return to your current living situation? Yes     Do you have help at home or someone to help you manage your care at home? Yes     Who are your caregiver(s) and their phone number(s)? Alon De Jesusrobert () [phone]     Prior to hospitilization cognitive status: Alert/Oriented     Current cognitive status: Alert/Oriented     Walking or Climbing Stairs Difficulty no     Dressing/Bathing Difficulty no     Home Accessibility stairs to enter home;stairs within home     Number of Stairs, Within Home, Primary other (see comments)   a flight    Number of Stairs, Main Entrance one     Home Layout Bathroom on 2nd floor;Bedroom on 2nd floor     Equipment Currently Used at Home none     Readmission within 30 days? No     Patient currently being followed by outpatient case management? No     Do you currently have service(s) that help you manage your care at home? No     Do you take prescription medications? Yes     Do you have prescription coverage? Yes     Coverage BCBS     Do you have any problems affording any of your prescribed medications? TBD     Is the patient taking medications as prescribed? yes     Who is going to help you get home at discharge? Alon De Jesusrobert () [phone]     How do you get to doctors appointments? family or friend will provide     Are you on dialysis? No     Do you take  coumadin? No     Discharge Plan B Home with family     Discharge Plan discussed with: Patient     Transition of Care Barriers None        OTHER    Name(s) of People in Home Alon Morel () [phone]                        Social Determinants of Health     Tobacco Use: Low Risk  (12/19/2023)    Patient History     Smoking Tobacco Use: Never     Smokeless Tobacco Use: Never     Passive Exposure: Not on file   Alcohol Use: Not At Risk (12/11/2023)    AUDIT-C     Frequency of Alcohol Consumption: 4 or more times a week     Average Number of Drinks: 1 or 2     Frequency of Binge Drinking: Never   Financial Resource Strain: Low Risk  (12/11/2023)    Overall Financial Resource Strain (CARDIA)     Difficulty of Paying Living Expenses: Not very hard   Food Insecurity: No Food Insecurity (12/11/2023)    Hunger Vital Sign     Worried About Running Out of Food in the Last Year: Never true     Ran Out of Food in the Last Year: Never true   Transportation Needs: No Transportation Needs (12/11/2023)    PRAPARE - Transportation     Lack of Transportation (Medical): No     Lack of Transportation (Non-Medical): No   Physical Activity: Unknown (12/19/2023)    Exercise Vital Sign     Days of Exercise per Week: 0 days     Minutes of Exercise per Session: Not on file   Stress: No Stress Concern Present (12/11/2023)    [employer] [place] [employer] - Occupational Stress Questionnaire     Feeling of Stress : Only a little   Social Connections: Unknown (12/11/2023)    Social Connection and Isolation Panel [NHANES]     Frequency of Communication with Friends and Family: Twice a week     Frequency of Social Gatherings with Friends and Family: Twice a week     Attends Islam Services: Not on file     Active Member of Clubs or Organizations: Yes     Attends Club or Organization Meetings: 1 to 4 times per year     Marital Status:    Housing Stability: High Risk (12/11/2023)    Housing Stability Vital Sign      Unable to Pay for Housing in the Last Year: Yes     Number of Places Lived in the Last Year: 1     Unstable Housing in the Last Year: No   Depression: Low Risk  (2/10/2023)    Depression     Last PHQ-4: Flowsheet Data: 0        Discharge Plan A and Plan B have been determined by review of patient's clinical status, future medical and therapeutic needs, and coverage/benefits for post-acute care in coordination with multidisciplinary team members.      Shantelle Leon RN, CCRN-K, [facility]  Neuro-Critical Care   X [phone]

## 2023-12-19 NOTE — PLAN OF CARE
"  POC reviewed with Marylin Morel and family at 1400. Pt verbalized understanding. Questions and concerns addressed. No acute events today. Pt progressing toward goals. Will continue to monitor. See below and flowsheets for full assessment and VS info.     - K replaced. Redraw came back at 3.8. Ready for discharge home.   - PIVs removed.   - Discharge paperwork reviewed.     Neuro:  [place] Coma Scale  Best Eye Response: 4-->(E4) spontaneous  Best Motor Response: 6-->(M6) obeys commands  Best Verbal Response: 5-->(V5) oriented  [place] Coma Scale Score: 15  Assessment Qualifiers: patient not sedated/intubated, no eye obstruction present  Pupil PERRLA: yes     24 hr Temp:  [97.9 °F (36.6 °C)-100 °F (37.8 °C)]     CV:   Rhythm: normal sinus rhythm  BP goals:   SBP <  150  MAP > 65    Resp:           Plan: N/A    GI/:     Diet/Nutrition Received: regular  Last Bowel Movement: 12/19/23  Voiding Characteristics: voids spontaneously without difficulty    Intake/Output Summary (Last 24 hours) at 12/19/2023 1702  Last data filed at 12/19/2023 1432  Gross per 24 hour   Intake 1507.56 ml   Output 550 ml   Net 957.56 ml     Unmeasured Output  Urine Occurrence: 1  Stool Occurrence: 1    Labs/Accuchecks:  Recent Labs   Lab 12/19/23  0522   WBC 10.51   RBC 3.52*   HGB 12.1   HCT 34.6*         Recent Labs   Lab 12/19/23  0522 12/19/23  1608     --    K 3.1* 3.8   CO2 18*  --      --    BUN 10  --    CREATININE 0.7  --    ALKPHOS 44*  --    ALT 21  --    AST 25  --    BILITOT 0.5  --     No results for input(s): "PROTIME", "INR", "APTT", "HEPANTIXA" in the last 168 hours. No results for input(s): "CPK", "CPKMB", "TROPONINI", "MB" in the last 168 hours.    Electrolytes: Electrolytes replaced  Accuchecks: none    Gtts:      LDA/Wounds:  Lines/Drains/Airways       None                 Wounds: Yes  Wound care consulted: No     Is this a stroke patient? No.     Problem: Adult Inpatient Plan of Care  Goal: Plan " of Care Review  Outcome: Adequate for Care Transition     Problem: Adult Inpatient Plan of Care  Goal: Optimal Comfort and Wellbeing  Outcome: Adequate for Care Transition     Problem: Adult Inpatient Plan of Care  Goal: Readiness for Transition of Care  Outcome: Adequate for Care Transition

## 2023-12-19 NOTE — SUBJECTIVE & OBJECTIVE
Interval History:  S/p endoscopic colloid cyst resection and fenestration. NAEON. No issues walking around unit w/ nursing staff, no need for PT/OT eval. Stable for discharge home today once K+ replaced per NSGY.    Review of Systems   Respiratory:  Negative for cough and shortness of breath.    Cardiovascular:  Negative for chest pain.   Gastrointestinal:  Negative for abdominal pain, nausea and vomiting.   Genitourinary:  Negative for difficulty urinating.   Musculoskeletal:  Negative for neck pain.   Neurological:  Positive for headaches (controlled w/ PRNs). Negative for weakness, light-headedness and numbness.     Objective:     Vitals:  Temp: 100 °F (37.8 °C)  Pulse: 84  Rhythm: normal sinus rhythm  BP: 100/79  MAP (mmHg): 87  Resp: (!) 22  SpO2: 98 %    Temp  Min: 97.9 °F (36.6 °C)  Max: 100 °F (37.8 °C)  Pulse  Min: 69  Max: 96  BP  Min: 100/79  Max: 154/76  MAP (mmHg)  Min: 76  Max: 108  Resp  Min: 10  Max: 30  SpO2  Min: 92 %  Max: 100 %    12/18 0701 - 12/19 0700  In: 2125.3 [I.V.:25.3]  Out: 550 [Urine:550]   Unmeasured Output  Urine Occurrence: 1  Stool Occurrence: 1        Physical Exam  Vitals and nursing note reviewed.   Constitutional:       Appearance: Normal appearance.   HENT:      Head: Normocephalic and atraumatic.   Eyes:      Extraocular Movements: Extraocular movements intact.      Pupils: Pupils are equal, round, and reactive to light.   Cardiovascular:      Rate and Rhythm: Normal rate and regular rhythm.      Pulses: Normal pulses.      Heart sounds: Normal heart sounds.   Pulmonary:      Effort: Pulmonary effort is normal.      Breath sounds: Normal breath sounds.   Abdominal:      General: Abdomen is flat. Bowel sounds are normal.      Palpations: Abdomen is soft.   Musculoskeletal:         General: Normal range of motion.      Cervical back: Normal range of motion and neck supple.   Skin:     General: Skin is warm and dry.      Capillary Refill: Capillary refill takes less than 2  seconds.   Neurological:      Mental Status: She is alert and oriented to person, place, and time.      GCS: GCS eye subscore is 4. GCS verbal subscore is 5. GCS motor subscore is 6.      Cranial Nerves: Cranial nerves 2-12 are intact.      Sensory: Sensation is intact.      Motor: Motor function is intact.      Coordination: Coordination is intact.      Gait: Gait is intact.      Comments:   -E4 V5 M6  -PERRL  -Oriented to person, place, time, and situation  -Follows commands in all four extremities  -BAILEY equally  -5/5 strength throughout            Medications:  Continuous Scheduledacetaminophen, 1,000 mg, Q8H  cefTRIAXone (ROCEPHIN) IVPB, 2 g, Q12H  cetirizine, 10 mg, Daily  enoxparin, 40 mg, Daily  fluticasone propionate, 1 spray, Daily  levothyroxine, 25 mcg, Daily  lisinopriL-hydrochlorothiazide, 2 tablet, Daily  mupirocin, , BID  pantoprazole, 40 mg, Daily  polyethylene glycol, 17 g, Daily  senna-docusate 8.6-50 mg, 2 tablet, BID    PRNhydrALAZINE, 10 mg, Q6H PRN  HYDROmorphone, 1 mg, Q6H PRN  methocarbamoL, 1,000 mg, BID PRN  metoclopramide, 5 mg, Q6H PRN  ondansetron, 4 mg, Q12H PRN  oxyCODONE, 5 mg, Q3H PRN  simethicone, 1 tablet, TID PRN  sodium chloride 0.9%, 10 mL, PRN      Today I personally reviewed pertinent medications, lines/drains/airways, imaging, cardiology results, laboratory results, notably: Post-op CTH    Diet  Diet Adult Regular (IDDSI Level 7) Standard Tray  Diet Adult Regular  Diet Adult Regular (IDDSI Level 7) Standard Tray  Diet Adult Regular

## 2023-12-19 NOTE — PROGRESS NOTES
[facility] Critical [facility]  Neurosurgery  Progress Note    Subjective:     History of Present Illness: No notes on file    Post-Op Info:  Procedure(s) (LRB):  FENESTRATION, CYST, ARACHNOID, ENDOSCOPIC- terence hole for colloid cyst resection and septostomy (N/A)   1 Day Post-Op   Interval History: 12/19/2023: POD 1 s/p endoscopic third ventricular colloid cyst fenestration and resection. NAEON. AFVSS. Exam stable. Pain controlled. Tolerating PO. Voiding spontaneously. Passing flatus. Postop CTH with expected changes.    Medications:  Continuous Infusions:   nicardipine Stopped (12/18/23 1758)     Scheduled Meds:   acetaminophen  1,000 mg Oral Q8H    cefTRIAXone (ROCEPHIN) IVPB  2 g Intravenous Q12H    cetirizine  10 mg Oral Daily    enoxparin  40 mg Subcutaneous Daily    fluticasone propionate  1 spray Each Nostril Daily    levothyroxine  25 mcg Oral Daily    lisinopriL-hydrochlorothiazide  2 tablet Oral Daily    mupirocin   Nasal BID    pantoprazole  40 mg Oral Daily    polyethylene glycol  17 g Oral Daily    potassium chloride  10 mEq Intravenous Q1H    potassium chloride  40 mEq Oral Once    senna-docusate 8.6-50 mg  2 tablet Oral BID     PRN Meds:hydrALAZINE, HYDROmorphone, HYDROmorphone, methocarbamoL, metoclopramide, ondansetron, oxyCODONE, prochlorperazine, simethicone, sodium chloride 0.9%     Review of Systems  Objective:     Weight: 61.6 kg (135 lb 12.9 oz)  Body mass index is 22.6 kg/m².  Vital Signs (Most Recent):  Temp: 97.9 °F (36.6 °C) (12/19/23 0701)  Pulse: 76 (12/19/23 0717)  Resp: 18 (12/19/23 0717)  BP: 110/73 (12/19/23 0701)  SpO2: 99 % (12/19/23 0717) Vital Signs (24h Range):  Temp:  [97.9 °F (36.6 °C)-99 °F (37.2 °C)] 97.9 °F (36.6 °C)  Pulse:  [69-96] 76  Resp:  [10-26] 18  SpO2:  [92 %-99 %] 99 %  BP: (102-174)/(56-91) 110/73  Arterial Line BP: (126-161)/(68-87) 129/72                                 Physical Exam         Neurosurgery Physical Exam    GENERAL: resting comfortably  HEENT:  "NCAT, PERRL, mucous membranes moist  NECK: supple, trachea midline  CV: normal capillary refill  PULM: aerating well, symmetric expansion, no distress  ABD: soft, NT, ND  EXT: no c/c/e    NEURO:    AAO x 3  CN II-XII grossly intact  Fc x 4 antigravity  SILT    No drift or dysmetria    Cranial dressing cdi    Significant Labs:  Recent Labs   Lab 12/18/23  1514 12/19/23  0522   * 108    137   K 3.1* 3.1*    105   CO2 20* 18*   BUN 15 10   CREATININE 0.7 0.7   CALCIUM 8.2* 8.0*   MG 1.6 1.6     Recent Labs   Lab 12/18/23  1514 12/19/23  0522   WBC 6.70 10.51   HGB 12.4 12.1   HCT 35.6* 34.6*    229     No results for input(s): "LABPT", "INR", "APTT" in the last 48 hours.  Microbiology Results (last 7 days)       ** No results found for the last 168 hours. **          All pertinent labs from the last 24 hours have been reviewed.    Significant Diagnostics:  I have reviewed all pertinent imaging results/findings within the past 24 hours.  CT Head Without Contrast    Result Date: 12/18/2023  Postoperative changes from right frontal approach surgery for colloid cyst.  No evidence of postoperative complications. Electronically signed by: Eric Martin MD Date:    12/18/2023 Time:    16:34   Assessment/Plan:     * Colloid cyst of brain  58F w/ PMH of HTN, hypothyroid, GERD  who presents with 3rd ventricular colloid cyst now s/p endoscopic cyst fenestration and resection on 12/18:    --Patient admitted to [facility] on telemetry      -q1h neurochecks in ICU, q2h neurochecks in stepdown, q4h neurochecks on floor  --All labs and diagnostics reviewed  --Postop CTH with expected changes.  --SBP <160  --Na >135  --PT/OT/OOB  --ADAT  --Pain control  --TEDs/SCDs/LVX  --Progressing well; anticipate d/c home today after electrolyte repletion and PT/OT eval.  --Continue to monitor clinically, notify NSGY immediately with any changes in neuro status    Dispo: anticipate home    Plan d/w Dr. Alamo.          Nemesio GILLETTE" MD Mary  Neurosurgery  [facility] - Neuro Critical Care

## 2023-12-19 NOTE — DISCHARGE SUMMARY
[facility]  Neurosurgery  Discharge Summary      Patient Name: Marylin Morel  MRN: [id number]  Admission Date: 12/18/2023  Hospital Length of Stay: 1 days  Discharge Date and Time:  12/19/2023 7:55 AM  Attending Physician: Clement Alamo MD   Discharging Provider: Nemesio Hernandez MD  Primary Care Provider: Gabriel Mccabe MD    HPI:   No notes on file    Procedure(s) (LRB):  FENESTRATION, CYST, ARACHNOID, ENDOSCOPIC- terence hole for colloid cyst resection and septostomy (N/A)     Hospital Course: 12/19/2023: POD 1 s/p endoscopic third ventricular colloid cyst fenestration and resection. NAEON. AFVSS. Exam stable. Pain controlled. Tolerating PO. Voiding spontaneously. Passing flatus. Postop CTH with expected changes.    Goals of Care Treatment Preferences:         Consults:   Consults (From admission, onward)          Status Ordering Provider     Inpatient consult to Social Work  Once        Provider:  (Not yet assigned)    Ordered NEMESIO HERNANDEZ            Significant Diagnostic Studies: N/A  CT Head Without Contrast    Result Date: 12/18/2023  Postoperative changes from right frontal approach surgery for colloid cyst.  No evidence of postoperative complications. Electronically signed by: Eric Martin MD Date:    12/18/2023 Time:    16:34     Pending Diagnostic Studies:       Procedure Component Value Units Date/Time    Specimen to Pathology, Surgery Neurosurgery [[id number]] Collected: 12/18/23 1410    Order Status: Sent Lab Status: In process Updated: 12/18/23 2224    Specimen: Tissue           Final Active Diagnoses:    Diagnosis Date Noted POA    PRINCIPAL PROBLEM:  Colloid cyst of brain [Q04.6] 12/18/2023 Not Applicable    Hypothyroidism [E03.9] 12/27/2013 Yes    Essential hypertension, benign [I10] 10/02/2013 Yes      Problems Resolved During this Admission:    Diagnosis Date Noted Date Resolved POA    Arachnoid cyst [G93.0] 12/18/2023 12/18/2023 Yes      Discharged Condition:  stable     Disposition: Home or Self Care    Follow Up:   Follow-up Information       Clement Alamo MD Follow up on 1/4/2024.    Specialty: Neurosurgery  Why: For wound re-check  Contact information:  [address]  [facility] [address] [phone]                           Patient Instructions:      Diet Adult Regular     Notify your health care provider if you experience any of the following:  temperature >100.4     Notify your health care provider if you experience any of the following:  persistent nausea and vomiting or diarrhea     Notify your health care provider if you experience any of the following:  severe uncontrolled pain     Notify your health care provider if you experience any of the following:  redness, tenderness, or signs of infection (pain, swelling, redness, odor or green/yellow discharge around incision site)     Notify your health care provider if you experience any of the following:  difficulty breathing or increased cough     Notify your health care provider if you experience any of the following:  severe persistent headache     Notify your health care provider if you experience any of the following:  worsening rash     Notify your health care provider if you experience any of the following:  persistent dizziness, light-headedness, or visual disturbances     Notify your health care provider if you experience any of the following:  increased confusion or weakness     Remove dressing in 48 hours     Activity as tolerated     Medications:  Reconciled Home Medications:      Medication List        START taking these medications      ondansetron 4 MG tablet  Commonly known as: ZOFRAN  Take 1 tablet (4 mg total) by mouth every 8 (eight) hours as needed for Nausea.     oxyCODONE-acetaminophen 5-325 mg per tablet  Commonly known as: PERCOCET  Take 1 tablet by mouth every 4 (four) hours as needed for Pain.     senna-docusate 8.6-50 mg 8.6-50 mg per tablet  Commonly known as: SENNA WITH DOCUSATE  SODIUM  Take 2 tablets by mouth once daily.            CONTINUE taking these medications      cetirizine 10 MG tablet  Commonly known as: ZYRTEC  Take 10 mg by mouth once daily.     COMBIPATCH 0.05-0.14 mg/24 hr  Generic drug: estradiol-norethindrone     diazePAM 5 MG tablet  Commonly known as: VALIUM  TAKE 1 TABLET BY MOUTH 30 MINUTES PRIOR TO TEST AND TAKE OTHER AS NEEDED     EPINEPHrine 0.3 mg/0.3 mL Atin  Commonly known as: EPIPEN  Inject 0.3 mLs (0.3 mg total) into the muscle once. for 1 dose     fish oil-omega-3 fatty acids 300-1,000 mg capsule  Take by mouth once daily.     fluticasone propionate 50 mcg/actuation nasal spray  Commonly known as: FLONASE  INSTILL ONE SPRAY INTO EACH NOSTRIL ONCE DAILY     levothyroxine 25 MCG tablet  Commonly known as: SYNTHROID  Take 1 tablet (25 mcg total) by mouth once daily.     lisinopriL-hydrochlorothiazide 20-25 mg Tab  Commonly known as: PRINZIDE,ZESTORETIC  Take 1 tablet by mouth once daily.     methocarbamoL 500 MG Tab  Commonly known as: ROBAXIN  Take 1 tablet (500 mg total) by mouth 2 (two) times daily as needed.     multivitamin capsule  Take 1 capsule by mouth once daily.     olopatadine 0.2 % Drop  Commonly known as: PATADAY     omeprazole 40 MG capsule  Commonly known as: PRILOSEC  Take 1 capsule (40 mg total) by mouth once daily.     simethicone 125 mg Cap capsule  Commonly known as: GAS-X EXTRA STRENGTH  Take 1 capsule (125 mg total) by mouth 4 (four) times daily as needed for Flatulence.     tiZANidine 4 MG tablet  Commonly known as: ZANAFLEX  Take 4 mg by mouth 2 (two) times daily as needed.     vitamin D 1000 units Tab  Commonly known as: VITAMIN D3  Take 1,000 Units by mouth once daily.            STOP taking these medications      ibuprofen 800 MG tablet  Commonly known as: ADVILDAYNE MD  Neurosurgery  [facility] - Neuro Critical Care

## 2023-12-19 NOTE — PLAN OF CARE
[facility] Neuro Critical [facility]  Discharge Final Note    Primary Care Provider: Gabriel Mccabe MD    Expected Discharge Date: 12/19/2023    Patient discharged to home with no post acute needs per MD,    Final Discharge Note (most recent)       Final Note - 12/19/23 1307          Final Note    Assessment Type Final Discharge Note     Anticipated Discharge Disposition Home or Self Care     What phone number can be called within the next 1-3 days to see how you are doing after discharge? [phone]     Hospital Resources/Appts/Education Provided Appointments scheduled and added to AVS                     Important Message from Medicare  n/a             Contact Info       Clement Alamo MD   Specialty: Neurosurgery    [address]  [place] [address]   Phone: [phone]       Next Steps: Follow up on 1/4/2024    Instructions: For wound re-check    Jf Quiñonez NP   Specialty: Internal Medicine    [address] [facility] [address]   Phone: [phone]       Next Steps: Go on 12/26/2023    Instructions: Hospital follow up at 11 am          Discharge Plan A and Plan B have been determined by review of patient's clinical status, future medical and therapeutic needs, and coverage/benefits for post-acute care in coordination with multidisciplinary team members.      Shantelle Leon, RN, CCRN-K, [facility]  Neuro-Critical Care   X [phone]

## 2023-12-19 NOTE — PLAN OF CARE
12/19/23 0841   Post-Acute Status   Post-Acute Authorization Other   Other Status No Post-Acute Service Needs   Discharge Delays None known at this time   Discharge Plan   Discharge Plan A Home with family     Patient cleared for discharge from case management standpoint.      Chart and discharge orders reviewed.  Patient discharged home with no further case management needs.    NIKOLAI Small - [facility]  [phone]

## 2023-12-19 NOTE — PROGRESS NOTES
[facility] - Neuro Critical Care  Neurocritical Care  Progress Note    Admit Date: 12/18/2023  Service Date: 12/19/2023  Length of Stay: 1    Subjective:     Chief Complaint: Colloid cyst of brain    History of Present Illness: Marylin Morel is a 58 year old female with a medical history significant for HTN and a known colloid cyst who is admitted to [facility] for post operative monitoring following endoscopic colloid cyst resection and fenestration.    Hospital Course: 12/19/2023 S/p endoscopic colloid cyst resection and fenestration. NAEON. No issues walking around unit w/ nursing staff, no need for PT/OT eval. Stable for discharge home today once K+ replaced per NSGY.    Interval History:  S/p endoscopic colloid cyst resection and fenestration. NAEON. No issues walking around unit w/ nursing staff, no need for PT/OT eval. Stable for discharge home today once K+ replaced per NSGY.    Review of Systems   Respiratory:  Negative for cough and shortness of breath.    Cardiovascular:  Negative for chest pain.   Gastrointestinal:  Negative for abdominal pain, nausea and vomiting.   Genitourinary:  Negative for difficulty urinating.   Musculoskeletal:  Negative for neck pain.   Neurological:  Positive for headaches (controlled w/ PRNs). Negative for weakness, light-headedness and numbness.     Objective:     Vitals:  Temp: 100 °F (37.8 °C)  Pulse: 84  Rhythm: normal sinus rhythm  BP: 100/79  MAP (mmHg): 87  Resp: (!) 22  SpO2: 98 %    Temp  Min: 97.9 °F (36.6 °C)  Max: 100 °F (37.8 °C)  Pulse  Min: 69  Max: 96  BP  Min: 100/79  Max: 154/76  MAP (mmHg)  Min: 76  Max: 108  Resp  Min: 10  Max: 30  SpO2  Min: 92 %  Max: 100 %    12/18 0701 - 12/19 0700  In: 2125.3 [I.V.:25.3]  Out: 550 [Urine:550]   Unmeasured Output  Urine Occurrence: 1  Stool Occurrence: 1        Physical Exam  Vitals and nursing note reviewed.   Constitutional:       Appearance: Normal appearance.   HENT:      Head: Normocephalic and atraumatic.   Eyes:       Extraocular Movements: Extraocular movements intact.      Pupils: Pupils are equal, round, and reactive to light.   Cardiovascular:      Rate and Rhythm: Normal rate and regular rhythm.      Pulses: Normal pulses.      Heart sounds: Normal heart sounds.   Pulmonary:      Effort: Pulmonary effort is normal.      Breath sounds: Normal breath sounds.   Abdominal:      General: Abdomen is flat. Bowel sounds are normal.      Palpations: Abdomen is soft.   Musculoskeletal:         General: Normal range of motion.      Cervical back: Normal range of motion and neck supple.   Skin:     General: Skin is warm and dry.      Capillary Refill: Capillary refill takes less than 2 seconds.   Neurological:      Mental Status: She is alert and oriented to person, place, and time.      GCS: GCS eye subscore is 4. GCS verbal subscore is 5. GCS motor subscore is 6.      Cranial Nerves: Cranial nerves 2-12 are intact.      Sensory: Sensation is intact.      Motor: Motor function is intact.      Coordination: Coordination is intact.      Gait: Gait is intact.      Comments:   -E4 V5 M6  -PERRL  -Oriented to person, place, time, and situation  -Follows commands in all four extremities  -BAILEY equally  -5/5 strength throughout            Medications:  Continuous Scheduledacetaminophen, 1,000 mg, Q8H  cefTRIAXone (ROCEPHIN) IVPB, 2 g, Q12H  cetirizine, 10 mg, Daily  enoxparin, 40 mg, Daily  fluticasone propionate, 1 spray, Daily  levothyroxine, 25 mcg, Daily  lisinopriL-hydrochlorothiazide, 2 tablet, Daily  mupirocin, , BID  pantoprazole, 40 mg, Daily  polyethylene glycol, 17 g, Daily  senna-docusate 8.6-50 mg, 2 tablet, BID    PRNhydrALAZINE, 10 mg, Q6H PRN  HYDROmorphone, 1 mg, Q6H PRN  methocarbamoL, 1,000 mg, BID PRN  metoclopramide, 5 mg, Q6H PRN  ondansetron, 4 mg, Q12H PRN  oxyCODONE, 5 mg, Q3H PRN  simethicone, 1 tablet, TID PRN  sodium chloride 0.9%, 10 mL, PRN      Today I personally reviewed pertinent medications,  lines/drains/airways, imaging, cardiology results, laboratory results, notably: Post-op CTH    Diet  Diet Adult Regular (IDDSI Level 7) Standard Tray  Diet Adult Regular  Diet Adult Regular (IDDSI Level 7) Standard Tray  Diet Adult Regular    Assessment/Plan:     Neuro  * Colloid cyst of brain  59 y/o F with known colloid cyst admitted to [facility] for post operative monitoring following endoscopic colloid cyst resection and fenestration.    -Admitted to [facility]  -Post-op CTH w/ expectee changes  -No issues walking unit w/ RN; no need for PT/OT  -Repeat K+ 3.8 following replacement  -Stable for discharge home per NSGY    Cardiac/Vascular  Essential hypertension, benign  -SBP goal <150  -Continue home lisinopril-HCTZ    Endocrine  Hypothyroidism  -Continue home levothyroxine          The patient is being Prophylaxed for:  Venous Thromboembolism with: Mechanical or Chemical  Stress Ulcer with: Not Applicable   Ventilator Pneumonia with: not applicable    Activity Orders            Diet Adult Regular (IDDSI Level 7) Standard Tray: Regular starting at 12/18 1429          No Order    Martha Zapata NP  Neurocritical Care  [facility] - Neuro Critical Care

## 2023-12-19 NOTE — SUBJECTIVE & OBJECTIVE
Interval History: 12/19/2023: POD 1 s/p endoscopic third ventricular colloid cyst fenestration and resection. NAEON. AFVSS. Exam stable. Pain controlled. Tolerating PO. Voiding spontaneously. Passing flatus. Postop CTH with expected changes.    Medications:  Continuous Infusions:   nicardipine Stopped (12/18/23 1758)     Scheduled Meds:   acetaminophen  1,000 mg Oral Q8H    cefTRIAXone (ROCEPHIN) IVPB  2 g Intravenous Q12H    cetirizine  10 mg Oral Daily    enoxparin  40 mg Subcutaneous Daily    fluticasone propionate  1 spray Each Nostril Daily    levothyroxine  25 mcg Oral Daily    lisinopriL-hydrochlorothiazide  2 tablet Oral Daily    mupirocin   Nasal BID    pantoprazole  40 mg Oral Daily    polyethylene glycol  17 g Oral Daily    potassium chloride  10 mEq Intravenous Q1H    potassium chloride  40 mEq Oral Once    senna-docusate 8.6-50 mg  2 tablet Oral BID     PRN Meds:hydrALAZINE, HYDROmorphone, HYDROmorphone, methocarbamoL, metoclopramide, ondansetron, oxyCODONE, prochlorperazine, simethicone, sodium chloride 0.9%     Review of Systems  Objective:     Weight: 61.6 kg (135 lb 12.9 oz)  Body mass index is 22.6 kg/m².  Vital Signs (Most Recent):  Temp: 97.9 °F (36.6 °C) (12/19/23 0701)  Pulse: 76 (12/19/23 0717)  Resp: 18 (12/19/23 0717)  BP: 110/73 (12/19/23 0701)  SpO2: 99 % (12/19/23 0717) Vital Signs (24h Range):  Temp:  [97.9 °F (36.6 °C)-99 °F (37.2 °C)] 97.9 °F (36.6 °C)  Pulse:  [69-96] 76  Resp:  [10-26] 18  SpO2:  [92 %-99 %] 99 %  BP: (102-174)/(56-91) 110/73  Arterial Line BP: (126-161)/(68-87) 129/72                                 Physical Exam         Neurosurgery Physical Exam    GENERAL: resting comfortably  HEENT: NCAT, PERRL, mucous membranes moist  NECK: supple, trachea midline  CV: normal capillary refill  PULM: aerating well, symmetric expansion, no distress  ABD: soft, NT, ND  EXT: no c/c/e    NEURO:    AAO x 3  CN II-XII grossly intact  Fc x 4 antigravity  SILT    No drift or  "dysmetria    Cranial dressing cdi    Significant Labs:  Recent Labs   Lab 12/18/23  1514 12/19/23  0522   * 108    137   K 3.1* 3.1*    105   CO2 20* 18*   BUN 15 10   CREATININE 0.7 0.7   CALCIUM 8.2* 8.0*   MG 1.6 1.6     Recent Labs   Lab 12/18/23  1514 12/19/23  0522   WBC 6.70 10.51   HGB 12.4 12.1   HCT 35.6* 34.6*    229     No results for input(s): "LABPT", "INR", "APTT" in the last 48 hours.  Microbiology Results (last 7 days)       ** No results found for the last 168 hours. **          All pertinent labs from the last 24 hours have been reviewed.    Significant Diagnostics:  I have reviewed all pertinent imaging results/findings within the past 24 hours.  CT Head Without Contrast    Result Date: 12/18/2023  Postoperative changes from right frontal approach surgery for colloid cyst.  No evidence of postoperative complications. Electronically signed by: Eric Martin MD Date:    12/18/2023 Time:    16:34   "

## 2023-12-19 NOTE — PLAN OF CARE
"[facility] Care Plan    POC reviewed with Marylin Morel and family at 2200. Pt verbalized understanding. Questions and concerns addressed. No acute events overnight. Pt progressing toward goals. Will continue to monitor. See below and flowsheets for full assessment and VS info.     Pain treated PRN  A-line and smallwood removed  Pt up to bathroom with standby assist, no issues  Pt normotensive    Is this a stroke patient? no    Neuro:  [place] Coma Scale  Best Eye Response: 4-->(E4) spontaneous  Best Motor Response: 6-->(M6) obeys commands  Best Verbal Response: 5-->(V5) oriented  [place] Coma Scale Score: 15  Pupil PERRLA: yes     24hr Temp:  [98.1 °F (36.7 °C)-99 °F (37.2 °C)]     CV:   Rhythm: normal sinus rhythm  BP goals:   SBP <  150  MAP > 65    Resp:           Plan: N/A    GI/:     Diet/Nutrition Received: regular  Last Bowel Movement: 12/18/23  Voiding Characteristics: urethral catheter (bladder)    Intake/Output Summary (Last 24 hours) at 12/19/2023 0631  Last data filed at 12/18/2023 2101  Gross per 24 hour   Intake 2125.3 ml   Output 550 ml   Net 1575.3 ml     Unmeasured Output  Urine Occurrence: 1    Labs/Accuchecks:  Recent Labs   Lab 12/19/23  0522   WBC 10.51   RBC 3.52*   HGB 12.1   HCT 34.6*         Recent Labs   Lab 12/18/23  1514      K 3.1*   CO2 20*      BUN 15   CREATININE 0.7   ALKPHOS 46*   ALT 23   AST 24   BILITOT 0.5    No results for input(s): "PROTIME", "INR", "APTT", "HEPANTIXA" in the last 168 hours. No results for input(s): "CPK", "CPKMB", "TROPONINI", "MB" in the last 168 hours.    Electrolytes: No replacement orders  Accuchecks: none    Gtts:   nicardipine Stopped (12/18/23 1758)       LDA/Wounds:  Lines/Drains/Airways       Peripheral Intravenous Line  Duration                  Peripheral IV - Single Lumen 12/18/23 1045 20 G Left;Posterior Forearm <1 day         Peripheral IV - Single Lumen 12/18/23 1210 18 G Right Forearm <1 day                  Wounds: No; terence " hole site  Wound care consulted: No

## 2023-12-19 NOTE — ASSESSMENT & PLAN NOTE
57 y/o F with known colloid cyst admitted to [facility] for post operative monitoring following endoscopic colloid cyst resection and fenestration.    -Admitted to [facility]  -Post-op CTH w/ expectee changes  -No issues walking unit w/ RN; no need for PT/OT  -Repeat K+ 3.8 following replacement  -Stable for discharge home per NSGY

## 2023-12-19 NOTE — NURSING
RN removed PIVs and went over discharge paperwork. No further questions at this time.  to take patient to car via wheelchair for discharge home.

## 2023-12-19 NOTE — HOSPITAL COURSE
12/19/2023: POD 1 s/p endoscopic third ventricular colloid cyst fenestration and resection. NAEON. AFVSS. Exam stable. Pain controlled. Tolerating PO. Voiding spontaneously. Passing flatus. Postop CTH with expected changes.

## 2023-12-19 NOTE — DISCHARGE INSTRUCTIONS
--Patient stable for discharge to home    --Please take prescriptions as detailed in medication list    --All questions/concerns addressed and answered    --Please followup with neurosurgery clinic on 1/4/24; to be arranged by Neurosurgery Clinic     --Continued therapy in home setting 3x weekly    --Please call immediately for any new onset nausea/vomiting/fever/chills, wound breakdown, numbness/tingling/weakness        Wound Care Instructions:  -If you have any dressings at discharge, please remove 48 hours after the surgery.  -If you have dissolvable sutures and glue over your incisions; do not pick at them; they will dissolve over a couple of weeks.  -If you have steri strips, do not remove, as they will fall off.  -If you have staples, do not remove, as they will be removed at clinic follow up.  -You may shower daily but do not soak or submerge wound in water.  -Scalp/head incisions, wash hair daily with baby shampoo and do not use hair products. Pat incision dry, do not rub.  -For head incisions, do not wear scarfs or hats.  -Keep all wounds clean, dry, and open to air.  -Do not apply creams or ointments to the wound.  -No driving while on narcotic pain medications  -Call Neurosurgery if the wound opens, drains, or becomes red

## 2023-12-19 NOTE — ASSESSMENT & PLAN NOTE
58F w/ PMH of HTN, hypothyroid, GERD  who presents with 3rd ventricular colloid cyst now s/p endoscopic cyst fenestration and resection on 12/18:    --Patient admitted to [facility] on telemetry      -q1h neurochecks in ICU, q2h neurochecks in stepdown, q4h neurochecks on floor  --All labs and diagnostics reviewed  --Postop CTH with expected changes.  --SBP <160  --Na >135  --PT/OT/OOB  --ADAT  --Pain control  --TEDs/SCDs/LVX  --Progressing well; anticipate d/c home today after electrolyte repletion and PT/OT eval.  --Continue to monitor clinically, notify NSGY immediately with any changes in neuro status    Dispo: anticipate home    Plan d/w Dr. Alamo.

## 2023-12-20 ENCOUNTER — PATIENT OUTREACH (OUTPATIENT)
Dept: ADMINISTRATIVE | Facility: CLINIC | Age: 58
End: 2023-12-20
Payer: COMMERCIAL

## 2023-12-20 ENCOUNTER — PATIENT MESSAGE (OUTPATIENT)
Dept: NEUROSURGERY | Facility: CLINIC | Age: 58
End: 2023-12-20
Payer: COMMERCIAL

## 2023-12-20 NOTE — OP NOTE
[facility] - Neuro Critical Care  Neurosurgery  Operative Note    OP Note      Date of Procedure: 12/18/2023       Pre-Operative Diagnosis: Colloid cyst of brain [Q04.6]  Obstructive hydrocephalus [G91.1]    Post-Operative Diagnosis: Post-Op Diagnosis Codes:     * Colloid cyst of brain [Q04.6]     * Obstructive hydrocephalus [G91.1]    Anesthesia: General    Procedures performed:  1.  A right frontal approach for resection of 3rd ventricular colloid cyst in tumor 2. Endoscopic septostomy 3. Use of neuro navigation 4.  Use of neuro endoscopy      Surgeon: Clement Alamo MD    Assistant::  Nemesio Hernandez MD    Indication for Procedure:  This is a 58-year-old female who was found to have partial obstructive hydrocephalus was found to have a colloid cyst tumor in the 3rd ventricle at the foramen of Monro blocking the CSF flow.    Operative Note:  Patient undergone a neuro navigation scan preoperatively.  Patient was anesthetized intubated by anesthesia.  Was placed in [place] head pin in a supine position.  Using navigation we identified the ideal trajectory point.  The right frontal area was shaved prepped and draped in sterile fashion.  We carried out a vertical incision over that area.  We placed a self-retaining retractor in place.  We placed a bur hole.  The dura was opened.  We used navigation to introduce it into introducer sheath into the frontal horn.  Once we were able to get that into position.  We used a 30 degree scope to navigate down with constant irrigation.  We are able to get that to see the septum pellucidum.  We used a bipolar to make a opening through the septum pellucidum in across the contralateral side opening the septum.  Once that happened we then turned our attention to the foramen of Monro navigated down found the lesion at the foramen of Monro in the 3rd ventricle.  We used a cup forceps to make a hole in the wall in we sent a piece of specimen for that.  We then used aspiration device to be  able to aspirate the contents inside the cyst.  Once we decompress it weeks do bipolar the choroid plexus around the gently in piecemeal remove the tumor lesion.  There was some venous oozing which we controlled with bipolar as well as with irrigation in pressure.  After fair amount irrigation in hemostasis we felt we did not need to leave in a drain.  We saw the the lesion was resected we saw good view into the into the 3rd ventricle.  We removed the introducer sheath endoscope placed a piece of Gelfoam at the entry point and then closed the scalp wound in layers a sterile dressing was put in place patient was extubated brought to the ICU without any problems or complication.    EBL:  None  Specimen Sent:  3rd ventricular tumor

## 2023-12-20 NOTE — PROGRESS NOTES
C3 nurse spoke with Marylin Morel for a TCC post hospital discharge follow up call. The patient has a scheduled HOSFU appointment with Jf Quiñonez NP on 12/26/23 @ 1100.

## 2023-12-21 LAB
FINAL PATHOLOGIC DIAGNOSIS: NORMAL
GROSS: NORMAL
Lab: NORMAL
MICROSCOPIC EXAM: NORMAL

## 2023-12-22 LAB
BLD PROD TYP BPU: NORMAL
BLD PROD TYP BPU: NORMAL
BLOOD UNIT EXPIRATION DATE: NORMAL
BLOOD UNIT EXPIRATION DATE: NORMAL
BLOOD UNIT TYPE CODE: 6200
BLOOD UNIT TYPE CODE: 6200
BLOOD UNIT TYPE: NORMAL
BLOOD UNIT TYPE: NORMAL
CODING SYSTEM: NORMAL
CODING SYSTEM: NORMAL
CROSSMATCH INTERPRETATION: NORMAL
CROSSMATCH INTERPRETATION: NORMAL
DISPENSE STATUS: NORMAL
DISPENSE STATUS: NORMAL
TRANS ERYTHROCYTES VOL PATIENT: NORMAL ML
TRANS ERYTHROCYTES VOL PATIENT: NORMAL ML

## 2023-12-26 ENCOUNTER — OFFICE VISIT (OUTPATIENT)
Dept: FAMILY MEDICINE | Facility: CLINIC | Age: 58
End: 2023-12-26
Payer: COMMERCIAL

## 2023-12-26 VITALS
HEART RATE: 77 BPM | OXYGEN SATURATION: 96 % | SYSTOLIC BLOOD PRESSURE: 102 MMHG | HEIGHT: 65 IN | BODY MASS INDEX: 22.51 KG/M2 | DIASTOLIC BLOOD PRESSURE: 80 MMHG | WEIGHT: 135.13 LBS

## 2023-12-26 DIAGNOSIS — Q04.6 COLLOID CYST OF BRAIN: Primary | ICD-10-CM

## 2023-12-26 DIAGNOSIS — E78.00 HYPERCHOLESTEREMIA: ICD-10-CM

## 2023-12-26 PROCEDURE — 99213 PR OFFICE/OUTPT VISIT, EST, LEVL III, 20-29 MIN: ICD-10-PCS | Mod: S$GLB,,, | Performed by: NURSE PRACTITIONER

## 2023-12-26 PROCEDURE — 1159F PR MEDICATION LIST DOCUMENTED IN MEDICAL RECORD: ICD-10-PCS | Mod: CPTII,S$GLB,, | Performed by: NURSE PRACTITIONER

## 2023-12-26 PROCEDURE — 4010F ACE/ARB THERAPY RXD/TAKEN: CPT | Mod: CPTII,S$GLB,, | Performed by: NURSE PRACTITIONER

## 2023-12-26 PROCEDURE — 3079F DIAST BP 80-89 MM HG: CPT | Mod: CPTII,S$GLB,, | Performed by: NURSE PRACTITIONER

## 2023-12-26 PROCEDURE — 4010F PR ACE/ARB THEARPY RXD/TAKEN: ICD-10-PCS | Mod: CPTII,S$GLB,, | Performed by: NURSE PRACTITIONER

## 2023-12-26 PROCEDURE — 1159F MED LIST DOCD IN RCRD: CPT | Mod: CPTII,S$GLB,, | Performed by: NURSE PRACTITIONER

## 2023-12-26 PROCEDURE — 3008F PR BODY MASS INDEX (BMI) DOCUMENTED: ICD-10-PCS | Mod: CPTII,S$GLB,, | Performed by: NURSE PRACTITIONER

## 2023-12-26 PROCEDURE — 3079F PR MOST RECENT DIASTOLIC BLOOD PRESSURE 80-89 MM HG: ICD-10-PCS | Mod: CPTII,S$GLB,, | Performed by: NURSE PRACTITIONER

## 2023-12-26 PROCEDURE — 99213 OFFICE O/P EST LOW 20 MIN: CPT | Mod: S$GLB,,, | Performed by: NURSE PRACTITIONER

## 2023-12-26 PROCEDURE — 99999 PR PBB SHADOW E&M-EST. PATIENT-LVL III: ICD-10-PCS | Mod: PBBFAC,,, | Performed by: NURSE PRACTITIONER

## 2023-12-26 PROCEDURE — 1111F PR DISCHARGE MEDS RECONCILED W/ CURRENT OUTPATIENT MED LIST: ICD-10-PCS | Mod: CPTII,S$GLB,, | Performed by: NURSE PRACTITIONER

## 2023-12-26 PROCEDURE — 3074F SYST BP LT 130 MM HG: CPT | Mod: CPTII,S$GLB,, | Performed by: NURSE PRACTITIONER

## 2023-12-26 PROCEDURE — 3074F PR MOST RECENT SYSTOLIC BLOOD PRESSURE < 130 MM HG: ICD-10-PCS | Mod: CPTII,S$GLB,, | Performed by: NURSE PRACTITIONER

## 2023-12-26 PROCEDURE — 99999 PR PBB SHADOW E&M-EST. PATIENT-LVL III: CPT | Mod: PBBFAC,,, | Performed by: NURSE PRACTITIONER

## 2023-12-26 PROCEDURE — 3008F BODY MASS INDEX DOCD: CPT | Mod: CPTII,S$GLB,, | Performed by: NURSE PRACTITIONER

## 2023-12-26 PROCEDURE — 1111F DSCHRG MED/CURRENT MED MERGE: CPT | Mod: CPTII,S$GLB,, | Performed by: NURSE PRACTITIONER

## 2023-12-26 NOTE — PROGRESS NOTES
This dictation has been generated using Modal Fluency Dictation some phonetic errors may occur. Please contact author for clarification if needed.     Problem List Items Addressed This Visit       Colloid cyst of brain - Primary     Other Visit Diagnoses       Hypercholesteremia                   Follow up with Neurosurgery for clarification of restart of ibuprofen.   Patient Instructions   Fish oil, flax seed, and Krill.      Follow up if symptoms worsen or fail to improve.    ________________________________________________________________  ________________________________________________________________      Chief Complaint   Patient presents with    Hospital Follow Up     History of present illness  This 58 y.o. presents today for complaint of hospital follow-up recent brain surgery.  Patient notes hyperlipidemia.  Reviewed labs with patient.  Discussed diet modifications.  A prior provider had mentioned a supplement to her I do not think she would benefit from niacin however unsure what that discussion was.  Did discuss fish all flaxseed oil and Krill with patient.    Musculoskeletal pain had been taking ibuprofen with help.  We had held ibuprofen for a week prior to surgery but patient notes discharge summary said stopped taking medication and she has not resumed.  Discussed follow-up with Neurosurgery regarding restarting ibuprofen which would be acceptable per my point of view.    Patient notes tic left eye.  Has been present for weeks.  Denies any weakness.  Ask if this is related to cyst or surgery.  Discussed with her this is a common issue and likely not but may discussed with neuro.  Usually stress related tic.  Limited review of systems negative  Past Medical History:   Diagnosis Date    Allergy     ants    GERD (gastroesophageal reflux disease)     Hypertension     Thyroid disease        Past Surgical History:   Procedure Laterality Date    AUGMENTATION OF BREAST      BREAST SURGERY      COSMETIC  SURGERY      ENDOSCOPIC FENESTRATION OF ARACHNOID CYST N/A 12/18/2023    Procedure: FENESTRATION, CYST, ARACHNOID, ENDOSCOPIC- terence hole for colloid cyst resection and septostomy;  Surgeon: Clement Alamo MD;  Location: [facility] OR [address] [place] [address];  Service: Neurosurgery;  Laterality: N/A;    hip replacement left Left     NECK SURGERY         Family History   Problem Relation Age of Onset    COPD Mother     Heart disease Father         MI    Hypertension Brother        Social History     Socioeconomic History    Marital status:    Occupational History     Employer: [employer]   Tobacco Use    Smoking status: Never    Smokeless tobacco: Never   Substance and Sexual Activity    Alcohol use: Yes     Alcohol/week: 1.0 standard drink of alcohol     Types: 1 Glasses of wine per week     Comment: 1 glass of wine daily    Drug use: No    Sexual activity: Yes     Partners: Male     Social Determinants of Health     Financial Resource Strain: Low Risk  (12/11/2023)    Overall Financial Resource Strain (CARDIA)     Difficulty of Paying Living Expenses: Not very hard   Food Insecurity: No Food Insecurity (12/11/2023)    Hunger Vital Sign     Worried About Running Out of Food in the Last Year: Never true     Ran Out of Food in the Last Year: Never true   Transportation Needs: No Transportation Needs (12/11/2023)    PRAPARE - Transportation     Lack of Transportation (Medical): No     Lack of Transportation (Non-Medical): No   Physical Activity: Unknown (12/19/2023)    Exercise Vital Sign     Days of Exercise per Week: 0 days   Stress: No Stress Concern Present (12/11/2023)    Citizen of [place] of Occupational Health - Occupational Stress Questionnaire     Feeling of Stress : Only a little   Social Connections: Unknown (12/11/2023)    Social Connection and Isolation Panel [NHANES]     Frequency of Communication with Friends and Family: Twice a week     Frequency of Social Gatherings with Friends and Family: Twice a week      Active Member of Clubs or Organizations: Yes     Attends Club or Organization Meetings: 1 to 4 times per year     Marital Status:    Housing Stability: High Risk (12/11/2023)    Housing Stability Vital Sign     Unable to Pay for Housing in the Last Year: Yes     Number of Places Lived in the Last Year: 1     Unstable Housing in the Last Year: No       Current Outpatient Medications   Medication Sig Dispense Refill    cetirizine (ZYRTEC) 10 MG tablet Take 10 mg by mouth once daily.      COMBIPATCH 0.05-0.14 mg/24 hr twice a week.      EPINEPHrine (EPIPEN) 0.3 mg/0.3 mL AtIn Inject 0.3 mLs (0.3 mg total) into the muscle once. for 1 dose 2 each 0    fluticasone propionate (FLONASE) 50 mcg/actuation nasal spray INSTILL ONE SPRAY INTO EACH NOSTRIL ONCE DAILY 16 g 11    levothyroxine (SYNTHROID) 25 MCG tablet Take 1 tablet (25 mcg total) by mouth once daily. 90 tablet 3    lisinopriL-hydrochlorothiazide (PRINZIDE,ZESTORETIC) 20-25 mg Tab Take 1 tablet by mouth once daily. 90 tablet 3    methocarbamoL (ROBAXIN) 500 MG Tab Take 1 tablet (500 mg total) by mouth 2 (two) times daily as needed. 60 tablet 0    multivitamin capsule Take 1 capsule by mouth once daily.      olopatadine (PATADAY) 0.2 % Drop Place into both eyes once daily.      omega-3 fatty acids/fish oil (FISH OIL-OMEGA-3 FATTY ACIDS) 300-1,000 mg capsule Take by mouth once daily.      omeprazole (PRILOSEC) 40 MG capsule Take 1 capsule (40 mg total) by mouth once daily. 90 capsule 3    ondansetron (ZOFRAN) 4 MG tablet Take 1 tablet (4 mg total) by mouth every 8 (eight) hours as needed for Nausea. 20 tablet 0    oxyCODONE-acetaminophen (PERCOCET) 5-325 mg per tablet Take 1 tablet by mouth every 4 (four) hours as needed for Pain. 30 tablet 0    senna-docusate 8.6-50 mg (SENNA WITH DOCUSATE SODIUM) 8.6-50 mg per tablet Take 2 tablets by mouth once daily. 20 tablet 0    simethicone (GAS-X EXTRA STRENGTH) 125 mg Cap capsule Take 1 capsule (125 mg total) by  "mouth 4 (four) times daily as needed for Flatulence. 30 capsule 1    tiZANidine (ZANAFLEX) 4 MG tablet Take 4 mg by mouth 2 (two) times daily as needed.      vitamin D (VITAMIN D3) 1000 units Tab Take 1,000 Units by mouth once daily.       No current facility-administered medications for this visit.       Review of patient's allergies indicates:   Allergen Reactions    Insect venom Anaphylaxis     Ant bites    Bactrim [sulfamethoxazole-trimethoprim] Blisters     Blisters/rash on torso and extremities        Physical examination  Vitals Reviewed  /80 (BP Location: Right arm, Patient Position: Sitting, BP Method: Medium (Manual))   Pulse 77   Ht 5' 5" (1.651 m)   Wt 61.3 kg (135 lb 2.3 oz)   LMP 02/05/2015 (Approximate)   SpO2 96%   BMI 22.49 kg/m²  Body mass index is 22.49 kg/m².     BP Readings from Last 3 Encounters:   12/26/23 102/80   12/19/23 100/79   12/01/23 130/80       Wt Readings from Last 3 Encounters:   12/26/23 61.3 kg (135 lb 2.3 oz)   12/18/23 61.6 kg (135 lb 12.9 oz)   12/01/23 61.6 kg (135 lb 12.9 oz)     Gen. Well-dressed well-nourished   Skin warm dry and intact.  No rashes noted.  HEENT.  TM intact bilateral with normal light reflex.  No mastoid tenderness during percussion.  Nares patent bilateral.  Pharynx is unremarkable.  No maxillary or frontal sinus tenderness when percussed.    Neck is supple without adenopathy  Chest.  Respirations are even unlabored.  Lungs are clear to auscultation.  Cardiac regular rate and rhythm.  No chest wall adenopathy noted.  Neuro. Awake alert oriented x4.  Normal judgment and cognition noted.  Extremities no clubbing cyanosis or edema noted.     Call or return to clinic prn if these symptoms worsen or fail to improve as anticipated.      "

## 2023-12-27 ENCOUNTER — PATIENT MESSAGE (OUTPATIENT)
Dept: NEUROSURGERY | Facility: CLINIC | Age: 58
End: 2023-12-27
Payer: COMMERCIAL

## 2024-01-04 ENCOUNTER — CLINICAL SUPPORT (OUTPATIENT)
Dept: NEUROSURGERY | Facility: CLINIC | Age: 59
End: 2024-01-04
Payer: COMMERCIAL

## 2024-01-04 VITALS — TEMPERATURE: 98 F

## 2024-01-04 DIAGNOSIS — Q04.6 COLLOID CYST OF BRAIN: Primary | ICD-10-CM

## 2024-01-04 PROCEDURE — 99999 PR PBB SHADOW E&M-EST. PATIENT-LVL I: CPT | Mod: PBBFAC,,,

## 2024-01-04 NOTE — PROGRESS NOTES
Patient seen in clinic for 2 week post op s/p terence hole for colloid cyst resection and septostomy with Dr Alamo 12/18/2023. Patient has no complaints. The headaches and dizziness pre-operatively have gone away.        Cranial incision assessed, dissolvable sutures used for closure, no redness, swelling, or drainage, edges well approximated.     Patient was instructed as follows:   Discontinue Bacitracin after tonight.  May shower normally but pat dry after shower.  Do not submerge wound in bath tub or go swimming until released by the physician  Keep incision clean, dry and open to air as much as possible.  Patient encouraged to walk as much as possible but advised to walk with family member or friend and rest as necessary.  No lifting >10lbs.    Patient verbalized understanding of all instructions.    All questions were answered. Patient will follow up with Dr. Alamo 01/30/2024 with MRI 1st.  Patient was encouraged to call clinic with any future concerns prior to follow up appt. If any worsening symptoms, patient should report to ED.       Olinda Howell, MSN, BSN, RN  Neurosurgery Nurse Navigator

## 2024-01-17 ENCOUNTER — PATIENT MESSAGE (OUTPATIENT)
Dept: NEUROSURGERY | Facility: CLINIC | Age: 59
End: 2024-01-17
Payer: COMMERCIAL

## 2024-01-30 ENCOUNTER — OFFICE VISIT (OUTPATIENT)
Dept: NEUROSURGERY | Facility: CLINIC | Age: 59
End: 2024-01-30
Payer: COMMERCIAL

## 2024-01-30 ENCOUNTER — HOSPITAL ENCOUNTER (OUTPATIENT)
Dept: RADIOLOGY | Facility: HOSPITAL | Age: 59
Discharge: HOME | End: 2024-01-30
Attending: PHYSICIAN ASSISTANT
Payer: COMMERCIAL

## 2024-01-30 VITALS — TEMPERATURE: 97 F

## 2024-01-30 DIAGNOSIS — Q04.6 COLLOID CYST OF BRAIN: Primary | ICD-10-CM

## 2024-01-30 DIAGNOSIS — G91.1 OBSTRUCTIVE HYDROCEPHALUS: ICD-10-CM

## 2024-01-30 DIAGNOSIS — Q04.6 COLLOID CYST OF BRAIN: ICD-10-CM

## 2024-01-30 PROCEDURE — 1159F MED LIST DOCD IN RCRD: CPT | Mod: CPTII,S$GLB,, | Performed by: NEUROLOGICAL SURGERY

## 2024-01-30 PROCEDURE — 99024 POSTOP FOLLOW-UP VISIT: CPT | Mod: S$GLB,,, | Performed by: NEUROLOGICAL SURGERY

## 2024-01-30 PROCEDURE — 70551 MRI BRAIN STEM W/O DYE: CPT | Mod: TC

## 2024-01-30 PROCEDURE — 99999 PR PBB SHADOW E&M-EST. PATIENT-LVL III: CPT | Mod: PBBFAC,,, | Performed by: NEUROLOGICAL SURGERY

## 2024-01-30 PROCEDURE — 70551 MRI BRAIN STEM W/O DYE: CPT | Mod: 26,,, | Performed by: RADIOLOGY

## 2024-01-30 RX ORDER — IBUPROFEN 200 MG
200 TABLET ORAL
Status: ON HOLD | COMMUNITY
End: 2024-05-17 | Stop reason: HOSPADM

## 2024-01-30 NOTE — PROGRESS NOTES
Neurosurgery  Established Patient  SCRIBE #1 NOTE: IBRIGETTE, am scribing for, and in the presence of,  Clement Alamo MD. I have scribed the entire note.        SUBJECTIVE:     History of Present Illness:  58-year-old female, with a PMHx of DDD, cervical radiculopathy, and colloid cyst of brain, presents for s/p endoscopic resection of colloid cyst and septostomy for obstructive hydrocephalus on 12/18/2023. Pathology is consistent with a colloid cyst.     Today pt reports that she is feeling fine overall since the operation. Her pre-op headaches are resolved.    Review of patient's allergies indicates:   Allergen Reactions    Insect venom Anaphylaxis     Ant bites    Bactrim [sulfamethoxazole-trimethoprim] Blisters     Blisters/rash on torso and extremities        Current Outpatient Medications   Medication Sig Dispense Refill    cetirizine (ZYRTEC) 10 MG tablet Take 10 mg by mouth once daily.      COMBIPATCH 0.05-0.14 mg/24 hr twice a week.      EPINEPHrine (EPIPEN) 0.3 mg/0.3 mL AtIn Inject 0.3 mLs (0.3 mg total) into the muscle once. for 1 dose 2 each 0    fluticasone propionate (FLONASE) 50 mcg/actuation nasal spray INSTILL ONE SPRAY INTO EACH NOSTRIL ONCE DAILY 16 g 11    levothyroxine (SYNTHROID) 25 MCG tablet Take 1 tablet (25 mcg total) by mouth once daily. 90 tablet 3    lisinopriL-hydrochlorothiazide (PRINZIDE,ZESTORETIC) 20-25 mg Tab Take 1 tablet by mouth once daily. 90 tablet 3    methocarbamoL (ROBAXIN) 500 MG Tab Take 1 tablet (500 mg total) by mouth 2 (two) times daily as needed. 60 tablet 0    multivitamin capsule Take 1 capsule by mouth once daily.      olopatadine (PATADAY) 0.2 % Drop Place into both eyes once daily.      omega-3 fatty acids/fish oil (FISH OIL-OMEGA-3 FATTY ACIDS) 300-1,000 mg capsule Take by mouth once daily.      omeprazole (PRILOSEC) 40 MG capsule Take 1 capsule (40 mg total) by mouth once daily. 90 capsule 3    ondansetron (ZOFRAN) 4 MG tablet Take 1 tablet (4 mg total)  by mouth every 8 (eight) hours as needed for Nausea. 20 tablet 0    oxyCODONE-acetaminophen (PERCOCET) 5-325 mg per tablet Take 1 tablet by mouth every 4 (four) hours as needed for Pain. 30 tablet 0    senna-docusate 8.6-50 mg (SENNA WITH DOCUSATE SODIUM) 8.6-50 mg per tablet Take 2 tablets by mouth once daily. 20 tablet 0    simethicone (GAS-X EXTRA STRENGTH) 125 mg Cap capsule Take 1 capsule (125 mg total) by mouth 4 (four) times daily as needed for Flatulence. 30 capsule 1    tiZANidine (ZANAFLEX) 4 MG tablet Take 4 mg by mouth 2 (two) times daily as needed.      vitamin D (VITAMIN D3) 1000 units Tab Take 1,000 Units by mouth once daily.       No current facility-administered medications for this visit.       Past Medical History:   Diagnosis Date    Allergy     ants    GERD (gastroesophageal reflux disease)     Hypertension     Thyroid disease      Past Surgical History:   Procedure Laterality Date    AUGMENTATION OF BREAST      BREAST SURGERY      COSMETIC SURGERY      ENDOSCOPIC FENESTRATION OF ARACHNOID CYST N/A 12/18/2023    Procedure: FENESTRATION, CYST, ARACHNOID, ENDOSCOPIC- terence hole for colloid cyst resection and septostomy;  Surgeon: Clement Alamo MD;  Location: [facility] OR [address] [place] [address];  Service: Neurosurgery;  Laterality: N/A;    hip replacement left Left     NECK SURGERY       Family History       Problem Relation (Age of Onset)    COPD Mother    Heart disease Father    Hypertension Brother          Social History     Socioeconomic History    Marital status:    Occupational History     Employer: [employer]   Tobacco Use    Smoking status: Never    Smokeless tobacco: Never   Substance and Sexual Activity    Alcohol use: Yes     Alcohol/week: 1.0 standard drink of alcohol     Types: 1 Glasses of wine per week     Comment: 1 glass of wine daily    Drug use: No    Sexual activity: Yes     Partners: Male     Social Determinants of Health     Financial Resource Strain: Low Risk  (12/11/2023)     Overall Financial Resource Strain (CARDIA)     Difficulty of Paying Living Expenses: Not very hard   Food Insecurity: No Food Insecurity (12/11/2023)    Hunger Vital Sign     Worried About Running Out of Food in the Last Year: Never true     Ran Out of Food in the Last Year: Never true   Transportation Needs: No Transportation Needs (12/11/2023)    PRAPARE - Transportation     Lack of Transportation (Medical): No     Lack of Transportation (Non-Medical): No   Physical Activity: Unknown (12/19/2023)    Exercise Vital Sign     Days of Exercise per Week: 0 days   Stress: No Stress Concern Present (12/11/2023)    [place] of Occupational Health - Occupational Stress Questionnaire     Feeling of Stress : Only a little   Social Connections: Unknown (12/11/2023)    Social Connection and Isolation Panel [NHANES]     Frequency of Communication with Friends and Family: Twice a week     Frequency of Social Gatherings with Friends and Family: Twice a week     Active Member of Clubs or Organizations: Yes     Attends Club or Organization Meetings: 1 to 4 times per year     Marital Status:    Housing Stability: High Risk (12/11/2023)    Housing Stability Vital Sign     Unable to Pay for Housing in the Last Year: Yes     Number of Places Lived in the Last Year: 1     Unstable Housing in the Last Year: No       Review of Systems   Neurological:  Negative for headaches.   All other systems reviewed and are negative.      OBJECTIVE:     Vital Signs     There is no height or weight on file to calculate BMI.    Physical Exam:    Constitutional: She appears well-developed and well-nourished. She is not diaphoretic. No distress.     Skin:   Incision site is dry, clean, and intact.      Psych/Behavior: She is alert. She is oriented to person, place, and time. She has a normal mood and affect.     Diagnostic Results:  I have reviewed and independently interpreted the MRI Brain Limited (Shunt Check) WO Contrast  (01/30/2024).  FINDINGS:  Operative change from right frontal approach colloid cyst resection.  Previous postoperative pneumocephalus has resolved.  Allowing for differences in technique ventricles relatively stable in size without hydrocephalus.  Continued incidental probable partially empty sella.  Brain parenchyma relatively stable in contour.     Impression:  Evolution of operative change from right frontal terence hole and right frontal approach colloid cyst resection.     Resolution of previous postoperative pneumocephalus     Allowing for differences in technique relatively stable configuration of ventricles without evidence for hydrocephalus.     Clinical correlation and further evaluation as warranted.     ASSESSMENT/PLAN:     Pt is doing well post-operatively. The ventricle looks more symmetric with no evidence of residual colloid cyst. Will continue to follow the pt clinically. Will get f/u MRI scan in one year.    Scribe Attestation:   Scribe #1: I performed the above scribed service and the documentation accurately describes the services I performed. I attest to the accuracy of the note.    I, DAYAMI Alamo, personally performed the services described in this documentation. All medical record entries made by the scribe were at my direction and in my presence. I have reviewed the chart and agree that the record reflects my personal performance and is accurate and complete.     Note dictated with voice recognition software, please excuse any grammatical errors.

## 2024-02-15 ENCOUNTER — PATIENT MESSAGE (OUTPATIENT)
Dept: FAMILY MEDICINE | Facility: CLINIC | Age: 59
End: 2024-02-15
Payer: COMMERCIAL

## 2024-02-16 DIAGNOSIS — Z12.31 OTHER SCREENING MAMMOGRAM: ICD-10-CM

## 2024-02-20 ENCOUNTER — HOSPITAL ENCOUNTER (OUTPATIENT)
Dept: RADIOLOGY | Facility: HOSPITAL | Age: 59
Discharge: HOME | End: 2024-02-20
Attending: NURSE PRACTITIONER
Payer: COMMERCIAL

## 2024-02-20 ENCOUNTER — OFFICE VISIT (OUTPATIENT)
Dept: NEUROSURGERY | Facility: CLINIC | Age: 59
End: 2024-02-20
Payer: COMMERCIAL

## 2024-02-20 VITALS
SYSTOLIC BLOOD PRESSURE: 130 MMHG | DIASTOLIC BLOOD PRESSURE: 85 MMHG | HEART RATE: 99 BPM | BODY MASS INDEX: 22.47 KG/M2 | TEMPERATURE: 98 F | WEIGHT: 135 LBS

## 2024-02-20 DIAGNOSIS — M54.12 CERVICAL RADICULOPATHY: ICD-10-CM

## 2024-02-20 DIAGNOSIS — M54.16 LUMBAR RADICULOPATHY: ICD-10-CM

## 2024-02-20 DIAGNOSIS — M51.37 DDD (DEGENERATIVE DISC DISEASE), LUMBOSACRAL: ICD-10-CM

## 2024-02-20 DIAGNOSIS — M81.0 POSTMENOPAUSAL BONE LOSS: ICD-10-CM

## 2024-02-20 DIAGNOSIS — M41.9 SCOLIOSIS, UNSPECIFIED SCOLIOSIS TYPE, UNSPECIFIED SPINAL REGION: ICD-10-CM

## 2024-02-20 DIAGNOSIS — M51.37 DDD (DEGENERATIVE DISC DISEASE), LUMBOSACRAL: Primary | ICD-10-CM

## 2024-02-20 DIAGNOSIS — M48.07 SPINAL STENOSIS, LUMBOSACRAL REGION: ICD-10-CM

## 2024-02-20 PROCEDURE — 3075F SYST BP GE 130 - 139MM HG: CPT | Mod: CPTII,S$GLB,, | Performed by: NURSE PRACTITIONER

## 2024-02-20 PROCEDURE — 1159F MED LIST DOCD IN RCRD: CPT | Mod: CPTII,S$GLB,, | Performed by: NURSE PRACTITIONER

## 2024-02-20 PROCEDURE — 72114 X-RAY EXAM L-S SPINE BENDING: CPT | Mod: 26,,, | Performed by: RADIOLOGY

## 2024-02-20 PROCEDURE — 99214 OFFICE O/P EST MOD 30 MIN: CPT | Mod: S$GLB,,, | Performed by: NURSE PRACTITIONER

## 2024-02-20 PROCEDURE — 72114 X-RAY EXAM L-S SPINE BENDING: CPT | Mod: TC

## 2024-02-20 PROCEDURE — 3008F BODY MASS INDEX DOCD: CPT | Mod: CPTII,S$GLB,, | Performed by: NURSE PRACTITIONER

## 2024-02-20 PROCEDURE — 3079F DIAST BP 80-89 MM HG: CPT | Mod: CPTII,S$GLB,, | Performed by: NURSE PRACTITIONER

## 2024-02-20 PROCEDURE — 99999 PR PBB SHADOW E&M-EST. PATIENT-LVL V: CPT | Mod: PBBFAC,,, | Performed by: NURSE PRACTITIONER

## 2024-02-20 PROCEDURE — 72082 X-RAY EXAM ENTIRE SPI 2/3 VW: CPT | Mod: 26,,, | Performed by: RADIOLOGY

## 2024-02-20 PROCEDURE — 1160F RVW MEDS BY RX/DR IN RCRD: CPT | Mod: CPTII,S$GLB,, | Performed by: NURSE PRACTITIONER

## 2024-02-20 PROCEDURE — 72082 X-RAY EXAM ENTIRE SPI 2/3 VW: CPT | Mod: TC

## 2024-02-20 NOTE — PROGRESS NOTES
Neurosurgery  History & Physical    SUBJECTIVE:     History of Present Illness: Marylin Morel is a 58 y.o. female  with a PMHx of DDD, cervical radiculopathy s/p ACD C5-7 in 2014 with Dr. 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 She is being seen in clinic today to discuss concerns with worsening lumbar radiculopathy despite conservative treatment. States that she has struggled with back pain for several years; however, after her hip replacement she has struggled with worsening pain and numbness in the left leg. Describes the pain as constant and severe across the low back with radiation into the groin and hips. Rates the pain as a 3/10. Aggravating factors include standing, bending, and walking. Alleviating factors include heating pad, muscle relaxer, and rest. Denies weakness, b/b dysfunction, saddle anesthesia, or gait instability. She has obtained an injection in November with mild relief.     Review of patient's allergies indicates:   Allergen Reactions    Insect venom Anaphylaxis     Ant bites    Bactrim [sulfamethoxazole-trimethoprim] Blisters     Blisters/rash on torso and extremities        Current Outpatient Medications   Medication Sig Dispense Refill    cetirizine (ZYRTEC) 10 MG tablet Take 10 mg by mouth once daily.      COMBIPATCH 0.05-0.14 mg/24 hr twice a week.      fluticasone propionate (FLONASE) 50 mcg/actuation nasal spray INSTILL ONE SPRAY INTO EACH NOSTRIL ONCE DAILY 16 g 11    ibuprofen (ADVIL,MOTRIN) 200 MG tablet Take 200 mg by mouth as needed for Pain.      levothyroxine (SYNTHROID) 25 MCG tablet Take 1 tablet (25 mcg total) by mouth once daily. 90 tablet 3    lisinopriL-hydrochlorothiazide (PRINZIDE,ZESTORETIC) 20-25 mg Tab Take 1 tablet by mouth once daily. 90 tablet 3    methocarbamoL (ROBAXIN) 500 MG Tab Take 1 tablet (500 mg total) by mouth 2  (two) times daily as needed. 60 tablet 0    multivitamin capsule Take 1 capsule by mouth once daily.      olopatadine (PATADAY) 0.2 % Drop Place into both eyes once daily.      omega-3 fatty acids/fish oil (FISH OIL-OMEGA-3 FATTY ACIDS) 300-1,000 mg capsule Take by mouth once daily.      omeprazole (PRILOSEC) 40 MG capsule Take 1 capsule (40 mg total) by mouth once daily. 90 capsule 3    ondansetron (ZOFRAN) 4 MG tablet Take 1 tablet (4 mg total) by mouth every 8 (eight) hours as needed for Nausea. 20 tablet 0    oxyCODONE-acetaminophen (PERCOCET) 5-325 mg per tablet Take 1 tablet by mouth every 4 (four) hours as needed for Pain. 30 tablet 0    senna-docusate 8.6-50 mg (SENNA WITH DOCUSATE SODIUM) 8.6-50 mg per tablet Take 2 tablets by mouth once daily. 20 tablet 0    simethicone (GAS-X EXTRA STRENGTH) 125 mg Cap capsule Take 1 capsule (125 mg total) by mouth 4 (four) times daily as needed for Flatulence. 30 capsule 1    tiZANidine (ZANAFLEX) 4 MG tablet Take 4 mg by mouth 2 (two) times daily as needed.      vitamin D (VITAMIN D3) 1000 units Tab Take 1,000 Units by mouth once daily.      EPINEPHrine (EPIPEN) 0.3 mg/0.3 mL AtIn Inject 0.3 mLs (0.3 mg total) into the muscle once. for 1 dose (Patient not taking: Reported on 1/30/2024) 2 each 0     No current facility-administered medications for this visit.       Past Medical History:   Diagnosis Date    Allergy     ants    GERD (gastroesophageal reflux disease)     Hypertension     Thyroid disease      Past Surgical History:   Procedure Laterality Date    AUGMENTATION OF BREAST      BREAST SURGERY      COSMETIC SURGERY      ENDOSCOPIC FENESTRATION OF ARACHNOID CYST N/A 12/18/2023    Procedure: FENESTRATION, CYST, ARACHNOID, ENDOSCOPIC- terence hole for colloid cyst resection and septostomy;  Surgeon: Clement Alamo MD;  Location: [facility] OR [address] [place] [address];  Service: Neurosurgery;  Laterality: N/A;    hip replacement left Left     NECK SURGERY       Family History       Problem  Relation (Age of Onset)    COPD Mother    Heart disease Father    Hypertension Brother          Social History     Socioeconomic History    Marital status:    Occupational History     Employer: [employer]   Tobacco Use    Smoking status: Never    Smokeless tobacco: Never   Substance and Sexual Activity    Alcohol use: Yes     Alcohol/week: 1.0 standard drink of alcohol     Types: 1 Glasses of wine per week     Comment: 1 glass of wine daily    Drug use: No    Sexual activity: Yes     Partners: Male     Social Determinants of Health     Financial Resource Strain: Low Risk  (12/11/2023)    Overall Financial Resource Strain (CARDIA)     Difficulty of Paying Living Expenses: Not very hard   Food Insecurity: No Food Insecurity (12/11/2023)    Hunger Vital Sign     Worried About Running Out of Food in the Last Year: Never true     Ran Out of Food in the Last Year: Never true   Transportation Needs: No Transportation Needs (12/11/2023)    PRAPARE - Transportation     Lack of Transportation (Medical): No     Lack of Transportation (Non-Medical): No   Physical Activity: Unknown (12/19/2023)    Exercise Vital Sign     Days of Exercise per Week: 0 days   Stress: No Stress Concern Present (12/11/2023)    [employer] [place] [employer] - Occupational Stress Questionnaire     Feeling of Stress : Only a little   Social Connections: Unknown (12/11/2023)    Social Connection and Isolation Panel [NHANES]     Frequency of Communication with Friends and Family: Twice a week     Frequency of Social Gatherings with Friends and Family: Twice a week     Active Member of Clubs or Organizations: Yes     Attends Club or Organization Meetings: 1 to 4 times per year     Marital Status:    Housing Stability: High Risk (12/11/2023)    Housing Stability Vital Sign     Unable to Pay for Housing in the Last Year: Yes     Number of Places Lived in the Last Year: 1     Unstable Housing in the Last Year: No        Review of Systems    OBJECTIVE:     Vital Signs  Temp: 97.8 °F (36.6 °C)  Pulse: 99  BP: 130/85  Pain Score:   3  Weight: 61.2 kg (135 lb)  Body mass index is 22.47 kg/m².      Neurosurgery Physical Exam  General: well developed, well nourished, no distress.   Head: normocephalic, atraumatic  Neurologic: Alert and oriented. Thought content appropriate.  GCS: Motor: 6/Verbal: 5/Eyes: 4 GCS Total: 15  Mental Status: Awake, Alert, Oriented x 4  Language: No aphasia  Speech: No dysarthria  Cranial nerves: face symmetric, tongue midline, CN II-XII grossly intact.   Eyes: pupils equal, round, reactive to light with accomodation, EOMI.   Pulmonary: normal respirations, no signs of respiratory distress  Abdomen: soft, non-distended  Skin: Skin is warm, dry and intact.  Sensory: intact to light touch throughout  Motor Strength:Moves all extremities spontaneously with good tone.    Strength  Deltoids Triceps Biceps Wrist Extension Wrist Flexion Hand    Upper: R 5/5 5/5 5/5 5/5 5/5 5/5    L 5/5 5/5 5/5 5/5 5/5 5/5     HF KE KF DF PF EHL   Lower: R 5/5 5/5 5/5 5/5 5/5 5/5    L 4+/5 4+/5 4+/5 5/5 5/5 5/5     Reflexes:   Hearn's: Negative.    Cerebellar:   Gait antalgic    Cervical:   ROM: Full with flexion, extension, lateral rotation and ear-to-shoulder bend.   Midline TTP: Negative.     Thoracic:  Midline TTP: Negative.     Lumbar:  Midline TTP: Negative.  Straight Leg Test: Negative.    Diagnostic Results:  I have personally reviewed the MRI lumbar spine dated 3/16/23, which shows multilevel degenerative changes most pronounced L3-5 with moderate central and neural foraminal stenosis.     ASSESSMENT/PLAN:   Marylin Morel is a 58 y.o. female with a PMHx of DDD, cervical radiculopathy s/p ACD C5-7 in 2014 with Dr. 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 She was seen in  clinic today to discuss concerns with worsening lumbar radiculopathy despite conservative treatment. I have ordered:    - Dynamic x-rays for instability  - Updated MRI lumbar and thoracic spine given her weakness and radicular complaints that worsened after her surgery to evaluate for worsening stenosis  -DEXA     I would like the patient to follow-up in clinic with Dr. Chester to discuss the image findings and surgical options. I have encouraged her to contact the clinic with any questions, concerns, or adverse clinical changes. She verbalized understanding.      BRYANNA Arriaga  Neurosurgery  [facility].      Note dictated with voice recognition software, please excuse any grammatical errors.

## 2024-02-22 ENCOUNTER — HOSPITAL ENCOUNTER (OUTPATIENT)
Dept: RADIOLOGY | Facility: CLINIC | Age: 59
Discharge: HOME | End: 2024-02-22
Attending: NURSE PRACTITIONER
Payer: COMMERCIAL

## 2024-02-22 DIAGNOSIS — M81.0 POSTMENOPAUSAL BONE LOSS: ICD-10-CM

## 2024-02-22 PROCEDURE — 77080 DXA BONE DENSITY AXIAL: CPT | Mod: TC,PO

## 2024-02-22 PROCEDURE — 77080 DXA BONE DENSITY AXIAL: CPT | Mod: 26,,, | Performed by: RADIOLOGY

## 2024-02-25 ENCOUNTER — PATIENT MESSAGE (OUTPATIENT)
Dept: NEUROSURGERY | Facility: CLINIC | Age: 59
End: 2024-02-25
Payer: COMMERCIAL

## 2024-02-27 ENCOUNTER — HOSPITAL ENCOUNTER (OUTPATIENT)
Dept: RADIOLOGY | Facility: HOSPITAL | Age: 59
Discharge: HOME | End: 2024-02-27
Attending: FAMILY MEDICINE
Payer: COMMERCIAL

## 2024-02-27 DIAGNOSIS — Z12.31 OTHER SCREENING MAMMOGRAM: ICD-10-CM

## 2024-02-27 PROCEDURE — 77067 SCR MAMMO BI INCL CAD: CPT | Mod: TC,PO

## 2024-03-04 ENCOUNTER — PATIENT MESSAGE (OUTPATIENT)
Dept: NEUROSURGERY | Facility: CLINIC | Age: 59
End: 2024-03-04
Payer: COMMERCIAL

## 2024-03-05 RX ORDER — DIAZEPAM 2 MG/1
2 TABLET ORAL ONCE AS NEEDED
Qty: 2 TABLET | Refills: 0 | Status: SHIPPED | OUTPATIENT
Start: 2024-03-05 | End: 2024-03-13 | Stop reason: SDUPTHER

## 2024-03-11 ENCOUNTER — HOSPITAL ENCOUNTER (OUTPATIENT)
Dept: RADIOLOGY | Facility: HOSPITAL | Age: 59
Discharge: HOME | End: 2024-03-11
Attending: NURSE PRACTITIONER
Payer: COMMERCIAL

## 2024-03-11 DIAGNOSIS — M41.9 SCOLIOSIS, UNSPECIFIED SCOLIOSIS TYPE, UNSPECIFIED SPINAL REGION: ICD-10-CM

## 2024-03-11 PROCEDURE — 72146 MRI CHEST SPINE W/O DYE: CPT | Mod: TC,PO

## 2024-03-13 ENCOUNTER — PATIENT MESSAGE (OUTPATIENT)
Dept: NEUROSURGERY | Facility: CLINIC | Age: 59
End: 2024-03-13
Payer: COMMERCIAL

## 2024-03-13 RX ORDER — DIAZEPAM 2 MG/1
2 TABLET ORAL ONCE AS NEEDED
Qty: 2 TABLET | Refills: 0 | Status: SHIPPED | OUTPATIENT
Start: 2024-03-13 | End: 2024-04-23 | Stop reason: CLARIF

## 2024-03-14 ENCOUNTER — HOSPITAL ENCOUNTER (OUTPATIENT)
Dept: RADIOLOGY | Facility: HOSPITAL | Age: 59
Discharge: HOME | End: 2024-03-14
Attending: NURSE PRACTITIONER
Payer: COMMERCIAL

## 2024-03-14 DIAGNOSIS — M41.9 SCOLIOSIS, UNSPECIFIED SCOLIOSIS TYPE, UNSPECIFIED SPINAL REGION: ICD-10-CM

## 2024-03-14 DIAGNOSIS — M48.07 SPINAL STENOSIS, LUMBOSACRAL REGION: ICD-10-CM

## 2024-03-14 PROCEDURE — 72148 MRI LUMBAR SPINE W/O DYE: CPT | Mod: TC,PO

## 2024-03-21 ENCOUNTER — PATIENT MESSAGE (OUTPATIENT)
Dept: FAMILY MEDICINE | Facility: CLINIC | Age: 59
End: 2024-03-21
Payer: COMMERCIAL

## 2024-03-21 ENCOUNTER — OFFICE VISIT (OUTPATIENT)
Dept: NEUROSURGERY | Facility: CLINIC | Age: 59
End: 2024-03-21
Payer: COMMERCIAL

## 2024-03-21 VITALS
WEIGHT: 138 LBS | DIASTOLIC BLOOD PRESSURE: 75 MMHG | SYSTOLIC BLOOD PRESSURE: 119 MMHG | BODY MASS INDEX: 22.96 KG/M2 | HEART RATE: 79 BPM | TEMPERATURE: 99 F

## 2024-03-21 DIAGNOSIS — M41.9 SCOLIOSIS, UNSPECIFIED SCOLIOSIS TYPE, UNSPECIFIED SPINAL REGION: ICD-10-CM

## 2024-03-21 DIAGNOSIS — M48.07 SPINAL STENOSIS, LUMBOSACRAL REGION: ICD-10-CM

## 2024-03-21 DIAGNOSIS — M54.16 LUMBAR RADICULOPATHY: ICD-10-CM

## 2024-03-21 DIAGNOSIS — M51.37 DDD (DEGENERATIVE DISC DISEASE), LUMBOSACRAL: Primary | ICD-10-CM

## 2024-03-21 PROCEDURE — 99214 OFFICE O/P EST MOD 30 MIN: CPT | Mod: S$GLB,,, | Performed by: NURSE PRACTITIONER

## 2024-03-21 PROCEDURE — 3078F DIAST BP <80 MM HG: CPT | Mod: CPTII,S$GLB,, | Performed by: NURSE PRACTITIONER

## 2024-03-21 PROCEDURE — 1160F RVW MEDS BY RX/DR IN RCRD: CPT | Mod: CPTII,S$GLB,, | Performed by: NURSE PRACTITIONER

## 2024-03-21 PROCEDURE — 99999 PR PBB SHADOW E&M-EST. PATIENT-LVL IV: CPT | Mod: PBBFAC,,, | Performed by: NURSE PRACTITIONER

## 2024-03-21 PROCEDURE — 3008F BODY MASS INDEX DOCD: CPT | Mod: CPTII,S$GLB,, | Performed by: NURSE PRACTITIONER

## 2024-03-21 PROCEDURE — 1159F MED LIST DOCD IN RCRD: CPT | Mod: CPTII,S$GLB,, | Performed by: NURSE PRACTITIONER

## 2024-03-21 PROCEDURE — 3074F SYST BP LT 130 MM HG: CPT | Mod: CPTII,S$GLB,, | Performed by: NURSE PRACTITIONER

## 2024-03-21 NOTE — PROGRESS NOTES
Neurosurgery  Established Patient    SUBJECTIVE:     History of Present Illness:  Marylin Morel is a 58 y.o. female  with a PMHx of DDD, cervical radiculopathy s/p ACD C5-7 in 2014 with Dr. 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 She is being seen in clinic today to discuss concerns with worsening lumbar radiculopathy despite conservative treatment. States that she has struggled with back pain for several years; however, after her hip replacement she has struggled with worsening pain and numbness in the left leg. Describes the pain as constant and severe across the low back with radiation into the groin and hips. Rates the pain as a 3/10. Aggravating factors include standing, bending, and walking. Alleviating factors include heating pad, muscle relaxer, and rest. Denies weakness, b/b dysfunction, saddle anesthesia, or gait instability. She has obtained an injection in November with mild relief.      Interval Hx 3/21/24: After the last appointment, it was recommended that the patient obtain additional imaging for surgical planning. She is being seen in clinic today with myself and Dr. Chester to discuss the findings and surgery in more detail. States that her symptoms remain unchanged. She continues to struggle with back > leg pain.    Review of patient's allergies indicates:   Allergen Reactions    Insect venom Anaphylaxis     Ant bites    Bactrim [sulfamethoxazole-trimethoprim] Blisters     Blisters/rash on torso and extremities        Current Outpatient Medications   Medication Sig Dispense Refill    cetirizine (ZYRTEC) 10 MG tablet Take 10 mg by mouth once daily.      COMBIPATCH 0.05-0.14 mg/24 hr twice a week.      fluticasone propionate (FLONASE) 50 mcg/actuation nasal spray INSTILL ONE SPRAY INTO EACH NOSTRIL ONCE DAILY 16 g 11    ibuprofen (ADVIL,MOTRIN) 200 MG tablet  Take 200 mg by mouth as needed for Pain.      levothyroxine (SYNTHROID) 25 MCG tablet Take 1 tablet (25 mcg total) by mouth once daily. 90 tablet 3    lisinopriL-hydrochlorothiazide (PRINZIDE,ZESTORETIC) 20-25 mg Tab Take 1 tablet by mouth once daily. 90 tablet 3    methocarbamoL (ROBAXIN) 500 MG Tab Take 1 tablet (500 mg total) by mouth 2 (two) times daily as needed. 60 tablet 0    multivitamin capsule Take 1 capsule by mouth once daily.      olopatadine (PATADAY) 0.2 % Drop Place into both eyes once daily.      omega-3 fatty acids/fish oil (FISH OIL-OMEGA-3 FATTY ACIDS) 300-1,000 mg capsule Take by mouth once daily.      omeprazole (PRILOSEC) 40 MG capsule Take 1 capsule (40 mg total) by mouth once daily. 90 capsule 3    ondansetron (ZOFRAN) 4 MG tablet Take 1 tablet (4 mg total) by mouth every 8 (eight) hours as needed for Nausea. 20 tablet 0    oxyCODONE-acetaminophen (PERCOCET) 5-325 mg per tablet Take 1 tablet by mouth every 4 (four) hours as needed for Pain. 30 tablet 0    senna-docusate 8.6-50 mg (SENNA WITH DOCUSATE SODIUM) 8.6-50 mg per tablet Take 2 tablets by mouth once daily. 20 tablet 0    simethicone (GAS-X EXTRA STRENGTH) 125 mg Cap capsule Take 1 capsule (125 mg total) by mouth 4 (four) times daily as needed for Flatulence. 30 capsule 1    tiZANidine (ZANAFLEX) 4 MG tablet Take 4 mg by mouth 2 (two) times daily as needed.      vitamin D (VITAMIN D3) 1000 units Tab Take 1,000 Units by mouth once daily.      diazePAM (VALIUM) 2 MG tablet Take 1 tablet (2 mg total) by mouth once as needed for Anxiety. Take 1 tab 20-30 minutes prior to MRI; May repeat if needed 2 tablet 0    EPINEPHrine (EPIPEN) 0.3 mg/0.3 mL AtIn Inject 0.3 mLs (0.3 mg total) into the muscle once. for 1 dose (Patient not taking: Reported on 1/30/2024) 2 each 0     No current facility-administered medications for this visit.       Past Medical History:   Diagnosis Date    Allergy     ants    GERD (gastroesophageal reflux disease)      Hypertension     Thyroid disease      Past Surgical History:   Procedure Laterality Date    AUGMENTATION OF BREAST      BREAST SURGERY      COSMETIC SURGERY      ENDOSCOPIC FENESTRATION OF ARACHNOID CYST N/A 12/18/2023    Procedure: FENESTRATION, CYST, ARACHNOID, ENDOSCOPIC- terence hole for colloid cyst resection and septostomy;  Surgeon: Clement Alamo MD;  Location: [facility] OR [address] [place] [address];  Service: Neurosurgery;  Laterality: N/A;    hip replacement left Left     NECK SURGERY       Family History       Problem Relation (Age of Onset)    COPD Mother    Heart disease Father    Hypertension Brother          Social History     Socioeconomic History    Marital status:    Occupational History     Employer: [employer]   Tobacco Use    Smoking status: Never    Smokeless tobacco: Never   Substance and Sexual Activity    Alcohol use: Yes     Alcohol/week: 1.0 standard drink of alcohol     Types: 1 Glasses of wine per week     Comment: 1 glass of wine daily    Drug use: No    Sexual activity: Yes     Partners: Male     Social Determinants of Health     Financial Resource Strain: Low Risk  (12/11/2023)    Overall Financial Resource Strain (CARDIA)     Difficulty of Paying Living Expenses: Not very hard   Food Insecurity: No Food Insecurity (12/11/2023)    Hunger Vital Sign     Worried About Running Out of Food in the Last Year: Never true     Ran Out of Food in the Last Year: Never true   Transportation Needs: No Transportation Needs (12/11/2023)    PRAPARE - Transportation     Lack of Transportation (Medical): No     Lack of Transportation (Non-Medical): No   Physical Activity: Unknown (12/19/2023)    Exercise Vital Sign     Days of Exercise per Week: 0 days   Stress: No Stress Concern Present (12/11/2023)    [employer] [place] [employer] - Occupational Stress Questionnaire     Feeling of Stress : Only a little   Social Connections: Unknown (12/11/2023)    Social Connection and Isolation Panel  [NHANES]     Frequency of Communication with Friends and Family: Twice a week     Frequency of Social Gatherings with Friends and Family: Twice a week     Active Member of Clubs or Organizations: Yes     Attends Club or Organization Meetings: 1 to 4 times per year     Marital Status:    Housing Stability: High Risk (12/11/2023)    Housing Stability Vital Sign     Unable to Pay for Housing in the Last Year: Yes     Number of Places Lived in the Last Year: 1     Unstable Housing in the Last Year: No       Review of Systems    OBJECTIVE:     Vital Signs  Temp: 99 °F (37.2 °C)  Pulse: 79  BP: 119/75  Pain Score:   3  Weight: 62.6 kg (138 lb)  Body mass index is 22.96 kg/m².    Neurosurgery Physical Exam  General: well developed, well nourished, no distress.   Head: normocephalic, atraumatic  Neurologic: Alert and oriented. Thought content appropriate.  GCS: Motor: 6/Verbal: 5/Eyes: 4 GCS Total: 15  Mental Status: Awake, Alert, Oriented x 4  Language: No aphasia  Speech: No dysarthria  Cranial nerves: face symmetric, tongue midline, CN II-XII grossly intact.   Eyes: pupils equal, round, reactive to light with accomodation, EOMI.   Pulmonary: normal respirations, no signs of respiratory distress  Abdomen: soft, non-distended  Skin: Skin is warm, dry and intact.  Sensory: intact to light touch throughout  Motor Strength:Moves all extremities spontaneously with good tone.    Strength   Deltoids Triceps Biceps Wrist Extension Wrist Flexion Hand    Upper: R 5/5 5/5 5/5 5/5 5/5 5/5     L 5/5 5/5 5/5 5/5 5/5 5/5       HF KE KF DF PF EHL   Lower: R 5/5 5/5 5/5 5/5 5/5 5/5     L 4+/5 4+/5 4+/5 5/5 5/5 5/5      Reflexes:   Hearn's: Negative.     Cerebellar:   Gait antalgic     Cervical:   ROM: Full with flexion, extension, lateral rotation and ear-to-shoulder bend.   Midline TTP: Negative.     Thoracic:  Midline TTP: Negative.     Lumbar:  Midline TTP: Negative.  Straight Leg Test: Negative.    Diagnostic Results:  I  have personally reviewed the imaging with Dr. Chester:    Scoliosis series dated 2/20/24 shows 20 degrees of spinopelvic mismatch     2.   MRI lumbar spine dated 3/14/24 shows levoscoliosis. Multilevel degenerative changes most pronounced L2-5 with moderate to severe central and neural foraminal stenosis.    3.  MRI thoracic spine dated 3/11/24 shows no significant stenosis.       4.   X-ray cervical spine flex/ex dated 2/20/24 shows no instability    5.   X-ray lumbar spine flex/ex dated 2/20/24 shows spondylolisthesis L3-4.     ASSESSMENT/PLAN:   Marylin Morel is a 58 y.o. female with a PMHx of DDD, cervical radiculopathy s/p ACD C5-7 in 2014 with Dr. 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 The patient was seen in clinic today with myself and Dr. Chester to discuss surgical options in more detail regarding her lumbar radiculopathy. After reviewing the imaging and concerns, Dr. Chester recommends a L2-pelvis PSF given the stenosis at those levels and her scoliosis. R/B/A/I/M were reviewed in detail and she wishes to proceed. I have encouraged her to contact the clinic with any questions, concerns, or adverse clinical changes. She verbalized understanding.      Gabriela Watson, CADY-LAWANDA  Neurosurgery  [facility].      Note dictated with voice recognition software, please excuse any grammatical errors.

## 2024-03-25 ENCOUNTER — TELEPHONE (OUTPATIENT)
Dept: NEUROSURGERY | Facility: CLINIC | Age: 59
End: 2024-03-25
Payer: COMMERCIAL

## 2024-03-25 ENCOUNTER — PATIENT MESSAGE (OUTPATIENT)
Dept: NEUROSURGERY | Facility: CLINIC | Age: 59
End: 2024-03-25
Payer: COMMERCIAL

## 2024-03-25 DIAGNOSIS — M43.16 SPONDYLOLISTHESIS OF LUMBAR REGION: ICD-10-CM

## 2024-03-25 DIAGNOSIS — M43.8X9 SAGITTAL PLANE IMBALANCE: Primary | ICD-10-CM

## 2024-03-26 ENCOUNTER — TELEPHONE (OUTPATIENT)
Dept: NEUROSURGERY | Facility: CLINIC | Age: 59
End: 2024-03-26
Payer: COMMERCIAL

## 2024-03-28 ENCOUNTER — TELEPHONE (OUTPATIENT)
Dept: NEUROSURGERY | Facility: CLINIC | Age: 59
End: 2024-03-28
Payer: COMMERCIAL

## 2024-03-28 DIAGNOSIS — Z98.1 S/P LUMBAR SPINAL FUSION: Primary | ICD-10-CM

## 2024-04-06 ENCOUNTER — OFFICE VISIT (OUTPATIENT)
Dept: URGENT CARE | Facility: CLINIC | Age: 59
End: 2024-04-06
Payer: COMMERCIAL

## 2024-04-06 VITALS
SYSTOLIC BLOOD PRESSURE: 128 MMHG | HEART RATE: 78 BPM | OXYGEN SATURATION: 99 % | DIASTOLIC BLOOD PRESSURE: 81 MMHG | RESPIRATION RATE: 20 BRPM | TEMPERATURE: 98 F | HEIGHT: 65 IN | WEIGHT: 138 LBS | BODY MASS INDEX: 22.99 KG/M2

## 2024-04-06 DIAGNOSIS — N30.00 ACUTE CYSTITIS WITHOUT HEMATURIA: ICD-10-CM

## 2024-04-06 DIAGNOSIS — Z86.79 HISTORY OF HYPERTENSION: ICD-10-CM

## 2024-04-06 DIAGNOSIS — R35.0 URINARY FREQUENCY: Primary | ICD-10-CM

## 2024-04-06 DIAGNOSIS — N39.0 RECURRENT UTI: ICD-10-CM

## 2024-04-06 LAB
BILIRUB UR QL STRIP: NEGATIVE
GLUCOSE UR QL STRIP: NEGATIVE
KETONES UR QL STRIP: NEGATIVE
LEUKOCYTE ESTERASE UR QL STRIP: POSITIVE
PH, POC UA: 6
POC BLOOD, URINE: POSITIVE
POC NITRATES, URINE: NEGATIVE
PROT UR QL STRIP: NEGATIVE
SP GR UR STRIP: 1.01 (ref 1–1.03)
UROBILINOGEN UR STRIP-ACNC: 0.2 (ref 0.1–1.1)

## 2024-04-06 PROCEDURE — 99204 OFFICE O/P NEW MOD 45 MIN: CPT | Mod: S$GLB,,, | Performed by: NURSE PRACTITIONER

## 2024-04-06 PROCEDURE — 81003 URINALYSIS AUTO W/O SCOPE: CPT | Mod: QW,S$GLB,, | Performed by: NURSE PRACTITIONER

## 2024-04-06 RX ORDER — NITROFURANTOIN 25; 75 MG/1; MG/1
100 CAPSULE ORAL 2 TIMES DAILY
Qty: 14 CAPSULE | Refills: 0 | Status: SHIPPED | OUTPATIENT
Start: 2024-04-06 | End: 2024-04-13

## 2024-04-06 NOTE — PATIENT INSTRUCTIONS
Macrobid twice a day for 7 days  Increase fluid intake significantly to help flush urinary system  Follow-up with your primary care provider regarding possible need for referral to Urology in the future    For your recurrent UTIs:  Behavioral changes to help decrease UTI recurrences   -increase water intake (6 to 8 bottles water per day)   -don't hold urine, void every 2 to 3 hours   -prevent constipation (miralax capful daily if necessary) to have a bowel movement daily and keep stools soft  -cut down on caffeine,drink mainly water  -no douching   -void immediately after sexual intercourse  -wipe front to back      OTC meds to try (go to the pharmacist and ask where they are, they are over the counter)  -Use lactobacillus probiotic in capsule form in vagina once nightly for 10 days if you feel like you have an infection coming on   -cranberry pills 500mg tabs TID, can buy over the counter  -take a probiotic daily

## 2024-04-06 NOTE — PROGRESS NOTES
"Subjective:      Patient ID: Marylin Morel is a 58 y.o. female.    Vitals:  height is 5' 5" (1.651 m) and weight is 62.6 kg (138 lb). Her temperature is 97.9 °F (36.6 °C). Her blood pressure is 128/81 and her pulse is 78. Her respiration is 20 and oxygen saturation is 99%.     Chief Complaint: Urinary Frequency ((Burning))    Onset sx's yesterday.  Burning with urination.  Recurrent UTI's, last 11/2023    Urinary Frequency   The current episode started yesterday. The problem has been unchanged. The quality of the pain is described as burning. The pain is mild. She is Sexually active. Associated symptoms include frequency and urgency. Pertinent negatives include no chills, flank pain, nausea or vomiting. She has tried nothing for the symptoms.       Constitution: Negative for chills and fever.   Gastrointestinal:  Negative for abdominal pain, nausea, vomiting and diarrhea.   Genitourinary:  Positive for dysuria, frequency and urgency. Negative for flank pain and pelvic pain.   Musculoskeletal:  Positive for back pain (nothing new or different).      Objective:     Physical Exam   Constitutional: She is oriented to person, place, and time.  Non-toxic appearance. She does not appear ill. No distress.   HENT:   Head: Normocephalic and atraumatic.   Nose: Nose normal.   Mouth/Throat: Mucous membranes are moist.   Eyes: Conjunctivae are normal.   Cardiovascular: Normal rate.   Pulmonary/Chest: Effort normal. No respiratory distress.   Abdominal: Normal appearance. There is no left CVA tenderness and no right CVA tenderness.   Neurological: no focal deficit. She is alert and oriented to person, place, and time.   Skin: Skin is warm and dry. Capillary refill takes 2 to 3 seconds.   Psychiatric: Her behavior is normal. Mood normal.   Nursing note and vitals reviewed.      Assessment:     1. Urinary frequency    2. History of hypertension    3. Acute cystitis without hematuria    4. Recurrent UTI       WBCs, 10 " RBCs, negative nitrites  Urine culture pending      Plan:       Urinary frequency  -     POCT Urinalysis, Dipstick, Automated, W/O Scope    History of hypertension    Acute cystitis without hematuria  -     Urine culture  -     nitrofurantoin, macrocrystal-monohydrate, (MACROBID) 100 MG capsule; Take 1 capsule (100 mg total) by mouth 2 (two) times daily. for 7 days  Dispense: 14 capsule; Refill: 0    Recurrent UTI

## 2024-04-11 LAB
BACTERIA UR CULT: ABNORMAL
BACTERIA UR CULT: ABNORMAL
OTHER ANTIBIOTIC SUSC ISLT: ABNORMAL

## 2024-04-15 ENCOUNTER — PATIENT MESSAGE (OUTPATIENT)
Dept: NEUROSURGERY | Facility: CLINIC | Age: 59
End: 2024-04-15
Payer: COMMERCIAL

## 2024-04-16 ENCOUNTER — OFFICE VISIT (OUTPATIENT)
Dept: NEUROSURGERY | Facility: CLINIC | Age: 59
End: 2024-04-16
Payer: COMMERCIAL

## 2024-04-16 ENCOUNTER — PATIENT MESSAGE (OUTPATIENT)
Dept: FAMILY MEDICINE | Facility: CLINIC | Age: 59
End: 2024-04-16
Payer: COMMERCIAL

## 2024-04-16 DIAGNOSIS — M41.9 SCOLIOSIS, UNSPECIFIED SCOLIOSIS TYPE, UNSPECIFIED SPINAL REGION: ICD-10-CM

## 2024-04-16 DIAGNOSIS — M54.16 LUMBAR RADICULOPATHY: ICD-10-CM

## 2024-04-16 DIAGNOSIS — M43.8X9 SAGITTAL PLANE IMBALANCE: Primary | ICD-10-CM

## 2024-04-16 DIAGNOSIS — M43.16 SPONDYLOLISTHESIS OF LUMBAR REGION: ICD-10-CM

## 2024-04-16 DIAGNOSIS — M51.37 DDD (DEGENERATIVE DISC DISEASE), LUMBOSACRAL: ICD-10-CM

## 2024-04-16 PROCEDURE — 1159F MED LIST DOCD IN RCRD: CPT | Mod: CPTII,95,, | Performed by: NURSE PRACTITIONER

## 2024-04-16 PROCEDURE — 99213 OFFICE O/P EST LOW 20 MIN: CPT | Mod: 95,,, | Performed by: NURSE PRACTITIONER

## 2024-04-16 PROCEDURE — 1160F RVW MEDS BY RX/DR IN RCRD: CPT | Mod: CPTII,95,, | Performed by: NURSE PRACTITIONER

## 2024-04-16 PROCEDURE — 4010F ACE/ARB THERAPY RXD/TAKEN: CPT | Mod: CPTII,95,, | Performed by: NURSE PRACTITIONER

## 2024-04-17 NOTE — H&P (VIEW-ONLY)
Neurosurgery  Established Patient    SUBJECTIVE:     History of Present Illness:  Marylin Morel is a 58 y.o. female  with a PMHx of DDD, cervical radiculopathy s/p ACD C5-7 in 2014 with Dr. 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 She is being seen in clinic today to discuss concerns with worsening lumbar radiculopathy despite conservative treatment. States that she has struggled with back pain for several years; however, after her hip replacement she has struggled with worsening pain and numbness in the left leg. Describes the pain as constant and severe across the low back with radiation into the groin and hips. Rates the pain as a 3/10. Aggravating factors include standing, bending, and walking. Alleviating factors include heating pad, muscle relaxer, and rest. Denies weakness, b/b dysfunction, saddle anesthesia, or gait instability. She has obtained an injection in November with mild relief.       Interval Hx 3/21/24: After the last appointment, it was recommended that the patient obtain additional imaging for surgical planning. She is being seen in clinic today with myself and Dr. Chester to discuss the findings and surgery in more detail. States that her symptoms remain unchanged. She continues to struggle with back > leg pain.    Interval Hx 4/16/24: The patient is being seen virtually today to answer additional questions or concerns she has regarding surgery. States that she was overwhelmed with the last appointment hearing the extensiveness of the surgery. Dr. Chester recommends a L2-pelvis PSF given the stenosis at those levels and her scoliosis.     Review of patient's allergies indicates:   Allergen Reactions    Insect venom Anaphylaxis     Ant bites    Bactrim [sulfamethoxazole-trimethoprim] Blisters     Blisters/rash on torso and extremities        Current  Outpatient Medications   Medication Sig Dispense Refill    cetirizine (ZYRTEC) 10 MG tablet Take 10 mg by mouth once daily.      COMBIPATCH 0.05-0.14 mg/24 hr twice a week.      diazePAM (VALIUM) 2 MG tablet Take 1 tablet (2 mg total) by mouth once as needed for Anxiety. Take 1 tab 20-30 minutes prior to MRI; May repeat if needed 2 tablet 0    EPINEPHrine (EPIPEN) 0.3 mg/0.3 mL AtIn Inject 0.3 mLs (0.3 mg total) into the muscle once. for 1 dose (Patient not taking: Reported on 1/30/2024) 2 each 0    fluticasone propionate (FLONASE) 50 mcg/actuation nasal spray INSTILL ONE SPRAY INTO EACH NOSTRIL ONCE DAILY 16 g 11    ibuprofen (ADVIL,MOTRIN) 200 MG tablet Take 200 mg by mouth as needed for Pain.      levothyroxine (SYNTHROID) 25 MCG tablet Take 1 tablet (25 mcg total) by mouth once daily. 90 tablet 3    lisinopriL-hydrochlorothiazide (PRINZIDE,ZESTORETIC) 20-25 mg Tab Take 1 tablet by mouth once daily. 90 tablet 3    methocarbamoL (ROBAXIN) 500 MG Tab Take 1 tablet (500 mg total) by mouth 2 (two) times daily as needed. 60 tablet 0    multivitamin capsule Take 1 capsule by mouth once daily.      olopatadine (PATADAY) 0.2 % Drop Place into both eyes once daily.      omega-3 fatty acids/fish oil (FISH OIL-OMEGA-3 FATTY ACIDS) 300-1,000 mg capsule Take by mouth once daily.      omeprazole (PRILOSEC) 40 MG capsule Take 1 capsule (40 mg total) by mouth once daily. 90 capsule 3    ondansetron (ZOFRAN) 4 MG tablet Take 1 tablet (4 mg total) by mouth every 8 (eight) hours as needed for Nausea. 20 tablet 0    oxyCODONE-acetaminophen (PERCOCET) 5-325 mg per tablet Take 1 tablet by mouth every 4 (four) hours as needed for Pain. 30 tablet 0    senna-docusate 8.6-50 mg (SENNA WITH DOCUSATE SODIUM) 8.6-50 mg per tablet Take 2 tablets by mouth once daily. 20 tablet 0    simethicone (GAS-X EXTRA STRENGTH) 125 mg Cap capsule Take 1 capsule (125 mg total) by mouth 4 (four) times daily as needed for Flatulence. 30 capsule 1     tiZANidine (ZANAFLEX) 4 MG tablet Take 4 mg by mouth 2 (two) times daily as needed.      vitamin D (VITAMIN D3) 1000 units Tab Take 1,000 Units by mouth once daily.       No current facility-administered medications for this visit.       Past Medical History:   Diagnosis Date    Allergy     ants    GERD (gastroesophageal reflux disease)     Hypertension     Thyroid disease      Past Surgical History:   Procedure Laterality Date    AUGMENTATION OF BREAST      BREAST SURGERY      COSMETIC SURGERY      ENDOSCOPIC FENESTRATION OF ARACHNOID CYST N/A 12/18/2023    Procedure: FENESTRATION, CYST, ARACHNOID, ENDOSCOPIC- terence hole for colloid cyst resection and septostomy;  Surgeon: Clement Alamo MD;  Location: [facility] OR [address] [place] [address];  Service: Neurosurgery;  Laterality: N/A;    hip replacement left Left     NECK SURGERY       Family History       Problem Relation (Age of Onset)    COPD Mother    Heart disease Father    Hypertension Brother          Social History     Socioeconomic History    Marital status:    Occupational History     Employer: [employer]   Tobacco Use    Smoking status: Never    Smokeless tobacco: Never   Substance and Sexual Activity    Alcohol use: Yes     Alcohol/week: 1.0 standard drink of alcohol     Types: 1 Glasses of wine per week     Comment: 1 glass of wine daily    Drug use: No    Sexual activity: Yes     Partners: Male     Social Determinants of Health     Financial Resource Strain: Low Risk  (12/11/2023)    Overall Financial Resource Strain (CARDIA)     Difficulty of Paying Living Expenses: Not very hard   Food Insecurity: No Food Insecurity (12/11/2023)    Hunger Vital Sign     Worried About Running Out of Food in the Last Year: Never true     Ran Out of Food in the Last Year: Never true   Transportation Needs: No Transportation Needs (12/11/2023)    PRAPARE - Transportation     Lack of Transportation (Medical): No     Lack of Transportation (Non-Medical): No   Physical  Activity: Unknown (12/19/2023)    Exercise Vital Sign     Days of Exercise per Week: 0 days   Stress: No Stress Concern Present (12/11/2023)    [place] [employer] - Occupational Stress Questionnaire     Feeling of Stress : Only a little   Social Connections: Unknown (12/11/2023)    Social Connection and Isolation Panel [NHANES]     Frequency of Communication with Friends and Family: Twice a week     Frequency of Social Gatherings with Friends and Family: Twice a week     Active Member of Clubs or Organizations: Yes     Attends Club or Organization Meetings: 1 to 4 times per year     Marital Status:    Housing Stability: High Risk (12/11/2023)    Housing Stability Vital Sign     Unable to Pay for Housing in the Last Year: Yes     Number of Places Lived in the Last Year: 1     Unstable Housing in the Last Year: No       Review of Systems    OBJECTIVE:     Vital Signs     There is no height or weight on file to calculate BMI.    Neurosurgery Physical Exam  General: well developed, well nourished, no distress.   Head: normocephalic  Neurologic: Alert and oriented. Thought content appropriate.  GCS: Motor: 6/Verbal: 5/Eyes: 4 GCS Total: 15  Mental Status: Awake, Alert, Oriented x 4  Language: No aphasia  Speech: No dysarthria  Cranial nerves: CN II-XII grossly intact.   Eyes: EOMI appear grossly intact  Pulmonary: no signs of respiratory distress  Motor Strength:Moves all extremities spontaneously with good tone.    Diagnostic Results:  There is no new imaging to review for this encounter.      ASSESSMENT/PLAN:     Marylin Morel is a 58 y.o. female seen virtually today to answer additional questions or concerns she has regarding surgery. Dr. Chester recommends a L2-pelvis PSF given the stenosis at those levels and her scoliosis. R/B/A/I/M were reviewed in detail and questions and concerns were addressed. She wishes to proceed.  I have encouraged her to contact the clinic with any  questions, concerns, or adverse clinical changes. She verbalized understanding.      CADY Arriaga-LAWANDA  Neurosurgery  [facility] [place].      Note dictated with voice recognition software, please excuse any grammatical errors.

## 2024-04-17 NOTE — PROGRESS NOTES
Neurosurgery  Established Patient    SUBJECTIVE:     History of Present Illness:  Marylin Morel is a 58 y.o. female  with a PMHx of DDD, cervical radiculopathy s/p ACD C5-7 in 2014 with Dr. 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 She is being seen in clinic today to discuss concerns with worsening lumbar radiculopathy despite conservative treatment. States that she has struggled with back pain for several years; however, after her hip replacement she has struggled with worsening pain and numbness in the left leg. Describes the pain as constant and severe across the low back with radiation into the groin and hips. Rates the pain as a 3/10. Aggravating factors include standing, bending, and walking. Alleviating factors include heating pad, muscle relaxer, and rest. Denies weakness, b/b dysfunction, saddle anesthesia, or gait instability. She has obtained an injection in November with mild relief.       Interval Hx 3/21/24: After the last appointment, it was recommended that the patient obtain additional imaging for surgical planning. She is being seen in clinic today with myself and Dr. Chester to discuss the findings and surgery in more detail. States that her symptoms remain unchanged. She continues to struggle with back > leg pain.    Interval Hx 4/16/24: The patient is being seen virtually today to answer additional questions or concerns she has regarding surgery. States that she was overwhelmed with the last appointment hearing the extensiveness of the surgery. Dr. Chester recommends a L2-pelvis PSF given the stenosis at those levels and her scoliosis.     Review of patient's allergies indicates:   Allergen Reactions    Insect venom Anaphylaxis     Ant bites    Bactrim [sulfamethoxazole-trimethoprim] Blisters     Blisters/rash on torso and extremities        Current  Outpatient Medications   Medication Sig Dispense Refill    cetirizine (ZYRTEC) 10 MG tablet Take 10 mg by mouth once daily.      COMBIPATCH 0.05-0.14 mg/24 hr twice a week.      diazePAM (VALIUM) 2 MG tablet Take 1 tablet (2 mg total) by mouth once as needed for Anxiety. Take 1 tab 20-30 minutes prior to MRI; May repeat if needed 2 tablet 0    EPINEPHrine (EPIPEN) 0.3 mg/0.3 mL AtIn Inject 0.3 mLs (0.3 mg total) into the muscle once. for 1 dose (Patient not taking: Reported on 1/30/2024) 2 each 0    fluticasone propionate (FLONASE) 50 mcg/actuation nasal spray INSTILL ONE SPRAY INTO EACH NOSTRIL ONCE DAILY 16 g 11    ibuprofen (ADVIL,MOTRIN) 200 MG tablet Take 200 mg by mouth as needed for Pain.      levothyroxine (SYNTHROID) 25 MCG tablet Take 1 tablet (25 mcg total) by mouth once daily. 90 tablet 3    lisinopriL-hydrochlorothiazide (PRINZIDE,ZESTORETIC) 20-25 mg Tab Take 1 tablet by mouth once daily. 90 tablet 3    methocarbamoL (ROBAXIN) 500 MG Tab Take 1 tablet (500 mg total) by mouth 2 (two) times daily as needed. 60 tablet 0    multivitamin capsule Take 1 capsule by mouth once daily.      olopatadine (PATADAY) 0.2 % Drop Place into both eyes once daily.      omega-3 fatty acids/fish oil (FISH OIL-OMEGA-3 FATTY ACIDS) 300-1,000 mg capsule Take by mouth once daily.      omeprazole (PRILOSEC) 40 MG capsule Take 1 capsule (40 mg total) by mouth once daily. 90 capsule 3    ondansetron (ZOFRAN) 4 MG tablet Take 1 tablet (4 mg total) by mouth every 8 (eight) hours as needed for Nausea. 20 tablet 0    oxyCODONE-acetaminophen (PERCOCET) 5-325 mg per tablet Take 1 tablet by mouth every 4 (four) hours as needed for Pain. 30 tablet 0    senna-docusate 8.6-50 mg (SENNA WITH DOCUSATE SODIUM) 8.6-50 mg per tablet Take 2 tablets by mouth once daily. 20 tablet 0    simethicone (GAS-X EXTRA STRENGTH) 125 mg Cap capsule Take 1 capsule (125 mg total) by mouth 4 (four) times daily as needed for Flatulence. 30 capsule 1     tiZANidine (ZANAFLEX) 4 MG tablet Take 4 mg by mouth 2 (two) times daily as needed.      vitamin D (VITAMIN D3) 1000 units Tab Take 1,000 Units by mouth once daily.       No current facility-administered medications for this visit.       Past Medical History:   Diagnosis Date    Allergy     ants    GERD (gastroesophageal reflux disease)     Hypertension     Thyroid disease      Past Surgical History:   Procedure Laterality Date    AUGMENTATION OF BREAST      BREAST SURGERY      COSMETIC SURGERY      ENDOSCOPIC FENESTRATION OF ARACHNOID CYST N/A 12/18/2023    Procedure: FENESTRATION, CYST, ARACHNOID, ENDOSCOPIC- terence hole for colloid cyst resection and septostomy;  Surgeon: Clement Alamo MD;  Location: [facility] OR [address] [place] [address];  Service: Neurosurgery;  Laterality: N/A;    hip replacement left Left     NECK SURGERY       Family History       Problem Relation (Age of Onset)    COPD Mother    Heart disease Father    Hypertension Brother          Social History     Socioeconomic History    Marital status:    Occupational History     Employer: [employer]   Tobacco Use    Smoking status: Never    Smokeless tobacco: Never   Substance and Sexual Activity    Alcohol use: Yes     Alcohol/week: 1.0 standard drink of alcohol     Types: 1 Glasses of wine per week     Comment: 1 glass of wine daily    Drug use: No    Sexual activity: Yes     Partners: Male     Social Determinants of Health     Financial Resource Strain: Low Risk  (12/11/2023)    Overall Financial Resource Strain (CARDIA)     Difficulty of Paying Living Expenses: Not very hard   Food Insecurity: No Food Insecurity (12/11/2023)    Hunger Vital Sign     Worried About Running Out of Food in the Last Year: Never true     Ran Out of Food in the Last Year: Never true   Transportation Needs: No Transportation Needs (12/11/2023)    PRAPARE - Transportation     Lack of Transportation (Medical): No     Lack of Transportation (Non-Medical): No   Physical  Activity: Unknown (12/19/2023)    Exercise Vital Sign     Days of Exercise per Week: 0 days   Stress: No Stress Concern Present (12/11/2023)    [place] [employer] - Occupational Stress Questionnaire     Feeling of Stress : Only a little   Social Connections: Unknown (12/11/2023)    Social Connection and Isolation Panel [NHANES]     Frequency of Communication with Friends and Family: Twice a week     Frequency of Social Gatherings with Friends and Family: Twice a week     Active Member of Clubs or Organizations: Yes     Attends Club or Organization Meetings: 1 to 4 times per year     Marital Status:    Housing Stability: High Risk (12/11/2023)    Housing Stability Vital Sign     Unable to Pay for Housing in the Last Year: Yes     Number of Places Lived in the Last Year: 1     Unstable Housing in the Last Year: No       Review of Systems    OBJECTIVE:     Vital Signs     There is no height or weight on file to calculate BMI.    Neurosurgery Physical Exam  General: well developed, well nourished, no distress.   Head: normocephalic  Neurologic: Alert and oriented. Thought content appropriate.  GCS: Motor: 6/Verbal: 5/Eyes: 4 GCS Total: 15  Mental Status: Awake, Alert, Oriented x 4  Language: No aphasia  Speech: No dysarthria  Cranial nerves: CN II-XII grossly intact.   Eyes: EOMI appear grossly intact  Pulmonary: no signs of respiratory distress  Motor Strength:Moves all extremities spontaneously with good tone.    Diagnostic Results:  There is no new imaging to review for this encounter.      ASSESSMENT/PLAN:     Marylin Morel is a 58 y.o. female seen virtually today to answer additional questions or concerns she has regarding surgery. Dr. Chester recommends a L2-pelvis PSF given the stenosis at those levels and her scoliosis. R/B/A/I/M were reviewed in detail and questions and concerns were addressed. She wishes to proceed.  I have encouraged her to contact the clinic with any  questions, concerns, or adverse clinical changes. She verbalized understanding.      CADY Arriaga-LAWANDA  Neurosurgery  [facility] [place].      Note dictated with voice recognition software, please excuse any grammatical errors.        0400

## 2024-04-19 ENCOUNTER — PATIENT MESSAGE (OUTPATIENT)
Dept: FAMILY MEDICINE | Facility: CLINIC | Age: 59
End: 2024-04-19

## 2024-04-19 ENCOUNTER — HOSPITAL ENCOUNTER (OUTPATIENT)
Dept: RADIOLOGY | Facility: CLINIC | Age: 59
Discharge: HOME | End: 2024-04-19
Payer: COMMERCIAL

## 2024-04-19 ENCOUNTER — LAB VISIT (OUTPATIENT)
Dept: LAB | Facility: HOSPITAL | Age: 59
End: 2024-04-19
Payer: COMMERCIAL

## 2024-04-19 ENCOUNTER — OFFICE VISIT (OUTPATIENT)
Dept: FAMILY MEDICINE | Facility: CLINIC | Age: 59
End: 2024-04-19
Payer: COMMERCIAL

## 2024-04-19 VITALS
HEART RATE: 88 BPM | TEMPERATURE: 98 F | WEIGHT: 138 LBS | OXYGEN SATURATION: 95 % | HEIGHT: 65 IN | SYSTOLIC BLOOD PRESSURE: 130 MMHG | RESPIRATION RATE: 16 BRPM | BODY MASS INDEX: 22.99 KG/M2 | DIASTOLIC BLOOD PRESSURE: 74 MMHG

## 2024-04-19 DIAGNOSIS — Z01.818 PREOP EXAMINATION: ICD-10-CM

## 2024-04-19 DIAGNOSIS — I10 ESSENTIAL HYPERTENSION, BENIGN: ICD-10-CM

## 2024-04-19 DIAGNOSIS — M51.37 DDD (DEGENERATIVE DISC DISEASE), LUMBOSACRAL: ICD-10-CM

## 2024-04-19 DIAGNOSIS — Z01.818 PREOP EXAMINATION: Primary | ICD-10-CM

## 2024-04-19 DIAGNOSIS — E03.9 HYPOTHYROIDISM, UNSPECIFIED TYPE: ICD-10-CM

## 2024-04-19 LAB
ALBUMIN SERPL BCP-MCNC: 4.3 G/DL (ref 3.5–5.2)
ALP SERPL-CCNC: 54 U/L (ref 55–135)
ALT SERPL W/O P-5'-P-CCNC: 43 U/L (ref 10–44)
ANION GAP SERPL CALC-SCNC: 10 MMOL/L (ref 8–16)
AST SERPL-CCNC: 40 U/L (ref 10–40)
BASOPHILS # BLD AUTO: 0.08 K/UL (ref 0–0.2)
BASOPHILS NFR BLD: 0.8 % (ref 0–1.9)
BILIRUB SERPL-MCNC: 0.7 MG/DL (ref 0.1–1)
BUN SERPL-MCNC: 15 MG/DL (ref 6–20)
CALCIUM SERPL-MCNC: 10.1 MG/DL (ref 8.7–10.5)
CHLORIDE SERPL-SCNC: 99 MMOL/L (ref 95–110)
CO2 SERPL-SCNC: 26 MMOL/L (ref 23–29)
CREAT SERPL-MCNC: 0.8 MG/DL (ref 0.5–1.4)
DIFFERENTIAL METHOD BLD: ABNORMAL
EOSINOPHIL # BLD AUTO: 0.1 K/UL (ref 0–0.5)
EOSINOPHIL NFR BLD: 1.3 % (ref 0–8)
ERYTHROCYTE [DISTWIDTH] IN BLOOD BY AUTOMATED COUNT: 11.8 % (ref 11.5–14.5)
EST. GFR  (NO RACE VARIABLE): >60 ML/MIN/1.73 M^2
GLUCOSE SERPL-MCNC: 93 MG/DL (ref 70–110)
HCT VFR BLD AUTO: 38.7 % (ref 37–48.5)
HGB BLD-MCNC: 13.2 G/DL (ref 12–16)
IMM GRANULOCYTES # BLD AUTO: 0.04 K/UL (ref 0–0.04)
IMM GRANULOCYTES NFR BLD AUTO: 0.4 % (ref 0–0.5)
LYMPHOCYTES # BLD AUTO: 2.7 K/UL (ref 1–4.8)
LYMPHOCYTES NFR BLD: 26.7 % (ref 18–48)
MCH RBC QN AUTO: 34.2 PG (ref 27–31)
MCHC RBC AUTO-ENTMCNC: 34.1 G/DL (ref 32–36)
MCV RBC AUTO: 100 FL (ref 82–98)
MONOCYTES # BLD AUTO: 0.8 K/UL (ref 0.3–1)
MONOCYTES NFR BLD: 7.7 % (ref 4–15)
NEUTROPHILS # BLD AUTO: 6.3 K/UL (ref 1.8–7.7)
NEUTROPHILS NFR BLD: 63.1 % (ref 38–73)
NRBC BLD-RTO: 0 /100 WBC
OHS QRS DURATION: 86 MS
OHS QTC CALCULATION: 422 MS
PLATELET # BLD AUTO: 270 K/UL (ref 150–450)
PMV BLD AUTO: 9.8 FL (ref 9.2–12.9)
POTASSIUM SERPL-SCNC: 3.2 MMOL/L (ref 3.5–5.1)
PROT SERPL-MCNC: 7.5 G/DL (ref 6–8.4)
RBC # BLD AUTO: 3.86 M/UL (ref 4–5.4)
SODIUM SERPL-SCNC: 135 MMOL/L (ref 136–145)
WBC # BLD AUTO: 10.02 K/UL (ref 3.9–12.7)

## 2024-04-19 PROCEDURE — 1160F RVW MEDS BY RX/DR IN RCRD: CPT | Mod: CPTII,S$GLB,,

## 2024-04-19 PROCEDURE — 1159F MED LIST DOCD IN RCRD: CPT | Mod: CPTII,S$GLB,,

## 2024-04-19 PROCEDURE — 85730 THROMBOPLASTIN TIME PARTIAL: CPT

## 2024-04-19 PROCEDURE — 3078F DIAST BP <80 MM HG: CPT | Mod: CPTII,S$GLB,,

## 2024-04-19 PROCEDURE — 85025 COMPLETE CBC W/AUTO DIFF WBC: CPT

## 2024-04-19 PROCEDURE — 71046 X-RAY EXAM CHEST 2 VIEWS: CPT | Mod: TC,FY,PO

## 2024-04-19 PROCEDURE — 71046 X-RAY EXAM CHEST 2 VIEWS: CPT | Mod: 26,,, | Performed by: RADIOLOGY

## 2024-04-19 PROCEDURE — 99999 PR PBB SHADOW E&M-EST. PATIENT-LVL V: CPT | Mod: PBBFAC,,,

## 2024-04-19 PROCEDURE — 36415 COLL VENOUS BLD VENIPUNCTURE: CPT | Mod: PO

## 2024-04-19 PROCEDURE — 80053 COMPREHEN METABOLIC PANEL: CPT

## 2024-04-19 PROCEDURE — 99214 OFFICE O/P EST MOD 30 MIN: CPT | Mod: S$GLB,,,

## 2024-04-19 PROCEDURE — 3008F BODY MASS INDEX DOCD: CPT | Mod: CPTII,S$GLB,,

## 2024-04-19 PROCEDURE — 93005 ELECTROCARDIOGRAM TRACING: CPT | Mod: S$GLB,,,

## 2024-04-19 PROCEDURE — 85610 PROTHROMBIN TIME: CPT

## 2024-04-19 PROCEDURE — 93010 ELECTROCARDIOGRAM REPORT: CPT | Mod: S$GLB,,, | Performed by: INTERNAL MEDICINE

## 2024-04-19 PROCEDURE — 4010F ACE/ARB THERAPY RXD/TAKEN: CPT | Mod: CPTII,S$GLB,,

## 2024-04-19 PROCEDURE — 3075F SYST BP GE 130 - 139MM HG: CPT | Mod: CPTII,S$GLB,,

## 2024-04-19 NOTE — PATIENT INSTRUCTIONS
Luther Coulter,     If you are due for any health screening(s) below please notify me so we can arrange them to be ordered and scheduled to maintain your health. Most healthy patients complete it. Don't lose out on improving your health.     All of your core healthy metrics are met.              Dear Ms. Adriel:    Your [facility] is dedicated to helping you stay healthy with regularly scheduled recommended screenings.  Scheduling routine screenings is important to maintaining good health. Our records indicate that you may be overdue for your screening pap smear. A pap smear screening can help identify patients at risk for developing cervical cancer at an early stage, when it is most likely to be successfully treated.    We encourage you to schedule your appointment with your [facility] provider or some primary care providers also perform this screening.    If you have completed or scheduled your pap smear screening outside of [facility], please notify your primary care team so we can update your health record.      If you have questions or would like to schedule your screening, please contact your primary care clinic.    Sincerely,    Your [facility]

## 2024-04-19 NOTE — PROGRESS NOTES
Patient ID: Marylin Morel is a 58 y.o. female.    Chief Complaint: Pre-op Exam        Preop exam. Spine surgery. Denies adverse reaction to general anesthesia previously. No excessive bleeding or bruising. Reviewed meds. Instructed to hold NSAIDs and certain supplements 7 days prior. Plans to also discuss this with surgical team. No experienced CP or SOB. She could walk city blocks or flights of stairs without experiencing CP or SOB. Denies issues with THADDEUS in the past.           Past Medical History:   Diagnosis Date    Allergy     ants    GERD (gastroesophageal reflux disease)     Hypertension     Thyroid disease                 Current Outpatient Medications:     cetirizine (ZYRTEC) 10 MG tablet, Take 10 mg by mouth once daily., Disp: , Rfl:     COMBIPATCH 0.05-0.14 mg/24 hr, twice a week., Disp: , Rfl:     EPINEPHrine (EPIPEN) 0.3 mg/0.3 mL AtIn, Inject 0.3 mLs (0.3 mg total) into the muscle once. for 1 dose, Disp: 2 each, Rfl: 0    fluticasone propionate (FLONASE) 50 mcg/actuation nasal spray, INSTILL ONE SPRAY INTO EACH NOSTRIL ONCE DAILY, Disp: 16 g, Rfl: 11    ibuprofen (ADVIL,MOTRIN) 200 MG tablet, Take 200 mg by mouth as needed for Pain., Disp: , Rfl:     levothyroxine (SYNTHROID) 25 MCG tablet, Take 1 tablet (25 mcg total) by mouth once daily., Disp: 90 tablet, Rfl: 3    lisinopriL-hydrochlorothiazide (PRINZIDE,ZESTORETIC) 20-25 mg Tab, Take 1 tablet by mouth once daily., Disp: 90 tablet, Rfl: 3    methocarbamoL (ROBAXIN) 500 MG Tab, Take 1 tablet (500 mg total) by mouth 2 (two) times daily as needed., Disp: 60 tablet, Rfl: 0    multivitamin capsule, Take 1 capsule by mouth once daily., Disp: , Rfl:     olopatadine (PATADAY) 0.2 % Drop, Place into both eyes once daily., Disp: , Rfl:     omega-3 fatty acids/fish oil (FISH OIL-OMEGA-3 FATTY ACIDS) 300-1,000 mg capsule, Take by mouth once daily., Disp: , Rfl:     omeprazole (PRILOSEC) 40 MG capsule, Take 1 capsule (40 mg total) by mouth once daily.,  Disp: 90 capsule, Rfl: 3    simethicone (GAS-X EXTRA STRENGTH) 125 mg Cap capsule, Take 1 capsule (125 mg total) by mouth 4 (four) times daily as needed for Flatulence., Disp: 30 capsule, Rfl: 1    tiZANidine (ZANAFLEX) 4 MG tablet, Take 4 mg by mouth 2 (two) times daily as needed., Disp: , Rfl:     vitamin D (VITAMIN D3) 1000 units Tab, Take 1,000 Units by mouth once daily., Disp: , Rfl:     diazePAM (VALIUM) 2 MG tablet, Take 1 tablet (2 mg total) by mouth once as needed for Anxiety. Take 1 tab 20-30 minutes prior to MRI; May repeat if needed (Patient not taking: Reported on 4/19/2024), Disp: 2 tablet, Rfl: 0    ondansetron (ZOFRAN) 4 MG tablet, Take 1 tablet (4 mg total) by mouth every 8 (eight) hours as needed for Nausea. (Patient not taking: Reported on 4/19/2024), Disp: 20 tablet, Rfl: 0    oxyCODONE-acetaminophen (PERCOCET) 5-325 mg per tablet, Take 1 tablet by mouth every 4 (four) hours as needed for Pain. (Patient not taking: Reported on 4/19/2024), Disp: 30 tablet, Rfl: 0    senna-docusate 8.6-50 mg (SENNA WITH DOCUSATE SODIUM) 8.6-50 mg per tablet, Take 2 tablets by mouth once daily. (Patient not taking: Reported on 4/19/2024), Disp: 20 tablet, Rfl: 0    The 10-year ASCVD risk score (Luisa DK, et al., 2019) is: 4.8%    Values used to calculate the score:      Age: 58 years      Sex: Female      Is Non- : No      Diabetic: No      Tobacco smoker: No      Systolic Blood Pressure: 130 mmHg      Is BP treated: Yes      HDL Cholesterol: 63 mg/dL      Total Cholesterol: 269 mg/dL     Wt Readings from Last 3 Encounters:   04/19/24 62.6 kg (138 lb 0.1 oz)   04/06/24 62.6 kg (138 lb)   03/21/24 62.6 kg (138 lb)     Temp Readings from Last 3 Encounters:   04/19/24 98.2 °F (36.8 °C) (Oral)   04/06/24 97.9 °F (36.6 °C)   03/21/24 99 °F (37.2 °C) (Temporal)     BP Readings from Last 3 Encounters:   04/19/24 130/74   04/06/24 128/81   03/21/24 119/75     Pulse Readings from Last 3 Encounters:    04/19/24 88   04/06/24 78   03/21/24 79     Resp Readings from Last 3 Encounters:   04/19/24 16   04/06/24 20   12/19/23 (!) 22     PF Readings from Last 3 Encounters:   No data found for PF     SpO2 Readings from Last 3 Encounters:   04/19/24 95%   04/06/24 99%   12/26/23 96%        Lab Results   Component Value Date    HGBA1C 5.2 10/06/2022    HGBA1C 5.0 01/18/2020     Lab Results   Component Value Date    LDLCALC 174.6 (H) 11/11/2023    CREATININE 0.7 12/19/2023       Review of Systems   Constitutional:  Positive for activity change.   Musculoskeletal:  Positive for back pain.           Objective:      Physical Exam  Vitals reviewed.   Constitutional:       General: She is not in acute distress.     Appearance: Normal appearance. She is normal weight. She is not ill-appearing, toxic-appearing or diaphoretic.   HENT:      Head: Normocephalic.      Right Ear: External ear normal.      Left Ear: External ear normal.      Nose: Nose normal. No congestion or rhinorrhea.      Mouth/Throat:      Mouth: Mucous membranes are moist.      Pharynx: Oropharynx is clear.   Eyes:      General: No scleral icterus.        Right eye: No discharge.         Left eye: No discharge.      Extraocular Movements: Extraocular movements intact.      Conjunctiva/sclera: Conjunctivae normal.   Cardiovascular:      Rate and Rhythm: Normal rate and regular rhythm.      Pulses: Normal pulses.      Heart sounds: Normal heart sounds. No murmur heard.     No friction rub. No gallop.   Pulmonary:      Effort: Pulmonary effort is normal. No respiratory distress.      Breath sounds: Normal breath sounds. No wheezing, rhonchi or rales.   Chest:      Chest wall: No tenderness.   Musculoskeletal:         General: No swelling or deformity. Normal range of motion.      Cervical back: Normal range of motion.      Right lower leg: No edema.      Left lower leg: No edema.   Skin:     General: Skin is warm and dry.      Capillary Refill: Capillary refill  takes less than 2 seconds.      Coloration: Skin is not jaundiced.      Findings: No bruising, erythema, lesion or rash.   Neurological:      Mental Status: She is alert and oriented to person, place, and time.      Gait: Gait normal.   Psychiatric:         Mood and Affect: Mood normal.         Behavior: Behavior normal.         Thought Content: Thought content normal.         Judgment: Judgment normal.             Screening recommendations appropriate to age and health status were reviewed.    STOP BAN points   Low Risk for moderate to severe THADDEUS    Preop examination  -     IN OFFICE EKG 12-LEAD (to Muse)  -     X-Ray Chest PA And Lateral; Future; Expected date: 2024  -     Urinalysis; Future; Expected date: 2024  -     PROTIME-INR; Future; Expected date: 2024  -     APTT; Future; Expected date: 2024  -     CBC W/ AUTO DIFFERENTIAL; Future; Expected date: 2024  -     COMPREHENSIVE METABOLIC PANEL; Future; Expected date: 2024        -    Pt is medically optimized for upcoming procedure. Proceed as scheduled.     Essential hypertension, benign        -    Stable. Continue meds. Will continue to monitor.     Hypothyroidism, unspecified type        -    Stable. Continue meds. Will continue to monitor.     DDD (degenerative disc disease), lumbosacral         -    Surgery per Dr. Chester       RCRI risk factors include: no known RCRI risk factors. As such, per RCRI the risk of cardiac death, nonfatal myocardial infarction, or nonfatal cardiac arrest is 0.4% and the risk of myocardial infarction, pulmonary edema, ventricular fibrillation, primary cardiac arrest, or complete heart block is 0.5%.  Overall this patient can be considered low risk for this intermediate risk procedure. No further cardiac testing is recommended at this time.     Patient denies any symptoms (as per HPI) concerning for undiagnosed lung disease including THADDEUS. Would not recommend obtaining chest X-ray,  sleep study, or PFTs at this time. Patient is a non-smoker. We discussed the benefits of early mobilization and deep breathing after surgery.      Screened patient for alcohol misuse, use of illicit drugs, and personal or family history of anesthetic complications or bleeding diathesis and no substantial concerns were identified. Instructed to hold ETOH prior to procedure.     All current medications were reviewed and at this time no changes to medications are recommended prior to surgery. Instructed to hold ASA, NSAIDs, and supplements 7 days  prior to procedure    I recommend use of standard pre-op and post-op precautions for this patient. In my opinion, she is medically optimized for this procedure, and can proceed without further evaluation.

## 2024-04-22 ENCOUNTER — CLINICAL SUPPORT (OUTPATIENT)
Dept: FAMILY MEDICINE | Facility: CLINIC | Age: 59
End: 2024-04-22
Payer: COMMERCIAL

## 2024-04-22 DIAGNOSIS — Z01.818 PREOP EXAMINATION: Primary | ICD-10-CM

## 2024-04-22 LAB
APTT PPP: 30.5 SEC (ref 21–32)
INR PPP: 1 (ref 0.8–1.2)
PROTHROMBIN TIME: 11 SEC (ref 9–12.5)

## 2024-04-22 PROCEDURE — 99499 UNLISTED E&M SERVICE: CPT | Mod: S$GLB,,,

## 2024-04-22 NOTE — PROGRESS NOTES
Pt reports to clinic for repeat EKG. No C/O angina or shortness of breath. Repeat EKG given to Avelino for review. Copy faxed to provider as requested

## 2024-04-23 DIAGNOSIS — Z01.818 PREOPERATIVE TESTING: Primary | ICD-10-CM

## 2024-04-23 NOTE — PRE-PROCEDURE INSTRUCTIONS
Patient stated has only gotten PONV with her brain  surgery, no other problems with anesthesia. She has gotten her medical clearance from  Avelino LAWTON on 4/19. Will need poc appt and labs.  Our  will call to set up these appts.    Preop instructions given. Hold aspirin, aspirin containing products, nsaids(aleve, advil, motrin, ibuprofen, naprosyn, naproxen, voltaren, diclofenac), vitamins ( Multivitamin, Vitamin D) and supplements( Omega  3 fish oil) one week prior to surgery.     May take Tylenol.( Also sent to [facility] portal)  Verbalizes understanding.

## 2024-04-23 NOTE — ANESTHESIA PAT ROS NOTE
04/23/2024  Marylin Morel is a 58 y.o., female.      Pre-op Assessment    I have reviewed the Patient Summary Reports.     I have reviewed the Nursing Notes. I have reviewed the NPO Status.   I have reviewed the Medications.     Review of Systems  Anesthesia Hx:    PONV History of prior surgery of interest to airway management or planning:  Previous anesthesia: General 12/18/2023  undefined FENESTRATION, CYST, ARACHNOID, ENDOSCOPIC- terence hole for colloid cyst resection and septostomy (Head) with general anesthesia.  Procedure performed at an [facility].      Airway issues documented on chart review include mask, easy     Denies Family Hx of Anesthesia complications.   Personal Hx of Anesthesia complications, Post-Operative Nausea/Vomiting                    Social:  Non-Smoker, Social Alcohol Use       Hematology/Oncology:  Hematology Normal   Oncology Normal                                   EENT/Dental:   Seasonal allergies            Cardiovascular:  Exercise tolerance: poor   Hypertension, well controlled       Denies Angina.     no hyperlipidemia  Denies JHAVERI.    Functional Capacity 4 METS                         Pulmonary:  Pulmonary Normal                       Renal/:  Renal/ Normal                 Hepatic/GI:   Denies PUD.  GERD, well controlled Denies Liver Disease.  Denies Hepatitis.           Musculoskeletal:  Arthritis    Musculoskeletal General/Symptoms: neck pain, muscle pain, localized, limited ROM, joint swelling, joint stiffness, joint pain, low back pain, sciatica.  struggled with back pain for several years; however, after her hip replacement she has struggled with worsening pain and numbness in the left leg.  Joint Disease:  Arthritis, Osteoarthritis, spine     Spine Disorders: lumbar Disc disease, Degenerative disease and Chronic Pain Degenerative Joint Disease, Scoliosis     , curvature of <60 degrees.  Cervical Spine Disorder, Cervical Disc Disease, Radiculopathy, S/P Cervical Fusion   Lumbar Spine Disorders, Lumbar Disc Disease Dr. Chester recommends a L2-pelvis PSF given the stenosis at those levels and her scoliosis.     3/14/24 CT IMAGING  IMPRESSION:  1. Levoscoliosis of the lower lumbar spine.  2. Degenerative change as outlined in detail above most pronounced at L3-L4, L4-L5 and L5-S1, with interval worsening from the previous MRI.  3. Severe spinal canal stenosis at L3-L4 resulting in waviness of the nerve roots above and below this level suggesting arachnoiditis and/or impingement.       Neurological:  Denies TIA.  Denies CVA. Neuromuscular Disease,   Denies Seizures.    DDD (degenerative disc disease), lumbosacral    Cervical radiculopathy    Colloid cyst of brain      Neuro Symptoms  12/19/2023 S/p endoscopic colloid cyst resection and fenestration. NAEON.   Pain Etiology/Diagnosis, Arthritis, Osteoarthritis, spine, Cervical Radiculopathy  Peripheral Neuropathy       Denies Brain Tumor                 Neuromuscular Disease   Endocrine:   Hypothyroidism          Dermatological:  Skin Normal    Psych:  Psychiatric Normal                    Physical Exam  General: Well nourished, Cooperative, Alert and Oriented    Airway:  Neck ROM: Normal ROM    Dental:  Caps / Implants, Retainer  Bottom permanent retainer  Chest/Lungs:  Clear to auscultation, Normal Respiratory Rate    Heart:  Rate: Normal  Rhythm: Regular Rhythm          Anesthesia Assessment: Preoperative EQUATION    Planned Procedure: Procedure(s) (LRB):  **LOOP X** L2 to pelvis posterior instrumented fusion (N/A)  Requested Anesthesia Type:General  Surgeon: Eric Chester MD  Service: Neurosurgery  Known or anticipated Date of Surgery:5/14/2024    Surgeon notes: reviewed    Electronic QUestionnaire Assessment completed via nurse interview with patient.        Triage considerations:       Previous anesthesia records:GETA  and No problems 12/18/2023   FENESTRATION, CYST, ARACHNOID, ENDOSCOPIC- terence hole for colloid cyst resection and septostomy (Head)   Airway:  Mallampati: II   Mouth Opening: Normal  TM Distance: Normal  Tongue: Normal  Neck ROM: Flexion Decreased, Extension Decreased     Dental:  Caps / Implants, Intact  Bottom permanent retainer    Last PCP note: 3-6 months ago , within [facility]   Subspecialty notes: ENT, Neurosurgery    Other important co-morbidities:PER EPIC:  GERD, HTN, Hypothyroid, and LUMBAR SPONDYLOSIS       Tests already available:  Available tests,  within 1 month , within 3 months , within [facility] .             Instructions given. (See in Nurse's note)    Optimization:  Anesthesia Preop Clinic Assessment  Indicated    Medical Opinion Indicated       Sub-specialist consult indicated:   TBD       Plan:    Testing:  PT/INR, PTT, TSH, and T&S   Pre-anesthesia  visit       Visit focus: concerns in complex and/or prolonged anesthesia, position other than supine     Consultation:Patient's PCP for re-evaluation     Patient  has previously scheduled Medical Appointment:NONE    Navigation: Tests Scheduled. TBD             Consults scheduled.TBD             Results will be tracked by Preop Clinic.  4/23 Medical clearance given by Avelino ALWTON on 4/19:  I recommend use of standard pre-op and post-op precautions for this patient. In my opinion, she is medically optimized for this procedure, and can proceed without further evaluation.      Alexandrea Arellano RN BSN

## 2024-04-24 ENCOUNTER — TELEPHONE (OUTPATIENT)
Dept: FAMILY MEDICINE | Facility: CLINIC | Age: 59
End: 2024-04-24
Payer: COMMERCIAL

## 2024-04-24 NOTE — TELEPHONE ENCOUNTER
Spoke with patient in regards to pre op paper work. Patient stated they will come by some time this week to  paperwork.

## 2024-04-25 ENCOUNTER — TELEPHONE (OUTPATIENT)
Dept: PREADMISSION TESTING | Facility: HOSPITAL | Age: 59
End: 2024-04-25
Payer: COMMERCIAL

## 2024-04-26 LAB
OHS QRS DURATION: 84 MS
OHS QTC CALCULATION: 428 MS

## 2024-04-29 ENCOUNTER — PATIENT MESSAGE (OUTPATIENT)
Dept: NEUROSURGERY | Facility: CLINIC | Age: 59
End: 2024-04-29
Payer: COMMERCIAL

## 2024-05-01 ENCOUNTER — TELEPHONE (OUTPATIENT)
Dept: NEUROSURGERY | Facility: CLINIC | Age: 59
End: 2024-05-01
Payer: COMMERCIAL

## 2024-05-01 NOTE — TELEPHONE ENCOUNTER
Called patient with concerns about times on a medical leave form that came to my desk. 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

## 2024-05-06 ENCOUNTER — PATIENT MESSAGE (OUTPATIENT)
Dept: NEUROSURGERY | Facility: CLINIC | Age: 59
End: 2024-05-06
Payer: COMMERCIAL

## 2024-05-07 ENCOUNTER — HOSPITAL ENCOUNTER (OUTPATIENT)
Dept: PREADMISSION TESTING | Facility: HOSPITAL | Age: 59
Discharge: HOME | End: 2024-05-07
Attending: NEUROLOGICAL SURGERY
Payer: COMMERCIAL

## 2024-05-07 VITALS
BODY MASS INDEX: 23.22 KG/M2 | OXYGEN SATURATION: 98 % | HEIGHT: 64 IN | WEIGHT: 136 LBS | TEMPERATURE: 99 F | SYSTOLIC BLOOD PRESSURE: 124 MMHG | HEART RATE: 85 BPM | DIASTOLIC BLOOD PRESSURE: 70 MMHG

## 2024-05-07 NOTE — DISCHARGE INSTRUCTIONS
Your surgery has been scheduled for:_______5/14/24___________________________________    You should report to:  ____[facility], located on the [place] side of the first floor of the           [facility] ([phone])  ___x_[facility], located on the [facility] side of the            Second floor of the [facility] ([phone])  ____3rd Floor [facility] located on the [facility] side of the [facility] [phone]  ____[facility]: located at [address] [place] [address]. [address]     Please Note   Tell your doctor if you take Aspirin, products containing Aspirin, herbal medications  or blood thinners, such as Coumadin, Ticlid, or Plavix.  (Consult your provider regarding holding or stopping before surgery).  Arrange for someone to drive you home following surgery.  You will not be allowed to leave the surgical facility alone or drive yourself home following sedation and anesthesia.        Before Surgery  Stop taking all herbal medications, vitamins, and supplements 7 days prior to surgery  No Motrin/Advil (Ibuprofen) 7 days before surgery  No Aleve (Naproxen) 7 days before surgery  Stop Taking Asprin, products containing Asprin ___7__days before surgery  Stop taking blood thinners_______days before surgery  No Goody's/BC  Powder 7 days before surgery  Refrain from drinking alcoholic beverages for 24hours before and after surgery  Stop or limit smoking _____7____days before surgery  You may take Tylenol for pain    Night before Surgery  Do not eat or drink after midnight  Take a shower or bath (shower is recommended).  Bathe with Hibiclens soap or an antibacterial soap from the neck down.  If not supplied by your surgeon, hibiclens soap will need to be purchased over the counter in pharmacy.  Rinse soap off thoroughly.  Shampoo your hair with your regular shampoo    The Day of  Surgery  Take another bath or shower with hibiclens or any antibacterial soap, to reduce the chance of infection.  Take heart and blood pressure medications with a small sip of water, as advised by the perioperative team.  Do not take fluid pills  You may brush your teeth and rinse your mouth, but do not swall any additional water.   Do not apply perfumes, powder, body lotions or deodorant on the day of surgery.  Nail polish should be removed.  Do not wear makeup or moisturizer  Wear comfortable clothes, such as a button front shirt and loose fitting pants.  Leave all jewelry, including body piercings, and valuables at home.    Bring any devices you will neeed after surgery such as crutches or canes.  If you have sleep apnea, please bring your CPAP machine  In the event that your physical condition changes including the onset of a cold or respiratory illness, or if you have to delay or cancel your surgery, please notify your surgeon.     Anesthesia: General Anesthesia     You are watched continuously during your procedure by your anesthesia provider.     Youre due to have surgery. During surgery, youll be given medicine called anesthesia or anesthetic. This will keep you comfortable and pain-free. Your anesthesia provider will use general anesthesia.  What is general anesthesia?  General anesthesia puts you into a state like deep sleep. It goes into the bloodstream (IV anesthetics), into the lungs (gas anesthetics), or both. You feel nothing during the procedure. You will not remember it. During the procedure, the anesthesia provider monitors you continuously. He or she checks your heart rate and rhythm, blood pressure, breathing, and blood oxygen.  IV anesthetics. IV anesthetics are given through an IV line in your arm. Theyre often given first. This is so you are asleep before a gas anesthetic is started. Some kinds of IV anesthetics relieve pain. Others relax you. Your doctor will decide which kind is best in  your case.  Gas anesthetics. Gas anesthetics are breathed into the lungs. They are often used to keep you asleep. They can be given through a facemask or a tube placed in your larynx or trachea (breathing tube).  If you have a facemask, your anesthesia provider will most likely place it over your nose and mouth while youre still awake. Youll breathe oxygen through the mask as your IV anesthetic is started. Gas anesthetic may be added through the mask.  If you have a tube in the larynx or trachea, it will be inserted into your throat after youre asleep.  Anesthesia tools and medicines  You will likely have:  IV anesthetics. These are put into an IV line into your bloodstream.  Gas anesthetics. You breathe these anesthetics into your lungs, where they pass into your bloodstream.  Pulse oximeter. This is a small clip that is attached to the end of your finger. This measures your blood oxygen level.  Electrocardiography leads (electrodes). These are small sticky pads that are placed on your chest. They record your heart rate and rhythm.  Blood pressure cuff. This reads your blood pressure.  Risks and possible complications  General anesthesia has some risks. These include:  Breathing problems  Nausea and vomiting  Sore throat or hoarseness (usually temporary)  Allergic reaction to the anesthetic  Irregular heartbeat (rare)  Cardiac arrest (rare)   Anesthesia safety  Follow all instructions you are given for how long not to eat or drink before your procedure.  Be sure your doctor knows what medicines and drugs you take. This includes over-the-counter medicines, herbs, supplements, alcohol or other drugs. You will be asked when those were last taken.  Have an adult family member or friend drive you home after the procedure.  For the first 24 hours after your surgery:  Do not drive or use heavy equipment.  Do not make important decisions or sign legal documents. If important decisions or signing legal documents is  necessary during the first 24 hours after surgery, have a trusted family member or spouse act on your behalf.  Avoid alcohol.  Have a responsible adult stay with you. He or she can watch for problems and help keep you safe.  Date Last Reviewed: 12/1/2016 © 2000-2017 [employer]. [address] [place] [address], [place] [address]. All rights reserved. This information is not intended as a substitute for professional medical care. Always follow your healthcare professional's instructions.

## 2024-05-13 ENCOUNTER — TELEPHONE (OUTPATIENT)
Dept: NEUROSURGERY | Facility: CLINIC | Age: 59
End: 2024-05-13
Payer: COMMERCIAL

## 2024-05-13 NOTE — TELEPHONE ENCOUNTER
Phone call to patient with pre op instructions.  Patient instructed to remain NPO after midnight tonight , to bathe in hibicleanse or dial antibacterial soap tonight and tomorrow morning before arrival, and to arrive on 2nd floor [facility]  at 5 am. Pt. Verbalized understanding of above instructions.     
daily

## 2024-05-14 ENCOUNTER — ANESTHESIA (OUTPATIENT)
Dept: SURGERY | Facility: HOSPITAL | Age: 59
DRG: 460 | End: 2024-05-14
Payer: COMMERCIAL

## 2024-05-14 ENCOUNTER — HOSPITAL ENCOUNTER (INPATIENT)
Facility: HOSPITAL | Age: 59
LOS: 4 days | Discharge: HOME HEALTH | DRG: 460 | End: 2024-05-18
Attending: NEUROLOGICAL SURGERY | Admitting: NEUROLOGICAL SURGERY
Payer: COMMERCIAL

## 2024-05-14 ENCOUNTER — ANESTHESIA EVENT (OUTPATIENT)
Dept: SURGERY | Facility: HOSPITAL | Age: 59
DRG: 460 | End: 2024-05-14
Payer: COMMERCIAL

## 2024-05-14 VITALS — RESPIRATION RATE: 10 BRPM

## 2024-05-14 DIAGNOSIS — M47.816 LUMBAR SPONDYLOSIS: Primary | ICD-10-CM

## 2024-05-14 LAB
ABO + RH BLD: NORMAL
ANION GAP SERPL CALC-SCNC: 12 MMOL/L (ref 8–16)
APTT PPP: 25.6 SEC (ref 21–32)
BASOPHILS # BLD AUTO: 0.07 K/UL (ref 0–0.2)
BASOPHILS NFR BLD: 1.1 % (ref 0–1.9)
BLD GP AB SCN CELLS X3 SERPL QL: NORMAL
BUN SERPL-MCNC: 10 MG/DL (ref 6–20)
CALCIUM SERPL-MCNC: 9.6 MG/DL (ref 8.7–10.5)
CHLORIDE SERPL-SCNC: 97 MMOL/L (ref 95–110)
CO2 SERPL-SCNC: 24 MMOL/L (ref 23–29)
CREAT SERPL-MCNC: 0.8 MG/DL (ref 0.5–1.4)
DIFFERENTIAL METHOD BLD: ABNORMAL
EOSINOPHIL # BLD AUTO: 0.1 K/UL (ref 0–0.5)
EOSINOPHIL NFR BLD: 2.1 % (ref 0–8)
ERYTHROCYTE [DISTWIDTH] IN BLOOD BY AUTOMATED COUNT: 11.7 % (ref 11.5–14.5)
EST. GFR  (NO RACE VARIABLE): >60 ML/MIN/1.73 M^2
GLUCOSE SERPL-MCNC: 100 MG/DL (ref 70–110)
HCT VFR BLD AUTO: 38.7 % (ref 37–48.5)
HGB BLD-MCNC: 13.3 G/DL (ref 12–16)
IMM GRANULOCYTES # BLD AUTO: 0.02 K/UL (ref 0–0.04)
IMM GRANULOCYTES NFR BLD AUTO: 0.3 % (ref 0–0.5)
INR PPP: 1 (ref 0.8–1.2)
LYMPHOCYTES # BLD AUTO: 1.6 K/UL (ref 1–4.8)
LYMPHOCYTES NFR BLD: 25 % (ref 18–48)
MCH RBC QN AUTO: 33.7 PG (ref 27–31)
MCHC RBC AUTO-ENTMCNC: 34.4 G/DL (ref 32–36)
MCV RBC AUTO: 98 FL (ref 82–98)
MONOCYTES # BLD AUTO: 0.7 K/UL (ref 0.3–1)
MONOCYTES NFR BLD: 10.7 % (ref 4–15)
NEUTROPHILS # BLD AUTO: 3.8 K/UL (ref 1.8–7.7)
NEUTROPHILS NFR BLD: 60.8 % (ref 38–73)
NRBC BLD-RTO: 0 /100 WBC
PLATELET # BLD AUTO: 266 K/UL (ref 150–450)
PMV BLD AUTO: 9.7 FL (ref 9.2–12.9)
POTASSIUM SERPL-SCNC: 3.4 MMOL/L (ref 3.5–5.1)
PROTHROMBIN TIME: 10.9 SEC (ref 9–12.5)
RBC # BLD AUTO: 3.95 M/UL (ref 4–5.4)
SODIUM SERPL-SCNC: 133 MMOL/L (ref 136–145)
SPECIMEN OUTDATE: NORMAL
WBC # BLD AUTO: 6.28 K/UL (ref 3.9–12.7)

## 2024-05-14 PROCEDURE — 22634 ARTHRD CMBN 1NTRSPC EA ADDL: CPT | Mod: 62,,, | Performed by: NEUROLOGICAL SURGERY

## 2024-05-14 PROCEDURE — 22848 INSERT PELV FIXATION DEVICE: CPT | Mod: 82,,, | Performed by: ORTHOPAEDIC SURGERY

## 2024-05-14 PROCEDURE — 37000009 HC ANESTHESIA EA ADD 15 MINS: Performed by: NEUROLOGICAL SURGERY

## 2024-05-14 PROCEDURE — 85025 COMPLETE CBC W/AUTO DIFF WBC: CPT | Performed by: STUDENT IN AN ORGANIZED HEALTH CARE EDUCATION/TRAINING PROGRAM

## 2024-05-14 PROCEDURE — 22842 INSERT SPINE FIXATION DEVICE: CPT | Mod: ,,, | Performed by: NEUROLOGICAL SURGERY

## 2024-05-14 PROCEDURE — 71000015 HC POSTOP RECOV 1ST HR: Performed by: NEUROLOGICAL SURGERY

## 2024-05-14 PROCEDURE — 22853 INSJ BIOMECHANICAL DEVICE: CPT | Mod: ,,, | Performed by: NEUROLOGICAL SURGERY

## 2024-05-14 PROCEDURE — 0QH304Z INSERTION OF INTERNAL FIXATION DEVICE INTO LEFT PELVIC BONE, OPEN APPROACH: ICD-10-PCS | Performed by: NEUROLOGICAL SURGERY

## 2024-05-14 PROCEDURE — 0SG00AJ FUSION OF LUMBAR VERTEBRAL JOINT WITH INTERBODY FUSION DEVICE, POSTERIOR APPROACH, ANTERIOR COLUMN, OPEN APPROACH: ICD-10-PCS | Performed by: NEUROLOGICAL SURGERY

## 2024-05-14 PROCEDURE — 22214 INCIS 1 VERTEBRAL SEG LUMBAR: CPT | Mod: 62,51,, | Performed by: ORTHOPAEDIC SURGERY

## 2024-05-14 PROCEDURE — 36620 INSERTION CATHETER ARTERY: CPT | Mod: 59,,, | Performed by: ANESTHESIOLOGY

## 2024-05-14 PROCEDURE — 22633 ARTHRD CMBN 1NTRSPC LUMBAR: CPT | Mod: 62,,, | Performed by: ORTHOPAEDIC SURGERY

## 2024-05-14 PROCEDURE — 22842 INSERT SPINE FIXATION DEVICE: CPT | Mod: 82,,, | Performed by: ORTHOPAEDIC SURGERY

## 2024-05-14 PROCEDURE — 0QS004Z REPOSITION LUMBAR VERTEBRA WITH INTERNAL FIXATION DEVICE, OPEN APPROACH: ICD-10-PCS | Performed by: NEUROLOGICAL SURGERY

## 2024-05-14 PROCEDURE — 85610 PROTHROMBIN TIME: CPT | Performed by: STUDENT IN AN ORGANIZED HEALTH CARE EDUCATION/TRAINING PROGRAM

## 2024-05-14 PROCEDURE — 80048 BASIC METABOLIC PNL TOTAL CA: CPT | Performed by: STUDENT IN AN ORGANIZED HEALTH CARE EDUCATION/TRAINING PROGRAM

## 2024-05-14 PROCEDURE — 8E0WXBG COMPUTER ASSISTED PROCEDURE OF TRUNK REGION, WITH COMPUTERIZED TOMOGRAPHY: ICD-10-PCS | Performed by: NEUROLOGICAL SURGERY

## 2024-05-14 PROCEDURE — 22214 INCIS 1 VERTEBRAL SEG LUMBAR: CPT | Mod: 62,51,, | Performed by: NEUROLOGICAL SURGERY

## 2024-05-14 PROCEDURE — 36000710: Performed by: NEUROLOGICAL SURGERY

## 2024-05-14 PROCEDURE — 61783 SCAN PROC SPINAL: CPT | Mod: ,,, | Performed by: NEUROLOGICAL SURGERY

## 2024-05-14 PROCEDURE — 0SG3071 FUSION OF LUMBOSACRAL JOINT WITH AUTOLOGOUS TISSUE SUBSTITUTE, POSTERIOR APPROACH, POSTERIOR COLUMN, OPEN APPROACH: ICD-10-PCS | Performed by: NEUROLOGICAL SURGERY

## 2024-05-14 PROCEDURE — 71000016 HC POSTOP RECOV ADDL HR: Performed by: NEUROLOGICAL SURGERY

## 2024-05-14 PROCEDURE — 25000003 PHARM REV CODE 250: Performed by: NEUROLOGICAL SURGERY

## 2024-05-14 PROCEDURE — 20930 SP BONE ALGRFT MORSEL ADD-ON: CPT | Mod: ,,, | Performed by: NEUROLOGICAL SURGERY

## 2024-05-14 PROCEDURE — D9220A PRA ANESTHESIA: Mod: CRNA,,, | Performed by: NURSE ANESTHETIST, CERTIFIED REGISTERED

## 2024-05-14 PROCEDURE — 0QH204Z INSERTION OF INTERNAL FIXATION DEVICE INTO RIGHT PELVIC BONE, OPEN APPROACH: ICD-10-PCS | Performed by: NEUROLOGICAL SURGERY

## 2024-05-14 PROCEDURE — C1713 ANCHOR/SCREW BN/BN,TIS/BN: HCPCS | Performed by: NEUROLOGICAL SURGERY

## 2024-05-14 PROCEDURE — 63600175 PHARM REV CODE 636 W HCPCS: Performed by: STUDENT IN AN ORGANIZED HEALTH CARE EDUCATION/TRAINING PROGRAM

## 2024-05-14 PROCEDURE — 86900 BLOOD TYPING SEROLOGIC ABO: CPT | Performed by: STUDENT IN AN ORGANIZED HEALTH CARE EDUCATION/TRAINING PROGRAM

## 2024-05-14 PROCEDURE — 22216 INCIS ADDL SPINE SEGMENT: CPT | Mod: 62,,, | Performed by: ORTHOPAEDIC SURGERY

## 2024-05-14 PROCEDURE — 0SB20ZZ EXCISION OF LUMBAR VERTEBRAL DISC, OPEN APPROACH: ICD-10-PCS | Performed by: NEUROLOGICAL SURGERY

## 2024-05-14 PROCEDURE — 63600175 PHARM REV CODE 636 W HCPCS: Performed by: NURSE ANESTHETIST, CERTIFIED REGISTERED

## 2024-05-14 PROCEDURE — 25000003 PHARM REV CODE 250: Performed by: STUDENT IN AN ORGANIZED HEALTH CARE EDUCATION/TRAINING PROGRAM

## 2024-05-14 PROCEDURE — 86901 BLOOD TYPING SEROLOGIC RH(D): CPT | Performed by: STUDENT IN AN ORGANIZED HEALTH CARE EDUCATION/TRAINING PROGRAM

## 2024-05-14 PROCEDURE — C1734 ORTH/DEVIC/DRUG BN/BN,TIS/BN: HCPCS | Performed by: NEUROLOGICAL SURGERY

## 2024-05-14 PROCEDURE — 22853 INSJ BIOMECHANICAL DEVICE: CPT | Mod: 82,,, | Performed by: ORTHOPAEDIC SURGERY

## 2024-05-14 PROCEDURE — 22614 ARTHRD PST TQ 1NTRSPC EA ADD: CPT | Mod: 62,,, | Performed by: NEUROLOGICAL SURGERY

## 2024-05-14 PROCEDURE — 36415 COLL VENOUS BLD VENIPUNCTURE: CPT | Performed by: STUDENT IN AN ORGANIZED HEALTH CARE EDUCATION/TRAINING PROGRAM

## 2024-05-14 PROCEDURE — 11000001 HC ACUTE MED/SURG PRIVATE ROOM

## 2024-05-14 PROCEDURE — 20936 SP BONE AGRFT LOCAL ADD-ON: CPT | Mod: ,,, | Performed by: NEUROLOGICAL SURGERY

## 2024-05-14 PROCEDURE — 85730 THROMBOPLASTIN TIME PARTIAL: CPT | Performed by: STUDENT IN AN ORGANIZED HEALTH CARE EDUCATION/TRAINING PROGRAM

## 2024-05-14 PROCEDURE — 22634 ARTHRD CMBN 1NTRSPC EA ADDL: CPT | Mod: 62,,, | Performed by: ORTHOPAEDIC SURGERY

## 2024-05-14 PROCEDURE — 63600175 PHARM REV CODE 636 W HCPCS: Mod: JZ,JG | Performed by: NEUROLOGICAL SURGERY

## 2024-05-14 PROCEDURE — 22614 ARTHRD PST TQ 1NTRSPC EA ADD: CPT | Mod: 62,,, | Performed by: ORTHOPAEDIC SURGERY

## 2024-05-14 PROCEDURE — 63600175 PHARM REV CODE 636 W HCPCS: Performed by: ANESTHESIOLOGY

## 2024-05-14 PROCEDURE — 25000003 PHARM REV CODE 250

## 2024-05-14 PROCEDURE — C1729 CATH, DRAINAGE: HCPCS | Performed by: NEUROLOGICAL SURGERY

## 2024-05-14 PROCEDURE — 25000003 PHARM REV CODE 250: Performed by: NURSE ANESTHETIST, CERTIFIED REGISTERED

## 2024-05-14 PROCEDURE — 22216 INCIS ADDL SPINE SEGMENT: CPT | Mod: 62,,, | Performed by: NEUROLOGICAL SURGERY

## 2024-05-14 PROCEDURE — 27201423 OPTIME MED/SURG SUP & DEVICES STERILE SUPPLY: Performed by: NEUROLOGICAL SURGERY

## 2024-05-14 PROCEDURE — D9220A PRA ANESTHESIA: Mod: ANES,,, | Performed by: ANESTHESIOLOGY

## 2024-05-14 PROCEDURE — 0SG30AJ FUSION OF LUMBOSACRAL JOINT WITH INTERBODY FUSION DEVICE, POSTERIOR APPROACH, ANTERIOR COLUMN, OPEN APPROACH: ICD-10-PCS | Performed by: NEUROLOGICAL SURGERY

## 2024-05-14 PROCEDURE — 22848 INSERT PELV FIXATION DEVICE: CPT | Mod: ,,, | Performed by: NEUROLOGICAL SURGERY

## 2024-05-14 PROCEDURE — 0SB40ZZ EXCISION OF LUMBOSACRAL DISC, OPEN APPROACH: ICD-10-PCS | Performed by: NEUROLOGICAL SURGERY

## 2024-05-14 PROCEDURE — 36000711: Performed by: NEUROLOGICAL SURGERY

## 2024-05-14 PROCEDURE — 27000221 HC OXYGEN, UP TO 24 HOURS

## 2024-05-14 PROCEDURE — 0SG1071 FUSION OF 2 OR MORE LUMBAR VERTEBRAL JOINTS WITH AUTOLOGOUS TISSUE SUBSTITUTE, POSTERIOR APPROACH, POSTERIOR COLUMN, OPEN APPROACH: ICD-10-PCS | Performed by: NEUROLOGICAL SURGERY

## 2024-05-14 PROCEDURE — 86850 RBC ANTIBODY SCREEN: CPT | Performed by: STUDENT IN AN ORGANIZED HEALTH CARE EDUCATION/TRAINING PROGRAM

## 2024-05-14 PROCEDURE — 71000033 HC RECOVERY, INTIAL HOUR: Performed by: NEUROLOGICAL SURGERY

## 2024-05-14 PROCEDURE — 94761 N-INVAS EAR/PLS OXIMETRY MLT: CPT

## 2024-05-14 PROCEDURE — 22633 ARTHRD CMBN 1NTRSPC LUMBAR: CPT | Mod: 62,,, | Performed by: NEUROLOGICAL SURGERY

## 2024-05-14 PROCEDURE — 37000008 HC ANESTHESIA 1ST 15 MINUTES: Performed by: NEUROLOGICAL SURGERY

## 2024-05-14 DEVICE — SCREW EXPEDIUM FEN STRL 6X45MM: Type: IMPLANTABLE DEVICE | Site: SPINE LUMBAR | Status: FUNCTIONAL

## 2024-05-14 DEVICE — SCREW INNER SINGLE SET TITANIU: Type: IMPLANTABLE DEVICE | Site: SPINE LUMBAR | Status: FUNCTIONAL

## 2024-05-14 DEVICE — CONNECTOR SPINAL SFX A5 5.5MM: Type: IMPLANTABLE DEVICE | Site: SPINE LUMBAR | Status: FUNCTIONAL

## 2024-05-14 DEVICE — SCREW EXPEDIUM VERSE PA 8X80MM: Type: IMPLANTABLE DEVICE | Site: SPINE LUMBAR | Status: FUNCTIONAL

## 2024-05-14 DEVICE — CAGE POST LUM LORDTC 11MM 9X22: Type: IMPLANTABLE DEVICE | Site: SPINE LUMBAR | Status: FUNCTIONAL

## 2024-05-14 DEVICE — SCREW EXPEDIUM VERSE 5.5 6X45: Type: IMPLANTABLE DEVICE | Site: SPINE LUMBAR | Status: FUNCTIONAL

## 2024-05-14 DEVICE — ROD SPINE MTRX STR 400X80MM: Type: IMPLANTABLE DEVICE | Site: SPINE LUMBAR | Status: FUNCTIONAL

## 2024-05-14 DEVICE — SET SCREW EXPEDIUM VERSE UNITZ: Type: IMPLANTABLE DEVICE | Site: SPINE LUMBAR | Status: FUNCTIONAL

## 2024-05-14 DEVICE — CAGE POST LUM LORDTC 9MM 9X26: Type: IMPLANTABLE DEVICE | Site: SPINE LUMBAR | Status: FUNCTIONAL

## 2024-05-14 DEVICE — IMPLANTABLE DEVICE: Type: IMPLANTABLE DEVICE | Site: SPINE LUMBAR | Status: FUNCTIONAL

## 2024-05-14 DEVICE — KIT MED BONE INFUSE: Type: IMPLANTABLE DEVICE | Site: SPINE LUMBAR | Status: FUNCTIONAL

## 2024-05-14 DEVICE — KIT CONFIDENCE W/O NEEDLES: Type: IMPLANTABLE DEVICE | Site: SPINE LUMBAR | Status: FUNCTIONAL

## 2024-05-14 DEVICE — SCREW BONE SPINAL 5.5 6 X 45MM: Type: IMPLANTABLE DEVICE | Site: SPINE LUMBAR | Status: FUNCTIONAL

## 2024-05-14 DEVICE — SCREW BONE SPINAL 5.5 6 X 40MM: Type: IMPLANTABLE DEVICE | Site: SPINE LUMBAR | Status: FUNCTIONAL

## 2024-05-14 RX ORDER — MIDAZOLAM HYDROCHLORIDE 1 MG/ML
INJECTION INTRAMUSCULAR; INTRAVENOUS
Status: DISCONTINUED | OUTPATIENT
Start: 2024-05-14 | End: 2024-05-14

## 2024-05-14 RX ORDER — PROCHLORPERAZINE EDISYLATE 5 MG/ML
5 INJECTION INTRAMUSCULAR; INTRAVENOUS EVERY 6 HOURS PRN
Status: DISCONTINUED | OUTPATIENT
Start: 2024-05-14 | End: 2024-05-18 | Stop reason: HOSPADM

## 2024-05-14 RX ORDER — BUPIVACAINE HYDROCHLORIDE 2.5 MG/ML
INJECTION, SOLUTION EPIDURAL; INFILTRATION; INTRACAUDAL
Status: DISCONTINUED | OUTPATIENT
Start: 2024-05-14 | End: 2024-05-14 | Stop reason: HOSPADM

## 2024-05-14 RX ORDER — MUPIROCIN 20 MG/G
1 OINTMENT TOPICAL 2 TIMES DAILY
Status: DISCONTINUED | OUTPATIENT
Start: 2024-05-14 | End: 2024-05-14 | Stop reason: HOSPADM

## 2024-05-14 RX ORDER — FENTANYL CITRATE 50 UG/ML
25 INJECTION, SOLUTION INTRAMUSCULAR; INTRAVENOUS EVERY 5 MIN PRN
Status: DISCONTINUED | OUTPATIENT
Start: 2024-05-14 | End: 2024-05-14 | Stop reason: HOSPADM

## 2024-05-14 RX ORDER — BISACODYL 10 MG/1
10 SUPPOSITORY RECTAL DAILY
Status: DISCONTINUED | OUTPATIENT
Start: 2024-05-14 | End: 2024-05-18 | Stop reason: HOSPADM

## 2024-05-14 RX ORDER — CEFAZOLIN 2 G/1
INJECTION, POWDER, FOR SOLUTION INTRAMUSCULAR; INTRAVENOUS
Status: DISCONTINUED | OUTPATIENT
Start: 2024-05-14 | End: 2024-05-14

## 2024-05-14 RX ORDER — HYDROMORPHONE HYDROCHLORIDE 1 MG/ML
0.5 INJECTION, SOLUTION INTRAMUSCULAR; INTRAVENOUS; SUBCUTANEOUS EVERY 10 MIN PRN
Status: DISCONTINUED | OUTPATIENT
Start: 2024-05-14 | End: 2024-05-14 | Stop reason: HOSPADM

## 2024-05-14 RX ORDER — SIMETHICONE 80 MG
1 TABLET,CHEWABLE ORAL 3 TIMES DAILY PRN
Status: DISCONTINUED | OUTPATIENT
Start: 2024-05-14 | End: 2024-05-18 | Stop reason: HOSPADM

## 2024-05-14 RX ORDER — PANTOPRAZOLE SODIUM 40 MG/1
40 TABLET, DELAYED RELEASE ORAL DAILY
Status: DISCONTINUED | OUTPATIENT
Start: 2024-05-15 | End: 2024-05-18 | Stop reason: HOSPADM

## 2024-05-14 RX ORDER — NALOXONE HCL 0.4 MG/ML
0.4 VIAL (ML) INJECTION
Status: DISCONTINUED | OUTPATIENT
Start: 2024-05-14 | End: 2024-05-18 | Stop reason: HOSPADM

## 2024-05-14 RX ORDER — OXYCODONE HYDROCHLORIDE 5 MG/1
5 TABLET ORAL
Status: DISCONTINUED | OUTPATIENT
Start: 2024-05-14 | End: 2024-05-14 | Stop reason: HOSPADM

## 2024-05-14 RX ORDER — ACETAMINOPHEN 10 MG/ML
INJECTION, SOLUTION INTRAVENOUS
Status: DISCONTINUED | OUTPATIENT
Start: 2024-05-14 | End: 2024-05-14

## 2024-05-14 RX ORDER — LIDOCAINE HYDROCHLORIDE 20 MG/ML
INJECTION, SOLUTION EPIDURAL; INFILTRATION; INTRACAUDAL; PERINEURAL
Status: DISCONTINUED | OUTPATIENT
Start: 2024-05-14 | End: 2024-05-14

## 2024-05-14 RX ORDER — PHENYLEPHRINE HCL IN 0.9% NACL 1 MG/10 ML
SYRINGE (ML) INTRAVENOUS
Status: DISCONTINUED | OUTPATIENT
Start: 2024-05-14 | End: 2024-05-14

## 2024-05-14 RX ORDER — ONDANSETRON 8 MG/1
8 TABLET, ORALLY DISINTEGRATING ORAL EVERY 6 HOURS PRN
Status: DISCONTINUED | OUTPATIENT
Start: 2024-05-14 | End: 2024-05-18 | Stop reason: HOSPADM

## 2024-05-14 RX ORDER — ACETAMINOPHEN 500 MG
1000 TABLET ORAL EVERY 6 HOURS
Status: DISCONTINUED | OUTPATIENT
Start: 2024-05-14 | End: 2024-05-18 | Stop reason: HOSPADM

## 2024-05-14 RX ORDER — HYDROMORPHONE HYDROCHLORIDE 1 MG/ML
2 INJECTION, SOLUTION INTRAMUSCULAR; INTRAVENOUS; SUBCUTANEOUS
Status: DISCONTINUED | OUTPATIENT
Start: 2024-05-14 | End: 2024-05-18 | Stop reason: HOSPADM

## 2024-05-14 RX ORDER — MUPIROCIN 20 MG/G
OINTMENT TOPICAL
Status: DISCONTINUED | OUTPATIENT
Start: 2024-05-14 | End: 2024-05-14 | Stop reason: HOSPADM

## 2024-05-14 RX ORDER — SODIUM CHLORIDE 9 MG/ML
INJECTION, SOLUTION INTRAVENOUS CONTINUOUS
Status: DISCONTINUED | OUTPATIENT
Start: 2024-05-14 | End: 2024-05-14

## 2024-05-14 RX ORDER — LISINOPRIL AND HYDROCHLOROTHIAZIDE 10; 12.5 MG/1; MG/1
2 TABLET ORAL DAILY
Status: DISCONTINUED | OUTPATIENT
Start: 2024-05-14 | End: 2024-05-17

## 2024-05-14 RX ORDER — ONDANSETRON HYDROCHLORIDE 2 MG/ML
4 INJECTION, SOLUTION INTRAVENOUS DAILY PRN
Status: DISCONTINUED | OUTPATIENT
Start: 2024-05-14 | End: 2024-05-14 | Stop reason: HOSPADM

## 2024-05-14 RX ORDER — AMOXICILLIN 250 MG
2 CAPSULE ORAL NIGHTLY PRN
Status: DISCONTINUED | OUTPATIENT
Start: 2024-05-14 | End: 2024-05-15

## 2024-05-14 RX ORDER — ONDANSETRON HYDROCHLORIDE 2 MG/ML
INJECTION, SOLUTION INTRAVENOUS
Status: DISCONTINUED | OUTPATIENT
Start: 2024-05-14 | End: 2024-05-14

## 2024-05-14 RX ORDER — DIPHENHYDRAMINE HYDROCHLORIDE 50 MG/ML
25 INJECTION INTRAMUSCULAR; INTRAVENOUS EVERY 8 HOURS PRN
Status: DISCONTINUED | OUTPATIENT
Start: 2024-05-14 | End: 2024-05-18 | Stop reason: HOSPADM

## 2024-05-14 RX ORDER — TRANEXAMIC ACID 100 MG/ML
INJECTION, SOLUTION INTRAVENOUS CONTINUOUS PRN
Status: DISCONTINUED | OUTPATIENT
Start: 2024-05-14 | End: 2024-05-14

## 2024-05-14 RX ORDER — VASOPRESSIN 20 [USP'U]/ML
INJECTION, SOLUTION INTRAMUSCULAR; SUBCUTANEOUS
Status: DISCONTINUED | OUTPATIENT
Start: 2024-05-14 | End: 2024-05-14

## 2024-05-14 RX ORDER — ALUMINUM HYDROXIDE, MAGNESIUM HYDROXIDE, AND SIMETHICONE 1200; 120; 1200 MG/30ML; MG/30ML; MG/30ML
30 SUSPENSION ORAL EVERY 4 HOURS PRN
Status: DISCONTINUED | OUTPATIENT
Start: 2024-05-14 | End: 2024-05-18 | Stop reason: HOSPADM

## 2024-05-14 RX ORDER — TRANEXAMIC ACID 100 MG/ML
INJECTION, SOLUTION INTRAVENOUS
Status: DISCONTINUED | OUTPATIENT
Start: 2024-05-14 | End: 2024-05-14

## 2024-05-14 RX ORDER — PROCHLORPERAZINE EDISYLATE 5 MG/ML
5 INJECTION INTRAMUSCULAR; INTRAVENOUS EVERY 30 MIN PRN
Status: DISCONTINUED | OUTPATIENT
Start: 2024-05-14 | End: 2024-05-14 | Stop reason: HOSPADM

## 2024-05-14 RX ORDER — FLUTICASONE PROPIONATE 50 MCG
2 SPRAY, SUSPENSION (ML) NASAL DAILY
Status: DISCONTINUED | OUTPATIENT
Start: 2024-05-15 | End: 2024-05-18 | Stop reason: HOSPADM

## 2024-05-14 RX ORDER — REMIFENTANIL HYDROCHLORIDE 1 MG/ML
INJECTION, POWDER, LYOPHILIZED, FOR SOLUTION INTRAVENOUS CONTINUOUS PRN
Status: DISCONTINUED | OUTPATIENT
Start: 2024-05-14 | End: 2024-05-14

## 2024-05-14 RX ORDER — OXYCODONE HYDROCHLORIDE 10 MG/1
10 TABLET ORAL EVERY 4 HOURS PRN
Status: DISCONTINUED | OUTPATIENT
Start: 2024-05-14 | End: 2024-05-18 | Stop reason: HOSPADM

## 2024-05-14 RX ORDER — DEXAMETHASONE SODIUM PHOSPHATE 4 MG/ML
INJECTION, SOLUTION INTRA-ARTICULAR; INTRALESIONAL; INTRAMUSCULAR; INTRAVENOUS; SOFT TISSUE
Status: DISCONTINUED | OUTPATIENT
Start: 2024-05-14 | End: 2024-05-14

## 2024-05-14 RX ORDER — LEVOTHYROXINE SODIUM 25 UG/1
25 TABLET ORAL DAILY
Status: DISCONTINUED | OUTPATIENT
Start: 2024-05-15 | End: 2024-05-15

## 2024-05-14 RX ORDER — PHENYLEPHRINE HCL IN 0.9% NACL 1 MG/10 ML
SYRINGE (ML) INTRAVENOUS
Status: COMPLETED
Start: 2024-05-14 | End: 2024-05-14

## 2024-05-14 RX ORDER — ROCURONIUM BROMIDE 10 MG/ML
INJECTION, SOLUTION INTRAVENOUS
Status: DISCONTINUED | OUTPATIENT
Start: 2024-05-14 | End: 2024-05-14

## 2024-05-14 RX ORDER — OXYCODONE HYDROCHLORIDE 5 MG/1
5 TABLET ORAL EVERY 4 HOURS PRN
Status: DISCONTINUED | OUTPATIENT
Start: 2024-05-14 | End: 2024-05-18 | Stop reason: HOSPADM

## 2024-05-14 RX ORDER — VANCOMYCIN HYDROCHLORIDE 1 G/20ML
INJECTION, POWDER, LYOPHILIZED, FOR SOLUTION INTRAVENOUS
Status: DISCONTINUED | OUTPATIENT
Start: 2024-05-14 | End: 2024-05-14 | Stop reason: HOSPADM

## 2024-05-14 RX ORDER — SODIUM CHLORIDE 9 MG/ML
INJECTION, SOLUTION INTRAVENOUS CONTINUOUS
Status: DISCONTINUED | OUTPATIENT
Start: 2024-05-14 | End: 2024-05-15

## 2024-05-14 RX ORDER — MUPIROCIN 20 MG/G
OINTMENT TOPICAL 2 TIMES DAILY
Status: DISPENSED | OUTPATIENT
Start: 2024-05-14 | End: 2024-05-16

## 2024-05-14 RX ORDER — IBUPROFEN 400 MG/1
400 TABLET ORAL EVERY 6 HOURS PRN
Status: DISCONTINUED | OUTPATIENT
Start: 2024-05-14 | End: 2024-05-15

## 2024-05-14 RX ORDER — FENTANYL CITRATE 50 UG/ML
INJECTION, SOLUTION INTRAMUSCULAR; INTRAVENOUS
Status: DISCONTINUED | OUTPATIENT
Start: 2024-05-14 | End: 2024-05-14

## 2024-05-14 RX ORDER — PROPOFOL 10 MG/ML
VIAL (ML) INTRAVENOUS
Status: DISCONTINUED | OUTPATIENT
Start: 2024-05-14 | End: 2024-05-14

## 2024-05-14 RX ORDER — SUCCINYLCHOLINE CHLORIDE 20 MG/ML
INJECTION INTRAMUSCULAR; INTRAVENOUS
Status: DISCONTINUED | OUTPATIENT
Start: 2024-05-14 | End: 2024-05-14

## 2024-05-14 RX ORDER — LIDOCAINE HYDROCHLORIDE AND EPINEPHRINE 10; 10 MG/ML; UG/ML
INJECTION, SOLUTION INFILTRATION; PERINEURAL
Status: DISCONTINUED | OUTPATIENT
Start: 2024-05-14 | End: 2024-05-14 | Stop reason: HOSPADM

## 2024-05-14 RX ORDER — METHOCARBAMOL 750 MG/1
750 TABLET, FILM COATED ORAL 4 TIMES DAILY
Status: DISCONTINUED | OUTPATIENT
Start: 2024-05-14 | End: 2024-05-18 | Stop reason: HOSPADM

## 2024-05-14 RX ORDER — PHENYLEPHRINE HCL IN 0.9% NACL 1 MG/10 ML
100 SYRINGE (ML) INTRAVENOUS EVERY 5 MIN PRN
Status: DISCONTINUED | OUTPATIENT
Start: 2024-05-14 | End: 2024-05-14 | Stop reason: HOSPADM

## 2024-05-14 RX ORDER — PHENYLEPHRINE HYDROCHLORIDE 10 MG/ML
INJECTION INTRAVENOUS CONTINUOUS PRN
Status: DISCONTINUED | OUTPATIENT
Start: 2024-05-14 | End: 2024-05-14

## 2024-05-14 RX ADMIN — ONDANSETRON 4 MG: 2 INJECTION INTRAMUSCULAR; INTRAVENOUS at 10:05

## 2024-05-14 RX ADMIN — MUPIROCIN 1 G: 20 OINTMENT TOPICAL at 01:05

## 2024-05-14 RX ADMIN — HYDROMORPHONE HYDROCHLORIDE 0.5 MG: 1 INJECTION, SOLUTION INTRAMUSCULAR; INTRAVENOUS; SUBCUTANEOUS at 12:05

## 2024-05-14 RX ADMIN — PROPOFOL 150 MG: 10 INJECTION, EMULSION INTRAVENOUS at 07:05

## 2024-05-14 RX ADMIN — FENTANYL CITRATE 50 MCG: 50 INJECTION INTRAMUSCULAR; INTRAVENOUS at 07:05

## 2024-05-14 RX ADMIN — SODIUM CHLORIDE: 9 INJECTION, SOLUTION INTRAVENOUS at 12:05

## 2024-05-14 RX ADMIN — METHOCARBAMOL 750 MG: 750 TABLET ORAL at 12:05

## 2024-05-14 RX ADMIN — HYDROMORPHONE HYDROCHLORIDE 0.5 MG: 1 INJECTION, SOLUTION INTRAMUSCULAR; INTRAVENOUS; SUBCUTANEOUS at 04:05

## 2024-05-14 RX ADMIN — VASOPRESSIN 2 UNITS: 20 INJECTION INTRAVENOUS at 11:05

## 2024-05-14 RX ADMIN — PHENYLEPHRINE HYDROCHLORIDE 0.35 MCG/KG/MIN: 10 INJECTION INTRAVENOUS at 08:05

## 2024-05-14 RX ADMIN — Medication 100 MCG: at 12:05

## 2024-05-14 RX ADMIN — Medication 100 MCG: at 08:05

## 2024-05-14 RX ADMIN — VASOPRESSIN 1 UNITS: 20 INJECTION INTRAVENOUS at 11:05

## 2024-05-14 RX ADMIN — TRANEXAMIC ACID 620 MG: 100 INJECTION, SOLUTION INTRAVENOUS at 08:05

## 2024-05-14 RX ADMIN — CEFAZOLIN 2 G: 2 INJECTION, POWDER, FOR SOLUTION INTRAMUSCULAR; INTRAVENOUS at 04:05

## 2024-05-14 RX ADMIN — TRANEXAMIC ACID 1 MG/KG/HR: 100 INJECTION INTRAVENOUS at 08:05

## 2024-05-14 RX ADMIN — SODIUM CHLORIDE: 0.9 INJECTION, SOLUTION INTRAVENOUS at 07:05

## 2024-05-14 RX ADMIN — HYDROMORPHONE HYDROCHLORIDE 2 MG: 1 INJECTION, SOLUTION INTRAMUSCULAR; INTRAVENOUS; SUBCUTANEOUS at 09:05

## 2024-05-14 RX ADMIN — PROPOFOL 50 MG: 10 INJECTION, EMULSION INTRAVENOUS at 07:05

## 2024-05-14 RX ADMIN — SUGAMMADEX 200 MG: 100 INJECTION, SOLUTION INTRAVENOUS at 09:05

## 2024-05-14 RX ADMIN — MUPIROCIN: 20 OINTMENT TOPICAL at 12:05

## 2024-05-14 RX ADMIN — VASOPRESSIN 1 UNITS: 20 INJECTION INTRAVENOUS at 10:05

## 2024-05-14 RX ADMIN — Medication 50 MCG: at 09:05

## 2024-05-14 RX ADMIN — Medication 200 MCG: at 01:05

## 2024-05-14 RX ADMIN — FENTANYL CITRATE 50 MCG: 50 INJECTION INTRAMUSCULAR; INTRAVENOUS at 08:05

## 2024-05-14 RX ADMIN — DEXAMETHASONE SODIUM PHOSPHATE 4 MG: 4 INJECTION INTRA-ARTICULAR; INTRALESIONAL; INTRAMUSCULAR; INTRAVENOUS; SOFT TISSUE at 08:05

## 2024-05-14 RX ADMIN — CEFAZOLIN 2 G: 330 INJECTION, POWDER, FOR SOLUTION INTRAMUSCULAR; INTRAVENOUS at 08:05

## 2024-05-14 RX ADMIN — MUPIROCIN: 20 OINTMENT TOPICAL at 06:05

## 2024-05-14 RX ADMIN — OXYCODONE HYDROCHLORIDE 10 MG: 10 TABLET ORAL at 12:05

## 2024-05-14 RX ADMIN — ACETAMINOPHEN 1000 MG: 500 TABLET ORAL at 05:05

## 2024-05-14 RX ADMIN — SENNOSIDES AND DOCUSATE SODIUM 2 TABLET: 50; 8.6 TABLET ORAL at 07:05

## 2024-05-14 RX ADMIN — SODIUM CHLORIDE: 9 INJECTION, SOLUTION INTRAVENOUS at 06:05

## 2024-05-14 RX ADMIN — SODIUM CHLORIDE, SODIUM GLUCONATE, SODIUM ACETATE, POTASSIUM CHLORIDE, MAGNESIUM CHLORIDE, SODIUM PHOSPHATE, DIBASIC, AND POTASSIUM PHOSPHATE: .53; .5; .37; .037; .03; .012; .00082 INJECTION, SOLUTION INTRAVENOUS at 07:05

## 2024-05-14 RX ADMIN — ACETAMINOPHEN 1000 MG: 10 INJECTION, SOLUTION INTRAVENOUS at 10:05

## 2024-05-14 RX ADMIN — Medication 100 MCG: at 07:05

## 2024-05-14 RX ADMIN — ROCURONIUM BROMIDE 40 MG: 10 INJECTION, SOLUTION INTRAVENOUS at 08:05

## 2024-05-14 RX ADMIN — MIDAZOLAM 2 MG: 1 INJECTION INTRAMUSCULAR; INTRAVENOUS at 07:05

## 2024-05-14 RX ADMIN — GLYCOPYRROLATE 0.2 MG: 0.2 INJECTION INTRAMUSCULAR; INTRAVENOUS at 07:05

## 2024-05-14 RX ADMIN — REMIFENTANIL HYDROCHLORIDE 0.1 MCG/KG/MIN: 1 INJECTION, POWDER, LYOPHILIZED, FOR SOLUTION INTRAVENOUS at 07:05

## 2024-05-14 RX ADMIN — LIDOCAINE HYDROCHLORIDE 80 MG: 20 INJECTION, SOLUTION EPIDURAL; INFILTRATION; INTRACAUDAL at 07:05

## 2024-05-14 RX ADMIN — METHOCARBAMOL 750 MG: 750 TABLET ORAL at 05:05

## 2024-05-14 RX ADMIN — CEFAZOLIN 2 G: 2 INJECTION, POWDER, FOR SOLUTION INTRAMUSCULAR; INTRAVENOUS at 11:05

## 2024-05-14 RX ADMIN — SUCCINYLCHOLINE CHLORIDE 120 MG: 20 INJECTION, SOLUTION INTRAMUSCULAR; INTRAVENOUS; PARENTERAL at 07:05

## 2024-05-14 RX ADMIN — OXYCODONE HYDROCHLORIDE 10 MG: 10 TABLET ORAL at 07:05

## 2024-05-14 RX ADMIN — METHOCARBAMOL 750 MG: 750 TABLET ORAL at 07:05

## 2024-05-14 RX ADMIN — SODIUM CHLORIDE: 9 INJECTION, SOLUTION INTRAVENOUS at 09:05

## 2024-05-14 NOTE — ANESTHESIA PREPROCEDURE EVALUATION
[facility]  Anesthesia Pre-Operative Evaluation        Patient Name: Marylin Morel  YOB: 1965  MRN: [id number]    SUBJECTIVE:     Pre-operative Evaluation for Procedure(s) (LRB):  **LOOP X** L2 to pelvis posterior instrumented fusion (N/A)     05/14/2024    Marylin Morel is a 58 y.o. female with a PMHx significant for HTN, GERD, cervical radiculopathy, lumbosacral DDD.     The patient now presents for the above procedure(s).    Previous Airway:    Date/Time: 12/18/2023 12:04 PM     Performed by: Emmanuel Mendez MD  Authorized by: Emmanuel Mendez MD      Intubation:     Induction:  Intravenous    Intubated:  Postinduction    Mask Ventilation:  Easy mask    Attempts:  1    Attempted By:  Staff anesthesiologist    Method of Intubation:  Direct    Blade:  Martinez 2    Laryngeal View Grade: Grade I - full view of cords      Difficult Airway Encountered?: No      Complications:  None    Airway Device:  Oral endotracheal tube    Airway Device Size:  7.0    Secured at:  The lips    Placement Verified By:  Capnometry    Complicating Factors:  Anterior larynx    Findings Post-Intubation:  BS equal bilateral and atraumatic/condition of teeth unchanged          Patient Active Problem List   Diagnosis    Essential hypertension, benign    Hypothyroidism    Seasonal allergies    GERD (gastroesophageal reflux disease)    DDD (degenerative disc disease), lumbosacral    Cervical radiculopathy    Colloid cyst of brain       Past Medical History:   Diagnosis Date    Allergy     ants    GERD (gastroesophageal reflux disease)     Hypertension     Thyroid disease        Review of patient's allergies indicates:   Allergen Reactions    Insect venom Anaphylaxis     Ant bites    Bactrim [sulfamethoxazole-trimethoprim] Blisters     Blisters/rash on torso and extremities        Current Outpatient Medications   Medication Instructions    cetirizine (ZYRTEC) 10 mg, Oral, Daily,      COMBIPATCH  0.05-0.14 mg/24 hr Twice weekly    EPINEPHrine (EPIPEN) 0.3 mg, Intramuscular    fluticasone propionate (FLONASE) 50 mcg/actuation nasal spray INSTILL ONE SPRAY INTO EACH NOSTRIL ONCE DAILY    ibuprofen (ADVIL,MOTRIN) 200 mg, Oral, As needed (PRN)    levothyroxine (SYNTHROID) 25 mcg, Oral, Daily    lisinopriL-hydrochlorothiazide (PRINZIDE,ZESTORETIC) 20-25 mg Tab 1 tablet, Oral, Daily    meclizine (ANTIVERT) 25 mg tablet TAKE 1 TABLET BY MOUTH THREE TIMES DAILY AS NEEDED FOR UP TO 30 DAYS    methocarbamoL (ROBAXIN) 500 mg, Oral, 2 times daily PRN    multivitamin capsule 1 capsule, Oral, Daily    olopatadine (PATADAY) 0.2 % Drop Both Eyes, Daily    omega-3 fatty acids/fish oil (FISH OIL-OMEGA-3 FATTY ACIDS) 300-1,000 mg capsule Oral, Daily    omeprazole (PRILOSEC) 40 mg, Oral, Daily    oxyCODONE-acetaminophen (PERCOCET) 5-325 mg per tablet 1 tablet, Oral, Every 4 hours PRN    simethicone (GAS-X EXTRA STRENGTH) 125 mg, Oral, 4 times daily PRN    tiZANidine (ZANAFLEX) 4 mg, Oral, 2 times daily PRN    triamcinolone acetonide 0.1% (KENALOG) 0.1 % cream Aaa bid x 1-2 wks prn rash    vitamin D (VITAMIN D3) 1,000 Units, Oral, Daily       Past Surgical History:   Procedure Laterality Date    AUGMENTATION OF BREAST      BREAST SURGERY      COSMETIC SURGERY      ENDOSCOPIC FENESTRATION OF ARACHNOID CYST N/A 12/18/2023    Procedure: FENESTRATION, CYST, ARACHNOID, ENDOSCOPIC- terence hole for colloid cyst resection and septostomy;  Surgeon: Clement Alamo MD;  Location: [facility] OR [address] [place] [address];  Service: Neurosurgery;  Laterality: N/A;    hip replacement left Left     NECK SURGERY         Social History     Substance and Sexual Activity   Drug Use No     Alcohol Use: Not At Risk (12/11/2023)    AUDIT-C     Frequency of Alcohol Consumption: 4 or more times a week     Average Number of Drinks: 1 or 2     Frequency of Binge Drinking: Never     Tobacco Use: Low Risk  (5/14/2024)    Patient History     Smoking Tobacco Use: Never     Smokeless  Tobacco Use: Never     Passive Exposure: Not on file       OBJECTIVE:     Vital Signs Range (Last 24H):  Temp:  [36.8 °C (98.2 °F)]   Pulse:  [76]   Resp:  [18]   BP: (153)/(83)   SpO2:  [99 %]       Significant Labs    Heme Profile  Lab Results   Component Value Date    WBC 10.02 04/19/2024    HGB 13.2 04/19/2024    HCT 38.7 04/19/2024     04/19/2024       Coagulation Studies  Lab Results   Component Value Date    LABPROT 11.0 04/19/2024    INR 1.0 04/19/2024    APTT 30.5 04/19/2024       BMP  Lab Results   Component Value Date     (L) 04/19/2024    K 3.2 (L) 04/19/2024    CL 99 04/19/2024    CO2 26 04/19/2024    BUN 15 04/19/2024    CREATININE 0.8 04/19/2024    MG 1.6 12/19/2023    PHOS 3.2 12/19/2023       Liver Function Tests  Lab Results   Component Value Date    AST 40 04/19/2024    ALT 43 04/19/2024    ALKPHOS 54 (L) 04/19/2024    BILITOT 0.7 04/19/2024    PROT 7.5 04/19/2024    ALBUMIN 4.3 04/19/2024       Lipid Profile  Lab Results   Component Value Date    CHOL 269 (H) 11/11/2023    HDL 63 11/11/2023    TRIG 157 (H) 11/11/2023       Endocrine Profile  Lab Results   Component Value Date    HGBA1C 5.2 10/06/2022    TSH 2.279 05/07/2024         Cardiac Studies    EKG:   Results for orders placed or performed in visit on 04/22/24   EKG 12-lead    Collection Time: 04/22/24  3:34 PM   Result Value Ref Range    QRS Duration 84 ms    [facility] QTC Calculation 428 ms    Narrative    Test Reason :     Vent. Rate : 088 BPM     Atrial Rate : 088 BPM     P-R Int : 162 ms          QRS Dur : 084 ms      QT Int : 354 ms       P-R-T Axes : 067 012 016 degrees     QTc Int : 428 ms    Normal sinus rhythm  Nonspecific ST and T wave abnormality  Abnormal ECG  When compared with ECG of 19-APR-2024 13:11,  Questionable change in The axis  Inverted T waves have replaced nonspecific T wave abnormality in Inferior  leads  Confirmed by Eric Wade MD ([id number]) on 4/26/2024 12:35:48 PM    Referred By: sarah machado           Confirmed  By:Eric Wade MD       KORY  No results found for this or any previous visit.      TTE  No results found for this or any previous visit.      ASSESSMENT/PLAN:           Pre-op Assessment    I have reviewed the Patient Summary Reports.     I have reviewed the Nursing Notes. I have reviewed the NPO Status.   I have reviewed the Medications.     Review of Systems  Anesthesia Hx:    PONV, only with last anesthetic History of prior surgery of interest to airway management or planning:  Previous anesthesia: General 12/18/2023  undefined FENESTRATION, CYST, ARACHNOID, ENDOSCOPIC- terence hole for colloid cyst resection and septostomy (Head) with general anesthesia.  Procedure performed at an [facility].      Airway issues documented on chart review include mask, easy     Denies Family Hx of Anesthesia complications.    Denies Personal Hx of Anesthesia complications.                    Social:  Non-Smoker, Social Alcohol Use       Hematology/Oncology:  Hematology Normal   Oncology Normal                                   EENT/Dental:   Seasonal allergies            Cardiovascular:  Exercise tolerance: good   Hypertension, well controlled       Denies Angina.     no hyperlipidemia  Denies JHAVERI.  ECG has been reviewed.  Functional Capacity 4 METS                         Pulmonary:  Pulmonary Normal                       Renal/:  Renal/ Normal                 Hepatic/GI:   Denies PUD.  GERD, well controlled Denies Liver Disease.  Denies Hepatitis.           Musculoskeletal:  Arthritis    Musculoskeletal General/Symptoms: neck pain, muscle pain, localized, limited ROM, joint swelling, joint stiffness, joint pain, low back pain, sciatica.  struggled with back pain for several years; however, after her hip replacement she has struggled with worsening pain and numbness in the left leg.  Joint Disease:  Arthritis, Osteoarthritis, spine     Spine Disorders: lumbar Disc disease, Degenerative disease and Chronic Pain Degenerative  Joint Disease, Scoliosis    , curvature of <60 degrees.  Cervical Spine Disorder, Cervical Disc Disease, Radiculopathy, S/P Cervical Fusion   Lumbar Spine Disorders, Lumbar Disc Disease Dr. Chester recommends a L2-pelvis PSF given the stenosis at those levels and her scoliosis.     3/14/24 CT IMAGING  IMPRESSION:  1. Levoscoliosis of the lower lumbar spine.  2. Degenerative change as outlined in detail above most pronounced at L3-L4, L4-L5 and L5-S1, with interval worsening from the previous MRI.  3. Severe spinal canal stenosis at L3-L4 resulting in waviness of the nerve roots above and below this level suggesting arachnoiditis and/or impingement.       Neurological:  Denies TIA.  Denies CVA. Neuromuscular Disease,   Denies Seizures.    DDD (degenerative disc disease), lumbosacral    Cervical radiculopathy    Colloid cyst of brain      Neuro Symptoms  12/19/2023 S/p endoscopic colloid cyst resection and fenestration. NAEON.   Pain Etiology/Diagnosis, Arthritis, Osteoarthritis, spine, Cervical Radiculopathy  Peripheral Neuropathy       Denies Brain Tumor                 Neuromuscular Disease   Endocrine:   Hypothyroidism          Dermatological:  Skin Normal    Psych:  Psychiatric Normal                    Physical Exam  General: Well nourished, Cooperative, Alert and Oriented    Airway:  Mallampati: IV   Mouth Opening: Normal  TM Distance: Normal  Tongue: Normal  Neck ROM: Normal ROM    Dental:  Caps / Implants, Retainer  Bottom permanent retainer  Chest/Lungs:  Normal Respiratory Rate    Heart:  Rate: Normal        Anesthesia Plan  Type of Anesthesia, risks & benefits discussed:    Anesthesia Type: Gen ETT  Intra-op Monitoring Plan: Standard ASA Monitors and Art Line  Post Op Pain Control Plan: multimodal analgesia and IV/PO Opioids PRN  Induction:  IV  Airway Plan: Video, Post-Induction  Informed Consent: Informed consent signed with the Patient and all parties understand the risks and agree with anesthesia plan.   All questions answered.   ASA Score: 2  Day of Surgery Review of History & Physical: H&P Update referred to the surgeon/provider.    Ready For Surgery From Anesthesia Perspective.     .

## 2024-05-14 NOTE — ANESTHESIA PROCEDURE NOTES
Arterial    Diagnosis: Lumbar radiculopathy    Patient location during procedure: done in OR    Staffing  Authorizing Provider: Roger Tabares MD  Performing Provider: Roger Tabares MD    Staffing  Performed by: Roger Tabares MD  Authorized by: Roger Tabares MD    Anesthesiologist was present at the time of the procedure.    Preanesthetic Checklist  Completed: patient identified, IV checked, site marked, risks and benefits discussed, surgical consent, monitors and equipment checked, pre-op evaluation, timeout performed and anesthesia consent givenArterial  Skin Prep: chlorhexidine gluconate  Local Infiltration: none  Orientation: right  Location: radial    Catheter Size: 20 G  Catheter placement by Ultrasound guidance. Heme positive aspiration all ports.   Vessel Caliber: small, patent, compressibility normal  Vascular Doppler:  not done  Needle advanced into vessel with real time Ultrasound guidance.Insertion Attempts: 1  Assessment  Dressing: secured with tape and tegaderm  Patient: Tolerated well

## 2024-05-14 NOTE — TRANSFER OF CARE
Anesthesia Transfer of Care Note    Patient: Marylin Morel    Procedure(s) Performed: Procedure(s) (LRB):  **LOOP X** L2 to pelvis posterior instrumented fusion (N/A)    Patient location: PACU    Anesthesia Type: general    Transport from OR: Transported from OR on 6-10 L/min O2 by face mask with adequate spontaneous ventilation    Post pain: adequate analgesia    Post assessment: no apparent anesthetic complications and tolerated procedure well    Post vital signs: stable    Level of consciousness: awake, alert and oriented    Nausea/Vomiting: no nausea/vomiting    Complications: none    Transfer of care protocol was followed      Last vitals: Visit Vitals  BP (!) 127/57   Pulse 72   Temp 36.1 °C (97 °F) (Temporal)   Resp 11   LMP 02/05/2015 (Approximate)   SpO2 100%   Breastfeeding No

## 2024-05-14 NOTE — ANESTHESIA PROCEDURE NOTES
Intubation    Date/Time: 5/14/2024 7:18 AM    Performed by: Cindy Marmolejo CRNA  Authorized by: Roger Tabares MD    Intubation:     Induction:  Intravenous    Intubated:  Postinduction    Mask Ventilation:  Easy mask    Attempts:  1    Attempted By:  CRNA    Method of Intubation:  Video laryngoscopy    Blade:  Carlos 3    Laryngeal View Grade: Grade I - full view of cords      Difficult Airway Encountered?: No      Complications:  None    Airway Device:  Oral endotracheal tube    Airway Device Size:  7.5    Style/Cuff Inflation:  Cuffed (inflated to minimal occlusive pressure)    Tube secured:  22    Secured at:  The lips    Placement Verified By:  Capnometry    Findings Post-Intubation:  BS equal bilateral and atraumatic/condition of teeth unchanged

## 2024-05-14 NOTE — NURSING TRANSFER
Nursing Transfer Note      5/14/2024   2:14 PM    Nurse giving handoff: UMANG Up RN PACU  Nurse receiving handoff:Kimberley RN NPU    Reason patient is being transferred: post surgery    Transfer To: 922    Transfer via bed    Transfer with IV pump/fluid, hemovac drains x3- to full suction    Transported by transport personnel x1    Transfer Vital Signs:  Please see flow sheet    Telemetry: Box Number N/A  Order for Tele Monitor? No    Additional Lines: Manley Catheter    4eyes on Skin: yes    Medicines sent: mupirocin, lisinopril tab    Any special needs or follow-up needed: routine    Patient belongings transferred with patient: Yes    Chart send with patient: Yes    Notified: spouse    Patient reassessed at: 5/14/2024 at  1645  1  Upon arrival to floor: cardiac monitor applied, patient oriented to room, and bed in lowest position

## 2024-05-14 NOTE — BRIEF OP NOTE
[facility] - [facility] (2nd Fl)  Brief Operative Note    SUMMARY     Surgery Date: 5/14/2024     Surgeons and Role:     * Eric Chester MD - Primary     * Fredrick Rob MD - Co-Surgeon    Assisting Surgeon: Clarence Danielle MD    Pre-op Diagnosis:  Sagittal plane imbalance [M43.8X9]  Spondylolisthesis of lumbar region [M43.16]    Post-op Diagnosis:  Post-Op Diagnosis Codes:     * Sagittal plane imbalance [M43.8X9]     * Spondylolisthesis of lumbar region [M43.16]    Procedure(s) (LRB):  **LOOP X** L2 to pelvis posterior instrumented fusion (N/A)    Anesthesia: General    Implants:  Implant Name Type Inv. Item Serial No.  Lot No. LRB No. Used Action   KIT CONFIDENCE W/O NEEDLES - [id number]  KIT CONFIDENCE W/O NEEDLES  [employer] [id number] N/A 1 Implanted   SCREW EXPEDIUM FEN STRL 6X45MM - [id number]  SCREW EXPEDIUM FEN STRL 6X45MM  SYNTHES [id number] N/A 1 Implanted   SCREW EXPEDIUM FEN STRL 6X45MM - [id number]  SCREW EXPEDIUM FEN STRL 6X45MM  SY[employer]THES [id number] N/A 1 Implanted   KIT MED BONE INFUSE - [id number]  KIT MED BONE INFUSE  [employer] [facility] [id number] N/A 1 Implanted   SCREW EXPEDIUM VERSE 5.5 6X45 - [id number]  SCREW EXPEDIUM VERSE 5.5 6X45  [employer]  N/A 2 Implanted   SCREW EXPEDIUM VERSE PA 8X80MM - [id number]  SCREW EXPEDIUM VERSE PA 8X80MM  [employer]ES  N/A 2 Implanted   SCREW BONE SPINAL 5.5 6 X 45MM - [id number]  SCREW BONE SPINAL 5.5 6 X 45MM  [employer]  N/A 4 Implanted   SCREW BONE SPINAL 5.5 6 X 40MM - [id number]  SCREW BONE SPINAL 5.5 6 X 40MM  [employer]  N/A 2 Implanted   EIT TLIF, H 11mm, 8 DEGREE, 28/10    [employer] [id number] N/A 1 Implanted   CAGE POST LUM LORDTC 9MM 9X26 - [id number]  CAGE POST LUM LORDTC 9MM 9X26  SYN[employer]ES [id number] N/A 1 Implanted   CAGE POST LUM LORDTC 11MM 9X22 - [id number]  CAGE POST LUM LORDTC 11MM 9X22  SY[employer]ES [id number] N/A 1 Implanted   SET SCREW EXPEDIUM VERSE UNITNAIN - [id number]  SET SCREW EXPEDIUM VERSE UNITZ  [employer]  N/A 6 Implanted   SCREW INNER SINGLE SET  TITANIU - [id number]  SCREW INNER SINGLE SET TITANIU  [employer]  N/A 6 Implanted   ALVAREZ SPINE MTRX STR [id number] - [id number]  ALVAREZ SPINE MTRX STR [id number]  [employer]  N/A 2 Implanted   CONNECTOR SPINAL SFX A5 5.5MM - [id number]  CONNECTOR SPINAL SFX A5 5.5MM  [employer]  N/A 1 Implanted       Operative Findings: L2-Pelvis fusion w/ L3-S1 TLIFs    Estimated Blood Loss: * No values recorded between 5/14/2024  8:16 AM and 5/14/2024 11:30 AM *    Estimated Blood Loss has been documented.         Specimens:   Specimen (24h ago, onward)      None            [id number]

## 2024-05-14 NOTE — NURSING
Patient Transferred to NPU Room 922 at 1730.       Upon arrival to the floor, patient greeted and oriented to room. Complete head to toe assessment completed per protocol. VSS, see flowsheet for details. Neuro assessment completed. Primary team notified of patient's transfer to floor. All current and transfer orders reviewed/reconciled per protocol. All emergency equipment set up in patient's room. Fall/seizure precautions initiated per providers orders. 4 Eyes skin assessment performed, see below for details. Reviewed assessment and rounding frequency with patient and family. Questions were encouraged and addressed. Repositioned patient for comfort with bed locked in lowest position, side rails up x 3, bed alarm set, and call light within reach. Instructed patient to call staff for mobility/assistance, verbalized understanding. No acute signs of distress noted at this time.     Nurses Note -- 4 Eyes      5/14/2024   6:41 PM      Skin assessed during: Admit      [] No Altered Skin Integrity Present    []Prevention Measures Documented      [x] Yes- Altered Skin Integrity Present or Discovered   Surgical incision is only altered skin integrity present    Wound Care Consulted? No    Attending Nurse:  Rosa M SMITH    Second RN/Staff Member:  SARAH Duenas      Patient S/P L2-pelvis posterior instrumented fusion. 3 HV drains noted on floor arrival, see intake/output flowsheets for details. Smallwood in place and to be removed POD 1 at 0600, smallwood care completed. Post op xray pending. Orders for LSO brace to be worn OOB. Brace is at bedside. Pain regimen reviewed with patient. Questions were encourage and addressed. Mobility protocol reviewed with patient, verbalized understanding.

## 2024-05-15 PROBLEM — R52 PAIN: Status: ACTIVE | Noted: 2024-05-15

## 2024-05-15 PROBLEM — Z86.69 HISTORY OF HYDROCEPHALUS: Status: ACTIVE | Noted: 2024-05-15

## 2024-05-15 PROBLEM — D62 ACUTE POSTOPERATIVE ANEMIA DUE TO EXPECTED BLOOD LOSS: Status: ACTIVE | Noted: 2024-05-15

## 2024-05-15 PROBLEM — E87.1 HYPONATREMIA: Status: ACTIVE | Noted: 2024-05-15

## 2024-05-15 PROBLEM — M48.062 LUMBAR STENOSIS WITH NEUROGENIC CLAUDICATION: Status: ACTIVE | Noted: 2024-05-15

## 2024-05-15 PROBLEM — E87.6 HYPOKALEMIA: Status: ACTIVE | Noted: 2024-05-15

## 2024-05-15 PROBLEM — M54.16 LUMBAR RADICULOPATHY: Status: ACTIVE | Noted: 2024-05-15

## 2024-05-15 LAB
ALBUMIN SERPL BCP-MCNC: 3 G/DL (ref 3.5–5.2)
ANION GAP SERPL CALC-SCNC: 9 MMOL/L (ref 8–16)
BASOPHILS # BLD AUTO: 0.06 K/UL (ref 0–0.2)
BASOPHILS NFR BLD: 0.8 % (ref 0–1.9)
BUN SERPL-MCNC: 7 MG/DL (ref 6–20)
CALCIUM SERPL-MCNC: 7.5 MG/DL (ref 8.7–10.5)
CHLORIDE SERPL-SCNC: 104 MMOL/L (ref 95–110)
CO2 SERPL-SCNC: 19 MMOL/L (ref 23–29)
CREAT SERPL-MCNC: 0.7 MG/DL (ref 0.5–1.4)
DIFFERENTIAL METHOD BLD: ABNORMAL
EOSINOPHIL # BLD AUTO: 0.2 K/UL (ref 0–0.5)
EOSINOPHIL NFR BLD: 3.2 % (ref 0–8)
ERYTHROCYTE [DISTWIDTH] IN BLOOD BY AUTOMATED COUNT: 12.1 % (ref 11.5–14.5)
EST. GFR  (NO RACE VARIABLE): >60 ML/MIN/1.73 M^2
GLUCOSE SERPL-MCNC: 107 MG/DL (ref 70–110)
HCT VFR BLD AUTO: 23.6 % (ref 37–48.5)
HGB BLD-MCNC: 8.1 G/DL (ref 12–16)
IMM GRANULOCYTES # BLD AUTO: 0.06 K/UL (ref 0–0.04)
IMM GRANULOCYTES NFR BLD AUTO: 0.8 % (ref 0–0.5)
LYMPHOCYTES # BLD AUTO: 1.6 K/UL (ref 1–4.8)
LYMPHOCYTES NFR BLD: 20.9 % (ref 18–48)
MCH RBC QN AUTO: 34.5 PG (ref 27–31)
MCHC RBC AUTO-ENTMCNC: 34.3 G/DL (ref 32–36)
MCV RBC AUTO: 100 FL (ref 82–98)
MONOCYTES # BLD AUTO: 0.7 K/UL (ref 0.3–1)
MONOCYTES NFR BLD: 8.9 % (ref 4–15)
NEUTROPHILS # BLD AUTO: 4.9 K/UL (ref 1.8–7.7)
NEUTROPHILS NFR BLD: 65.4 % (ref 38–73)
NRBC BLD-RTO: 0 /100 WBC
PLATELET # BLD AUTO: 184 K/UL (ref 150–450)
PMV BLD AUTO: 9.7 FL (ref 9.2–12.9)
POTASSIUM SERPL-SCNC: 3.1 MMOL/L (ref 3.5–5.1)
RBC # BLD AUTO: 2.35 M/UL (ref 4–5.4)
SODIUM SERPL-SCNC: 132 MMOL/L (ref 136–145)
WBC # BLD AUTO: 7.55 K/UL (ref 3.9–12.7)

## 2024-05-15 PROCEDURE — 85025 COMPLETE CBC W/AUTO DIFF WBC: CPT | Performed by: STUDENT IN AN ORGANIZED HEALTH CARE EDUCATION/TRAINING PROGRAM

## 2024-05-15 PROCEDURE — 94761 N-INVAS EAR/PLS OXIMETRY MLT: CPT

## 2024-05-15 PROCEDURE — 97166 OT EVAL MOD COMPLEX 45 MIN: CPT

## 2024-05-15 PROCEDURE — 63600175 PHARM REV CODE 636 W HCPCS: Performed by: PHYSICIAN ASSISTANT

## 2024-05-15 PROCEDURE — 97530 THERAPEUTIC ACTIVITIES: CPT

## 2024-05-15 PROCEDURE — 97116 GAIT TRAINING THERAPY: CPT

## 2024-05-15 PROCEDURE — 63600175 PHARM REV CODE 636 W HCPCS: Performed by: STUDENT IN AN ORGANIZED HEALTH CARE EDUCATION/TRAINING PROGRAM

## 2024-05-15 PROCEDURE — 25000003 PHARM REV CODE 250: Performed by: PHYSICIAN ASSISTANT

## 2024-05-15 PROCEDURE — 99024 POSTOP FOLLOW-UP VISIT: CPT | Mod: ,,, | Performed by: PHYSICIAN ASSISTANT

## 2024-05-15 PROCEDURE — 80048 BASIC METABOLIC PNL TOTAL CA: CPT | Performed by: STUDENT IN AN ORGANIZED HEALTH CARE EDUCATION/TRAINING PROGRAM

## 2024-05-15 PROCEDURE — 36415 COLL VENOUS BLD VENIPUNCTURE: CPT | Performed by: PHYSICIAN ASSISTANT

## 2024-05-15 PROCEDURE — 25000242 PHARM REV CODE 250 ALT 637 W/ HCPCS: Performed by: STUDENT IN AN ORGANIZED HEALTH CARE EDUCATION/TRAINING PROGRAM

## 2024-05-15 PROCEDURE — 82040 ASSAY OF SERUM ALBUMIN: CPT | Performed by: PHYSICIAN ASSISTANT

## 2024-05-15 PROCEDURE — 99900035 HC TECH TIME PER 15 MIN (STAT)

## 2024-05-15 PROCEDURE — 36415 COLL VENOUS BLD VENIPUNCTURE: CPT | Performed by: STUDENT IN AN ORGANIZED HEALTH CARE EDUCATION/TRAINING PROGRAM

## 2024-05-15 PROCEDURE — 97161 PT EVAL LOW COMPLEX 20 MIN: CPT

## 2024-05-15 PROCEDURE — 25000003 PHARM REV CODE 250: Performed by: STUDENT IN AN ORGANIZED HEALTH CARE EDUCATION/TRAINING PROGRAM

## 2024-05-15 PROCEDURE — 11000001 HC ACUTE MED/SURG PRIVATE ROOM

## 2024-05-15 RX ORDER — LEVOTHYROXINE SODIUM 25 UG/1
25 TABLET ORAL
Status: DISCONTINUED | OUTPATIENT
Start: 2024-05-16 | End: 2024-05-18 | Stop reason: HOSPADM

## 2024-05-15 RX ORDER — AMOXICILLIN 250 MG
1 CAPSULE ORAL 2 TIMES DAILY
Status: DISCONTINUED | OUTPATIENT
Start: 2024-05-15 | End: 2024-05-18 | Stop reason: HOSPADM

## 2024-05-15 RX ORDER — ENOXAPARIN SODIUM 100 MG/ML
40 INJECTION SUBCUTANEOUS EVERY 24 HOURS
Status: DISCONTINUED | OUTPATIENT
Start: 2024-05-15 | End: 2024-05-18 | Stop reason: HOSPADM

## 2024-05-15 RX ORDER — CETIRIZINE HYDROCHLORIDE 5 MG/1
5 TABLET ORAL DAILY
Status: DISCONTINUED | OUTPATIENT
Start: 2024-05-15 | End: 2024-05-18 | Stop reason: HOSPADM

## 2024-05-15 RX ADMIN — METHOCARBAMOL 750 MG: 750 TABLET ORAL at 05:05

## 2024-05-15 RX ADMIN — CEFAZOLIN 2 G: 2 INJECTION, POWDER, FOR SOLUTION INTRAMUSCULAR; INTRAVENOUS at 03:05

## 2024-05-15 RX ADMIN — IBUPROFEN 400 MG: 400 TABLET ORAL at 10:05

## 2024-05-15 RX ADMIN — OXYCODONE 5 MG: 5 TABLET ORAL at 04:05

## 2024-05-15 RX ADMIN — ACETAMINOPHEN 1000 MG: 500 TABLET ORAL at 05:05

## 2024-05-15 RX ADMIN — DIPHENHYDRAMINE HYDROCHLORIDE 25 MG: 50 INJECTION, SOLUTION INTRAMUSCULAR; INTRAVENOUS at 07:05

## 2024-05-15 RX ADMIN — OXYCODONE HYDROCHLORIDE 10 MG: 10 TABLET ORAL at 07:05

## 2024-05-15 RX ADMIN — HYDROMORPHONE HYDROCHLORIDE 2 MG: 1 INJECTION, SOLUTION INTRAMUSCULAR; INTRAVENOUS; SUBCUTANEOUS at 11:05

## 2024-05-15 RX ADMIN — ENOXAPARIN SODIUM 40 MG: 40 INJECTION SUBCUTANEOUS at 05:05

## 2024-05-15 RX ADMIN — CEFAZOLIN 2 G: 2 INJECTION, POWDER, FOR SOLUTION INTRAMUSCULAR; INTRAVENOUS at 11:05

## 2024-05-15 RX ADMIN — METHOCARBAMOL 750 MG: 750 TABLET ORAL at 08:05

## 2024-05-15 RX ADMIN — METHOCARBAMOL 750 MG: 750 TABLET ORAL at 12:05

## 2024-05-15 RX ADMIN — CEFAZOLIN 2 G: 2 INJECTION, POWDER, FOR SOLUTION INTRAMUSCULAR; INTRAVENOUS at 07:05

## 2024-05-15 RX ADMIN — POTASSIUM BICARBONATE 40 MEQ: 391 TABLET, EFFERVESCENT ORAL at 10:05

## 2024-05-15 RX ADMIN — ACETAMINOPHEN 1000 MG: 500 TABLET ORAL at 11:05

## 2024-05-15 RX ADMIN — MUPIROCIN: 20 OINTMENT TOPICAL at 08:05

## 2024-05-15 RX ADMIN — PANTOPRAZOLE SODIUM 40 MG: 40 TABLET, DELAYED RELEASE ORAL at 08:05

## 2024-05-15 RX ADMIN — SENNOSIDES AND DOCUSATE SODIUM 1 TABLET: 50; 8.6 TABLET ORAL at 08:05

## 2024-05-15 RX ADMIN — SENNOSIDES AND DOCUSATE SODIUM 1 TABLET: 50; 8.6 TABLET ORAL at 10:05

## 2024-05-15 RX ADMIN — CETIRIZINE HYDROCHLORIDE 5 MG: 5 TABLET, FILM COATED ORAL at 10:05

## 2024-05-15 RX ADMIN — FLUTICASONE PROPIONATE 100 MCG: 50 SPRAY, METERED NASAL at 09:05

## 2024-05-15 RX ADMIN — SODIUM CHLORIDE: 9 INJECTION, SOLUTION INTRAVENOUS at 05:05

## 2024-05-15 RX ADMIN — ACETAMINOPHEN 1000 MG: 500 TABLET ORAL at 12:05

## 2024-05-15 RX ADMIN — OXYCODONE HYDROCHLORIDE 10 MG: 10 TABLET ORAL at 03:05

## 2024-05-15 RX ADMIN — OXYCODONE HYDROCHLORIDE 10 MG: 10 TABLET ORAL at 11:05

## 2024-05-15 RX ADMIN — LEVOTHYROXINE SODIUM 25 MCG: 25 TABLET ORAL at 08:05

## 2024-05-15 RX ADMIN — OXYCODONE HYDROCHLORIDE 10 MG: 10 TABLET ORAL at 09:05

## 2024-05-15 RX ADMIN — DIPHENHYDRAMINE HYDROCHLORIDE 25 MG: 50 INJECTION, SOLUTION INTRAMUSCULAR; INTRAVENOUS at 05:05

## 2024-05-15 NOTE — SUBJECTIVE & OBJECTIVE
Interval History:  NAEON. AFVSS. Exam stable. Sitting up in chair. Improvement in preop pain. Incisional pain controlled with medication. Deep HV drains x 2 to gravity 2/2 output. Monitor H/H. Home pending drain removal.    Medications:  Continuous Infusions:  Scheduled Meds:   acetaminophen  1,000 mg Oral Q6H    bisacodyL  10 mg Rectal Daily    ceFAZolin (Ancef) IV (PEDS and ADULTS)  2 g Intravenous Q8H    cetirizine  5 mg Oral Daily    enoxparin  40 mg Subcutaneous Q24H (prophylaxis, 1700)    fluticasone propionate  2 spray Each Nostril Daily    [START ON 5/16/2024] levothyroxine  25 mcg Oral Before breakfast    lisinopriL-hydrochlorothiazide  2 tablet Oral Daily    methocarbamoL  750 mg Oral QID    mupirocin   Nasal BID    pantoprazole  40 mg Oral Daily    senna-docusate 8.6-50 mg  1 tablet Oral BID     PRN Meds:  Current Facility-Administered Medications:     aluminum-magnesium hydroxide-simethicone, 30 mL, Oral, Q4H PRN    diphenhydrAMINE, 25 mg, Intravenous, Q8H PRN    HYDROmorphone, 2 mg, Intravenous, Q3H PRN    ibuprofen, 400 mg, Oral, Q6H PRN    naloxone, 0.4 mg, Intravenous, PRN    ondansetron, 8 mg, Oral, Q6H PRN    oxyCODONE, 5 mg, Oral, Q4H PRN    oxyCODONE, 10 mg, Oral, Q4H PRN    prochlorperazine, 5 mg, Intravenous, Q6H PRN    simethicone, 1 tablet, Oral, TID PRN     Review of Systems  Objective:     Weight: 62 kg (136 lb 11 oz)  Body mass index is 23.83 kg/m².  Vital Signs (Most Recent):  Temp: 98.9 °F (37.2 °C) (05/15/24 1548)  Pulse: 90 (05/15/24 1548)  Resp: 17 (05/15/24 1550)  BP: 100/67 (05/15/24 1548)  SpO2: 99 % (05/15/24 1548) Vital Signs (24h Range):  Temp:  [97.9 °F (36.6 °C)-101.9 °F (38.8 °C)] 98.9 °F (37.2 °C)  Pulse:  [75-98] 90  Resp:  [16-18] 17  SpO2:  [87 %-100 %] 99 %  BP: (100-121)/(57-67) 100/67                              Closed/Suction Drain 05/14/24 1038 Tube - 1 Right;Superior Back Accordion 10 Fr. (Active)   Site Description Healing 05/15/24 0800   Dressing Type Other  (Comment) 05/15/24 0800   Dressing Status Clean;Dry;Intact 05/14/24 1145   Dressing Intervention Integrity maintained 05/14/24 1145   Drainage Sanguineous 05/15/24 0800   Status To bulb suction 05/15/24 0800   Output (mL) 60 mL 05/15/24 0611            Closed/Suction Drain 05/14/24 1038 Tube - 2 Left;Superior Back Accordion 10 Fr. (Active)   Site Description Healing 05/15/24 0800   Dressing Type Other (Comment) 05/15/24 0800   Dressing Status Clean;Dry;Intact 05/14/24 1145   Dressing Intervention Integrity maintained 05/14/24 1145   Drainage Sanguineous 05/15/24 0800   Status To bulb suction 05/15/24 0800   Output (mL) 90 mL 05/15/24 0611            Closed/Suction Drain 05/14/24 1057 Midline;Superior Back Accordion 10 Fr. (Active)   Site Description Healing 05/15/24 0800   Dressing Type Other (Comment) 05/15/24 0800   Dressing Status Clean;Dry;Intact 05/14/24 1145   Dressing Intervention Integrity maintained 05/15/24 0800   Drainage Sanguineous 05/15/24 0800   Status To bulb suction 05/15/24 0800   Output (mL) 30 mL 05/15/24 0611          Physical Exam         Neurosurgery Physical Exam    General: well developed, well nourished, no distress.   Head: normocephalic, atraumatic  Neurologic: Alert and oriented. Thought content appropriate.  GCS: Motor: 6/Verbal: 5/Eyes: 4 GCS Total: 15  Mental Status: Awake, Alert, Oriented x 4  Language: No aphasia  Speech: No dysarthria  Cranial nerves: face symmetric, tongue midline, CN II-XII grossly intact, EOMI.  Pulmonary: normal respirations, no signs of respiratory distress  Sensory: intact to light touch throughout  Motor Strength: Moves all extremities spontaneously with good tone.  Full strength upper and lower extremities. No abnormal movements seen.     Strength  Deltoids Triceps Biceps Wrist Extension Wrist Flexion Hand    Upper: R 5/5 5/5 5/5 5/5 5/5 5/5    L 5/5 5/5 5/5 5/5 5/5 5/5     Iliopsoas Quadriceps Knee  Flexion Tibialis  anterior Gastro- cnemius EHL    Lower: R 5/5 5/5 5/5 5/5 5/5 5/5    L 5/5 5/5 5/5 5/5 5/5 5/5     Skin: Skin is warm, dry and intact.  Incision covered with dressing. HV x 2 to gravity, superficial HV to suction.    Significant Labs:  Recent Labs   Lab 05/14/24  0615 05/15/24  0455    107   * 132*   K 3.4* 3.1*   CL 97 104   CO2 24 19*   BUN 10 7   CREATININE 0.8 0.7   CALCIUM 9.6 7.5*     Recent Labs   Lab 05/14/24  0615 05/15/24  0455   WBC 6.28 7.55   HGB 13.3 8.1*   HCT 38.7 23.6*    184     Recent Labs   Lab 05/14/24 0615   INR 1.0   APTT 25.6     Microbiology Results (last 7 days)       ** No results found for the last 168 hours. **          Recent Lab Results         05/15/24  1133   05/15/24  0455        Albumin 3.0         Anion Gap   9       Baso #   0.06       Basophil %   0.8       BUN   7       Calcium   7.5       Chloride   104       CO2   19       Creatinine   0.7       Differential Method   Automated       eGFR   >60.0       Eos #   0.2       Eos %   3.2       Glucose   107       Gran # (ANC)   4.9       Gran %   65.4       Hematocrit   23.6       Hemoglobin   8.1       Immature Grans (Abs)   0.06  Comment: Mild elevation in immature granulocytes is non specific and   can be seen in a variety of conditions including stress response,   acute inflammation, trauma and pregnancy. Correlation with other   laboratory and clinical findings is essential.         Immature Granulocytes   0.8       Lymph #   1.6       Lymph %   20.9       MCH   34.5       MCHC   34.3       MCV   100       Mono #   0.7       Mono %   8.9       MPV   9.7       nRBC   0       Platelet Count   184       Potassium   3.1       RBC   2.35       RDW   12.1       Sodium   132       WBC   7.55             All pertinent labs from the last 24 hours have been reviewed.    Significant Diagnostics:  I have reviewed all pertinent imaging results/findings within the past 24 hours.

## 2024-05-15 NOTE — ASSESSMENT & PLAN NOTE
Patient has hypokalemia which is Acute and currently controlled. Most recent potassium levels reviewed-   Lab Results   Component Value Date    K 3.1 (L) 05/15/2024   . Will continue potassium replacement per protocol and recheck repeat levels after replacement completed.

## 2024-05-15 NOTE — ASSESSMENT & PLAN NOTE
Patient's anemia is currently controlled. Has not received any PRBCs to date. Expected blood loss post-operatively. Daily cbc.   Current CBC reviewed-   Lab Results   Component Value Date    HGB 8.1 (L) 05/15/2024    HCT 23.6 (L) 05/15/2024     Monitor serial CBC and transfuse if patient becomes hemodynamically unstable, symptomatic or H/H drops below 7/21.

## 2024-05-15 NOTE — PLAN OF CARE
PT Evaluation completed and PT POC established.    Problem: Physical Therapy  Goal: Physical Therapy Goal  Description: Goals to be met by: 06/15/2024     Patient will increase functional independence with mobility by performin. Supine to sit with Modified Hope  2. Sit to supine with Modified Hope  3. Sit to stand transfer with Supervision  4. Bed to chair transfer with Supervision using LRAD  5. Gait  x 300 feet with Supervision using LRAD.   6. Ascend/descend 12 stair with left Handrails Stand-by Assistance using LRAD.     Outcome: Progressing

## 2024-05-15 NOTE — ANESTHESIA POSTPROCEDURE EVALUATION
Anesthesia Post Evaluation    Patient: Marylin Morel    Procedure(s) Performed: Procedure(s) (LRB):  **LOOP X** L2 to pelvis posterior instrumented fusion (N/A)    Final Anesthesia Type: general      Patient location during evaluation: PACU  Patient participation: Yes- Able to Participate  Level of consciousness: awake  Post-procedure vital signs: reviewed and stable  Pain management: adequate  Airway patency: patent    PONV status at discharge: No PONV  Anesthetic complications: no      Cardiovascular status: blood pressure returned to baseline and stable  Respiratory status: spontaneous ventilation  Hydration status: euvolemic  Follow-up not needed.              Vitals Value Taken Time   /65 05/15/24 0000   Temp 37.6 °C (99.7 °F) 05/15/24 0000   Pulse 79 05/15/24 0402   Resp 16 05/15/24 0401   SpO2 98 % 05/15/24 0402   Vitals shown include unfiled device data.      Event Time   Out of Recovery 12:15:00         Pain/Richard Score: Pain Rating Prior to Med Admin: 4 (5/15/2024  4:01 AM)  Pain Rating Post Med Admin: 2 (5/15/2024  5:01 AM)  Richard Score: 9 (5/14/2024 12:15 PM)

## 2024-05-15 NOTE — CARE UPDATE
I have reviewed the chart of Marylin Morel who is hospitalized for the following:    Active Hospital Problems    Diagnosis    *Lumbar radiculopathy    Hyponatremia     POA  Replace as needed  Monitor with dialy cmp      Hypokalemia     POA  Replace as needed  Monitor with dialy cmp      History of hydrocephalus    Pain     Multimodal       Acute blood loss anemia     5 point drop in hemoglobin post op  Bp drop post op in pacu received IVF  Monitor with daily cbc  Trend  Transfuse <7      Colloid cyst of brain    Cervical radiculopathy    DDD (degenerative disc disease), lumbosacral    GERD (gastroesophageal reflux disease)    Hypothyroidism    Essential hypertension, benign        Milly Dana, NP  Unit Based [facility]

## 2024-05-15 NOTE — PROGRESS NOTES
Placed a VIZZY for 02 monitoring      Nurses Note -- 4 Eyes      5/14/2024   1900      Skin assessed during: Q Shift Change      [] No Altered Skin Integrity Present    []Prevention Measures Documented      [x] Yes- Altered Skin Integrity Present or Discovered   [x] LDA Added if Not in Epic (Describe Wound)   [] New Altered Skin Integrity was Present on Admit and Documented in LDA   [] Wound Image Taken    Wound Care Consulted? No    Attending Nurse:  Donna Garcia RN/Staff Member:  SARAH Chu

## 2024-05-15 NOTE — PLAN OF CARE
Problem: Adult Inpatient Plan of Care  Goal: Plan of Care Review  Outcome: Progressing  Goal: Patient-Specific Goal (Individualized)  Outcome: Progressing  Goal: Absence of Hospital-Acquired Illness or Injury  Outcome: Progressing  Goal: Optimal Comfort and Wellbeing  Outcome: Progressing  Goal: Readiness for Transition of Care  Outcome: Progressing     Problem: Infection  Goal: Absence of Infection Signs and Symptoms  Outcome: Progressing     Problem: Wound  Goal: Optimal Coping  Outcome: Progressing  Goal: Optimal Functional Ability  Outcome: Progressing  Goal: Absence of Infection Signs and Symptoms  Outcome: Progressing  Goal: Improved Oral Intake  Outcome: Progressing  Goal: Optimal Pain Control and Function  Outcome: Progressing  Goal: Skin Health and Integrity  Outcome: Progressing  Goal: Optimal Wound Healing  Outcome: Progressing     Problem: Pain Acute  Goal: Optimal Pain Control and Function  Outcome: Progressing

## 2024-05-15 NOTE — HPI
History of Present Illness:  Marylin Morel is a 58 y.o. female  with a PMHx of DDD, cervical radiculopathy s/p ACD C5-7 in 2014 with Dr. 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 She is being seen in clinic today to discuss concerns with worsening lumbar radiculopathy despite conservative treatment. States that she has struggled with back pain for several years; however, after her hip replacement she has struggled with worsening pain and numbness in the left leg. Describes the pain as constant and severe across the low back with radiation into the groin and hips. Rates the pain as a 3/10. Aggravating factors include standing, bending, and walking. Alleviating factors include heating pad, muscle relaxer, and rest. Denies weakness, b/b dysfunction, saddle anesthesia, or gait instability. She has obtained an injection in November with mild relief.       Interval Hx 3/21/24: After the last appointment, it was recommended that the patient obtain additional imaging for surgical planning. She is being seen in clinic today with myself and Dr. Chester to discuss the findings and surgery in more detail. States that her symptoms remain unchanged. She continues to struggle with back > leg pain.     Interval Hx 4/16/24: The patient is being seen virtually today to answer additional questions or concerns she has regarding surgery. States that she was overwhelmed with the last appointment hearing the extensiveness of the surgery. Dr. Chester recommends a L2-pelvis PSF given the stenosis at those levels and her scoliosis.

## 2024-05-15 NOTE — PLAN OF CARE
[facility] ([facility])  Initial Discharge Assessment       Primary Care Provider: Gabriel Mccabe MD    Admission Diagnosis: Sagittal plane imbalance [M43.8X9]  Spondylolisthesis of lumbar region [M43.16]  Lumbar spondylosis [M47.816]    Admission Date: 5/14/2024  Expected Discharge Date:     Transition of Care Barriers: None    Payor: BLUE CROSS BLUE SHIELD / Plan: [place] / Product Type: Commercial /     Extended Emergency Contact Information  Primary Emergency Contact: Alon Morel  [address]           [place] [address] [facility]  Home Phone: [phone]  Work Phone: [phone]  Mobile Phone: [phone]  Relation: Spouse  Preferred language: English   needed? No    Discharge Plan A: Home with family, Home Health  Discharge Plan B: Home with family      [id number] - [place] - [address]  [address] [address]  Phone: [phone] Fax: [phone]    [facility] - [place] - [address]  [address] [facility] [address] [facility]  [place] [address]  Phone: [phone] Fax: [phone]    CM obtained discharge planning assessment with patient, spouse at bedside. Patient provided with discharge planning booklet.  Initial Assessment (most recent)       Adult Discharge Assessment - 05/15/24 1327          Discharge Assessment    Assessment Type Discharge Planning Assessment     Confirmed/corrected address, phone number and insurance Yes     Confirmed Demographics Correct on Facesheet     Source of Information patient     Communicated EDILMA with patient/caregiver Yes     Reason For Admission Cervical radiculopathy     People in Home spouse     Do you expect to return to your current living situation? Yes     Do you have help at home or someone to help you manage your care at home? Yes     Who are your caregiver(s) and their phone number(s)? Luchosunil Morel - spouse [phone]      Prior to hospitilization cognitive status: Alert/Oriented     Current cognitive status: Alert/Oriented     Walking or Climbing Stairs Difficulty no     Dressing/Bathing Difficulty no     Equipment Currently Used at Home other (see comments)   has a rolling walker but has not been using it    Readmission within 30 days? No     Patient currently being followed by outpatient case management? No     Do you currently have service(s) that help you manage your care at home? No     Do you take prescription medications? Yes     Do you have prescription coverage? Yes     Coverage BLUE CROSS BLUE SHIELD - BCBS OF [facility]     Do you have any problems affording any of your prescribed medications? No     Is the patient taking medications as prescribed? yes     Who is going to help you get home at discharge? family     How do you get to doctors appointments? family or friend will provide;car, drives self     Are you on dialysis? No     Do you take coumadin? No     Discharge Plan A Home with family;Home Health     Discharge Plan B Home with family     DME Needed Upon Discharge  none     Discharge Plan discussed with: Patient     Transition of Care Barriers None        Physical Activity    On average, how many days per week do you engage in moderate to strenuous exercise (like a brisk walk)? 0 days     On average, how many minutes do you engage in exercise at this level? 0 min        Financial Resource Strain    How hard is it for you to pay for the very basics like food, housing, medical care, and heating? Not hard at all        Housing Stability    In the last 12 months, was there a time when you were not able to pay the mortgage or rent on time? No     At any time in the past 12 months, were you homeless or living in a shelter (including now)? No        Transportation Needs    Has the lack of transportation kept you from medical appointments, meetings, work or from getting things needed for daily living? No         Food Insecurity    Within the past 12 months, you worried that your food would run out before you got the money to buy more. Never true     Within the past 12 months, the food you bought just didn't last and you didn't have money to get more. Never true        Stress    Do you feel stress - tense, restless, nervous, or anxious, or unable to sleep at night because your mind is troubled all the time - these days? Rather much        Social Isolation    How often do you feel lonely or isolated from those around you?  Never        Alcohol Use    Q1: How often do you have a drink containing alcohol? 2-3 times a week     Q2: How many drinks containing alcohol do you have on a typical day when you are drinking? 3 or 4     Q3: How often do you have six or more drinks on one occasion? Never        Utilities    In the past 12 months has the electric, gas, oil, or water company threatened to shut off services in your home? No        Health Literacy    How often do you need to have someone help you when you read instructions, pamphlets, or other written material from your doctor or pharmacy? Never        OTHER    Name(s) of People in Home Alon 0 spouse

## 2024-05-15 NOTE — ASSESSMENT & PLAN NOTE
Patient has hyponatremia which is controlled, improved from yesterday. Asymptomatic. Dc IVF. F/u am labs. The patient's sodium results have been reviewed and are listed below.  Recent Labs   Lab 05/15/24  0455   *

## 2024-05-15 NOTE — HOSPITAL COURSE
5/15: TIKI OTEROVSS. Exam stable. Sitting up in chair. Improvement in preop pain. Incisional pain controlled with medication. Deep HV drains x 2 to gravity 2/2 output. Monitor H/H. Home pending drain removal.  5/16: TIKI OTEROVSS. 2 HV Drains fell out overnight. 1 deep HV in place. Continue. Exam stable. Pain controlled. Transfused 1 unit RBC for symptomatic post-op anemia. Home pending drain removal.  5/17: TIKI OTEROVSS. 1L bolus overnight for hypotension. Improved. H/H stable s/p transfusion. HV drain fell out overnight, suture removed. Likely home this afternoon. Exam stable. BM today.

## 2024-05-15 NOTE — PROGRESS NOTES
[facility] ([facility])  Neurosurgery  Progress Note    Subjective:     History of Present Illness: History of Present Illness:  Marylin Morel is a 58 y.o. female  with a PMHx of DDD, cervical radiculopathy s/p ACD C5-7 in 2014 with Dr. 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 She is being seen in clinic today to discuss concerns with worsening lumbar radiculopathy despite conservative treatment. States that she has struggled with back pain for several years; however, after her hip replacement she has struggled with worsening pain and numbness in the left leg. Describes the pain as constant and severe across the low back with radiation into the groin and hips. Rates the pain as a 3/10. Aggravating factors include standing, bending, and walking. Alleviating factors include heating pad, muscle relaxer, and rest. Denies weakness, b/b dysfunction, saddle anesthesia, or gait instability. She has obtained an injection in November with mild relief.       Interval Hx 3/21/24: After the last appointment, it was recommended that the patient obtain additional imaging for surgical planning. She is being seen in clinic today with myself and Dr. Chester to discuss the findings and surgery in more detail. States that her symptoms remain unchanged. She continues to struggle with back > leg pain.     Interval Hx 4/16/24: The patient is being seen virtually today to answer additional questions or concerns she has regarding surgery. States that she was overwhelmed with the last appointment hearing the extensiveness of the surgery. Dr. Chester recommends a L2-pelvis PSF given the stenosis at those levels and her scoliosis.     Post-Op Info:  Procedure(s) (LRB):  **LOOP X** L2 to pelvis posterior instrumented fusion (N/A)   1 Day Post-Op   Interval History:  NAEON. AFVSS. Exam stable.  Sitting up in chair. Improvement in preop pain. Incisional pain controlled with medication. Deep HV drains x 2 to gravity 2/2 output. Monitor H/H. Home pending drain removal.    Medications:  Continuous Infusions:  Scheduled Meds:   acetaminophen  1,000 mg Oral Q6H    bisacodyL  10 mg Rectal Daily    ceFAZolin (Ancef) IV (PEDS and ADULTS)  2 g Intravenous Q8H    cetirizine  5 mg Oral Daily    enoxparin  40 mg Subcutaneous Q24H (prophylaxis, 1700)    fluticasone propionate  2 spray Each Nostril Daily    [START ON 5/16/2024] levothyroxine  25 mcg Oral Before breakfast    lisinopriL-hydrochlorothiazide  2 tablet Oral Daily    methocarbamoL  750 mg Oral QID    mupirocin   Nasal BID    pantoprazole  40 mg Oral Daily    senna-docusate 8.6-50 mg  1 tablet Oral BID     PRN Meds:  Current Facility-Administered Medications:     aluminum-magnesium hydroxide-simethicone, 30 mL, Oral, Q4H PRN    diphenhydrAMINE, 25 mg, Intravenous, Q8H PRN    HYDROmorphone, 2 mg, Intravenous, Q3H PRN    ibuprofen, 400 mg, Oral, Q6H PRN    naloxone, 0.4 mg, Intravenous, PRN    ondansetron, 8 mg, Oral, Q6H PRN    oxyCODONE, 5 mg, Oral, Q4H PRN    oxyCODONE, 10 mg, Oral, Q4H PRN    prochlorperazine, 5 mg, Intravenous, Q6H PRN    simethicone, 1 tablet, Oral, TID PRN     Review of Systems  Objective:     Weight: 62 kg (136 lb 11 oz)  Body mass index is 23.83 kg/m².  Vital Signs (Most Recent):  Temp: 98.9 °F (37.2 °C) (05/15/24 1548)  Pulse: 90 (05/15/24 1548)  Resp: 17 (05/15/24 1550)  BP: 100/67 (05/15/24 1548)  SpO2: 99 % (05/15/24 1548) Vital Signs (24h Range):  Temp:  [97.9 °F (36.6 °C)-101.9 °F (38.8 °C)] 98.9 °F (37.2 °C)  Pulse:  [75-98] 90  Resp:  [16-18] 17  SpO2:  [87 %-100 %] 99 %  BP: (100-121)/(57-67) 100/67                              Closed/Suction Drain 05/14/24 1038 Tube - 1 Right;Superior Back Accordion 10 Fr. (Active)   Site Description Healing 05/15/24 0800   Dressing Type Other (Comment) 05/15/24 0800   Dressing Status  Clean;Dry;Intact 05/14/24 1145   Dressing Intervention Integrity maintained 05/14/24 1145   Drainage Sanguineous 05/15/24 0800   Status To bulb suction 05/15/24 0800   Output (mL) 60 mL 05/15/24 0611            Closed/Suction Drain 05/14/24 1038 Tube - 2 Left;Superior Back Accordion 10 Fr. (Active)   Site Description Healing 05/15/24 0800   Dressing Type Other (Comment) 05/15/24 0800   Dressing Status Clean;Dry;Intact 05/14/24 1145   Dressing Intervention Integrity maintained 05/14/24 1145   Drainage Sanguineous 05/15/24 0800   Status To bulb suction 05/15/24 0800   Output (mL) 90 mL 05/15/24 0611            Closed/Suction Drain 05/14/24 1057 Midline;Superior Back Accordion 10 Fr. (Active)   Site Description Healing 05/15/24 0800   Dressing Type Other (Comment) 05/15/24 0800   Dressing Status Clean;Dry;Intact 05/14/24 1145   Dressing Intervention Integrity maintained 05/15/24 0800   Drainage Sanguineous 05/15/24 0800   Status To bulb suction 05/15/24 0800   Output (mL) 30 mL 05/15/24 0611          Physical Exam         Neurosurgery Physical Exam    General: well developed, well nourished, no distress.   Head: normocephalic, atraumatic  Neurologic: Alert and oriented. Thought content appropriate.  GCS: Motor: 6/Verbal: 5/Eyes: 4 GCS Total: 15  Mental Status: Awake, Alert, Oriented x 4  Language: No aphasia  Speech: No dysarthria  Cranial nerves: face symmetric, tongue midline, CN II-XII grossly intact, EOMI.  Pulmonary: normal respirations, no signs of respiratory distress  Sensory: intact to light touch throughout  Motor Strength: Moves all extremities spontaneously with good tone.  Full strength upper and lower extremities. No abnormal movements seen.     Strength  Deltoids Triceps Biceps Wrist Extension Wrist Flexion Hand    Upper: R 5/5 5/5 5/5 5/5 5/5 5/5    L 5/5 5/5 5/5 5/5 5/5 5/5     Iliopsoas Quadriceps Knee  Flexion Tibialis  anterior Gastro- cnemius EHL   Lower: R 5/5 5/5 5/5 5/5 5/5 5/5    L 5/5 5/5  5/5 5/5 5/5 5/5     Skin: Skin is warm, dry and intact.  Incision covered with dressing. HV x 2 to gravity, superficial HV to suction.    Significant Labs:  Recent Labs   Lab 05/14/24  0615 05/15/24  0455    107   * 132*   K 3.4* 3.1*   CL 97 104   CO2 24 19*   BUN 10 7   CREATININE 0.8 0.7   CALCIUM 9.6 7.5*     Recent Labs   Lab 05/14/24  0615 05/15/24  0455   WBC 6.28 7.55   HGB 13.3 8.1*   HCT 38.7 23.6*    184     Recent Labs   Lab 05/14/24 0615   INR 1.0   APTT 25.6     Microbiology Results (last 7 days)       ** No results found for the last 168 hours. **          Recent Lab Results         05/15/24  1133   05/15/24  0455        Albumin 3.0         Anion Gap   9       Baso #   0.06       Basophil %   0.8       BUN   7       Calcium   7.5       Chloride   104       CO2   19       Creatinine   0.7       Differential Method   Automated       eGFR   >60.0       Eos #   0.2       Eos %   3.2       Glucose   107       Gran # (ANC)   4.9       Gran %   65.4       Hematocrit   23.6       Hemoglobin   8.1       Immature Grans (Abs)   0.06  Comment: Mild elevation in immature granulocytes is non specific and   can be seen in a variety of conditions including stress response,   acute inflammation, trauma and pregnancy. Correlation with other   laboratory and clinical findings is essential.         Immature Granulocytes   0.8       Lymph #   1.6       Lymph %   20.9       MCH   34.5       MCHC   34.3       MCV   100       Mono #   0.7       Mono %   8.9       MPV   9.7       nRBC   0       Platelet Count   184       Potassium   3.1       RBC   2.35       RDW   12.1       Sodium   132       WBC   7.55             All pertinent labs from the last 24 hours have been reviewed.    Significant Diagnostics:  I have reviewed all pertinent imaging results/findings within the past 24 hours.  Assessment/Plan:     * Lumbar stenosis with neurogenic claudication  Patient is a 57 yo female with lumbar stenosis and  scoliosis now s/p L2-pelvis fusion on 5/14.    Acute blood loss anemia  Patient's anemia is currently controlled. Has not received any PRBCs to date. Expected blood loss post-operatively. Daily cbc.   Current CBC reviewed-   Lab Results   Component Value Date    HGB 8.1 (L) 05/15/2024    HCT 23.6 (L) 05/15/2024     Monitor serial CBC and transfuse if patient becomes hemodynamically unstable, symptomatic or H/H drops below 7/21.    Hypokalemia  Patient has hypokalemia which is Acute and currently controlled. Most recent potassium levels reviewed-   Lab Results   Component Value Date    K 3.1 (L) 05/15/2024   . Will continue potassium replacement per protocol and recheck repeat levels after replacement completed.     Hyponatremia  Patient has hyponatremia which is controlled, improved from yesterday. Asymptomatic. Dc IVF. F/u am labs. The patient's sodium results have been reviewed and are listed below.  Recent Labs   Lab 05/15/24  0455   *       GERD (gastroesophageal reflux disease)  Continue home dose PPI    Hypothyroidism  Continue home dose levothyroxine.    Essential hypertension, benign  Chronic, controlled. Latest blood pressure and vitals reviewed-     Temp:  [97.9 °F (36.6 °C)-101.9 °F (38.8 °C)]   Pulse:  [75-98]   Resp:  [16-18]   BP: (100-121)/(57-67)   SpO2:  [87 %-100 %] .   Home meds for hypertension were reviewed and noted below.   Hypertension Medications               lisinopriL-hydrochlorothiazide (PRINZIDE,ZESTORETIC) 20-25 mg Tab Take 1 tablet by mouth once daily.            While in the hospital, will manage blood pressure as follows; Continue home antihypertensive regimen    Will utilize p.r.n. blood pressure medication only if patient's blood pressure greater than 180/110 and she develops symptoms such as worsening chest pain or shortness of breath.    Hold home dose medications for SBP < 120.        Jessica Arnold PA-C  Neurosurgery  [facility] ([facility])

## 2024-05-15 NOTE — PLAN OF CARE
Problem: Occupational Therapy  Goal: Occupational Therapy Goal  Description: Goals to be met by: 6/15/24     Patient will increase functional independence with ADLs by performing:    LE Dressing with [place].  Grooming while standing with [place].  Toileting from toilet with [place] for hygiene and clothing management.   Toilet transfer to toilet with [place].    5/15/2024 1136 by Kaylin Urban OT  Outcome: Progressing

## 2024-05-15 NOTE — ASSESSMENT & PLAN NOTE
Chronic, controlled. Latest blood pressure and vitals reviewed-     Temp:  [97.9 °F (36.6 °C)-101.9 °F (38.8 °C)]   Pulse:  [75-98]   Resp:  [16-18]   BP: (100-121)/(57-67)   SpO2:  [87 %-100 %] .   Home meds for hypertension were reviewed and noted below.   Hypertension Medications               lisinopriL-hydrochlorothiazide (PRINZIDE,ZESTORETIC) 20-25 mg Tab Take 1 tablet by mouth once daily.            While in the hospital, will manage blood pressure as follows; Continue home antihypertensive regimen    Will utilize p.r.n. blood pressure medication only if patient's blood pressure greater than 180/110 and she develops symptoms such as worsening chest pain or shortness of breath.    Hold home dose medications for SBP < 120.   SUBJECTIVE:  Abdiel Baptiste is a 3 y.o. male here accompanied by mother for ADHD concerns    HPI  He will beginning [employer] school in [place], which is his first out of family learning experience.  Father had adhd  He was in early steps for speech delay     Courtneys allergies, medications, history, and problem list were updated as appropriate.    Review of Systems   Constitutional: Negative for activity change, appetite change, fatigue and fever.   HENT: Negative for congestion and ear pain.    Eyes: Negative for discharge.   Respiratory: Negative for cough.    Cardiovascular: Negative for chest pain.   Gastrointestinal: Negative for abdominal pain and vomiting.   Endocrine: Negative for heat intolerance.   Genitourinary: Negative for difficulty urinating.   Musculoskeletal: Negative for arthralgias.   Skin: Negative for rash.   Hematological: Negative for adenopathy.      A comprehensive review of symptoms was completed and negative except as noted above.    OBJECTIVE:  Vital signs  There were no vitals filed for this visit.     Physical Exam  Constitutional:       Appearance: He is well-developed. He is not diaphoretic.   HENT:      Right Ear: Tympanic membrane normal.      Left Ear: Tympanic membrane normal.      Mouth/Throat:      Mouth: Mucous membranes are moist.   Cardiovascular:      Rate and Rhythm: Normal rate and regular rhythm.      Heart sounds: S1 normal and S2 normal.   Pulmonary:      Effort: Pulmonary effort is normal. No respiratory distress.      Breath sounds: Normal breath sounds. No wheezing or rales.   Abdominal:      General: There is no distension.      Palpations: Abdomen is soft. There is no mass.      Tenderness: There is no abdominal tenderness. There is no guarding or rebound.   Musculoskeletal:      Cervical back: Neck supple.   Skin:     General: Skin is warm.      Coloration: Skin is not jaundiced.      Findings: No petechiae.   Neurological:      Mental Status: He is  alert.          ASSESSMENT/PLAN:  Abdiel was seen today for adhd concerns .    Diagnoses and all orders for this visit:    Child behavior problem         No results found for this or any previous visit (from the past 24 hour(s)).    Follow Up:  No follow-ups on file.            Patient Instructions   Please plan on having a sit down conference with the  6 weeks into the school year, and then report back on how he is doing

## 2024-05-15 NOTE — PT/OT/SLP EVAL
Occupational Therapy   Co-Evaluation    Name: Marylin Morel  MRN: [id number]  Admitting Diagnosis: <principal problem not specified>  Recent Surgery: Procedure(s) (LRB):  **LOOP X** L2 to pelvis posterior instrumented fusion (N/A) 1 Day Post-Op    Recommendations:     Discharge Recommendations: Low Intensity Therapy  Discharge Equipment Recommendations:  none  Barriers to discharge:  None    Assessment:     Marylin Morel is a 58 y.o. female with a medical diagnosis of post lumbar surgery.  She presents with decrease functional status secondary to medical diagnosis. Performance deficits affecting function: weakness, impaired endurance, impaired self care skills, impaired functional mobility, gait instability. Patient with good tolerance to therapy presenting with functional strength in all extremities at this time. Patient able to complete gait trial with standby assist and increased timing. Patient limited by activity tolerance and pain causing gait instability and classifying patient as fall risk at this time. Patient is therefore appropriate for acute OT services to increase patient self care performance and functional mobility. Following DC from [facility] patient should continue with a low intensity therapy to ensure patient safety and promote return to independence.     Rehab Prognosis: Good; patient would benefit from acute skilled OT services to address these deficits and reach maximum level of function.       Plan:     Patient to be seen 3 x/week to address the above listed problems via self-care/home management, therapeutic activities, therapeutic exercises, neuromuscular re-education  Plan of Care Expires: 06/15/24  Plan of Care Reviewed with: patient    Subjective     Chief Complaint: none at this time   Patient/Family Comments/goals: DC     Occupational Profile:  Living Environment: Patient lives with  in home with 12-15 stairs to enter and LHR. [place] with WIS w/ bench.   Previous level of  function: Independent   Roles and Routines: Patient is a pre-k teacher   Equipment Used at Home: walker, rolling  Assistance upon Discharge:      Pain/Comfort:  Pain Rating 1: 2/10  Location - Orientation 1: generalized  Location 1: back  Pain Addressed 1: Reposition, Distraction    Patients cultural, spiritual, Shinto conflicts given the current situation: no    Objective:     Communicated with: RN prior to session.  Patient found in chair with hemovac upon OT entry to room.    General Precautions: Standard, fall  Orthopedic Precautions: spinal precautions  Braces: TLSO  Respiratory Status: Room air    Occupational Performance:    Bed Mobility:    Patient in chair at time of evaluation    Functional Mobility/Transfers:  Patient completed Sit <> Stand Transfer with stand by assistance  with  hand-held assist   Functional Mobility: Patient able to ambulate 100 feet with standby assist and no AD     Activities of Daily Living:  Patient deferred 2/2 performing prior; patient reporting no issues     Cognitive/Visual Perceptual:  Cognitive/Psychosocial Skills:     -       Oriented to: Person, Place, Time, and Situation   -       Follows Commands/attention:Follows multistep  commands  -       Communication: clear/fluent  -       Safety awareness/insight to disability: intact   Visual/Perceptual:      -Intact      Physical Exam:  Sensation:    -       Intact  Upper Extremity Range of Motion:     -       Right Upper Extremity: WFL  -       Left Upper Extremity: WFL  Upper Extremity Strength:    -       Right Upper Extremity: WFL  -       Left Upper Extremity: WFL   Strength:    -       Right Upper Extremity: WFL  -       Left Upper Extremity: WFL  Fine Motor Coordination:    -       Intact    AMPAC 6 Click ADL:  AMPAC Total Score:      Treatment & Education:  Co-evaluation completed due to patient medical instability and to ensure patient safety. Provided education regarding role of OT, POC, & discharge  recommendations.  Pt with no further questions/concerns at this time. OT provided education on home recommendations and fall prevention in preparation for D/C.     Patient left supine with all lines intact and call button in reach    GOALS:   Multidisciplinary Problems       Occupational Therapy Goals          Problem: Occupational Therapy    Goal Priority Disciplines Outcome Interventions   Occupational Therapy Goal     OT, PT/OT Progressing    Description: Goals to be met by: 6/15/24     Patient will increase functional independence with ADLs by performing:    LE Dressing with [place].  Grooming while standing with [place].  Toileting from toilet with [place] for hygiene and clothing management.   Toilet transfer to toilet with [place].                         History:     Past Medical History:   Diagnosis Date    Allergy     ants    GERD (gastroesophageal reflux disease)     Hypertension     Thyroid disease          Past Surgical History:   Procedure Laterality Date    AUGMENTATION OF BREAST      BREAST SURGERY      COSMETIC SURGERY      ENDOSCOPIC FENESTRATION OF ARACHNOID CYST N/A 12/18/2023    Procedure: FENESTRATION, CYST, ARACHNOID, ENDOSCOPIC- terence hole for colloid cyst resection and septostomy;  Surgeon: Clement Alamo MD;  Location: [facility] OR [address] [place] [address];  Service: Neurosurgery;  Laterality: N/A;    hip replacement left Left     LUMBAR FUSION N/A 5/14/2024    Procedure: **LOOP X** L2 to pelvis posterior instrumented fusion;  Surgeon: Eric Chester MD;  Location: [facility] OR [address] [place] [address];  Service: Neurosurgery;  Laterality: N/A;  L2 to pelvis posterior instrumented fusion  anestheisa: general  brace: TLSO  radiology: C-arm  AIRO (if avail, if not LOOP X)  neuro mon: EMG/SEP  position: prone  bed: [place] 4 post  headrest: prone view  misc: [place]    NECK SURGERY         Time Tracking:     OT Date of Treatment: 05/15/24  OT Start Time: 0959  OT Stop Time: 1015  OT Total Time (min): 16 min    Billable  Minutes:Evaluation 6  Therapeutic Activity 10    5/15/2024

## 2024-05-15 NOTE — PT/OT/SLP EVAL
"Physical Therapy Co-Evaluation    Patient Name:  Marylin Morel   MRN:  [id number]    Recommendations:     Discharge Recommendations: Low Intensity Therapy when cleared from precautions  Discharge Equipment Recommendations: none   Barriers to discharge: Inaccessible home    Assessment:     Marylin Morel is a 58 y.o. female admitted with a medical diagnosis of L2-Pelvis fusion w/ L3-S1 TLIFs.  She presents with the following impairments/functional limitations: weakness, impaired endurance, impaired functional mobility, gait instability, impaired balance, pain, decreased lower extremity function, decreased upper extremity function, impaired sensation.    Pleasant and motivated. Pt with spinal precautions TLSO donned. Pt amb 100' no AD and presented with slow but steady gait. Pt will benefit from skilled PT services while in house in order to address the aforementioned deficits.      Rehab Prognosis: Good; patient would benefit from acute skilled PT services to address these deficits and reach maximum level of function.    Recent Surgery: Procedure(s) (LRB):  **LOOP X** L2 to pelvis posterior instrumented fusion (N/A) 1 Day Post-Op    Plan:     During this hospitalization, patient to be seen 3 x/week to address the identified rehab impairments via gait training, therapeutic activities, therapeutic exercises, neuromuscular re-education and progress toward the following goals:    Plan of Care Expires:  06/15/24    Subjective     "I remember some of these things from my previous hip surgery"    Pain/Comfort:  Pain Rating 1: 2/10  Location - Orientation 1: generalized  Location 1: back  Pain Addressed 1: Distraction, Reposition    Patients cultural, spiritual, Mormonism conflicts given the current situation: no    Living Environment:  Per pt, pt and spouse reside in [facility] with no JOO. Pt has 12-15 steps L HR to second floor where bedroom and Pt has a walk-in shower with built in bench. Pt has raised toilet seat.  Prior " to admission, patients level of function was I.  Equipment used at home: walker, rolling.  DME owned (not currently used): rolling walker.  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    Objective:     Communicated with RN prior to session.  Patient found up in chair with hemovac  upon PT entry to room.    General Precautions: Standard, fall  Orthopedic Precautions:spinal precautions   Braces: TLSO  Respiratory Status: Room air    Exams:  RLE ROM: WFL  RLE Strength: WFL  LLE ROM: WFL  LLE Strength: WFL    Functional Mobility:  Transfers:     Sit to Stand:  stand by assistance with no AD and additional time provided  Gait: Pt amb 100' no AD SBA and presented with fair loretta, arm swing present, reciprocal gait  Balance:   Good sitting balance  Good standing balance    Politely declined stair training       AM-PAC 6 CLICK MOBILITY  Total Score:18       Treatment & Education:  Discussed sitting upright in chair >1hr, pt agreeable  Discussed amb to restroom with RN/staff outside of therapy services  Discussed sitting up EOB and/or UIC with RN/staff outside of therapy services    Educated pt on PT role/POC  Educated pt on importance of OOB activity and daily ambulation   Pt educated on proper body mechanics, safety techniques, and energy conservation with PT facilitation and cueing throughout session   Pt verbalized understanding      Patient left up in chair with all lines intact, call button in reach, RN notified, and OT present.    GOALS:   Multidisciplinary Problems       Physical Therapy Goals          Problem: Physical Therapy    Goal Priority Disciplines Outcome Goal Variances Interventions   Physical Therapy Goal     PT, PT/OT Progressing     Description: Goals to be met by: 06/15/2024     Patient will increase functional independence with mobility by performin. Supine to sit with Modified [place]  2. Sit to supine with Modified [place]  3. Sit to stand  transfer with Super[place]  4. Bed to chair transfer with Supervision using LRAD  5. Gait  x 300 feet with Supervision using LRAD.   6. Ascend/descend 12 stair with left Handrails Stand-by Assistance using LRAD.                          History:     Past Medical History:   Diagnosis Date    Allergy     ants    GERD (gastroesophageal reflux disease)     Hypertension     Thyroid disease        Past Surgical History:   Procedure Laterality Date    AUGMENTATION OF BREAST      BREAST SURGERY      COSMETIC SURGERY      ENDOSCOPIC FENESTRATION OF ARACHNOID CYST N/A 12/18/2023    Procedure: FENESTRATION, CYST, ARACHNOID, ENDOSCOPIC- terence hole for colloid cyst resection and septostomy;  Surgeon: Clement Alamo MD;  Location: [facility] OR [address] [place] [address];  Service: Neurosurgery;  Laterality: N/A;    hip replacement left Left     LUMBAR FUSION N/A 5/14/2024    Procedure: **LOOP X** L2 to pelvis posterior instrumented fusion;  Surgeon: Eric Chester MD;  [facility];  Service: Neurosurgery;  Laterality: N/A;  L2 to pelvis posterior instrumented fusion  anestheisa: general  brace: TLSO  radiology: C-arm  AIRO (if avail, if not LOOP X)  neuro mon: EMG/SEP  position: prone  bed: [place] [id number] post  headrest: prone view  misc: [place]    NECK SURGERY         Time Tracking:     PT Received On: 05/15/24  PT Start Time: 0959     PT Stop Time: 1015  PT Total Time (min): 16 min     Billable Minutes: Evaluation 8 and Gait Training 8    Co-treatment performed due to patient's multiple deficits requiring two skilled therapists to appropriately and safely assess patient's strength and endurance while facilitating functional tasks in addition to accommodating for patient's activity tolerance.       05/15/2024

## 2024-05-16 LAB
ANION GAP SERPL CALC-SCNC: 5 MMOL/L (ref 8–16)
BASOPHILS # BLD AUTO: 0.06 K/UL (ref 0–0.2)
BASOPHILS # BLD AUTO: 0.06 K/UL (ref 0–0.2)
BASOPHILS NFR BLD: 0.5 % (ref 0–1.9)
BASOPHILS NFR BLD: 0.6 % (ref 0–1.9)
BLD PROD TYP BPU: NORMAL
BLOOD UNIT EXPIRATION DATE: NORMAL
BLOOD UNIT TYPE CODE: 6200
BLOOD UNIT TYPE: NORMAL
BUN SERPL-MCNC: 6 MG/DL (ref 6–20)
CALCIUM SERPL-MCNC: 8.3 MG/DL (ref 8.7–10.5)
CHLORIDE SERPL-SCNC: 106 MMOL/L (ref 95–110)
CO2 SERPL-SCNC: 24 MMOL/L (ref 23–29)
CODING SYSTEM: NORMAL
CREAT SERPL-MCNC: 0.7 MG/DL (ref 0.5–1.4)
CROSSMATCH INTERPRETATION: NORMAL
DIFFERENTIAL METHOD BLD: ABNORMAL
DIFFERENTIAL METHOD BLD: ABNORMAL
DISPENSE STATUS: NORMAL
EOSINOPHIL # BLD AUTO: 0.2 K/UL (ref 0–0.5)
EOSINOPHIL # BLD AUTO: 0.3 K/UL (ref 0–0.5)
EOSINOPHIL NFR BLD: 1.2 % (ref 0–8)
EOSINOPHIL NFR BLD: 3.5 % (ref 0–8)
ERYTHROCYTE [DISTWIDTH] IN BLOOD BY AUTOMATED COUNT: 12 % (ref 11.5–14.5)
ERYTHROCYTE [DISTWIDTH] IN BLOOD BY AUTOMATED COUNT: 15.3 % (ref 11.5–14.5)
EST. GFR  (NO RACE VARIABLE): >60 ML/MIN/1.73 M^2
GLUCOSE SERPL-MCNC: 119 MG/DL (ref 70–110)
HCT VFR BLD AUTO: 21.9 % (ref 37–48.5)
HCT VFR BLD AUTO: 25.6 % (ref 37–48.5)
HGB BLD-MCNC: 7.5 G/DL (ref 12–16)
HGB BLD-MCNC: 8.9 G/DL (ref 12–16)
IMM GRANULOCYTES # BLD AUTO: 0.04 K/UL (ref 0–0.04)
IMM GRANULOCYTES # BLD AUTO: 0.06 K/UL (ref 0–0.04)
IMM GRANULOCYTES NFR BLD AUTO: 0.4 % (ref 0–0.5)
IMM GRANULOCYTES NFR BLD AUTO: 0.5 % (ref 0–0.5)
LYMPHOCYTES # BLD AUTO: 1.2 K/UL (ref 1–4.8)
LYMPHOCYTES # BLD AUTO: 1.4 K/UL (ref 1–4.8)
LYMPHOCYTES NFR BLD: 14.3 % (ref 18–48)
LYMPHOCYTES NFR BLD: 9.2 % (ref 18–48)
MCH RBC QN AUTO: 33.3 PG (ref 27–31)
MCH RBC QN AUTO: 34.7 PG (ref 27–31)
MCHC RBC AUTO-ENTMCNC: 34.2 G/DL (ref 32–36)
MCHC RBC AUTO-ENTMCNC: 34.8 G/DL (ref 32–36)
MCV RBC AUTO: 101 FL (ref 82–98)
MCV RBC AUTO: 96 FL (ref 82–98)
MONOCYTES # BLD AUTO: 0.8 K/UL (ref 0.3–1)
MONOCYTES # BLD AUTO: 0.8 K/UL (ref 0.3–1)
MONOCYTES NFR BLD: 6 % (ref 4–15)
MONOCYTES NFR BLD: 8.6 % (ref 4–15)
NEUTROPHILS # BLD AUTO: 10.6 K/UL (ref 1.8–7.7)
NEUTROPHILS # BLD AUTO: 7.1 K/UL (ref 1.8–7.7)
NEUTROPHILS NFR BLD: 72.6 % (ref 38–73)
NEUTROPHILS NFR BLD: 82.6 % (ref 38–73)
NRBC BLD-RTO: 0 /100 WBC
NRBC BLD-RTO: 0 /100 WBC
NUM UNITS TRANS PACKED RBC: NORMAL
PLATELET # BLD AUTO: 167 K/UL (ref 150–450)
PLATELET # BLD AUTO: 191 K/UL (ref 150–450)
PMV BLD AUTO: 9.3 FL (ref 9.2–12.9)
PMV BLD AUTO: 9.6 FL (ref 9.2–12.9)
POTASSIUM SERPL-SCNC: 3.7 MMOL/L (ref 3.5–5.1)
RBC # BLD AUTO: 2.16 M/UL (ref 4–5.4)
RBC # BLD AUTO: 2.67 M/UL (ref 4–5.4)
SODIUM SERPL-SCNC: 135 MMOL/L (ref 136–145)
WBC # BLD AUTO: 12.77 K/UL (ref 3.9–12.7)
WBC # BLD AUTO: 9.77 K/UL (ref 3.9–12.7)

## 2024-05-16 PROCEDURE — 63600175 PHARM REV CODE 636 W HCPCS: Performed by: STUDENT IN AN ORGANIZED HEALTH CARE EDUCATION/TRAINING PROGRAM

## 2024-05-16 PROCEDURE — 25000003 PHARM REV CODE 250: Performed by: STUDENT IN AN ORGANIZED HEALTH CARE EDUCATION/TRAINING PROGRAM

## 2024-05-16 PROCEDURE — 30233N1 TRANSFUSION OF NONAUTOLOGOUS RED BLOOD CELLS INTO PERIPHERAL VEIN, PERCUTANEOUS APPROACH: ICD-10-PCS | Performed by: NEUROLOGICAL SURGERY

## 2024-05-16 PROCEDURE — 85025 COMPLETE CBC W/AUTO DIFF WBC: CPT | Mod: 91 | Performed by: NEUROLOGICAL SURGERY

## 2024-05-16 PROCEDURE — 80048 BASIC METABOLIC PNL TOTAL CA: CPT | Performed by: STUDENT IN AN ORGANIZED HEALTH CARE EDUCATION/TRAINING PROGRAM

## 2024-05-16 PROCEDURE — P9016 RBC LEUKOCYTES REDUCED: HCPCS | Performed by: PHYSICIAN ASSISTANT

## 2024-05-16 PROCEDURE — 94761 N-INVAS EAR/PLS OXIMETRY MLT: CPT

## 2024-05-16 PROCEDURE — 11000001 HC ACUTE MED/SURG PRIVATE ROOM

## 2024-05-16 PROCEDURE — 85025 COMPLETE CBC W/AUTO DIFF WBC: CPT | Performed by: STUDENT IN AN ORGANIZED HEALTH CARE EDUCATION/TRAINING PROGRAM

## 2024-05-16 PROCEDURE — 36415 COLL VENOUS BLD VENIPUNCTURE: CPT | Performed by: STUDENT IN AN ORGANIZED HEALTH CARE EDUCATION/TRAINING PROGRAM

## 2024-05-16 PROCEDURE — 99900035 HC TECH TIME PER 15 MIN (STAT)

## 2024-05-16 PROCEDURE — 25000003 PHARM REV CODE 250: Performed by: PHYSICIAN ASSISTANT

## 2024-05-16 PROCEDURE — 25000003 PHARM REV CODE 250

## 2024-05-16 PROCEDURE — 63600175 PHARM REV CODE 636 W HCPCS: Performed by: PHYSICIAN ASSISTANT

## 2024-05-16 PROCEDURE — 86920 COMPATIBILITY TEST SPIN: CPT | Performed by: PHYSICIAN ASSISTANT

## 2024-05-16 PROCEDURE — 36430 TRANSFUSION BLD/BLD COMPNT: CPT

## 2024-05-16 PROCEDURE — 99024 POSTOP FOLLOW-UP VISIT: CPT | Mod: ,,, | Performed by: PHYSICIAN ASSISTANT

## 2024-05-16 PROCEDURE — 36415 COLL VENOUS BLD VENIPUNCTURE: CPT | Performed by: NEUROLOGICAL SURGERY

## 2024-05-16 RX ORDER — HYDROCODONE BITARTRATE AND ACETAMINOPHEN 500; 5 MG/1; MG/1
TABLET ORAL
Status: DISCONTINUED | OUTPATIENT
Start: 2024-05-16 | End: 2024-05-18 | Stop reason: HOSPADM

## 2024-05-16 RX ADMIN — LEVOTHYROXINE SODIUM 25 MCG: 25 TABLET ORAL at 05:05

## 2024-05-16 RX ADMIN — SODIUM CHLORIDE 1000 ML: 9 INJECTION, SOLUTION INTRAVENOUS at 08:05

## 2024-05-16 RX ADMIN — PANTOPRAZOLE SODIUM 40 MG: 40 TABLET, DELAYED RELEASE ORAL at 08:05

## 2024-05-16 RX ADMIN — ACETAMINOPHEN 1000 MG: 500 TABLET ORAL at 05:05

## 2024-05-16 RX ADMIN — LISINOPRIL AND HYDROCHLOROTHIAZIDE 2 TABLET: 12.5; 1 TABLET ORAL at 08:05

## 2024-05-16 RX ADMIN — OXYCODONE HYDROCHLORIDE 10 MG: 10 TABLET ORAL at 05:05

## 2024-05-16 RX ADMIN — BISACODYL 10 MG: 10 SUPPOSITORY RECTAL at 08:05

## 2024-05-16 RX ADMIN — CETIRIZINE HYDROCHLORIDE 5 MG: 5 TABLET, FILM COATED ORAL at 08:05

## 2024-05-16 RX ADMIN — OXYCODONE HYDROCHLORIDE 10 MG: 10 TABLET ORAL at 04:05

## 2024-05-16 RX ADMIN — METHOCARBAMOL 750 MG: 750 TABLET ORAL at 08:05

## 2024-05-16 RX ADMIN — ACETAMINOPHEN 1000 MG: 500 TABLET ORAL at 11:05

## 2024-05-16 RX ADMIN — SENNOSIDES AND DOCUSATE SODIUM 1 TABLET: 50; 8.6 TABLET ORAL at 08:05

## 2024-05-16 RX ADMIN — BISACODYL 10 MG: 10 SUPPOSITORY RECTAL at 10:05

## 2024-05-16 RX ADMIN — METHOCARBAMOL 750 MG: 750 TABLET ORAL at 01:05

## 2024-05-16 RX ADMIN — ENOXAPARIN SODIUM 40 MG: 40 INJECTION SUBCUTANEOUS at 04:05

## 2024-05-16 RX ADMIN — OXYCODONE HYDROCHLORIDE 10 MG: 10 TABLET ORAL at 11:05

## 2024-05-16 RX ADMIN — FLUTICASONE PROPIONATE 100 MCG: 50 SPRAY, METERED NASAL at 08:05

## 2024-05-16 RX ADMIN — CEFAZOLIN 2 G: 2 INJECTION, POWDER, FOR SOLUTION INTRAMUSCULAR; INTRAVENOUS at 05:05

## 2024-05-16 RX ADMIN — MUPIROCIN: 20 OINTMENT TOPICAL at 08:05

## 2024-05-16 RX ADMIN — CEFAZOLIN 2 G: 2 INJECTION, POWDER, FOR SOLUTION INTRAMUSCULAR; INTRAVENOUS at 08:05

## 2024-05-16 RX ADMIN — HYDROMORPHONE HYDROCHLORIDE 2 MG: 1 INJECTION, SOLUTION INTRAMUSCULAR; INTRAVENOUS; SUBCUTANEOUS at 10:05

## 2024-05-16 RX ADMIN — METHOCARBAMOL 750 MG: 750 TABLET ORAL at 04:05

## 2024-05-16 NOTE — ASSESSMENT & PLAN NOTE
Patient has hyponatremia which is controlled, improved from yesterday. Asymptomatic. The patient's sodium results have been reviewed and are listed below.  Recent Labs   Lab 05/16/24  0443   *

## 2024-05-16 NOTE — SUBJECTIVE & OBJECTIVE
Interval History:  NAEON. AFVSS. 2 HV Drains fell out overnight. 1 deep HV in place. Continue. Exam stable. Pain controlled. Transfused 1 unit RBC for symptomatic post-op anemia. Home pending drain removal.    Medications:  Continuous Infusions:  Scheduled Meds:   acetaminophen  1,000 mg Oral Q6H    bisacodyL  10 mg Rectal Daily    ceFAZolin (Ancef) IV (PEDS and ADULTS)  2 g Intravenous Q8H    cetirizine  5 mg Oral Daily    enoxparin  40 mg Subcutaneous Q24H (prophylaxis, 1700)    fluticasone propionate  2 spray Each Nostril Daily    levothyroxine  25 mcg Oral Before breakfast    lisinopriL-hydrochlorothiazide  2 tablet Oral Daily    methocarbamoL  750 mg Oral QID    mupirocin   Nasal BID    pantoprazole  40 mg Oral Daily    senna-docusate 8.6-50 mg  1 tablet Oral BID     PRN Meds:  Current Facility-Administered Medications:     0.9%  NaCl infusion (for blood administration), , Intravenous, Q24H PRN    aluminum-magnesium hydroxide-simethicone, 30 mL, Oral, Q4H PRN    diphenhydrAMINE, 25 mg, Intravenous, Q8H PRN    HYDROmorphone, 2 mg, Intravenous, Q3H PRN    naloxone, 0.4 mg, Intravenous, PRN    ondansetron, 8 mg, Oral, Q6H PRN    oxyCODONE, 5 mg, Oral, Q4H PRN    oxyCODONE, 10 mg, Oral, Q4H PRN    prochlorperazine, 5 mg, Intravenous, Q6H PRN    simethicone, 1 tablet, Oral, TID PRN     Review of Systems  Objective:     Weight: 62 kg (136 lb 11 oz)  Body mass index is 23.83 kg/m².  Vital Signs (Most Recent):  Temp: 98.6 °F (37 °C) (05/16/24 1524)  Pulse: 87 (05/16/24 1524)  Resp: 18 (05/16/24 1524)  BP: (!) 97/54 (05/16/24 1524)  SpO2: 98 % (05/16/24 1524) Vital Signs (24h Range):  Temp:  [98.2 °F (36.8 °C)-99.9 °F (37.7 °C)] 98.6 °F (37 °C)  Pulse:  [75-96] 87  Resp:  [12-20] 18  SpO2:  [96 %-100 %] 98 %  BP: ()/(53-68) 97/54     Date 05/16/24 0700 - 05/17/24 0659   Shift 3422-5197 4529-5996 9682-0605 24 Hour Total   INTAKE   Blood 345   345   Shift Total(mL/kg) 345(5.6)   345(5.6)   OUTPUT   Shift  Total(mL/kg)       Weight (kg) 62 62 62 62                            Closed/Suction Drain 05/14/24 1038 Tube - 1 Right;Superior Back Accordion 10 Fr. (Active)   Site Description Healing 05/15/24 0800   Dressing Type Other (Comment) 05/15/24 0800   Dressing Status Clean;Dry;Intact 05/14/24 1145   Dressing Intervention Integrity maintained 05/14/24 1145   Drainage Sanguineous 05/15/24 0800   Status To bulb suction 05/15/24 0800   Output (mL) 60 mL 05/15/24 0611            Closed/Suction Drain 05/14/24 1038 Tube - 2 Left;Superior Back Accordion 10 Fr. (Active)   Site Description Healing 05/15/24 0800   Dressing Type Other (Comment) 05/15/24 0800   Dressing Status Clean;Dry;Intact 05/14/24 1145   Dressing Intervention Integrity maintained 05/14/24 1145   Drainage Sanguineous 05/15/24 0800   Status To bulb suction 05/15/24 0800   Output (mL) 90 mL 05/15/24 0611            Closed/Suction Drain 05/14/24 1057 Midline;Superior Back Accordion 10 Fr. (Active)   Site Description Healing 05/15/24 0800   Dressing Type Other (Comment) 05/15/24 0800   Dressing Status Clean;Dry;Intact 05/14/24 1145   Dressing Intervention Integrity maintained 05/15/24 0800   Drainage Sanguineous 05/15/24 0800   Status To bulb suction 05/15/24 0800   Output (mL) 30 mL 05/15/24 0611          Physical Exam         Neurosurgery Physical Exam    General: well developed, well nourished, no distress.   Head: normocephalic, atraumatic  Neurologic: Alert and oriented. Thought content appropriate.  GCS: Motor: 6/Verbal: 5/Eyes: 4 GCS Total: 15  Mental Status: Awake, Alert, Oriented x 4  Language: No aphasia  Speech: No dysarthria  Cranial nerves: face symmetric, tongue midline, CN II-XII grossly intact, EOMI.  Pulmonary: normal respirations, no signs of respiratory distress  Sensory: intact to light touch throughout  Motor Strength: Moves all extremities spontaneously with good tone.  Full strength upper and lower extremities. No abnormal movements seen.  "    Strength  Deltoids Triceps Biceps Wrist Extension Wrist Flexion Hand    Upper: R 5/5 5/5 5/5 5/5 5/5 5/5    L 5/5 5/5 5/5 5/5 5/5 5/5     Iliopsoas Quadriceps Knee  Flexion Tibialis  anterior Gastro- cnemius EHL   Lower: R 5/5 5/5 5/5 5/5 5/5 5/5    L 5/5 5/5 5/5 5/5 5/5 5/5     Skin: Skin is warm, dry and intact.  Incision covered with dressing. HV x 1 to suction.    Significant Labs:  Recent Labs   Lab 05/15/24  0455 05/16/24  0443    119*   * 135*   K 3.1* 3.7    106   CO2 19* 24   BUN 7 6   CREATININE 0.7 0.7   CALCIUM 7.5* 8.3*     Recent Labs   Lab 05/15/24  0455 05/16/24  0443   WBC 7.55 9.77   HGB 8.1* 7.5*   HCT 23.6* 21.9*    167     No results for input(s): "LABPT", "INR", "APTT" in the last 48 hours.    Microbiology Results (last 7 days)       ** No results found for the last 168 hours. **          Recent Lab Results         05/16/24 0443        Anion Gap 5       Baso # 0.06       Basophil % 0.6       BUN 6       Calcium 8.3       Chloride 106       CO2 24       Creatinine 0.7       Differential Method Automated       eGFR >60.0       Eos # 0.3       Eos % 3.5       Glucose 119       Gran # (ANC) 7.1       Gran % 72.6       Hematocrit 21.9       Hemoglobin 7.5       Immature Grans (Abs) 0.04  Comment: Mild elevation in immature granulocytes is non specific and   can be seen in a variety of conditions including stress response,   acute inflammation, trauma and pregnancy. Correlation with other   laboratory and clinical findings is essential.         Immature Granulocytes 0.4       Lymph # 1.4       Lymph % 14.3       MCH 34.7       MCHC 34.2              Mono # 0.8       Mono % 8.6       MPV 9.6       nRBC 0       Platelet Count 167       Potassium 3.7       RBC 2.16       RDW 12.0       Sodium 135       WBC 9.77             All pertinent labs from the last 24 hours have been reviewed.    Significant Diagnostics:  I have reviewed all pertinent imaging " results/findings within the past 24 hours.

## 2024-05-16 NOTE — CONSULTS
Chilo Phelps - Neurosurgery ([facility])  Adult Nutrition  Consult Note    SUMMARY     Recommendations    1. Continue Regular Diet. Encourage high-calorie, high-protein food sources. Small, frequent meals/snacks as tolerated.   2. Recommend ONS: Boost Plus (or alternative) BID for additional calories/PRO, if PO intake inadequate.   3. Add Beneprotein- BID for additional calories/PRO.   4. RD to monitor and follow-up.    Goals: Meet % EEN/EPN by follow-up date  Nutrition Goal Status: new  Communication of RD Recs: other (comment) (POC)    Assessment and Plan    Nutrition Problem  Increased nutrient needs (energy/protein)    Related to (etiology):   Increased physiological demands    Signs and Symptoms (as evidenced by):   S/p spinal sx     Interventions/Recommendations (treatment strategy):  Collaboration of nutrition care with other providers  ONS      Nutrition Diagnosis Status:   New        Reason for Assessment    Reason For Assessment: consult  Diagnosis:  (Lumbar stenosis with neurogenic claudication)  Relevant Medical History: GERD, HTN, thyroid disease  Interdisciplinary Rounds: did not attend  General Information Comments: RD consulted for optimization of nutrition s/p spinal fusion. Tolerating PO intake, consuming 100% at meals. Endorses constipation,  RD suggest increasing fruits/vegetables, adequate fluid intake and continue stool softeners att this time. No c/o NV denies difficulty chewing/swallowing. RD enccourages ONS, pt declined at this time. UBW ~130-35, pt appears nourished, no NFPE warranted at this time.  Nutrition Discharge Planning: Pending Clinical Course    Nutrition Risk Screen    Nutrition Risk Screen: no indicators present    Nutrition/Diet History    Patient Reported Diet/Restrictions/Preferences: general  Spiritual, Cultural Beliefs, Hoahaoism Practices, Values that Affect Care: no  Food Allergies: NKFA  Factors Affecting Nutritional Intake: None identified at this  "time    Anthropometrics    Temp: 98.7 °F (37.1 °C)  Height: 5' 3.5" (161.3 cm)  Height (inches): 63.5 in  Weight Method: Bed Scale  Weight: 62 kg (136 lb 11 oz)  Weight (lb): 136.69 lb  Ideal Body Weight (IBW), Female: 117.5 lb  % Ideal Body Weight, Female (lb): 116.33 %  BMI (Calculated): 23.8       Lab/Procedures/Meds    Pertinent Labs Reviewed: reviewed  Pertinent Labs Comments: H/H: 7.5/21.9, , MCH 34.7, Na 135, glu 119, Ca 8.3  Pertinent Medications Reviewed: reviewed  Pertinent Medications Comments: tylenol, bisacodyl, cetirizine, enoxaparin, levothyroxine, lisinopril-hydrocholorothiazide, pantoprazole, senna-docusate    Physical Findings/Assessment         Estimated/Assessed Needs    Weight Used For Calorie Calculations: 61.7 kg (136 lb)  Energy Calorie Requirements (kcal): 5265-0351 kcal/d (MSJ x 1.3-1.4)  Energy Need Method: Pitkin-St Jeor  Protein Requirements: 74-86 g (1.2-1.4 g/kg)  Weight Used For Protein Calculations: 61.7 kg (136 lb)        RDA Method (mL): 1520         Nutrition Prescription Ordered    Current Diet Order: Regular    Evaluation of Received Nutrient/Fluid Intake    I/O: -720 mL  Comments: LBM: 5/13  Tolerance: tolerating  % Intake of Estimated Energy Needs: 75 - 100 %  % Meal Intake: 75 - 100 %    Nutrition Risk    Level of Risk/Frequency of Follow-up: low - moderate (1x/ week)       Monitor and Evaluation    Food and Nutrient Intake: energy intake, food and beverage intake  Food and Nutrient Adminstration: diet order  Anthropometric Measurements: weight change, weight, body mass index  Biochemical Data, Medical Tests and Procedures: inflammatory profile, glucose/endocrine profile, gastrointestinal profile, electrolyte and renal panel, lipid profile  Nutrition-Focused Physical Findings: skin, head and eyes, extremities, muscles and bones, overall appearance       Nutrition Follow-Up    RD Follow-up?: Yes      Des Lora MS, RD, LDN    "

## 2024-05-16 NOTE — PROGRESS NOTES
Chilo Phelps - Neurosurgery ([facility])  Neurosurgery  Progress Note    Subjective:     History of Present Illness: History of Present Illness:  Marylin Morel is a 58 y.o. female  with a PMHx of DDD, cervical radiculopathy s/p ACD C5-7 in 2014 with Dr. 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 She is being seen in clinic today to discuss concerns with worsening lumbar radiculopathy despite conservative treatment. States that she has struggled with back pain for several years; however, after her hip replacement she has struggled with worsening pain and numbness in the left leg. Describes the pain as constant and severe across the low back with radiation into the groin and hips. Rates the pain as a 3/10. Aggravating factors include standing, bending, and walking. Alleviating factors include heating pad, muscle relaxer, and rest. Denies weakness, b/b dysfunction, saddle anesthesia, or gait instability. She has obtained an injection in November with mild relief.       Interval Hx 3/21/24: After the last appointment, it was recommended that the patient obtain additional imaging for surgical planning. She is being seen in clinic today with myself and Dr. Chester to discuss the findings and surgery in more detail. States that her symptoms remain unchanged. She continues to struggle with back > leg pain.     Interval Hx 4/16/24: The patient is being seen virtually today to answer additional questions or concerns she has regarding surgery. States that she was overwhelmed with the last appointment hearing the extensiveness of the surgery. Dr. Chester recommends a L2-pelvis PSF given the stenosis at those levels and her scoliosis.     Post-Op Info:  Procedure(s) (LRB):  **LOOP X** L2 to pelvis posterior instrumented fusion (N/A)   2 Days Post-Op   Interval History:  NAEON. AFVSS. 2 HV Drains  fell out overnight. 1 deep HV in place. Continue. Exam stable. Pain controlled. Transfused 1 unit RBC for symptomatic post-op anemia. Home pending drain removal.    Medications:  Continuous Infusions:  Scheduled Meds:   acetaminophen  1,000 mg Oral Q6H    bisacodyL  10 mg Rectal Daily    ceFAZolin (Ancef) IV (PEDS and ADULTS)  2 g Intravenous Q8H    cetirizine  5 mg Oral Daily    enoxparin  40 mg Subcutaneous Q24H (prophylaxis, 1700)    fluticasone propionate  2 spray Each Nostril Daily    levothyroxine  25 mcg Oral Before breakfast    lisinopriL-hydrochlorothiazide  2 tablet Oral Daily    methocarbamoL  750 mg Oral QID    mupirocin   Nasal BID    pantoprazole  40 mg Oral Daily    senna-docusate 8.6-50 mg  1 tablet Oral BID     PRN Meds:  Current Facility-Administered Medications:     0.9%  NaCl infusion (for blood administration), , Intravenous, Q24H PRN    aluminum-magnesium hydroxide-simethicone, 30 mL, Oral, Q4H PRN    diphenhydrAMINE, 25 mg, Intravenous, Q8H PRN    HYDROmorphone, 2 mg, Intravenous, Q3H PRN    naloxone, 0.4 mg, Intravenous, PRN    ondansetron, 8 mg, Oral, Q6H PRN    oxyCODONE, 5 mg, Oral, Q4H PRN    oxyCODONE, 10 mg, Oral, Q4H PRN    prochlorperazine, 5 mg, Intravenous, Q6H PRN    simethicone, 1 tablet, Oral, TID PRN     Review of Systems  Objective:     Weight: 62 kg (136 lb 11 oz)  Body mass index is 23.83 kg/m².  Vital Signs (Most Recent):  Temp: 98.6 °F (37 °C) (05/16/24 1524)  Pulse: 87 (05/16/24 1524)  Resp: 18 (05/16/24 1524)  BP: (!) 97/54 (05/16/24 1524)  SpO2: 98 % (05/16/24 1524) Vital Signs (24h Range):  Temp:  [98.2 °F (36.8 °C)-99.9 °F (37.7 °C)] 98.6 °F (37 °C)  Pulse:  [75-96] 87  Resp:  [12-20] 18  SpO2:  [96 %-100 %] 98 %  BP: ()/(53-68) 97/54     Date 05/16/24 0700 - 05/17/24 0659   Shift 6738-8024 8586-7339 0919-5997 24 Hour Total   INTAKE   Blood 345   345   Shift Total(mL/kg) 345(5.6)   345(5.6)   OUTPUT   Shift Total(mL/kg)       Weight (kg) 62 62 62 62                             Closed/Suction Drain 05/14/24 1038 Tube - 1 Right;Superior Back Accordion 10 Fr. (Active)   Site Description Healing 05/15/24 0800   Dressing Type Other (Comment) 05/15/24 0800   Dressing Status Clean;Dry;Intact 05/14/24 1145   Dressing Intervention Integrity maintained 05/14/24 1145   Drainage Sanguineous 05/15/24 0800   Status To bulb suction 05/15/24 0800   Output (mL) 60 mL 05/15/24 0611            Closed/Suction Drain 05/14/24 1038 Tube - 2 Left;Superior Back Accordion 10 Fr. (Active)   Site Description Healing 05/15/24 0800   Dressing Type Other (Comment) 05/15/24 0800   Dressing Status Clean;Dry;Intact 05/14/24 1145   Dressing Intervention Integrity maintained 05/14/24 1145   Drainage Sanguineous 05/15/24 0800   Status To bulb suction 05/15/24 0800   Output (mL) 90 mL 05/15/24 0611            Closed/Suction Drain 05/14/24 1057 Midline;Superior Back Accordion 10 Fr. (Active)   Site Description Healing 05/15/24 0800   Dressing Type Other (Comment) 05/15/24 0800   Dressing Status Clean;Dry;Intact 05/14/24 1145   Dressing Intervention Integrity maintained 05/15/24 0800   Drainage Sanguineous 05/15/24 0800   Status To bulb suction 05/15/24 0800   Output (mL) 30 mL 05/15/24 0611          Physical Exam         Neurosurgery Physical Exam    General: well developed, well nourished, no distress.   Head: normocephalic, atraumatic  Neurologic: Alert and oriented. Thought content appropriate.  GCS: Motor: 6/Verbal: 5/Eyes: 4 GCS Total: 15  Mental Status: Awake, Alert, Oriented x 4  Language: No aphasia  Speech: No dysarthria  Cranial nerves: face symmetric, tongue midline, CN II-XII grossly intact, EOMI.  Pulmonary: normal respirations, no signs of respiratory distress  Sensory: intact to light touch throughout  Motor Strength: Moves all extremities spontaneously with good tone.  Full strength upper and lower extremities. No abnormal movements seen.     Strength  Deltoids Triceps Biceps Wrist Extension Wrist  "Flexion Hand    Upper: R 5/5 5/5 5/5 5/5 5/5 5/5    L 5/5 5/5 5/5 5/5 5/5 5/5     Iliopsoas Quadriceps Knee  Flexion Tibialis  anterior Gastro- cnemius EHL   Lower: R 5/5 5/5 5/5 5/5 5/5 5/5    L 5/5 5/5 5/5 5/5 5/5 5/5     Skin: Skin is warm, dry and intact.  Incision covered with dressing. HV x 1 to suction.    Significant Labs:  Recent Labs   Lab 05/15/24  0455 05/16/24  0443    119*   * 135*   K 3.1* 3.7    106   CO2 19* 24   BUN 7 6   CREATININE 0.7 0.7   CALCIUM 7.5* 8.3*     Recent Labs   Lab 05/15/24  0455 05/16/24  0443   WBC 7.55 9.77   HGB 8.1* 7.5*   HCT 23.6* 21.9*    167     No results for input(s): "LABPT", "INR", "APTT" in the last 48 hours.    Microbiology Results (last 7 days)       ** No results found for the last 168 hours. **          Recent Lab Results         05/16/24 0443        Anion Gap 5       Baso # 0.06       Basophil % 0.6       BUN 6       Calcium 8.3       Chloride 106       CO2 24       Creatinine 0.7       Differential Method Automated       eGFR >60.0       Eos # 0.3       Eos % 3.5       Glucose 119       Gran # (ANC) 7.1       Gran % 72.6       Hematocrit 21.9       Hemoglobin 7.5       Immature Grans (Abs) 0.04  Comment: Mild elevation in immature granulocytes is non specific and   can be seen in a variety of conditions including stress response,   acute inflammation, trauma and pregnancy. Correlation with other   laboratory and clinical findings is essential.         Immature Granulocytes 0.4       Lymph # 1.4       Lymph % 14.3       MCH 34.7       MCHC 34.2              Mono # 0.8       Mono % 8.6       MPV 9.6       nRBC 0       Platelet Count 167       Potassium 3.7       RBC 2.16       RDW 12.0       Sodium 135       WBC 9.77             All pertinent labs from the last 24 hours have been reviewed.    Significant Diagnostics:  I have reviewed all pertinent imaging results/findings within the past 24 hours.  Assessment/Plan:     * " Lumbar stenosis with neurogenic claudication  Patient is a 59 yo female with lumbar stenosis and scoliosis now s/p L2-pelvis fusion on 5/14.    - Neurologically stable  - Pain control: continue current regimen  - Leave dressing to incision.  - Drains: 2 HV fell out overnight. Continue remaining drain to suction. Continue abx.  - Post op imaging showing satisfactory placement of hardware.   - Brace: LSO when OOB  - Continue PT/OT/OOB. OOB > 6hrs/day  - GI: Diet as tolerated. Continue bowel regimen.   - Voiding spontaneously s/p smallwood removal  - DVT ppx: LVX, Compression stockings, SCDs  - Atelectasis ppx: IS at bedside. Encourage use every hour while awake.  - Discussed with Dr. Chester    - Dispo: home pending drain removal      Acute blood loss anemia  Patient's anemia is currently controlled. Has received 1 units of PRBCs on 5/16 . Expected blood loss post-operatively. Daily cbc.   Current CBC reviewed-   Lab Results   Component Value Date    HGB 7.5 (L) 05/16/2024    HCT 21.9 (L) 05/16/2024     Monitor serial CBC and transfuse if patient becomes hemodynamically unstable, symptomatic or H/H drops below 7/21.    Hypokalemia  Resolved. Patient has hypokalemia which is Acute and currently controlled. Most recent potassium levels reviewed-   Lab Results   Component Value Date    K 3.7 05/16/2024   . Will continue potassium replacement per protocol and recheck repeat levels after replacement completed.     Hyponatremia  Patient has hyponatremia which is controlled, improved from yesterday. Asymptomatic. The patient's sodium results have been reviewed and are listed below.  Recent Labs   Lab 05/16/24  0443   *         GERD (gastroesophageal reflux disease)  Continue home dose PPI    Hypothyroidism  Continue home dose levothyroxine.    Essential hypertension, benign  Chronic, controlled. Latest blood pressure and vitals reviewed-     Temp:  [97.9 °F (36.6 °C)-101.9 °F (38.8 °C)]   Pulse:  [75-98]   Resp:  [16-18]    BP: (100-121)/(57-67)   SpO2:  [87 %-100 %] .   Home meds for hypertension were reviewed and noted below.   Hypertension Medications               lisinopriL-hydrochlorothiazide (PRINZIDE,ZESTORETIC) 20-25 mg Tab Take 1 tablet by mouth once daily.            While in the hospital, will manage blood pressure as follows; Continue home antihypertensive regimen    Will utilize p.r.n. blood pressure medication only if patient's blood pressure greater than 180/110 and she develops symptoms such as worsening chest pain or shortness of breath.    Hold home dose medications for SBP < 120.        Jessica Arnold PA-C  Neurosurgery  [facility]ilo Phelps - Neurosurgery ([facility])

## 2024-05-16 NOTE — ASSESSMENT & PLAN NOTE
Resolved. Patient has hypokalemia which is Acute and currently controlled. Most recent potassium levels reviewed-   Lab Results   Component Value Date    K 3.7 05/16/2024   . Will continue potassium replacement per protocol and recheck repeat levels after replacement completed.

## 2024-05-16 NOTE — PLAN OF CARE
Problem: Adult Inpatient Plan of Care  Goal: Plan of Care Review  Outcome: Progressing  Goal: Absence of Hospital-Acquired Illness or Injury  Outcome: Progressing  Goal: Readiness for Transition of Care  Outcome: Progressing     Problem: Wound  Goal: Absence of Infection Signs and Symptoms  Outcome: Progressing  Goal: Optimal Pain Control and Function  Outcome: Progressing  Goal: Optimal Wound Healing  Outcome: Progressing     Problem: Fall Injury Risk  Goal: Absence of Fall and Fall-Related Injury  Outcome: Progressing

## 2024-05-16 NOTE — PLAN OF CARE
Recommendations    1. Continue Regular Diet. Encourage high-calorie, high-protein food sources. Small, frequent meals/snacks as tolerated.   2. Recommend ONS: Boost Plus (or alternative) BID for additional calories/PRO, if PO intake inadequate.   3. Add Beneprotein- BID for additional calories/PRO.   4. RD to monitor and follow-up.    Goals: Meet % EEN/EPN by follow-up date  Nutrition Goal Status: new  Communication of RD Recs: other (comment) (POC)

## 2024-05-16 NOTE — ASSESSMENT & PLAN NOTE
Patient's anemia is currently controlled. Has received 1 units of PRBCs on 5/16 . Expected blood loss post-operatively. Daily cbc.   Current CBC reviewed-   Lab Results   Component Value Date    HGB 7.5 (L) 05/16/2024    HCT 21.9 (L) 05/16/2024     Monitor serial CBC and transfuse if patient becomes hemodynamically unstable, symptomatic or H/H drops below 7/21.

## 2024-05-17 LAB
BASOPHILS # BLD AUTO: 0.04 K/UL (ref 0–0.2)
BASOPHILS NFR BLD: 0.4 % (ref 0–1.9)
BILIRUB UR QL STRIP: NEGATIVE
CLARITY UR REFRACT.AUTO: CLEAR
COLOR UR AUTO: YELLOW
DIFFERENTIAL METHOD BLD: ABNORMAL
EOSINOPHIL # BLD AUTO: 0.2 K/UL (ref 0–0.5)
EOSINOPHIL NFR BLD: 2.2 % (ref 0–8)
ERYTHROCYTE [DISTWIDTH] IN BLOOD BY AUTOMATED COUNT: 15.2 % (ref 11.5–14.5)
GLUCOSE UR QL STRIP: NEGATIVE
HCT VFR BLD AUTO: 24 % (ref 37–48.5)
HGB BLD-MCNC: 8.2 G/DL (ref 12–16)
HGB UR QL STRIP: NEGATIVE
IMM GRANULOCYTES # BLD AUTO: 0.04 K/UL (ref 0–0.04)
IMM GRANULOCYTES NFR BLD AUTO: 0.4 % (ref 0–0.5)
KETONES UR QL STRIP: NEGATIVE
LEUKOCYTE ESTERASE UR QL STRIP: NEGATIVE
LYMPHOCYTES # BLD AUTO: 1.2 K/UL (ref 1–4.8)
LYMPHOCYTES NFR BLD: 11.6 % (ref 18–48)
MCH RBC QN AUTO: 32.7 PG (ref 27–31)
MCHC RBC AUTO-ENTMCNC: 34.2 G/DL (ref 32–36)
MCV RBC AUTO: 96 FL (ref 82–98)
MONOCYTES # BLD AUTO: 0.8 K/UL (ref 0.3–1)
MONOCYTES NFR BLD: 8 % (ref 4–15)
NEUTROPHILS # BLD AUTO: 7.9 K/UL (ref 1.8–7.7)
NEUTROPHILS NFR BLD: 77.4 % (ref 38–73)
NITRITE UR QL STRIP: NEGATIVE
NRBC BLD-RTO: 0 /100 WBC
PH UR STRIP: 8 [PH] (ref 5–8)
PLATELET # BLD AUTO: 188 K/UL (ref 150–450)
PMV BLD AUTO: 9.7 FL (ref 9.2–12.9)
PROT UR QL STRIP: NEGATIVE
RBC # BLD AUTO: 2.51 M/UL (ref 4–5.4)
SP GR UR STRIP: 1.01 (ref 1–1.03)
URN SPEC COLLECT METH UR: NORMAL
WBC # BLD AUTO: 10.19 K/UL (ref 3.9–12.7)

## 2024-05-17 PROCEDURE — 63600175 PHARM REV CODE 636 W HCPCS: Performed by: PHYSICIAN ASSISTANT

## 2024-05-17 PROCEDURE — 87040 BLOOD CULTURE FOR BACTERIA: CPT | Mod: 59 | Performed by: PHYSICIAN ASSISTANT

## 2024-05-17 PROCEDURE — 11000001 HC ACUTE MED/SURG PRIVATE ROOM

## 2024-05-17 PROCEDURE — 25000003 PHARM REV CODE 250: Performed by: STUDENT IN AN ORGANIZED HEALTH CARE EDUCATION/TRAINING PROGRAM

## 2024-05-17 PROCEDURE — 97530 THERAPEUTIC ACTIVITIES: CPT

## 2024-05-17 PROCEDURE — 25000003 PHARM REV CODE 250

## 2024-05-17 PROCEDURE — 94799 UNLISTED PULMONARY SVC/PX: CPT

## 2024-05-17 PROCEDURE — 25000003 PHARM REV CODE 250: Performed by: PHYSICIAN ASSISTANT

## 2024-05-17 PROCEDURE — 85025 COMPLETE CBC W/AUTO DIFF WBC: CPT | Performed by: STUDENT IN AN ORGANIZED HEALTH CARE EDUCATION/TRAINING PROGRAM

## 2024-05-17 PROCEDURE — 99024 POSTOP FOLLOW-UP VISIT: CPT | Mod: ,,, | Performed by: PHYSICIAN ASSISTANT

## 2024-05-17 PROCEDURE — 63600175 PHARM REV CODE 636 W HCPCS: Performed by: STUDENT IN AN ORGANIZED HEALTH CARE EDUCATION/TRAINING PROGRAM

## 2024-05-17 PROCEDURE — 36415 COLL VENOUS BLD VENIPUNCTURE: CPT | Performed by: STUDENT IN AN ORGANIZED HEALTH CARE EDUCATION/TRAINING PROGRAM

## 2024-05-17 PROCEDURE — 81003 URINALYSIS AUTO W/O SCOPE: CPT | Performed by: PHYSICIAN ASSISTANT

## 2024-05-17 RX ORDER — OXYCODONE HYDROCHLORIDE 10 MG/1
10 TABLET ORAL EVERY 4 HOURS PRN
Qty: 42 TABLET | Refills: 0 | Status: SHIPPED | OUTPATIENT
Start: 2024-05-17 | End: 2024-05-28 | Stop reason: SDUPTHER

## 2024-05-17 RX ORDER — METHOCARBAMOL 750 MG/1
750 TABLET, FILM COATED ORAL 4 TIMES DAILY
Qty: 40 TABLET | Refills: 0 | Status: SHIPPED | OUTPATIENT
Start: 2024-05-17 | End: 2024-05-28 | Stop reason: SDUPTHER

## 2024-05-17 RX ADMIN — ACETAMINOPHEN 1000 MG: 500 TABLET ORAL at 11:05

## 2024-05-17 RX ADMIN — ENEMA 1 ENEMA: 19; 7 ENEMA RECTAL at 09:05

## 2024-05-17 RX ADMIN — METHOCARBAMOL 750 MG: 750 TABLET ORAL at 08:05

## 2024-05-17 RX ADMIN — OXYCODONE HYDROCHLORIDE 10 MG: 10 TABLET ORAL at 01:05

## 2024-05-17 RX ADMIN — FLUTICASONE PROPIONATE 100 MCG: 50 SPRAY, METERED NASAL at 09:05

## 2024-05-17 RX ADMIN — SENNOSIDES AND DOCUSATE SODIUM 1 TABLET: 50; 8.6 TABLET ORAL at 09:05

## 2024-05-17 RX ADMIN — OXYCODONE HYDROCHLORIDE 10 MG: 10 TABLET ORAL at 04:05

## 2024-05-17 RX ADMIN — ENOXAPARIN SODIUM 40 MG: 40 INJECTION SUBCUTANEOUS at 04:05

## 2024-05-17 RX ADMIN — METHOCARBAMOL 750 MG: 750 TABLET ORAL at 12:05

## 2024-05-17 RX ADMIN — ACETAMINOPHEN 1000 MG: 500 TABLET ORAL at 01:05

## 2024-05-17 RX ADMIN — CETIRIZINE HYDROCHLORIDE 5 MG: 5 TABLET, FILM COATED ORAL at 09:05

## 2024-05-17 RX ADMIN — CEFAZOLIN 2 G: 2 INJECTION, POWDER, FOR SOLUTION INTRAMUSCULAR; INTRAVENOUS at 01:05

## 2024-05-17 RX ADMIN — OXYCODONE HYDROCHLORIDE 10 MG: 10 TABLET ORAL at 09:05

## 2024-05-17 RX ADMIN — SENNOSIDES AND DOCUSATE SODIUM 1 TABLET: 50; 8.6 TABLET ORAL at 08:05

## 2024-05-17 RX ADMIN — ACETAMINOPHEN 1000 MG: 500 TABLET ORAL at 06:05

## 2024-05-17 RX ADMIN — METHOCARBAMOL 750 MG: 750 TABLET ORAL at 04:05

## 2024-05-17 RX ADMIN — METHOCARBAMOL 750 MG: 750 TABLET ORAL at 09:05

## 2024-05-17 RX ADMIN — PANTOPRAZOLE SODIUM 40 MG: 40 TABLET, DELAYED RELEASE ORAL at 09:05

## 2024-05-17 RX ADMIN — LEVOTHYROXINE SODIUM 25 MCG: 25 TABLET ORAL at 04:05

## 2024-05-17 RX ADMIN — OXYCODONE HYDROCHLORIDE 10 MG: 10 TABLET ORAL at 08:05

## 2024-05-17 RX ADMIN — HYDROMORPHONE HYDROCHLORIDE 2 MG: 1 INJECTION, SOLUTION INTRAMUSCULAR; INTRAVENOUS; SUBCUTANEOUS at 10:05

## 2024-05-17 NOTE — PT/OT/SLP PROGRESS
Occupational Therapy   Treatment    Name: Marylin Morel  MRN: [id number]  Admitting Diagnosis:  Lumbar stenosis with neurogenic claudication  3 Days Post-Op    Recommendations:     Discharge Recommendations: Low Intensity Therapy  Discharge Equipment Recommendations:  none  Barriers to discharge:  None    Assessment:     Marylin Morel is a 58 y.o. female with a medical diagnosis of Lumbar stenosis with neurogenic claudication.  She presents with decrease functional status secondary to medical diagnosis. Performance deficits affecting function are weakness, impaired endurance, impaired self care skills, impaired functional mobility, gait instability. Patient able to complete two flights of stairs with standby assist and no AD. Patient engaged in conversation regarding upcoming DC with no questions or concerns about returning home.     Rehab Prognosis:  Good; patient would benefit from acute skilled OT services to address these deficits and reach maximum level of function.       Plan:     Patient to be seen 3 x/week to address the above listed problems via self-care/home management, therapeutic activities, therapeutic exercises, neuromuscular re-education  Plan of Care Expires: 07/15/24  Plan of Care Reviewed with: patient    Subjective     Chief Complaint: none at this time   Patient/Family Comments/goals: DC   Pain/Comfort:  Pain Rating 1: 0/10    Objective:     Communicated with: RN prior to session.  Patient found up in chair with hemovac upon OT entry to room.    General Precautions: Standard, fall    Orthopedic Precautions:spinal precautions  Braces: TLSO  Respiratory Status: Room air     Occupational Performance:     Bed Mobility:    Not completed this session     Functional Mobility/Transfers:  Patient completed Sit <> Stand Transfer with stand by assistance  with  no assistive device   Functional Mobility: Patient completed 300' ambulation and 2 flights of stairs with HR w/ standby assistance and no  LOB    [facility] 6 Click ADL:      Treatment & Education:  Provided education regarding role of OT, POC, & discharge recommendations.  Pt with no further questions/concerns at this time. OT provided education on home recommendations and fall prevention in preparation for D/C.     Patient left up in chair with all lines intact and call button in reach    GOALS:   Multidisciplinary Problems       Occupational Therapy Goals          Problem: Occupational Therapy    Goal Priority Disciplines Outcome Interventions   Occupational Therapy Goal     OT, PT/OT Progressing    Description: Goals to be met by: 6/15/24     Patient will increase functional independence with ADLs by performing:    LE Dressing with [place].  Grooming while standing with [place].  Toileting from toilet with [place] for hygiene and clothing management.   Toilet transfer to toilet with [place].                         Time Tracking:     OT Date of Treatment: 05/17/24  OT Start Time: 1420  OT Stop Time: 1430  OT Total Time (min): 10 min    Billable Minutes:Therapeutic Activity 10    OT/EMMANUEL: OT          5/17/2024

## 2024-05-17 NOTE — PROGRESS NOTES
[facility]ps - Neurosurgery ([facility])  Neurosurgery  Progress Note    Subjective:     History of Present Illness: History of Present Illness:  Marylin Morel is a 58 y.o. female  with a PMHx of DDD, cervical radiculopathy s/p ACD C5-7 in 2014 with Dr. 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 She is being seen in clinic today to discuss concerns with worsening lumbar radiculopathy despite conservative treatment. States that she has struggled with back pain for several years; however, after her hip replacement she has struggled with worsening pain and numbness in the left leg. Describes the pain as constant and severe across the low back with radiation into the groin and hips. Rates the pain as a 3/10. Aggravating factors include standing, bending, and walking. Alleviating factors include heating pad, muscle relaxer, and rest. Denies weakness, b/b dysfunction, saddle anesthesia, or gait instability. She has obtained an injection in November with mild relief.       Interval Hx 3/21/24: After the last appointment, it was recommended that the patient obtain additional imaging for surgical planning. She is being seen in clinic today with myself and Dr. Chester to discuss the findings and surgery in more detail. States that her symptoms remain unchanged. She continues to struggle with back > leg pain.     Interval Hx 4/16/24: The patient is being seen virtually today to answer additional questions or concerns she has regarding surgery. States that she was overwhelmed with the last appointment hearing the extensiveness of the surgery. Dr. Chester recommends a L2-pelvis PSF given the stenosis at those levels and her scoliosis.     Post-Op Info:  Procedure(s) (LRB):  **LOOP X** L2 to pelvis posterior instrumented fusion (N/A)   3 Days Post-Op   Interval History:   NAEON. AFVSS. 1L bolus  overnight for hypotension. Improved. H/H stable s/p transfusion. HV drain fell out overnight, suture removed. Likely home this afternoon. Exam stable. BM today.     Medications:  Continuous Infusions:  Scheduled Meds:   acetaminophen  1,000 mg Oral Q6H    bisacodyL  10 mg Rectal Daily    ceFAZolin (Ancef) IV (PEDS and ADULTS)  2 g Intravenous Q8H    cetirizine  5 mg Oral Daily    enoxparin  40 mg Subcutaneous Q24H (prophylaxis, 1700)    fluticasone propionate  2 spray Each Nostril Daily    levothyroxine  25 mcg Oral Before breakfast    methocarbamoL  750 mg Oral QID    pantoprazole  40 mg Oral Daily    senna-docusate 8.6-50 mg  1 tablet Oral BID     PRN Meds:  Current Facility-Administered Medications:     0.9%  NaCl infusion (for blood administration), , Intravenous, Q24H PRN    aluminum-magnesium hydroxide-simethicone, 30 mL, Oral, Q4H PRN    diphenhydrAMINE, 25 mg, Intravenous, Q8H PRN    HYDROmorphone, 2 mg, Intravenous, Q3H PRN    naloxone, 0.4 mg, Intravenous, PRN    ondansetron, 8 mg, Oral, Q6H PRN    oxyCODONE, 5 mg, Oral, Q4H PRN    oxyCODONE, 10 mg, Oral, Q4H PRN    prochlorperazine, 5 mg, Intravenous, Q6H PRN    simethicone, 1 tablet, Oral, TID PRN     Review of Systems  Objective:     Weight: 62 kg (136 lb 11 oz)  Body mass index is 23.83 kg/m².  Vital Signs (Most Recent):  Temp: 98.3 °F (36.8 °C) (05/17/24 0807)  Pulse: 91 (05/17/24 0807)  Resp: 16 (05/17/24 0905)  BP: 124/69 (05/17/24 0807)  SpO2: 95 % (05/17/24 0807) Vital Signs (24h Range):  Temp:  [98.3 °F (36.8 °C)-99.9 °F (37.7 °C)] 98.3 °F (36.8 °C)  Pulse:  [76-95] 91  Resp:  [16-18] 16  SpO2:  [95 %-98 %] 95 %  BP: ()/(52-69) 124/69                                Closed/Suction Drain 05/14/24 1038 Tube - 1 Right;Superior Back Accordion 10 Fr. (Active)   Site Description Healing 05/15/24 0800   Dressing Type Other (Comment) 05/15/24 0800   Dressing Status Clean;Dry;Intact 05/14/24 1145   Dressing Intervention Integrity maintained 05/14/24  1145   Drainage Sanguineous 05/15/24 0800   Status To bulb suction 05/15/24 0800   Output (mL) 60 mL 05/15/24 0611            Closed/Suction Drain 05/14/24 1038 Tube - 2 Left;Superior Back Accordion 10 Fr. (Active)   Site Description Healing 05/15/24 0800   Dressing Type Other (Comment) 05/15/24 0800   Dressing Status Clean;Dry;Intact 05/14/24 1145   Dressing Intervention Integrity maintained 05/14/24 1145   Drainage Sanguineous 05/15/24 0800   Status To bulb suction 05/15/24 0800   Output (mL) 90 mL 05/15/24 0611            Closed/Suction Drain 05/14/24 1057 Midline;Superior Back Accordion 10 Fr. (Active)   Site Description Healing 05/15/24 0800   Dressing Type Other (Comment) 05/15/24 0800   Dressing Status Clean;Dry;Intact 05/14/24 1145   Dressing Intervention Integrity maintained 05/15/24 0800   Drainage Sanguineous 05/15/24 0800   Status To bulb suction 05/15/24 0800   Output (mL) 30 mL 05/15/24 0611          Physical Exam         Neurosurgery Physical Exam    General: well developed, well nourished, no distress.   Head: normocephalic, atraumatic  Neurologic: Alert and oriented. Thought content appropriate.  GCS: Motor: 6/Verbal: 5/Eyes: 4 GCS Total: 15  Mental Status: Awake, Alert, Oriented x 4  Language: No aphasia  Speech: No dysarthria  Cranial nerves: face symmetric, tongue midline, CN II-XII grossly intact, EOMI.  Pulmonary: normal respirations, no signs of respiratory distress  Sensory: intact to light touch throughout  Motor Strength: Moves all extremities spontaneously with good tone.  Full strength upper and lower extremities. No abnormal movements seen.     Strength  Deltoids Triceps Biceps Wrist Extension Wrist Flexion Hand    Upper: R 5/5 5/5 5/5 5/5 5/5 5/5    L 5/5 5/5 5/5 5/5 5/5 5/5     Iliopsoas Quadriceps Knee  Flexion Tibialis  anterior Gastro- cnemius EHL   Lower: R 5/5 5/5 5/5 5/5 5/5 5/5    L 5/5 5/5 5/5 5/5 5/5 5/5     Skin: Skin is warm, dry and intact.  Incision c/d/I with skin  "edges approximated with staples.     Significant Labs:  Recent Labs   Lab 05/16/24 0443   *   *   K 3.7      CO2 24   BUN 6   CREATININE 0.7   CALCIUM 8.3*     Recent Labs   Lab 05/16/24 0443 05/16/24 2003 05/17/24  0511   WBC 9.77 12.77* 10.19   HGB 7.5* 8.9* 8.2*   HCT 21.9* 25.6* 24.0*    191 188     No results for input(s): "LABPT", "INR", "APTT" in the last 48 hours.    Microbiology Results (last 7 days)       ** No results found for the last 168 hours. **          Recent Lab Results         05/17/24 0511 05/16/24 2003        Baso # 0.04   0.06       Basophil % 0.4   0.5       Differential Method Automated   Automated       Eos # 0.2   0.2       Eos % 2.2   1.2       Gran # (ANC) 7.9   10.6       Gran % 77.4   82.6       Hematocrit 24.0   25.6       Hemoglobin 8.2   8.9       Immature Grans (Abs) 0.04  Comment: Mild elevation in immature granulocytes is non specific and   can be seen in a variety of conditions including stress response,   acute inflammation, trauma and pregnancy. Correlation with other   laboratory and clinical findings is essential.     0.06  Comment: Mild elevation in immature granulocytes is non specific and   can be seen in a variety of conditions including stress response,   acute inflammation, trauma and pregnancy. Correlation with other   laboratory and clinical findings is essential.         Immature Granulocytes 0.4   0.5       Lymph # 1.2   1.2       Lymph % 11.6   9.2       MCH 32.7   33.3       MCHC 34.2   34.8       MCV 96   96       Mono # 0.8   0.8       Mono % 8.0   6.0       MPV 9.7   9.3       nRBC 0   0       Platelet Count 188   191       RBC 2.51   2.67       RDW 15.2   15.3       WBC 10.19   12.77             All pertinent labs from the last 24 hours have been reviewed.    Significant Diagnostics:  I have reviewed all pertinent imaging results/findings within the past 24 hours.  Assessment/Plan:     * Lumbar stenosis with neurogenic " claudication  Patient is a 59 yo female with lumbar stenosis and scoliosis now s/p L2-pelvis fusion on 5/14.    - Neurologically stable  - Pain control: continue current regimen  - Leave incision open to air.   - Drains: 2 HV fell out 5/15 overnight. Last drain fell out 5/16 overnight.   - Post op imaging showing satisfactory placement of hardware.   - Brace: LSO when OOB  - Continue PT/OT/OOB. OOB > 6hrs/day  - GI: Diet as tolerated. Continue bowel regimen.   - Voiding spontaneously s/p smallwood removal  - DVT ppx: LVX, Compression stockings, SCDs  - Atelectasis ppx: IS at bedside. Encourage use every hour while awake.    - Dispo: home likely this afternoon if BP remains stable      Acute blood loss anemia  Patient's anemia is currently controlled. Has received 1 units of PRBCs on 5/16 . Expected blood loss post-operatively. Daily cbc.   Current CBC reviewed-   Lab Results   Component Value Date    HGB 7.5 (L) 05/16/2024    HCT 21.9 (L) 05/16/2024     Monitor serial CBC and transfuse if patient becomes hemodynamically unstable, symptomatic or H/H drops below 7/21.    Hypokalemia  Resolved. Patient has hypokalemia which is Acute and currently controlled. Most recent potassium levels reviewed-   Lab Results   Component Value Date    K 3.7 05/16/2024   . Will continue potassium replacement per protocol and recheck repeat levels after replacement completed.     Hyponatremia  Patient has hyponatremia which is controlled, improved from yesterday. Asymptomatic. The patient's sodium results have been reviewed and are listed below.  Recent Labs   Lab 05/16/24  0443   *         GERD (gastroesophageal reflux disease)  Continue home dose PPI    Hypothyroidism  Continue home dose levothyroxine.    Essential hypertension, benign  Chronic, controlled. Latest blood pressure and vitals reviewed-     Temp:  [97.9 °F (36.6 °C)-101.9 °F (38.8 °C)]   Pulse:  [75-98]   Resp:  [16-18]   BP: (100-121)/(57-67)   SpO2:  [87 %-100 %] .    Home meds for hypertension were reviewed and noted below.   Hypertension Medications               lisinopriL-hydrochlorothiazide (PRINZIDE,ZESTORETIC) 20-25 mg Tab Take 1 tablet by mouth once daily.            While in the hospital, will manage blood pressure as follows; Continue home antihypertensive regimen    Will utilize p.r.n. blood pressure medication only if patient's blood pressure greater than 180/110 and she develops symptoms such as worsening chest pain or shortness of breath.    Hold home dose medications for SBP < 120.        Jessica Arnold PA-C  Neurosurgery  [facility]ps - Neurosurgery ([facility])

## 2024-05-17 NOTE — ASSESSMENT & PLAN NOTE
Patient is a 59 yo female with lumbar stenosis and scoliosis now s/p L2-pelvis fusion on 5/14.    - Neurologically stable  - Pain control: continue current regimen  - Leave incision open to air.   - Drains: 2 HV fell out 5/15 overnight. Last drain fell out 5/16 overnight.   - Post op imaging showing satisfactory placement of hardware.   - Brace: LSO when OOB  - Continue PT/OT/OOB. OOB > 6hrs/day  - GI: Diet as tolerated. Continue bowel regimen.   - Voiding spontaneously s/p smallwood removal  - DVT ppx: LVX, Compression stockings, SCDs  - Atelectasis ppx: IS at bedside. Encourage use every hour while awake.    - Dispo: home likely this afternoon if BP remains stable

## 2024-05-17 NOTE — OP NOTE
DATE OF SURGERY: 5/14/24     PREOPERATIVE DIAGNOSIS:  1. L3-4 spondylolisthesis, scoliosis, lumbar stenosis L3-4 through L5-S1 and flat back deformity     POSTOPERATIVE DIAGNOSIS:  Same.     PROCEDURE PERFORMED:  1. Posterior spinal fusion, L2 to pelvis   2. Posterior segmental spinal fixation, L2 to pelvis (DePuy Synthes)  3. Pelvic fixation with S2AI screws (Depuy Synthes)  4. Posterior lumbar interbody fusion, L3-4, L4-5 and L5-S1  5.  Application of titanium interbody spacer, L3-4, L4-5 and L5-S1 (DePuy Synthes)  6.  Andry osteotomy, L3, L4 and L5  7.  Use of intraoperative fluoroscopy  8.  Use of intraoperative neuro-monitoring with triggered EMG  9. Use of intraoperative CT neuronavigation  10. Infuse and local bone grafting     SURGEON: Eric Chester M.D.     CO-SURGEON: Fredrick Rob M.D. -- Dr. Rob assisted with the placement of all spinal instrumentation because a qualified resident was not available for this portion of the procedure.     ANESTHESIA: GETA     ESTIMATED BLOOD LOSS: 400mL     COMPLICATIONS: None     DRAINS: Two deep Hemovacs and a Prevena incisional woundvac     SPECIMENS SENT: None     FINDINGS: None     INDICATIONS:     58F with debilitating axial low back pain, postural deformtiy, and radicular leg pain in the setting of the above diagnoses which failed medical management. I recommended the above procedure. R/B/A/I/M were reviewed in detail and the patient wished to proceed. All questions were answered and no guarantees were made.  .  PROCEDURE:     The patient was brought into the operating room where she was intubated and placed under general anesthesia without difficulty.  All lines were placed. She was repositioned prone onto the operating room table with appropriate padding all pressure points. The region of interest was localized with AP and lateral x-ray.  The skin was marked and the area was prepped and draped in the usual sterile fashion.  A timeout was performed prior  to procedure.  20 mL's of lidocaine with epinephrine were injected in the skin.     A midline linear incision was made with a 10 blade from approximately L2 to sacrum.  Supra and subfascial midline dissection was carried out with Bovie electrocautery.  Subperiosteal dissection was carried out with Bovie electrocautery and Chan elevators to expose the posterior elements from L2 to sacrum.  A pedicle finder was placed into the presumed L3 pedicle and confirmed on lateral x-ray. Medial facetectomies were performed bilaterally at L2-3 through L5-S1 with the osteotome.       The CT neuro navigation array was docked onto the right PSIS throught a separate stab incision and a CT spin was acquired.  The intraoperative CT confirmed levels..  Using neuro-navigation pedicle screws were placed bilaterally from L2 to S1. All lumbar screws were stimulated with the neuromonitoring probe and found to stimulate within safe range. Bilateral pelvic fixation was achieved with S2AI screws, using navigation. Spinal xrays confirmed good hardware position. We augmented bilateral L2 screws with cement under flouroscopy, and we confirmed no significant cement extravasation.     Attention was turned to performance of a posterior lumbar interbody fusion at L5-S1 from a right-sided approach. First an L5-S1 laminectomy was performed in standard fashion to access the thecal sac, nerve roots and disc space. Next we performed an L5 Andry osteotomy in standard fashion with the osteotome to reduce her deformtiy.  Next,  a nerve root retractor was used to retract the thecal sac medially and expose the L5-S1 disc space.  Epidural veins were cauterized and divided.  An annulotomy was performed with a 15 blade.  A pituitary rongeur was used to remove disc material.  The endplates were prepared with disc miranda, rasps, and curettes. A titanium cage was countersunk into the L5-S1 disc space and had snug fit. The L5-S1 disc space was pre and post packed  with local bone for interbody arthrodesis.     An L4-5 and L3-4 TLIF and an L3 and L4 Andry osteotomy was performed in identical fashion. Xrays showed good cage position at each level.    Bilateral titanium rods were sized, contoured and reduced into the tulip heads.  Set screws were tightened. Sequential compression was performed bilaterally across the L3, L4 and L5 Andry osteotomies to promote lumbar lordosis. Final xrays showed excellent reduction of the deformity and excellent position of all hardware.  The wound was copiously irrigated with sterile normal saline and a dilute Betadine solution. Exposed bony surfaces from L2 to sacrum were decorticated bilaterally with a high-speed drill.  Infuse and local bone were placed posteriorly for arthrodesis from L2 to sacrum. Two deep Hemovacs were placed.  A watertight fascial closure was achieved with interrupted 0 Vicryl sutures and a running Stratafix suture.  The soft tissues were closed in layers. Staples and vertical mattress nylon sutures were placed at the skin and a Prevena dressing was applied.     The patient appeared to tolerate the procedure well from a hemodynamic and neuromonitoring standpoint. MEPs were present and stable in all extremities throughout the case. Dr. Rob and I were present for all critical portions of the case, and at the end of the case all counts were correct. The patient was repositioned supine onto the hospital bed where she was extubated and allowed to emerge from anesthesia. she was sent to the PACU in stable condition for recovery.    JUSTIFICATION OF CO-SURGEON: This was a complex spinal deformity operation. Two attending surgeons were indicated to reduce operative times, blood loss, and to improve patient outcomes.

## 2024-05-17 NOTE — NURSING
Nurses Note -- 4 Eyes    5/16/2024 1915      Skin assessed during: Transfer      [x] No Altered Skin Integrity Present    []Prevention Measures Documented      [] Yes- Altered Skin Integrity Present or Discovered   [] LDA Added if Not in Epic (Describe Wound)   [] New Altered Skin Integrity was Present on Admit and Documented in LDA   [] Wound Image Taken    Wound Care Consulted? No    Attending Nurse:  Elham Garcia RN/Staff Member:  Claudine SMITH       Incisional drain and dressing

## 2024-05-17 NOTE — NURSING
Notified Dr Martinez that patient hemovac is not suctioning. The drain appears to be intact however does not hold suction.  at bedside stated it was fully suctioned all day. Will continue to monitor, no new orders at this time. CLWR, BR upx3, bed low, locked in position, safety maintained.Will continue to monitor.

## 2024-05-17 NOTE — PLAN OF CARE
Problem: Adult Inpatient Plan of Care  Goal: Plan of Care Review  Outcome: Progressing  Goal: Patient-Specific Goal (Individualized)  Outcome: Progressing     Problem: Infection  Goal: Absence of Infection Signs and Symptoms  Outcome: Progressing     Problem: Wound  Goal: Optimal Coping  Outcome: Progressing  Goal: Optimal Wound Healing  Outcome: Progressing

## 2024-05-17 NOTE — PLAN OF CARE
Patient with hemovac to lumbar region that is not suctioning, Dr Hallie Martinez notified. CBC drawn last night to assess post blood transfusion, H/H improved 8.9/25. 1 L of NS bolus administered 2/2 hypotension 80's/50's. BP improved to 122/61, Dr Martinez notified. Fleets enema ordered 2/2 constipation, pt stated she would take in the AM. Pain medication administered x2 overnight. CLWR, BR upx3, bed low, locked in position, safety maintained, will continue to monitor.    Problem: Wound  Goal: Absence of Infection Signs and Symptoms  Outcome: Progressing  Goal: Optimal Pain Control and Function  Outcome: Progressing  Goal: Skin Health and Integrity  Outcome: Progressing     Problem: Pain Acute  Goal: Optimal Pain Control and Function  Outcome: Progressing     Problem: Fall Injury Risk  Goal: Absence of Fall and Fall-Related Injury  Outcome: Progressing

## 2024-05-17 NOTE — SUBJECTIVE & OBJECTIVE
Interval History:   NAEON. AFVSS. 1L bolus overnight for hypotension. Improved. H/H stable s/p transfusion. HV drain fell out overnight, suture removed. Likely home this afternoon. Exam stable. BM today.     Medications:  Continuous Infusions:  Scheduled Meds:   acetaminophen  1,000 mg Oral Q6H    bisacodyL  10 mg Rectal Daily    ceFAZolin (Ancef) IV (PEDS and ADULTS)  2 g Intravenous Q8H    cetirizine  5 mg Oral Daily    enoxparin  40 mg Subcutaneous Q24H (prophylaxis, 1700)    fluticasone propionate  2 spray Each Nostril Daily    levothyroxine  25 mcg Oral Before breakfast    methocarbamoL  750 mg Oral QID    pantoprazole  40 mg Oral Daily    senna-docusate 8.6-50 mg  1 tablet Oral BID     PRN Meds:  Current Facility-Administered Medications:     0.9%  NaCl infusion (for blood administration), , Intravenous, Q24H PRN    aluminum-magnesium hydroxide-simethicone, 30 mL, Oral, Q4H PRN    diphenhydrAMINE, 25 mg, Intravenous, Q8H PRN    HYDROmorphone, 2 mg, Intravenous, Q3H PRN    naloxone, 0.4 mg, Intravenous, PRN    ondansetron, 8 mg, Oral, Q6H PRN    oxyCODONE, 5 mg, Oral, Q4H PRN    oxyCODONE, 10 mg, Oral, Q4H PRN    prochlorperazine, 5 mg, Intravenous, Q6H PRN    simethicone, 1 tablet, Oral, TID PRN     Review of Systems  Objective:     Weight: 62 kg (136 lb 11 oz)  Body mass index is 23.83 kg/m².  Vital Signs (Most Recent):  Temp: 98.3 °F (36.8 °C) (05/17/24 0807)  Pulse: 91 (05/17/24 0807)  Resp: 16 (05/17/24 0905)  BP: 124/69 (05/17/24 0807)  SpO2: 95 % (05/17/24 0807) Vital Signs (24h Range):  Temp:  [98.3 °F (36.8 °C)-99.9 °F (37.7 °C)] 98.3 °F (36.8 °C)  Pulse:  [76-95] 91  Resp:  [16-18] 16  SpO2:  [95 %-98 %] 95 %  BP: ()/(52-69) 124/69                                Closed/Suction Drain 05/14/24 1038 Tube - 1 Right;Superior Back Accordion 10 Fr. (Active)   Site Description Healing 05/15/24 0800   Dressing Type Other (Comment) 05/15/24 0800   Dressing Status Clean;Dry;Intact 05/14/24 1145   Dressing  Intervention Integrity maintained 05/14/24 1145   Drainage Sanguineous 05/15/24 0800   Status To bulb suction 05/15/24 0800   Output (mL) 60 mL 05/15/24 0611            Closed/Suction Drain 05/14/24 1038 Tube - 2 Left;Superior Back Accordion 10 Fr. (Active)   Site Description Healing 05/15/24 0800   Dressing Type Other (Comment) 05/15/24 0800   Dressing Status Clean;Dry;Intact 05/14/24 1145   Dressing Intervention Integrity maintained 05/14/24 1145   Drainage Sanguineous 05/15/24 0800   Status To bulb suction 05/15/24 0800   Output (mL) 90 mL 05/15/24 0611            Closed/Suction Drain 05/14/24 1057 Midline;Superior Back Accordion 10 Fr. (Active)   Site Description Healing 05/15/24 0800   Dressing Type Other (Comment) 05/15/24 0800   Dressing Status Clean;Dry;Intact 05/14/24 1145   Dressing Intervention Integrity maintained 05/15/24 0800   Drainage Sanguineous 05/15/24 0800   Status To bulb suction 05/15/24 0800   Output (mL) 30 mL 05/15/24 0611          Physical Exam         Neurosurgery Physical Exam    General: well developed, well nourished, no distress.   Head: normocephalic, atraumatic  Neurologic: Alert and oriented. Thought content appropriate.  GCS: Motor: 6/Verbal: 5/Eyes: 4 GCS Total: 15  Mental Status: Awake, Alert, Oriented x 4  Language: No aphasia  Speech: No dysarthria  Cranial nerves: face symmetric, tongue midline, CN II-XII grossly intact, EOMI.  Pulmonary: normal respirations, no signs of respiratory distress  Sensory: intact to light touch throughout  Motor Strength: Moves all extremities spontaneously with good tone.  Full strength upper and lower extremities. No abnormal movements seen.     Strength  Deltoids Triceps Biceps Wrist Extension Wrist Flexion Hand    Upper: R 5/5 5/5 5/5 5/5 5/5 5/5    L 5/5 5/5 5/5 5/5 5/5 5/5     Iliopsoas Quadriceps Knee  Flexion Tibialis  anterior Gastro- cnemius EHL   Lower: R 5/5 5/5 5/5 5/5 5/5 5/5    L 5/5 5/5 5/5 5/5 5/5 5/5     Skin: Skin is warm,  "dry and intact.  Incision c/d/I with skin edges approximated with staples.     Significant Labs:  Recent Labs   Lab 05/16/24 0443   *   *   K 3.7      CO2 24   BUN 6   CREATININE 0.7   CALCIUM 8.3*     Recent Labs   Lab 05/16/24 0443 05/16/24 2003 05/17/24  0511   WBC 9.77 12.77* 10.19   HGB 7.5* 8.9* 8.2*   HCT 21.9* 25.6* 24.0*    191 188     No results for input(s): "LABPT", "INR", "APTT" in the last 48 hours.    Microbiology Results (last 7 days)       ** No results found for the last 168 hours. **          Recent Lab Results         05/17/24 0511 05/16/24 2003        Baso # 0.04   0.06       Basophil % 0.4   0.5       Differential Method Automated   Automated       Eos # 0.2   0.2       Eos % 2.2   1.2       Gran # (ANC) 7.9   10.6       Gran % 77.4   82.6       Hematocrit 24.0   25.6       Hemoglobin 8.2   8.9       Immature Grans (Abs) 0.04  Comment: Mild elevation in immature granulocytes is non specific and   can be seen in a variety of conditions including stress response,   acute inflammation, trauma and pregnancy. Correlation with other   laboratory and clinical findings is essential.     0.06  Comment: Mild elevation in immature granulocytes is non specific and   can be seen in a variety of conditions including stress response,   acute inflammation, trauma and pregnancy. Correlation with other   laboratory and clinical findings is essential.         Immature Granulocytes 0.4   0.5       Lymph # 1.2   1.2       Lymph % 11.6   9.2       MCH 32.7   33.3       MCHC 34.2   34.8       MCV 96   96       Mono # 0.8   0.8       Mono % 8.0   6.0       MPV 9.7   9.3       nRBC 0   0       Platelet Count 188   191       RBC 2.51   2.67       RDW 15.2   15.3       WBC 10.19   12.77             All pertinent labs from the last 24 hours have been reviewed.    Significant Diagnostics:  I have reviewed all pertinent imaging results/findings within the past 24 hours.  "

## 2024-05-18 VITALS
SYSTOLIC BLOOD PRESSURE: 125 MMHG | DIASTOLIC BLOOD PRESSURE: 70 MMHG | OXYGEN SATURATION: 100 % | RESPIRATION RATE: 18 BRPM | HEART RATE: 87 BPM | BODY MASS INDEX: 23.34 KG/M2 | TEMPERATURE: 98 F | WEIGHT: 136.69 LBS | HEIGHT: 64 IN

## 2024-05-18 LAB
BASOPHILS # BLD AUTO: 0.05 K/UL (ref 0–0.2)
BASOPHILS NFR BLD: 0.6 % (ref 0–1.9)
DIFFERENTIAL METHOD BLD: ABNORMAL
EOSINOPHIL # BLD AUTO: 0.4 K/UL (ref 0–0.5)
EOSINOPHIL NFR BLD: 4.5 % (ref 0–8)
ERYTHROCYTE [DISTWIDTH] IN BLOOD BY AUTOMATED COUNT: 14.9 % (ref 11.5–14.5)
HCT VFR BLD AUTO: 23.6 % (ref 37–48.5)
HGB BLD-MCNC: 8 G/DL (ref 12–16)
IMM GRANULOCYTES # BLD AUTO: 0.03 K/UL (ref 0–0.04)
IMM GRANULOCYTES NFR BLD AUTO: 0.3 % (ref 0–0.5)
LYMPHOCYTES # BLD AUTO: 1.8 K/UL (ref 1–4.8)
LYMPHOCYTES NFR BLD: 20.6 % (ref 18–48)
MCH RBC QN AUTO: 32.5 PG (ref 27–31)
MCHC RBC AUTO-ENTMCNC: 33.9 G/DL (ref 32–36)
MCV RBC AUTO: 96 FL (ref 82–98)
MONOCYTES # BLD AUTO: 0.8 K/UL (ref 0.3–1)
MONOCYTES NFR BLD: 9.5 % (ref 4–15)
NEUTROPHILS # BLD AUTO: 5.7 K/UL (ref 1.8–7.7)
NEUTROPHILS NFR BLD: 64.5 % (ref 38–73)
NRBC BLD-RTO: 0 /100 WBC
PLATELET # BLD AUTO: 233 K/UL (ref 150–450)
PMV BLD AUTO: 9.3 FL (ref 9.2–12.9)
RBC # BLD AUTO: 2.46 M/UL (ref 4–5.4)
WBC # BLD AUTO: 8.75 K/UL (ref 3.9–12.7)

## 2024-05-18 PROCEDURE — 85025 COMPLETE CBC W/AUTO DIFF WBC: CPT | Performed by: STUDENT IN AN ORGANIZED HEALTH CARE EDUCATION/TRAINING PROGRAM

## 2024-05-18 PROCEDURE — 36415 COLL VENOUS BLD VENIPUNCTURE: CPT | Performed by: STUDENT IN AN ORGANIZED HEALTH CARE EDUCATION/TRAINING PROGRAM

## 2024-05-18 PROCEDURE — 25000003 PHARM REV CODE 250: Performed by: PHYSICIAN ASSISTANT

## 2024-05-18 PROCEDURE — 99024 POSTOP FOLLOW-UP VISIT: CPT | Mod: ,,, | Performed by: PHYSICIAN ASSISTANT

## 2024-05-18 PROCEDURE — 25000003 PHARM REV CODE 250: Performed by: STUDENT IN AN ORGANIZED HEALTH CARE EDUCATION/TRAINING PROGRAM

## 2024-05-18 RX ORDER — PREGABALIN 75 MG/1
75 CAPSULE ORAL 2 TIMES DAILY
Qty: 60 CAPSULE | Refills: 6 | Status: SHIPPED | OUTPATIENT
Start: 2024-05-18 | End: 2024-05-28 | Stop reason: SDUPTHER

## 2024-05-18 RX ORDER — BISACODYL 10 MG/1
10 SUPPOSITORY RECTAL DAILY PRN
Qty: 10 SUPPOSITORY | Refills: 0 | Status: SHIPPED | OUTPATIENT
Start: 2024-05-18

## 2024-05-18 RX ADMIN — OXYCODONE HYDROCHLORIDE 10 MG: 10 TABLET ORAL at 05:05

## 2024-05-18 RX ADMIN — CETIRIZINE HYDROCHLORIDE 5 MG: 5 TABLET, FILM COATED ORAL at 09:05

## 2024-05-18 RX ADMIN — ACETAMINOPHEN 1000 MG: 500 TABLET ORAL at 12:05

## 2024-05-18 RX ADMIN — ACETAMINOPHEN 1000 MG: 500 TABLET ORAL at 05:05

## 2024-05-18 RX ADMIN — LEVOTHYROXINE SODIUM 25 MCG: 25 TABLET ORAL at 05:05

## 2024-05-18 RX ADMIN — METHOCARBAMOL 750 MG: 750 TABLET ORAL at 09:05

## 2024-05-18 RX ADMIN — FLUTICASONE PROPIONATE 100 MCG: 50 SPRAY, METERED NASAL at 09:05

## 2024-05-18 RX ADMIN — OXYCODONE HYDROCHLORIDE 10 MG: 10 TABLET ORAL at 09:05

## 2024-05-18 RX ADMIN — PANTOPRAZOLE SODIUM 40 MG: 40 TABLET, DELAYED RELEASE ORAL at 09:05

## 2024-05-18 RX ADMIN — SENNOSIDES AND DOCUSATE SODIUM 1 TABLET: 50; 8.6 TABLET ORAL at 09:05

## 2024-05-18 RX ADMIN — METHOCARBAMOL 750 MG: 750 TABLET ORAL at 12:05

## 2024-05-18 NOTE — DISCHARGE SUMMARY
[facility] - Neurosurgery ([facility])  Neurosurgery  Discharge Summary      Patient Name: Marylin Morel  MRN: [id number]  Admission Date: 5/14/2024  Hospital Length of Stay: 4 days  Discharge Date and Time:  05/18/2024 10:23 AM  Attending Physician: Eric Chester MD   Discharging Provider: Kiana Martinez PA-C  Primary Care Provider: Gabriel Mccabe MD    HPI:   History of Present Illness:  Marylin Morel is a 58 y.o. female  with a PMHx of DDD, cervical radiculopathy s/p ACD C5-7 in 2014 with Dr. 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 She is being seen in clinic today to discuss concerns with worsening lumbar radiculopathy despite conservative treatment. States that she has struggled with back pain for several years; however, after her hip replacement she has struggled with worsening pain and numbness in the left leg. Describes the pain as constant and severe across the low back with radiation into the groin and hips. Rates the pain as a 3/10. Aggravating factors include standing, bending, and walking. Alleviating factors include heating pad, muscle relaxer, and rest. Denies weakness, b/b dysfunction, saddle anesthesia, or gait instability. She has obtained an injection in November with mild relief.       Interval Hx 3/21/24: After the last appointment, it was recommended that the patient obtain additional imaging for surgical planning. She is being seen in clinic today with myself and Dr. Chester to discuss the findings and surgery in more detail. States that her symptoms remain unchanged. She continues to struggle with back > leg pain.     Interval Hx 4/16/24: The patient is being seen virtually today to answer additional questions or concerns she has regarding surgery. States that she was overwhelmed with the last appointment hearing the extensiveness of the  surgery. Dr. Chester recommends a L2-pelvis PSF given the stenosis at those levels and her scoliosis.     Procedure(s) (LRB):  **LOOP X** L2 to pelvis posterior instrumented fusion (N/A)     Hospital Course: 5/15: TIKI OTEROVSS. Exam stable. Sitting up in chair. Improvement in preop pain. Incisional pain controlled with medication. Deep HV drains x 2 to gravity 2/2 output. Monitor H/H. Home pending drain removal.  5/16: PEPEEON. AFVSS. 2 HV Drains fell out overnight. 1 deep HV in place. Continue. Exam stable. Pain controlled. Transfused 1 unit RBC for symptomatic post-op anemia. Home pending drain removal.  5/17: PEPEELOIS OTEROVSS. 1L bolus overnight for hypotension. Improved. H/H stable s/p transfusion. HV drain fell out overnight, suture removed. Febrile to 101 this afternoon. CXR, UA, US BLE negative.   5/18: TIKI AFVSS. Pt is OOB to the chair this am. Tolerating po diet. Stable for DC home today with HH. Discharge instructions given verbally to patient and family. All of their questions were answered. They were encouraged to call the clinic with any questions or concerns prior to follow up appt.     Physical Exam:  General: well developed, well nourished, no distress.   Head: normocephalic, atraumatic  Neurologic: Alert and oriented. Thought content appropriate.  GCS: Motor: 6/Verbal: 5/Eyes: 4 GCS Total: 15  Mental Status: Awake, Alert, Oriented x 4  Language: No aphasia  Speech: No dysarthria  Cranial nerves: face symmetric, tongue midline, CN II-XII grossly intact, EOMI.  Pulmonary: normal respirations, no signs of respiratory distress  Sensory: intact to light touch throughout  Motor Strength: Moves all extremities spontaneously with good tone.  Full strength upper and lower extremities. No abnormal movements seen.      Strength   Deltoids Triceps Biceps Wrist Extension Wrist Flexion Hand    Upper: R 5/5 5/5 5/5 5/5 5/5 5/5     L 5/5 5/5 5/5 5/5 5/5 5/5       Iliopsoas Quadriceps Knee  Flexion  "Tibialis  anterior Gastro- cnemius EHL   Lower: R 5/5 5/5 5/5 5/5 5/5 5/5     L 5/5 5/5 5/5 5/5 5/5 5/5      Skin: Skin is warm, dry and intact.  Incision c/d/I with skin edges approximated with staples.     Goals of Care Treatment Preferences:         Consults:   Consults (From admission, onward)          Status Ordering Provider     Inpatient consult to Registered Dietitian/Nutritionist  Once        Provider:  (Not yet assigned)    Completed JOSEPH MONTOYA            Significant Diagnostic Studies: Labs: BMP: No results for input(s): "GLU", "NA", "K", "CL", "CO2", "BUN", "CREATININE", "CALCIUM", "MG" in the last 48 hours. and CBC   Recent Labs   Lab 05/16/24 2003 05/17/24 0511 05/18/24  0410   WBC 12.77* 10.19 8.75   HGB 8.9* 8.2* 8.0*   HCT 25.6* 24.0* 23.6*    188 233     Radiology: US LE venous, Xray chest, Xray lumbar spine    Pending Diagnostic Studies:       None          Final Active Diagnoses:    Diagnosis Date Noted POA    PRINCIPAL PROBLEM:  Lumbar stenosis with neurogenic claudication [M48.062] 05/15/2024 Yes    Hyponatremia [E87.1] 05/15/2024 Yes    Hypokalemia [E87.6] 05/15/2024 Yes    Lumbar radiculopathy [M54.16] 05/15/2024 Yes    History of hydrocephalus [Z86.69] 05/15/2024 Not Applicable    Pain [R52] 05/15/2024 Yes    Acute blood loss anemia [D62] 05/15/2024 Yes    Colloid cyst of brain [Q04.6] 12/18/2023 Not Applicable    Cervical radiculopathy [M54.12] 07/03/2018 Yes    DDD (degenerative disc disease), lumbosacral [M51.37] 01/02/2018 Yes    GERD (gastroesophageal reflux disease) [K21.9] 01/02/2018 Yes    Hypothyroidism [E03.9] 12/27/2013 Yes    Essential hypertension, benign [I10] 10/02/2013 Yes      Problems Resolved During this Admission:      Discharged Condition: stable     Disposition: Home-Health Care [facility]    Follow Up:   Future Appointments   Date Time Provider Department Center   5/28/2024 10:00 AM Lacy King RN [facility] NEUROS[facility]   6[facility]2024 10[facility]   6/27/2024 11:30 AM Eric Chester MD [facility] NEUROS8 [address]   5/2/2025  8:40 AM Keagan Luque MD [facility]        Patient Instructions:      Notify your health care provider if you experience any of the following:  temperature >100.4     Notify your health care provider if you experience any of the following:  persistent nausea and vomiting or diarrhea     Notify your health care provider if you experience any of the following:  severe uncontrolled pain     Notify your health care provider if you experience any of the following:  redness, tenderness, or signs of infection (pain, swelling, redness, odor or green/yellow discharge around incision site)     Notify your health care provider if you experience any of the following:  difficulty breathing or increased cough     Notify your health care provider if you experience any of the following:  severe persistent headache     Notify your health care provider if you experience any of the following:  worsening rash     Notify your health care provider if you experience any of the following:  persistent dizziness, light-headedness, or visual disturbances     Notify your health care provider if you experience any of the following:  increased confusion or weakness     Medications:  Reconciled Home Medications:      Medication List        START taking these medications      oxyCODONE 10 mg Tab immediate release tablet  Commonly known as: ROXICODONE  Take 1 tablet (10 mg total) by mouth every 4 (four) hours as needed (pain 6-7/10).            CHANGE how you take these medications      methocarbamoL 750 MG Tab  Commonly known as: ROBAXIN  Take 1 tablet (750 mg total) by mouth 4 (four) times daily. for 10 days  What changed:   medication strength  how much to take  when to take this  reasons to take this            CONTINUE taking these medications      cetirizine 10 MG tablet  Commonly known as: ZYRTEC  Take 10 mg by mouth once daily.      COMBIPATCH 0.05-0.14 mg/24 hr  Generic drug: estradiol-norethindrone  twice a week.     EPINEPHrine 0.3 mg/0.3 mL Atin  Commonly known as: EPIPEN  Inject 0.3 mg into the muscle.     fish oil-omega-3 fatty acids 300-1,000 mg capsule  Take by mouth once daily.     fluticasone propionate 50 mcg/actuation nasal spray  Commonly known as: FLONASE  INSTILL ONE SPRAY INTO EACH NOSTRIL ONCE DAILY     levothyroxine 25 MCG tablet  Commonly known as: SYNTHROID  Take 1 tablet (25 mcg total) by mouth once daily.     lisinopriL-hydrochlorothiazide 20-25 mg Tab  Commonly known as: PRINZIDE,ZESTORETIC  Take 1 tablet by mouth once daily.     multivitamin capsule  Take 1 capsule by mouth once daily.     olopatadine 0.2 % Drop  Commonly known as: PATADAY  Place into both eyes once daily.     omeprazole 40 MG capsule  Commonly known as: PRILOSEC  Take 1 capsule (40 mg total) by mouth once daily.     simethicone 125 mg Cap capsule  Commonly known as: GAS-X EXTRA STRENGTH  Take 1 capsule (125 mg total) by mouth 4 (four) times daily as needed for Flatulence.     triamcinolone acetonide 0.1% 0.1 % cream  Commonly known as: KENALOG  Aaa bid x 1-2 wks prn rash     vitamin D 1000 units Tab  Commonly known as: VITAMIN D3  Take 1,000 Units by mouth once daily.            STOP taking these medications      ibuprofen 200 MG tablet  Commonly known as: ADVIL,MOTRIN     oxyCODONE-acetaminophen 5-325 mg per tablet  Commonly known as: PERCOCET     tiZANidine 4 MG tablet  Commonly known as: ZANAFLEX            ASK your doctor about these medications      meclizine 25 mg tablet  Commonly known as: ANTIVERT  TAKE 1 TABLET BY MOUTH THREE TIMES DAILY AS NEEDED FOR UP TO 30 DAYS              Kiana Martinez PA-C  Neurosurgery  [facility] - Neurosurgery ([facility])

## 2024-05-18 NOTE — PLAN OF CARE
Patient sat up in chair for a few hours then got back in bed.  Given prn pain medication three times overnight. Pt wore brace out of bed. Fever broke over night. UA completed during the day--wnl. Blood cultures drawn still pending.  at bedside. CLWR, BR upx3, bed low, locked in position. Safety maintained, arnulfo continue to monitor.    Problem: Infection  Goal: Absence of Infection Signs and Symptoms  Outcome: Progressing     Problem: Wound  Goal: Optimal Coping  Outcome: Progressing  Goal: Optimal Functional Ability  Outcome: Progressing  Goal: Optimal Pain Control and Function  Outcome: Progressing     Problem: Pain Acute  Goal: Optimal Pain Control and Function  Outcome: Progressing

## 2024-05-18 NOTE — DISCHARGE INSTRUCTIONS
Please follow ONLY the instructions that are checked below.    Activity Restrictions:  [x]  Return to work will be determined on an individual basis.  [x]  No lifting greater than 10 pounds.  [x]  Avoid bending and twisting the area of your surgery more than 45 degrees from neutral position in any direction.  [x]  No driving or operating machinery:  [x]  until cleared by your surgeon.  [x]  while taking narcotic pain medications or muscle relaxants.  []  No cervical collar, soft collar, or lumbar brace required.  []  Wear your brace at all times. You may be given an extra brace or soft collar to wear when showering.  [x]  Wear your brace at all times except when flat in bed.  []  Wear brace for comfort only.  [x]  Increase ambulation over the next 2 weeks so that you are walking 2 miles per day at 2 weeks post-operatively.  [x]  Walk on paved surfaces only. It is okay to walk up and down stairs while holding onto a side rail.  [x]  No sexual activity for 2-3 weeks.    Discharge Medication/Follow-up:  [x]  Please refer to discharge medication reconciliation form.  [x]  Do not take ANY non-steroidal anti-inflammatory drugs (NSAIDS), including the following: ibuprofen, naprosyn, Aleve, Advil, Indocin, Mobic, or Celebrex for:  [x]  4 weeks  []  8 weeks  []  6 months  [x]  Prescriptions for appropriate medication will be given upon discharge.  [x]  Take docusate (Colace 100 mg): take one capsule a day as needed for constipation. You can get this over the counter.  [x]  Follow-up appointment:  [x]  10-14 days post-op for wound check by physician assistant/nurse  [x]  4-6 weeks with MD:  [x]  with x-rays  []  without x-rays  [x]  An appointment will be mailed to you.    Wound Care:  []  Remove dressing or bandaid in    days.  [x]  No bandage required. Keep your incision open to the air.  [x]  You may shower on the 2nd day after your surgery. Have the force of water hit you opposite from the incision. Pat the incision dry  after your shower; do not scrub the incision.  [x]  You cannot take a bath until 8 weeks after surgery.  []  Apply bacitracin to incision twice a day for    more days.    Call your doctor or go to the Emergency Room for any signs of infection, including: increased redness, drainage, pain, or fever (temperature ?101.5 for 24 hours). Call your doctor or go to the Emergency Room if there are any localized neurological changes; problems with speech, vision, numbness, tingling, weakness, or severe headache; or for other concerns.    Special Instructions:  [x]  No use of tobacco products.  [x]  Diet: Please eat a regular diet as tolerated.  []  Other diet:              Specific physician instructions:           Physicians need 3 days' notice for pain medicine to be refilled. Pain medicine will only be refilled between 8 AM and 5 PM, Monday through Friday, due to Food and Drug Administration regulation of documentation.    If you have any questions about this form, please call [phone].    Form No. 25447 (Revised 10/31/2013)

## 2024-05-18 NOTE — PLAN OF CARE
[facility] - Neurosurgery ([facility])      HOME HEALTH ORDERS  FACE TO FACE ENCOUNTER    Patient Name: Marylin Morel  YOB: 1965    PCP: Gabriel Mccabe MD   PCP Address: [address] [place], [place] [address]USE [facility] [address] [address]  PCP Phone Number: [phone]  PCP Fax: [phone]    Encounter Date:5/18/24    Admit to Home Health    Diagnoses:  Active Hospital Problems    Diagnosis  POA    *Lumbar stenosis with neurogenic claudication [M48.062]  Yes    Hyponatremia [E87.1]  Yes     POA  Replace as needed  Monitor with dialy cmp      Hypokalemia [E87.6]  Yes     POA  Replace as needed  Monitor with dialy cmp      Lumbar radiculopathy [M54.16]  Yes    History of hydrocephalus [Z86.69]  Not Applicable    Pain [R52]  Yes     Multimodal       Acute blood loss anemia [D62]  Yes     5 point drop in hemoglobin post op  Bp drop post op in pacu received IVF  Monitor with daily cbc  Trend  Transfuse <7      Colloid cyst of brain [Q04.6]  Not Applicable    Cervical radiculopathy [M54.12]  Yes    DDD (degenerative disc disease), lumbosacral [M51.37]  Yes    GERD (gastroesophageal reflux disease) [K21.9]  Yes    Hypothyroidism [E03.9]  Yes    Essential hypertension, benign [I10]  Yes      Resolved Hospital Problems   No resolved problems to display.       Follow Up Appointments:  Future Appointments   Date Time Provider Department Center   5/28/2024 10:00 AM Lacy King RN [facility] NEUROS8 [facility]   6/27/2024 10:45 AM [facility] OIC EOS [facility] EOS [facility]   6/27/2024 11:30 AM Eric Chester MD [facility] NEUROS8 [facility]   5/2/2025  8:40 AM Keagan Luque MD OCC [facility] DERM [facility]       Allergies:  Review of patient's allergies indicates:   Allergen Reactions    Insect venom Anaphylaxis     Ant bites    Bactrim [sulfamethoxazole-trimethoprim] Blisters     Blisters/rash on torso and extremities        Medications: Review discharge medications with patient and family and provide education.    Current Facility-Administered  Medications   Medication Dose Route Frequency Provider Last Rate Last Admin    0.9%  NaCl infusion (for blood administration)   Intravenous Q24H PRN Jessica Arnold PA-C        acetaminophen tablet 1,000 mg  1,000 mg Oral Q6H Clarence Danielle MD   1,000 mg at 05/17/24 1826    aluminum-magnesium hydroxide-simethicone 200-200-20 mg/5 mL suspension 30 mL  30 mL Oral Q4H PRN Clarence Danielle MD        bisacodyL suppository 10 mg  10 mg Rectal Daily Clarence Danielle MD   10 mg at 05/16/24 2201    cetirizine tablet 5 mg  5 mg Oral Daily Jessica Arnold PA-C   5 mg at 05/17/24 0905    diphenhydrAMINE injection 25 mg  25 mg Intravenous Q8H PRN Joanie Khan MD   25 mg at 05/15/24 1948    enoxaparin injection 40 mg  40 mg Subcutaneous Q24H (prophylaxis, 1700) Jessica Arnold PA-C   40 mg at 05/17/24 1637    fluticasone propionate 50 mcg/actuation nasal spray 100 mcg  2 spray Each Nostril Daily Clarence Danielle MD   100 mcg at 05/17/24 0906    HYDROmorphone injection 2 mg  2 mg Intravenous Q3H PRN Clarence Danielle MD   2 mg at 05/16/24 2258    levothyroxine tablet 25 mcg  25 mcg Oral Before breakfast Jessica Arnold PA-C   25 mcg at 05/17/24 0428    methocarbamoL tablet 750 mg  750 mg Oral QID Clarence Danielle MD   750 mg at 05/17/24 1637    naloxone 0.4 mg/mL injection 0.4 mg  0.4 mg Intravenous PRN Joanie Khan MD        ondansetron disintegrating tablet 8 mg  8 mg Oral Q6H PRN Clarence Danielle MD        oxyCODONE immediate release tablet 5 mg  5 mg Oral Q4H PRN Clarence Danielle MD   5 mg at 05/15/24 0401    oxyCODONE immediate release tablet Tab 10 mg  10 mg Oral Q4H PRN Clarence Danielle MD   10 mg at 05/17/24 1637    pantoprazole EC tablet 40 mg  40 mg Oral Daily Clarence Danielle MD   40 mg at 05/17/24 0905    prochlorperazine injection Soln 5 mg  5 mg Intravenous Q6H PRN Clarence Danielle MD        senna-docusate 8.6-50 mg per tablet 1 tablet  1 tablet Oral BID Jessica Arnold PA-C   1  tablet at 05/17/24 0906    simethicone chewable tablet 80 mg  1 tablet Oral TID Clarence Andujar MD         Current Discharge Medication List        START taking these medications    Details   oxyCODONE (ROXICODONE) 10 mg Tab immediate release tablet Take 1 tablet (10 mg total) by mouth every 4 (four) hours as needed (pain 6-7/10).  Qty: 42 tablet, Refills: 0    Comments: Quantity prescribed more than 7 day supply? No           CONTINUE these medications which have CHANGED    Details   methocarbamoL (ROBAXIN) 750 MG Tab Take 1 tablet (750 mg total) by mouth 4 (four) times daily. for 10 days  Qty: 40 tablet, Refills: 0           CONTINUE these medications which have NOT CHANGED    Details   cetirizine (ZYRTEC) 10 MG tablet Take 10 mg by mouth once daily.      COMBIPATCH 0.05-0.14 mg/24 hr twice a week.      fluticasone propionate (FLONASE) 50 mcg/actuation nasal spray INSTILL ONE SPRAY INTO EACH NOSTRIL ONCE DAILY  Qty: 16 g, Refills: 11    Associated Diagnoses: Seasonal allergies      levothyroxine (SYNTHROID) 25 MCG tablet Take 1 tablet (25 mcg total) by mouth once daily.  Qty: 90 tablet, Refills: 3    Associated Diagnoses: Hypothyroidism, unspecified type      lisinopriL-hydrochlorothiazide (PRINZIDE,ZESTORETIC) 20-25 mg Tab Take 1 tablet by mouth once daily.  Qty: 90 tablet, Refills: 3    Comments: .  Associated Diagnoses: Essential hypertension, benign      multivitamin capsule Take 1 capsule by mouth once daily.      olopatadine (PATADAY) 0.2 % Drop Place into both eyes once daily.      omega-3 fatty acids/fish oil (FISH OIL-OMEGA-3 FATTY ACIDS) 300-1,000 mg capsule Take by mouth once daily.      omeprazole (PRILOSEC) 40 MG capsule Take 1 capsule (40 mg total) by mouth once daily.  Qty: 90 capsule, Refills: 3    Associated Diagnoses: Gastroesophageal reflux disease, unspecified whether esophagitis present      simethicone (GAS-X EXTRA STRENGTH) 125 mg Cap capsule Take 1 capsule (125 mg total) by mouth 4  (four) times daily as needed for Flatulence.  Qty: 30 capsule, Refills: 1    Associated Diagnoses: Gastroesophageal reflux disease, unspecified whether esophagitis present; Stomach pain      triamcinolone acetonide 0.1% (KENALOG) 0.1 % cream Aaa bid x 1-2 wks prn rash  Qty: 80 g, Refills: 2      vitamin D (VITAMIN D3) 1000 units Tab Take 1,000 Units by mouth once daily.      EPINEPHrine (EPIPEN) 0.3 mg/0.3 mL AtIn Inject 0.3 mg into the muscle.      meclizine (ANTIVERT) 25 mg tablet TAKE 1 TABLET BY MOUTH THREE TIMES DAILY AS NEEDED FOR UP TO 30 DAYS           STOP taking these medications       ibuprofen (ADVIL,MOTRIN) 200 MG tablet Comments:   Reason for Stopping:         tiZANidine (ZANAFLEX) 4 MG tablet Comments:   Reason for Stopping:         oxyCODONE-acetaminophen (PERCOCET) 5-325 mg per tablet Comments:   Reason for Stopping:                 I have seen and examined this patient within the last 30 days. My clinical findings that support the need for the home health skilled services and home bound status are the following:no   Weakness/numbness causing balance and gait disturbance due to Weakness/Debility, Anemia, and Surgery making it taxing to leave home.  Requiring assistive device to leave home due to unsteady gait caused by  Weakness/Debility, Anemia, and Surgery.     Diet:   regular diet    Labs:  SN to perform labs:  CBC: Weekly; 1 week(s)    Referrals/ Consults  Physical Therapy to evaluate and treat. Evaluate for home safety and equipment needs; Establish/upgrade home exercise program. Perform / instruct on therapeutic exercises, gait training, transfer training, and Range of Motion.  Occupational Therapy to evaluate and treat. Evaluate home environment for safety and equipment needs. Perform/Instruct on transfers, ADL training, ROM, and therapeutic exercises.    Activities:   activity as tolerated    Nursing:   Agency to admit patient within 24 hours of hospital discharge unless specified on  physician order or at patient request    SN to complete comprehensive assessment including routine vital signs. Instruct on disease process and s/s of complications to report to MD. Review/verify medication list sent home with the patient at time of discharge  and instruct patient/caregiver as needed. Frequency may be adjusted depending on start of care date.     Skilled nurse to perform up to 3 visits PRN for symptoms related to diagnosis    Notify MD if SBP > 160 or < 90; DBP > 90 or < 50; HR > 120 or < 50; Temp > 101; O2 < 88%    Ok to schedule additional visits based on staff availability and patient request on consecutive days within the home health episode.    When multiple disciplines ordered:    Start of Care occurs on Sunday - Wednesday schedule remaining discipline evaluations as ordered on separate consecutive days following the start of care.    Thursday SOC -schedule subsequent evaluations Friday and Monday the following week.     Friday - Saturday SOC - schedule subsequent discipline evaluations on consecutive days starting Monday of the following week.    For all post-discharge communication and subsequent orders please contact patient's primary care physician. If unable to reach primary care physician or do not receive response within 30 minutes, please contact Dr. Chester Neurosurgery for clinical staff order clarification    Miscellaneous   N/A    Home Health Aide:  Physical Therapy Three times weekly and Occupational Therapy Three times weekly    Wound Care Orders  yes:  Surgical Wound:  Location: back    Monitor for signs of infection     I certify that this patient is confined to her home and needs physical therapy and occupational therapy.

## 2024-05-18 NOTE — PLAN OF CARE
[facility] - Neurosurgery (Hospital)  Discharge Final Note    Primary Care Provider: Gabriel Mccabe MD    Expected Discharge Date: 5/18/2024    Final Discharge Note (most recent)       Final Note - 05/18/24 1157          Final Note    Assessment Type Final Discharge Note     Anticipated Discharge Disposition Home-Health Care [facility]     What phone number can be called within the next 1-3 days to see how you are doing after discharge? [phone]     Hospital Resources/Appts/Education Provided Provided patient/caregiver with written discharge plan information;Appointments scheduled and added to AVS        Post-Acute Status    Coverage Payor: [facility] EPO -     Patient choice form signed by patient/caregiver List with quality metrics by geographic area provided     Discharge Delays None known at this time                     Important Message from Medicare             Patient to discharge to home today with [facility]. SOC 05/21/24.    MARIA DEL ROSARIO Blanchard  [facility]  [phone]

## 2024-05-18 NOTE — NURSING
Nurses Note -- 4 Eyes      5/18/2024   6:28 AM      Skin assessed during: Q Shift Change      [] No Altered Skin Integrity Present    []Prevention Measures Documented      [x] Yes- Altered Skin Integrity Present or Discovered   [] LDA Added if Not in Epic (Describe Wound)   [x] New Altered Skin Integrity was Present on Admit and Documented in LDA   [] Wound Image Taken    Wound Care Consulted? No    Attending Nurse:  Chaparrita Garcia RN/Staff Member:  April SMITH    Incisional wound to back

## 2024-05-18 NOTE — PLAN OF CARE
Problem: Adult Inpatient Plan of Care  Goal: Plan of Care Review  Outcome: Met  Goal: Patient-Specific Goal (Individualized)  Outcome: Met  Goal: Absence of Hospital-Acquired Illness or Injury  Outcome: Met  Goal: Optimal Comfort and Wellbeing  Outcome: Met  Goal: Readiness for Transition of Care  Outcome: Met     Problem: Infection  Goal: Absence of Infection Signs and Symptoms  Outcome: Met     Problem: Wound  Goal: Optimal Coping  Outcome: Met  Goal: Optimal Functional Ability  Outcome: Met  Goal: Absence of Infection Signs and Symptoms  Outcome: Met  Goal: Improved Oral Intake  Outcome: Met  Goal: Optimal Pain Control and Function  Outcome: Met  Goal: Skin Health and Integrity  Outcome: Met  Goal: Optimal Wound Healing  Outcome: Met     Problem: Pain Acute  Goal: Optimal Pain Control and Function  Outcome: Met     Problem: Fall Injury Risk  Goal: Absence of Fall and Fall-Related Injury  Outcome: Met

## 2024-05-19 ENCOUNTER — PATIENT MESSAGE (OUTPATIENT)
Dept: SPINE | Facility: CLINIC | Age: 59
End: 2024-05-19
Payer: COMMERCIAL

## 2024-05-21 ENCOUNTER — PATIENT OUTREACH (OUTPATIENT)
Dept: ADMINISTRATIVE | Facility: CLINIC | Age: 59
End: 2024-05-21
Payer: COMMERCIAL

## 2024-05-21 ENCOUNTER — LAB VISIT (OUTPATIENT)
Dept: LAB | Facility: HOSPITAL | Age: 59
End: 2024-05-21
Attending: FAMILY MEDICINE
Payer: COMMERCIAL

## 2024-05-21 ENCOUNTER — PATIENT MESSAGE (OUTPATIENT)
Dept: SPINE | Facility: CLINIC | Age: 59
End: 2024-05-21
Payer: COMMERCIAL

## 2024-05-21 DIAGNOSIS — D62 ACUTE POSTHEMORRHAGIC ANEMIA: Primary | ICD-10-CM

## 2024-05-21 LAB
BASOPHILS # BLD AUTO: 0.06 K/UL (ref 0–0.2)
BASOPHILS NFR BLD: 0.7 % (ref 0–1.9)
DIFFERENTIAL METHOD BLD: ABNORMAL
EOSINOPHIL # BLD AUTO: 0.2 K/UL (ref 0–0.5)
EOSINOPHIL NFR BLD: 2.5 % (ref 0–8)
ERYTHROCYTE [DISTWIDTH] IN BLOOD BY AUTOMATED COUNT: 13.7 % (ref 11.5–14.5)
HCT VFR BLD AUTO: 29.5 % (ref 37–48.5)
HGB BLD-MCNC: 9.7 G/DL (ref 12–16)
IMM GRANULOCYTES # BLD AUTO: 0.04 K/UL (ref 0–0.04)
IMM GRANULOCYTES NFR BLD AUTO: 0.5 % (ref 0–0.5)
LYMPHOCYTES # BLD AUTO: 1.4 K/UL (ref 1–4.8)
LYMPHOCYTES NFR BLD: 17.4 % (ref 18–48)
MCH RBC QN AUTO: 32.1 PG (ref 27–31)
MCHC RBC AUTO-ENTMCNC: 32.9 G/DL (ref 32–36)
MCV RBC AUTO: 98 FL (ref 82–98)
MONOCYTES # BLD AUTO: 0.6 K/UL (ref 0.3–1)
MONOCYTES NFR BLD: 7.7 % (ref 4–15)
NEUTROPHILS # BLD AUTO: 5.7 K/UL (ref 1.8–7.7)
NEUTROPHILS NFR BLD: 71.2 % (ref 38–73)
NRBC BLD-RTO: 0 /100 WBC
PLATELET # BLD AUTO: 474 K/UL (ref 150–450)
PLATELET BLD QL SMEAR: ABNORMAL
PMV BLD AUTO: 8.7 FL (ref 9.2–12.9)
RBC # BLD AUTO: 3.02 M/UL (ref 4–5.4)
WBC # BLD AUTO: 8.06 K/UL (ref 3.9–12.7)

## 2024-05-21 PROCEDURE — G0180 MD CERTIFICATION HHA PATIENT: HCPCS | Mod: ,,, | Performed by: NEUROLOGICAL SURGERY

## 2024-05-21 PROCEDURE — 85025 COMPLETE CBC W/AUTO DIFF WBC: CPT | Performed by: FAMILY MEDICINE

## 2024-05-21 PROCEDURE — 36415 COLL VENOUS BLD VENIPUNCTURE: CPT | Performed by: FAMILY MEDICINE

## 2024-05-21 NOTE — PROGRESS NOTES
C3 nurse attempted to contact Marylin Morel for a TCC post hospital discharge follow up call. The patient is unable to conduct the call @ this time. The patient requested a callback.    The patient does not have a scheduled HOSFU appointment within 5-7 days post hospital discharge date 05/18/24. Message sent to Physician staff to assist with HOSFU appointment scheduling.

## 2024-05-21 NOTE — PROGRESS NOTES
C3 nurse spoke with Marylin Morel for a TCC post hospital discharge follow up call. The patient has a scheduled HOSFU appointment with Gabriel Mccabe MD on 05/22/24 @ 1500.

## 2024-05-22 ENCOUNTER — OFFICE VISIT (OUTPATIENT)
Dept: FAMILY MEDICINE | Facility: CLINIC | Age: 59
End: 2024-05-22
Payer: COMMERCIAL

## 2024-05-22 VITALS
HEART RATE: 91 BPM | DIASTOLIC BLOOD PRESSURE: 78 MMHG | RESPIRATION RATE: 16 BRPM | SYSTOLIC BLOOD PRESSURE: 124 MMHG | OXYGEN SATURATION: 98 % | WEIGHT: 149.69 LBS | BODY MASS INDEX: 25.56 KG/M2 | TEMPERATURE: 98 F | HEIGHT: 64 IN

## 2024-05-22 DIAGNOSIS — Z98.1 S/P LUMBAR SPINAL FUSION: Primary | ICD-10-CM

## 2024-05-22 DIAGNOSIS — Z98.890 POST-OPERATIVE STATE: ICD-10-CM

## 2024-05-22 DIAGNOSIS — Z09 HOSPITAL DISCHARGE FOLLOW-UP: Primary | ICD-10-CM

## 2024-05-22 DIAGNOSIS — I10 ESSENTIAL HYPERTENSION, BENIGN: ICD-10-CM

## 2024-05-22 DIAGNOSIS — M41.9 SCOLIOSIS, UNSPECIFIED SCOLIOSIS TYPE, UNSPECIFIED SPINAL REGION: ICD-10-CM

## 2024-05-22 LAB
BACTERIA BLD CULT: NORMAL
BACTERIA BLD CULT: NORMAL

## 2024-05-22 PROCEDURE — 4010F ACE/ARB THERAPY RXD/TAKEN: CPT | Mod: CPTII,S$GLB,, | Performed by: FAMILY MEDICINE

## 2024-05-22 PROCEDURE — 99999 PR PBB SHADOW E&M-EST. PATIENT-LVL V: CPT | Mod: PBBFAC,,, | Performed by: FAMILY MEDICINE

## 2024-05-22 PROCEDURE — 99214 OFFICE O/P EST MOD 30 MIN: CPT | Mod: S$GLB,,, | Performed by: FAMILY MEDICINE

## 2024-05-22 PROCEDURE — 3074F SYST BP LT 130 MM HG: CPT | Mod: CPTII,S$GLB,, | Performed by: FAMILY MEDICINE

## 2024-05-22 PROCEDURE — 3078F DIAST BP <80 MM HG: CPT | Mod: CPTII,S$GLB,, | Performed by: FAMILY MEDICINE

## 2024-05-22 PROCEDURE — 1111F DSCHRG MED/CURRENT MED MERGE: CPT | Mod: CPTII,S$GLB,, | Performed by: FAMILY MEDICINE

## 2024-05-22 PROCEDURE — 3008F BODY MASS INDEX DOCD: CPT | Mod: CPTII,S$GLB,, | Performed by: FAMILY MEDICINE

## 2024-05-22 PROCEDURE — 1159F MED LIST DOCD IN RCRD: CPT | Mod: CPTII,S$GLB,, | Performed by: FAMILY MEDICINE

## 2024-05-22 NOTE — PATIENT INSTRUCTIONS
Luther Coulter,     If you are due for any health screening(s) below please notify me so we can arrange them to be ordered and scheduled to maintain your health. Most healthy patients complete it. Don't lose out on improving your health.     All of your core healthy metrics are met.

## 2024-05-22 NOTE — PROGRESS NOTES
Subjective:   Patient ID: Marylin Morel is a 58 y.o. female     Chief Complaint:Hospital Follow Up      Here for f/u.  Review of Systems   Respiratory:  Negative for shortness of breath.    Cardiovascular:  Negative for chest pain.   Gastrointestinal:  Negative for abdominal pain.   Genitourinary:  Negative for dysuria.   Musculoskeletal:  Positive for back pain.     Past Medical History:   Diagnosis Date    Allergy     ants    GERD (gastroesophageal reflux disease)     Hypertension     Thyroid disease      Past Surgical History:   Procedure Laterality Date    AUGMENTATION OF BREAST      BREAST SURGERY      COSMETIC SURGERY      ENDOSCOPIC FENESTRATION OF ARACHNOID CYST N/A 12/18/2023    Procedure: FENESTRATION, CYST, ARACHNOID, ENDOSCOPIC- terence hole for colloid cyst resection and septostomy;  Surgeon: Clement Alamo MD;  [facility];  Service: Neurosurgery;  Laterality: N/A;    hip replacement left Left     LUMBAR FUSION N/A 5/14/2024    Procedure: **LOOP X** L2 to pelvis posterior instrumented fusion;  Surgeon: Eric Chester MD;  [facility];  Service: Neurosurgery;  Laterality: N/A;  L2 to pelvis posterior instrumented fusion  anestheisa: general  brace: TLSO  radiology: C-arm  AIRO (if avail, if not LOOP X)  neuro mon: EMG/SEP  position: prone  bed: [place] 4 post  headrest: prone view  misc: smallwood    NECK SURGERY       Objective:     Vitals:    05/22/24 1505   BP: 124/78   Pulse: 91   Resp: 16   Temp: 98.3 °F (36.8 °C)     Body mass index is 26.1 kg/m².  Physical Exam  Vitals and nursing note reviewed.   Constitutional:       Appearance: She is well-developed.   HENT:      Head: Normocephalic and atraumatic.   Eyes:      General: No scleral icterus.     Conjunctiva/sclera: Conjunctivae normal.   Cardiovascular:      Heart sounds: No murmur heard.  Pulmonary:      Effort: Pulmonary effort is normal. No respiratory distress.   Musculoskeletal:         General: No  deformity. Normal range of motion.      Cervical back: Normal range of motion and neck supple.   Skin:     Coloration: Skin is not pale.      Findings: No rash.   Neurological:      Mental Status: She is alert and oriented to person, place, and time.   Psychiatric:         Behavior: Behavior normal.         Thought Content: Thought content normal.         Judgment: Judgment normal.     Assessment:     1. Hospital discharge follow-up    2. Essential hypertension, benign    3. Post-operative state      Plan:   Hospital discharge follow-up  Anemia improving. BP well controlled off of BP med. Discussed at length management. Has f/u with surgery next week.  Essential hypertension, benign  Well contolled. Counseled if BP begins to elevate can consider restarting BP med, may start with just HCTZ instead of hyzaar as pressures ran soft on hyzaar  Post-operative state  Afebrile, has f/u with surgery.          Total time spent of Greater than 30 minutes minutes on the day of the visit.This includes face to face time and preparing to see the patient, obtaining and reviewing separately obtained history, documenting clinical information in the electronic or other health record, independently interpreting results and communicating results to the patient/family/caregiver, or care coordinator.    Established patient with me has been instructed that must see me at least 1 time yearly (every 365 days) for refills of medications. Seeing other providers in this clinic is fine but expectation is to see me yearly.    Gabriel Mccabe MD  05/22/2024    Portions of this note have been dictated with MERVIN Hurtado

## 2024-05-25 ENCOUNTER — HOSPITAL ENCOUNTER (EMERGENCY)
Facility: HOSPITAL | Age: 59
Discharge: HOME | End: 2024-05-25
Attending: EMERGENCY MEDICINE
Payer: COMMERCIAL

## 2024-05-25 VITALS
OXYGEN SATURATION: 98 % | SYSTOLIC BLOOD PRESSURE: 154 MMHG | WEIGHT: 150 LBS | BODY MASS INDEX: 26.15 KG/M2 | HEART RATE: 83 BPM | DIASTOLIC BLOOD PRESSURE: 93 MMHG | TEMPERATURE: 98 F | RESPIRATION RATE: 16 BRPM

## 2024-05-25 DIAGNOSIS — R51.9 ACUTE INTRACTABLE HEADACHE, UNSPECIFIED HEADACHE TYPE: Primary | ICD-10-CM

## 2024-05-25 LAB
ALBUMIN SERPL BCP-MCNC: 3 G/DL (ref 3.5–5.2)
ALP SERPL-CCNC: 89 U/L (ref 55–135)
ALT SERPL W/O P-5'-P-CCNC: 24 U/L (ref 10–44)
ANION GAP SERPL CALC-SCNC: 9 MMOL/L (ref 8–16)
AST SERPL-CCNC: 29 U/L (ref 10–40)
BASOPHILS # BLD AUTO: 0.07 K/UL (ref 0–0.2)
BASOPHILS NFR BLD: 0.8 % (ref 0–1.9)
BILIRUB SERPL-MCNC: 0.3 MG/DL (ref 0.1–1)
BUN SERPL-MCNC: 7 MG/DL (ref 6–20)
CALCIUM SERPL-MCNC: 9.1 MG/DL (ref 8.7–10.5)
CHLORIDE SERPL-SCNC: 106 MMOL/L (ref 95–110)
CO2 SERPL-SCNC: 21 MMOL/L (ref 23–29)
CREAT SERPL-MCNC: 0.6 MG/DL (ref 0.5–1.4)
DIFFERENTIAL METHOD BLD: ABNORMAL
EOSINOPHIL # BLD AUTO: 0.1 K/UL (ref 0–0.5)
EOSINOPHIL NFR BLD: 1.5 % (ref 0–8)
ERYTHROCYTE [DISTWIDTH] IN BLOOD BY AUTOMATED COUNT: 14.1 % (ref 11.5–14.5)
EST. GFR  (NO RACE VARIABLE): >60 ML/MIN/1.73 M^2
GLUCOSE SERPL-MCNC: 105 MG/DL (ref 70–110)
HCT VFR BLD AUTO: 31.3 % (ref 37–48.5)
HGB BLD-MCNC: 9.9 G/DL (ref 12–16)
IMM GRANULOCYTES # BLD AUTO: 0.03 K/UL (ref 0–0.04)
IMM GRANULOCYTES NFR BLD AUTO: 0.3 % (ref 0–0.5)
LYMPHOCYTES # BLD AUTO: 1.3 K/UL (ref 1–4.8)
LYMPHOCYTES NFR BLD: 14.5 % (ref 18–48)
MCH RBC QN AUTO: 32.4 PG (ref 27–31)
MCHC RBC AUTO-ENTMCNC: 31.6 G/DL (ref 32–36)
MCV RBC AUTO: 102 FL (ref 82–98)
MONOCYTES # BLD AUTO: 0.7 K/UL (ref 0.3–1)
MONOCYTES NFR BLD: 7.4 % (ref 4–15)
NEUTROPHILS # BLD AUTO: 6.6 K/UL (ref 1.8–7.7)
NEUTROPHILS NFR BLD: 75.5 % (ref 38–73)
NRBC BLD-RTO: 0 /100 WBC
PLATELET # BLD AUTO: 612 K/UL (ref 150–450)
PMV BLD AUTO: 8.6 FL (ref 9.2–12.9)
POTASSIUM SERPL-SCNC: 4.3 MMOL/L (ref 3.5–5.1)
PROT SERPL-MCNC: 6.3 G/DL (ref 6–8.4)
RBC # BLD AUTO: 3.06 M/UL (ref 4–5.4)
SODIUM SERPL-SCNC: 136 MMOL/L (ref 136–145)
WBC # BLD AUTO: 8.75 K/UL (ref 3.9–12.7)

## 2024-05-25 PROCEDURE — 96375 TX/PRO/DX INJ NEW DRUG ADDON: CPT

## 2024-05-25 PROCEDURE — 99285 EMERGENCY DEPT VISIT HI MDM: CPT | Mod: 25

## 2024-05-25 PROCEDURE — 80053 COMPREHEN METABOLIC PANEL: CPT

## 2024-05-25 PROCEDURE — 85025 COMPLETE CBC W/AUTO DIFF WBC: CPT

## 2024-05-25 PROCEDURE — 63600175 PHARM REV CODE 636 W HCPCS

## 2024-05-25 PROCEDURE — 96374 THER/PROPH/DIAG INJ IV PUSH: CPT

## 2024-05-25 RX ORDER — ONDANSETRON 4 MG/1
4 TABLET, FILM COATED ORAL EVERY 8 HOURS PRN
Qty: 12 TABLET | Refills: 0 | Status: SHIPPED | OUTPATIENT
Start: 2024-05-25

## 2024-05-25 RX ORDER — KETOROLAC TROMETHAMINE 30 MG/ML
10 INJECTION, SOLUTION INTRAMUSCULAR; INTRAVENOUS
Status: COMPLETED | OUTPATIENT
Start: 2024-05-25 | End: 2024-05-25

## 2024-05-25 RX ORDER — PROCHLORPERAZINE EDISYLATE 5 MG/ML
10 INJECTION INTRAMUSCULAR; INTRAVENOUS
Status: COMPLETED | OUTPATIENT
Start: 2024-05-25 | End: 2024-05-25

## 2024-05-25 RX ADMIN — KETOROLAC TROMETHAMINE 10 MG: 30 INJECTION, SOLUTION INTRAMUSCULAR at 08:05

## 2024-05-25 RX ADMIN — PROCHLORPERAZINE EDISYLATE 10 MG: 5 INJECTION INTRAMUSCULAR; INTRAVENOUS at 08:05

## 2024-05-25 NOTE — ED PROVIDER NOTES
Encounter Date: 5/25/2024       History     Chief Complaint   Patient presents with    Headache     Reports severe HA that began this morning, s/p spinal sx 5/14/24     HPI    Patient is a 59yo female with hx of HTN, colloid cyst removal in 01/2024, s/p lumbar fusion on 5/14 for scoliosis presenting with severe headache. Headache began last night, but by this morning had become severe. Described as dull frontal headache with some pain to occiput as well.  Endorses nausea and photosensitivty.    Denies fever, blurry vision, new numbnessness, weakness, tingling, CP, SOB.     Has been doing well at home after discharge, walking half a mile a day.    Review of patient's allergies indicates:   Allergen Reactions    Insect venom Anaphylaxis     Ant bites    Bactrim [sulfamethoxazole-trimethoprim] Blisters     Blisters/rash on torso and extremities      Past Medical History:   Diagnosis Date    Allergy     ants    GERD (gastroesophageal reflux disease)     Hypertension     Thyroid disease      Past Surgical History:   Procedure Laterality Date    AUGMENTATION OF BREAST      BREAST SURGERY      COSMETIC SURGERY      ENDOSCOPIC FENESTRATION OF ARACHNOID CYST N/A 12/18/2023    Procedure: FENESTRATION, CYST, ARACHNOID, ENDOSCOPIC- terence hole for colloid cyst resection and septostomy;  Surgeon: Clement Alamo MD;  Location: [facility] OR [address] [place] [address];  Service: Neurosurgery;  Laterality: N/A;    hip replacement left Left     LUMBAR FUSION N/A 5/14/2024    Procedure: **LOOP X** L2 to pelvis posterior instrumented fusion;  Surgeon: Eric Chester MD;  Location: [facility] OR [address] [place] [address];  Service: Neurosurgery;  Laterality: N/A;  L2 to pelvis posterior instrumented fusion  anestheisa: general  brace: TLSO  radiology: C-arm  AIRO (if avail, if not LOOP X)  neuro mon: EMG/SEP  position: prone  bed: [place] 4 post  headrest: prone view  misc: smallwood    NECK SURGERY       Family History   Problem Relation Name Age of Onset    COPD Mother      Heart  disease Father          MI    Hypertension Brother       Social History     Tobacco Use    Smoking status: Never    Smokeless tobacco: Never   Substance Use Topics    Alcohol use: Yes     Alcohol/week: 1.0 standard drink of alcohol     Types: 1 Glasses of wine per week     Comment: 1 glass of wine daily    Drug use: No     Physical Exam     Initial Vitals [05/25/24 0650]   BP Pulse Resp Temp SpO2   (!) 158/85 79 16 98.3 °F (36.8 °C) 99 %      MAP       --         Physical Exam    Constitutional: She appears well-developed and well-nourished. She is not diaphoretic. No distress.   HENT:   Head: Normocephalic and atraumatic.   Eyes: EOM are normal.   Cardiovascular:  Normal rate and regular rhythm.           Pulmonary/Chest: Breath sounds normal. No respiratory distress.   Musculoskeletal:         General: No tenderness.     Neurological: She is alert. No cranial nerve deficit or sensory deficit (no new deficit).   Normal upper extremity strength; Lower extremities weak d/t surgery but equal BL and not acutely worse   Skin: Skin is warm and dry.   Vertical lumbar incision with stables, healing, intact   Psychiatric: She has a normal mood and affect. Thought content normal.         ED Course   Procedures  Labs Reviewed   CBC W/ AUTO DIFFERENTIAL - Abnormal; Notable for the following components:       Result Value    RBC 3.06 (*)     Hemoglobin 9.9 (*)     Hematocrit 31.3 (*)      (*)     MCH 32.4 (*)     MCHC 31.6 (*)     Platelets 612 (*)     MPV 8.6 (*)     Gran % 75.5 (*)     Lymph % 14.5 (*)     All other components within normal limits   COMPREHENSIVE METABOLIC PANEL - Abnormal; Notable for the following components:    CO2 21 (*)     Albumin 3.0 (*)     All other components within normal limits          Imaging Results              CT Head Without Contrast (Final result)  Result time 05/25/24 08:48:34      Final result by Dave Griffith DO (05/25/24 08:48:34)                   Impression:      No acute  intracranial findings specifically without evidence for acute intracranial hemorrhage or new abnormal parenchymal attenuation.    Evolution of remote operative change with right frontal terence hole and previous colloid cyst resection.  No evidence for recurrent colloid cyst or current hydrocephalus.    Clinical correlation and further evaluation as warranted.      Electronically signed by: Dave Griffith DO  Date:    05/25/2024  Time:    08:48               Narrative:    EXAMINATION:  CT HEAD WITHOUT CONTRAST    CLINICAL HISTORY:  Headache, sudden, severe;    TECHNIQUE:  Multiple sequential 5 mm axial images of the head without contrast.  Coronal and sagittal reformatted imaging from the axial acquisition.    COMPARISON:  CT 12/18/2023    FINDINGS:  Remote operative change right frontal terence hole and colloid cyst resection.    Resolution of previous scattered postoperative pneumocephalus with hypodense track along the right frontal lobe.    There is no evidence for acute intracranial hemorrhage or sulcal effacement.  The ventricles are normal in size without hydrocephalus.  No midline shift or mass effect.  Few trace ethmoid air cell opacities.                                       Medications   ketorolac injection 9.999 mg (9.999 mg Intravenous Given 5/25/24 0803)   prochlorperazine injection Soln 10 mg (10 mg Intravenous Given 5/25/24 0804)     Medical Decision Making  Amount and/or Complexity of Data Reviewed  Labs: ordered.  Radiology: ordered.    Risk  Prescription drug management.    Patient is a 57yo female with hx of HTN s/p lumbar fusion on 5/14 for scoliosis presenting with severe headache. Headache progressively worsening since last night, severe. No fevers, blurry vision, neuro deficits on H&P. Due to being so recently post op with relatively recent colloid cyst removal, basic labs ordered and CT head. While workup pending, she was give compazine and toradol. Labs and imaging relatively unremarkable and  headache almost completely resolved. Patient was able to be safely discharged home with strict return precautions.                                  Clinical Impression:  Final diagnoses:  [R51.9] Acute intractable headache, unspecified headache type (Primary)          ED Disposition Condition    Discharge Stable          ED Prescriptions       Medication Sig Dispense Start Date End Date Auth. Provider    ondansetron (ZOFRAN) 4 MG tablet Take 1 tablet (4 mg total) by mouth every 8 (eight) hours as needed for Nausea. 12 tablet 5/25/2024 -- Karey Garcia DO          Follow-up Information       Follow up With Specialties Details Why Contact Info    Gabriel Mccabe MD Family Medicine Schedule an appointment as soon as possible for a visit   [address] [facility] [id number]  [phone]      [facility] - Emergency Dept Emergency Medicine Go to  As needed, If symptoms worsen [address]  [facility] [address] [phone]             Karey Garcia DO  Resident  05/25/24 1356

## 2024-05-25 NOTE — DISCHARGE INSTRUCTIONS
Please return to the emergency department if you experience severe headache, difficulty speaking or walking, blurry vision, other concerning symptoms.  If you develop chest pain, shortness of breath, nausea or vomiting, abdominal pain, we would like to see was well.  Any concerns for infection regarding her recent surgery, numbness in the groin area, or stool or urinary incontinence please seek care immediately.    Keep all follow-up appointments and follow up with your primary care physician.

## 2024-05-25 NOTE — ED TRIAGE NOTES
Marylin Morel, a 58 y.o. female presents to the ED w/ complaint of headache    Triage note:  Chief Complaint   Patient presents with    Headache     Reports severe HA that began this morning, s/p spinal sx 5/14/24     Review of patient's allergies indicates:   Allergen Reactions    Insect venom Anaphylaxis     Ant bites    Bactrim [sulfamethoxazole-trimethoprim] Blisters     Blisters/rash on torso and extremities      Past Medical History:   Diagnosis Date    Allergy     ants    GERD (gastroesophageal reflux disease)     Hypertension     Thyroid disease

## 2024-05-28 ENCOUNTER — PATIENT MESSAGE (OUTPATIENT)
Dept: FAMILY MEDICINE | Facility: CLINIC | Age: 59
End: 2024-05-28
Payer: COMMERCIAL

## 2024-05-28 ENCOUNTER — CLINICAL SUPPORT (OUTPATIENT)
Dept: NEUROSURGERY | Facility: CLINIC | Age: 59
End: 2024-05-28
Payer: COMMERCIAL

## 2024-05-28 VITALS — DIASTOLIC BLOOD PRESSURE: 76 MMHG | SYSTOLIC BLOOD PRESSURE: 148 MMHG

## 2024-05-28 VITALS — HEART RATE: 89 BPM | SYSTOLIC BLOOD PRESSURE: 154 MMHG | TEMPERATURE: 99 F | DIASTOLIC BLOOD PRESSURE: 86 MMHG

## 2024-05-28 DIAGNOSIS — Z98.1 S/P LUMBAR SPINAL FUSION: Primary | ICD-10-CM

## 2024-05-28 DIAGNOSIS — I10 HYPERTENSION, UNSPECIFIED TYPE: Primary | ICD-10-CM

## 2024-05-28 PROCEDURE — 99999 PR PBB SHADOW E&M-EST. PATIENT-LVL II: CPT | Mod: PBBFAC,,,

## 2024-05-28 RX ORDER — HYDROCHLOROTHIAZIDE 12.5 MG/1
12.5 TABLET ORAL DAILY
Qty: 30 TABLET | Refills: 11 | Status: SHIPPED | OUTPATIENT
Start: 2024-05-28 | End: 2024-06-11

## 2024-05-28 RX ORDER — METHOCARBAMOL 750 MG/1
750 TABLET, FILM COATED ORAL 4 TIMES DAILY
Qty: 40 TABLET | Refills: 0 | Status: SHIPPED | OUTPATIENT
Start: 2024-05-28 | End: 2024-06-08

## 2024-05-28 RX ORDER — PREGABALIN 75 MG/1
75 CAPSULE ORAL 2 TIMES DAILY
Qty: 60 CAPSULE | Refills: 3 | Status: SHIPPED | OUTPATIENT
Start: 2024-05-28 | End: 2024-06-19 | Stop reason: SDUPTHER

## 2024-05-28 RX ORDER — MUPIROCIN CALCIUM 20 MG/G
CREAM TOPICAL 3 TIMES DAILY
COMMUNITY

## 2024-05-28 RX ORDER — OXYCODONE HYDROCHLORIDE 10 MG/1
10 TABLET ORAL EVERY 4 HOURS PRN
Qty: 42 TABLET | Refills: 0 | Status: SHIPPED | OUTPATIENT
Start: 2024-05-28

## 2024-05-28 NOTE — PROGRESS NOTES
Marylin Morel is a 58 y.o. female here for 2 week post op wound check here on 8th floor neurosurgery clinic    Surgery:L2-pelvis posterior instrumented fusion      Symptoms: some bilat leg soreness     DME:none    Brace: tlso    Pain: 2    Medication Refills: yes, lyrica, methocarbomol, oxycodone       Incision with sutures and  staples, EMMANUEL, well approximated, no redness, swelling or purulent drainage. Sutures and  staples removed. Pt tolerated procedure well.    EDUCATION:    Instructed patient to keep incision EMMANUEL, no lotions, creams or bandages. OK to shower without water pressure to incision. No scrubbing. Pat dry.  No submurging incision in water (no bathing/swimming) until 8 weeks after surgery once wound is healed. Do not lift more than 10 pounds. Avoid bending or twisting in the area of your surgery more than 45 degrees from neutral position in any direction. Wear brace at all times when up out of bed except when showering or flat in bed. Increase ambulation as tolerated. You should be walking 2 miles/day. Walk on paved surfaces only, holding side rail when using stairs.  No sexual activity for 6 weeks after surgery.  No driving or operating heavy machinary : until cleared by surgeon  or while you are taking narcotic pain medication or muscle relaxant.  If you have had a fusion, do not take any non steroidal anti-inflammatory drugs (NSAIDS) Including the following: Ibuprofen, naprosyn, Aleve, Advil, Indocin, Mobic, or Celebrex for 6 weeks.  Take docusate (colace 100mg): take 1 capsule a day as needed for constipation. You can purchase over the counter.  Avoid use of tobacco products.    Call your doctor or go to the Emergency Room for any signs of infection including: increased redness, drainage, pain or fever (temperature of 101.5 or above for 24hrs). Call your doctor or go to the ER if there are any localized neurological changes, problems with speech, vision,numbness,tingling,weakness,severe headache  or other concerns.  Above instructions reviewed with pt and copy of instructions given to pt along with follow up appt date and time.    Physicians need 3 days' notice for pain medicine to be refilled. Pain medicine will only be refilled between 8am and 5pm Monday-Friday.    Patient verbalizes understanding. All questions answered. Pt. Encouraged to call or send message thru the portal for any additional concerns. Patient to followup with Dr. Chester for 6 week followup with imaging.    Future Appointments   Date Time Provider Department Center   6/27/2024 10:45 AM [facility] OIC EOS [facility]   6/27/2024 11:30 AM Eric Chester MD [facility] NEUROS8 [facility]   12/18/2024  3:00 PM Gabriel Mccabe MD [facility] MED [place]   5/2/2025  8:40 AM Keagan Luque MD OCC [facility]

## 2024-05-29 NOTE — OP NOTE
DATE OF SURGERY: 5/14/24     SURGEON: Fredrick Rob M.D.      CO-SURGEON: Eric Chester M.D., Neurosurgery    PREOPERATIVE DIAGNOSIS:  1. L3-4 spondylolisthesis, scoliosis, lumbar stenosis L3-4 through L5-S1 and flat back deformity     POSTOPERATIVE DIAGNOSIS:  1. L3-4 spondylolisthesis, scoliosis, lumbar stenosis L3-4 through L5-S1 and flat back deformity     PROCEDURE PERFORMED:  1. Posterior spinal fusion, L2 to pelvis   2. Posterior segmental spinal fixation, L2 to pelvis (DePuy Synthes)  3. Pelvic fixation with S2AI screws (Depuy Synthes)  4. Posterior lumbar interbody fusion, L3-4, L4-5 and L5-S1  5.  Application of titanium interbody spacer, L3-4, L4-5 and L5-S1 (DePuy Synthes)  6.  Andry osteotomy, L3, L4 and L5  7.  Use of intraoperative CT neuronavigation  8. Infuse and local bone grafting     ANESTHESIA: GETA     ESTIMATED BLOOD LOSS: 400mL     COMPLICATIONS: None     DRAINS: Two deep Hemovacs and a Prevena incisional woundvac     SPECIMENS SENT: None     FINDINGS: None     INDICATIONS:     Please see Dr. Chester' note for a description of the indications of this operation.  .  PROCEDURE:     The patient was brought into the operating room where she was intubated and placed under general anesthesia without difficulty.  All lines were placed. She was repositioned prone onto the operating room table with appropriate padding all pressure points. The region of interest was localized with AP and lateral x-ray.  The skin was marked and the area was prepped and draped in the usual sterile fashion.  A timeout was performed prior to procedure.  20 mL's of lidocaine with epinephrine were injected in the skin.     We then made a midline linear incision with a 10 blade from approximately L2 to sacrum.  Supra and subfascial midline dissection was carried out with Bovie electrocautery.  Subperiosteal dissection was carried out with Bovie electrocautery and Chan elevators to expose the posterior elements from L2 to  sacrum.  A pedicle finder was placed into the presumed L3 pedicle and confirmed on lateral x-ray. Medial facetectomies were performed bilaterally at L2-3 through L5-S1 with the osteotome.       The CT neuro navigation array was docked onto the right PSIS throught a separate stab incision and a CT spin was acquired.  The intraoperative CT confirmed levels..  Using neuro-navigation pedicle screws were placed bilaterally from L2 to S1. All lumbar screws were stimulated with the neuromonitoring probe and found to stimulate within safe range. Bilateral pelvic fixation was achieved with S2AI screws, using navigation. Spinal xrays confirmed good hardware position. We augmented bilateral L2 screws with cement under flouroscopy, and we confirmed no significant cement extravasation.     Attention was turned to performance of a posterior lumbar interbody fusion at L5-S1 from a right-sided approach. First an L5-S1 laminectomy was performed in standard fashion to access the thecal sac, nerve roots and disc space. Next we performed an L5 Andry osteotomy in standard fashion with the osteotome to reduce her deformtiy.  Next,  a nerve root retractor was used to retract the thecal sac medially and expose the L5-S1 disc space.  Epidural veins were cauterized and divided.  An annulotomy was performed with a 15 blade.  A pituitary rongeur was used to remove disc material.  The endplates were prepared with disc miranda, rasps, and curettes. A titanium cage was countersunk into the L5-S1 disc space and had snug fit. The L5-S1 disc space was pre and post packed with local bone for interbody arthrodesis.     An L4-5 and L3-4 TLIF and an L3 and L4 Andry osteotomy was performed in identical fashion. Xrays showed good cage position at each level.    Bilateral titanium rods were sized, contoured and reduced into the tulip heads.  Set screws were tightened. Sequential compression was performed bilaterally across the L3, L4 and L5 Andry  osteotomies to promote lumbar lordosis. Final xrays showed excellent reduction of the deformity and excellent position of all hardware.  The wound was copiously irrigated with sterile normal saline and a dilute Betadine solution. Exposed bony surfaces from L2 to sacrum were decorticated bilaterally with a high-speed drill.  Infuse and local bone were placed posteriorly for arthrodesis from L2 to sacrum. Two deep Hemovacs were placed, and the wound was closed by the neurosurgery service.    JUSTIFICATION OF CO-SURGEON: This was a complex spinal deformity operation. Two attending surgeons were indicated to reduce operative times, blood loss, and to improve patient outcomes.

## 2024-06-07 ENCOUNTER — TELEPHONE (OUTPATIENT)
Dept: FAMILY MEDICINE | Facility: CLINIC | Age: 59
End: 2024-06-07
Payer: COMMERCIAL

## 2024-06-07 ENCOUNTER — PATIENT MESSAGE (OUTPATIENT)
Dept: SPINE | Facility: CLINIC | Age: 59
End: 2024-06-07
Payer: COMMERCIAL

## 2024-06-07 NOTE — TELEPHONE ENCOUNTER
Malka from [facility] called and stated when the patient was discharged from the hospital after a blood transfusion they stopped her Lisinopril. She was concerned about her b/p she was then given Hydrochlorothiazide 12.5 to take. Today her b/p is 134/74 and the patient still thinks that is too high and wanted to know is she needed to start the Lisinopril, after speaking with Dr. Mccabe he advised that she does not but she needs an appt if she has any questions or concerns. Informed Malka and the pt is scheduled for 0/06/11/2024 @ 4pm. Malka will inform the patient.

## 2024-06-09 ENCOUNTER — PATIENT MESSAGE (OUTPATIENT)
Dept: SPINE | Facility: CLINIC | Age: 59
End: 2024-06-09
Payer: COMMERCIAL

## 2024-06-10 RX ORDER — METHOCARBAMOL 750 MG/1
750 TABLET, FILM COATED ORAL 4 TIMES DAILY
Qty: 40 TABLET | Refills: 0 | Status: SHIPPED | OUTPATIENT
Start: 2024-06-10 | End: 2024-06-20

## 2024-06-11 ENCOUNTER — OFFICE VISIT (OUTPATIENT)
Dept: FAMILY MEDICINE | Facility: CLINIC | Age: 59
End: 2024-06-11
Payer: COMMERCIAL

## 2024-06-11 ENCOUNTER — EXTERNAL HOME HEALTH (OUTPATIENT)
Dept: HOME HEALTH SERVICES | Facility: HOSPITAL | Age: 59
End: 2024-06-11
Payer: COMMERCIAL

## 2024-06-11 VITALS
HEART RATE: 91 BPM | SYSTOLIC BLOOD PRESSURE: 130 MMHG | HEIGHT: 64 IN | WEIGHT: 142.88 LBS | RESPIRATION RATE: 16 BRPM | DIASTOLIC BLOOD PRESSURE: 82 MMHG | TEMPERATURE: 97 F | BODY MASS INDEX: 24.39 KG/M2 | OXYGEN SATURATION: 97 %

## 2024-06-11 DIAGNOSIS — I10 ESSENTIAL HYPERTENSION, BENIGN: Primary | ICD-10-CM

## 2024-06-11 PROCEDURE — G2211 COMPLEX E/M VISIT ADD ON: HCPCS | Mod: S$GLB,,, | Performed by: FAMILY MEDICINE

## 2024-06-11 PROCEDURE — 4010F ACE/ARB THERAPY RXD/TAKEN: CPT | Mod: CPTII,S$GLB,, | Performed by: FAMILY MEDICINE

## 2024-06-11 PROCEDURE — 99214 OFFICE O/P EST MOD 30 MIN: CPT | Mod: S$GLB,,, | Performed by: FAMILY MEDICINE

## 2024-06-11 PROCEDURE — 3079F DIAST BP 80-89 MM HG: CPT | Mod: CPTII,S$GLB,, | Performed by: FAMILY MEDICINE

## 2024-06-11 PROCEDURE — 1159F MED LIST DOCD IN RCRD: CPT | Mod: CPTII,S$GLB,, | Performed by: FAMILY MEDICINE

## 2024-06-11 PROCEDURE — 1111F DSCHRG MED/CURRENT MED MERGE: CPT | Mod: CPTII,S$GLB,, | Performed by: FAMILY MEDICINE

## 2024-06-11 PROCEDURE — 3008F BODY MASS INDEX DOCD: CPT | Mod: CPTII,S$GLB,, | Performed by: FAMILY MEDICINE

## 2024-06-11 PROCEDURE — 99999 PR PBB SHADOW E&M-EST. PATIENT-LVL V: CPT | Mod: PBBFAC,,, | Performed by: FAMILY MEDICINE

## 2024-06-11 PROCEDURE — 3075F SYST BP GE 130 - 139MM HG: CPT | Mod: CPTII,S$GLB,, | Performed by: FAMILY MEDICINE

## 2024-06-11 RX ORDER — LISINOPRIL AND HYDROCHLOROTHIAZIDE 20; 25 MG/1; MG/1
1 TABLET ORAL DAILY
Qty: 90 TABLET | Refills: 3 | Status: SHIPPED | OUTPATIENT
Start: 2024-06-11 | End: 2025-06-11

## 2024-06-11 NOTE — PATIENT INSTRUCTIONS
Luther Coulter,     If you are due for any health screening(s) below please notify me so we can arrange them to be ordered and scheduled to maintain your health. Most healthy patients complete it. Don't lose out on improving your health.     Tests to Keep You Healthy    Mammogram: Met on 2/27/2024  Colon Cancer Screening: Met on 12/8/2014  Cervical Cancer Screening: Met on 7/27/2023  Last Blood Pressure <= 139/89 (6/11/2024): NO

## 2024-06-11 NOTE — PROGRESS NOTES
Subjective:   Patient ID: Marylin Morel is a 58 y.o. female     Chief Complaint:Follow-up (B/p and med concern)      Here for checkup      Review of Systems   Respiratory:  Negative for shortness of breath.    Cardiovascular:  Negative for chest pain.   Gastrointestinal:  Negative for abdominal pain.   Genitourinary:  Negative for dysuria.     Past Medical History:   Diagnosis Date    Allergy     ants    GERD (gastroesophageal reflux disease)     Hypertension     Thyroid disease      Past Surgical History:   Procedure Laterality Date    AUGMENTATION OF BREAST      BREAST SURGERY      COSMETIC SURGERY      ENDOSCOPIC FENESTRATION OF ARACHNOID CYST N/A 12/18/2023    Procedure: FENESTRATION, CYST, ARACHNOID, ENDOSCOPIC- terence hole for colloid cyst resection and septostomy;  Surgeon: Clement Alamo MD;  [facility];  Service: Neurosurgery;  Laterality: N/A;    hip replacement left Left     LUMBAR FUSION N/A 5/14/2024    Procedure: **LOOP X** L2 to pelvis posterior instrumented fusion;  Surgeon: Eric Chester MD;  Location: [facility] OR 2ND FLR;  Service: Neurosurgery;  Laterality: N/A;  L2 to pelvis posterior instrumented fusion  anestheisa: general  brace: TLSO  radiology: C-arm  AIRO (if avail, if not LOOP X)  neuro mon: EMG/SEP  position: prone  bed: [place] 4 post  headrest: prone view  misc: smallwood    NECK SURGERY       Objective:     Vitals:    06/11/24 1558   BP: 130/82   Pulse: 91   Resp: 16   Temp: 97.1 °F (36.2 °C)     Body mass index is 24.91 kg/m².  Physical Exam  Vitals and nursing note reviewed.   Constitutional:       Appearance: She is well-developed.   HENT:      Head: Normocephalic and atraumatic.   Eyes:      General: No scleral icterus.     Conjunctiva/sclera: Conjunctivae normal.   Pulmonary:      Effort: Pulmonary effort is normal. No respiratory distress.   Musculoskeletal:         General: No deformity. Normal range of motion.      Cervical back: Normal range of motion and neck  supple.   Skin:     Coloration: Skin is not pale.      Findings: No rash.   Neurological:      Mental Status: She is alert and oriented to person, place, and time.   Psychiatric:         Behavior: Behavior normal.         Thought Content: Thought content normal.         Judgment: Judgment normal.       Assessment:     1. Essential hypertension, benign      Plan:   Essential hypertension, benign  -     lisinopriL-hydrochlorothiazide (PRINZIDE,ZESTORETIC) 20-25 mg Tab; Take 1 tablet by mouth once daily.  Dispense: 90 tablet; Refill: 3  -     Comprehensive Metabolic Panel; Future; Expected date: 06/11/2024  -     Hemoglobin A1C; Future; Expected date: 06/11/2024  -     Lipid Panel; Future; Expected date: 06/11/2024  Restarted prinizide at patient request. Discussed goal BP. Understands goals and advised close f/u after 4 weeks if BP not mostly under 140/90 though not all readings will reach this number.          Total time spent of Less than 30 minutes minutes on the day of the visit.This includes face to face time and preparing to see the patient, obtaining and reviewing separately obtained history, documenting clinical information in the electronic or other health record, independently interpreting results and communicating results to the patient/family/caregiver, or care coordinator.    Established patient with me has been instructed that must see me at least 1 time yearly (every 365 days) for refills of medications. Seeing other providers in this clinic is fine but expectation is to see me yearly.    Gabriel Mccabe MD  06/11/2024    Portions of this note have been dictated with MERVIN Hurtado

## 2024-06-19 ENCOUNTER — PATIENT MESSAGE (OUTPATIENT)
Dept: SPINE | Facility: CLINIC | Age: 59
End: 2024-06-19
Payer: COMMERCIAL

## 2024-06-19 RX ORDER — PREGABALIN 75 MG/1
75 CAPSULE ORAL 2 TIMES DAILY
Qty: 60 CAPSULE | Refills: 3 | Status: SHIPPED | OUTPATIENT
Start: 2024-06-19 | End: 2024-07-19

## 2024-06-27 ENCOUNTER — OFFICE VISIT (OUTPATIENT)
Dept: NEUROSURGERY | Facility: CLINIC | Age: 59
End: 2024-06-27
Payer: COMMERCIAL

## 2024-06-27 ENCOUNTER — HOSPITAL ENCOUNTER (OUTPATIENT)
Dept: RADIOLOGY | Facility: HOSPITAL | Age: 59
Discharge: HOME | End: 2024-06-27
Attending: NEUROLOGICAL SURGERY
Payer: COMMERCIAL

## 2024-06-27 VITALS — SYSTOLIC BLOOD PRESSURE: 121 MMHG | DIASTOLIC BLOOD PRESSURE: 82 MMHG | TEMPERATURE: 98 F | HEART RATE: 83 BPM

## 2024-06-27 DIAGNOSIS — Z98.1 S/P LUMBAR SPINAL FUSION: ICD-10-CM

## 2024-06-27 DIAGNOSIS — M43.27 FUSION OF SPINE, LUMBOSACRAL REGION: Primary | ICD-10-CM

## 2024-06-27 PROCEDURE — 3074F SYST BP LT 130 MM HG: CPT | Mod: CPTII,S$GLB,, | Performed by: PHYSICIAN ASSISTANT

## 2024-06-27 PROCEDURE — 99024 POSTOP FOLLOW-UP VISIT: CPT | Mod: S$GLB,,, | Performed by: PHYSICIAN ASSISTANT

## 2024-06-27 PROCEDURE — 4010F ACE/ARB THERAPY RXD/TAKEN: CPT | Mod: CPTII,S$GLB,, | Performed by: PHYSICIAN ASSISTANT

## 2024-06-27 PROCEDURE — 72100 X-RAY EXAM L-S SPINE 2/3 VWS: CPT | Mod: 26,,, | Performed by: INTERNAL MEDICINE

## 2024-06-27 PROCEDURE — 1159F MED LIST DOCD IN RCRD: CPT | Mod: CPTII,S$GLB,, | Performed by: PHYSICIAN ASSISTANT

## 2024-06-27 PROCEDURE — 99999 PR PBB SHADOW E&M-EST. PATIENT-LVL III: CPT | Mod: PBBFAC,,, | Performed by: PHYSICIAN ASSISTANT

## 2024-06-27 PROCEDURE — 3079F DIAST BP 80-89 MM HG: CPT | Mod: CPTII,S$GLB,, | Performed by: PHYSICIAN ASSISTANT

## 2024-06-27 PROCEDURE — 72100 X-RAY EXAM L-S SPINE 2/3 VWS: CPT | Mod: TC

## 2024-06-27 NOTE — PROGRESS NOTES
Chilo Phelps - Neurosurgery [facility] [place]  Neurosurgery    SUBJECTIVE:     History of Present Illness:  Marylin Morel is a 59 y.o. female s/p L2-pelvis fusion 5/14/2024 with Dr. Chester who presents for 6 week post-op visit. Improvement in back and LLE pain. Stable LLE preop numbness. Reports new right knee pain. Denies falls/trauma. Also notes midline neck pain radiating to the shoulders. She has a hx C5-7 ACDF. Compliant with LSO brace. Denies b/b dysfunction, saddle anesthesia. Taking lyrica 75 BID.         Review of patient's allergies indicates:   Allergen Reactions    Insect venom Anaphylaxis     Ant bites    Bactrim [sulfamethoxazole-trimethoprim] Blisters     Blisters/rash on torso and extremities        Past Medical History:   Diagnosis Date    Allergy     ants    GERD (gastroesophageal reflux disease)     Hypertension     Thyroid disease        Past Surgical History:   Procedure Laterality Date    AUGMENTATION OF BREAST      BREAST SURGERY      COSMETIC SURGERY      ENDOSCOPIC FENESTRATION OF ARACHNOID CYST N/A 12/18/2023    Procedure: FENESTRATION, CYST, ARACHNOID, ENDOSCOPIC- terence hole for colloid cyst resection and septostomy;  Surgeon: Clement Alamo MD;  Location: [facility] OR [address] [place] [address];  Service: Neurosurgery;  Laterality: N/A;    hip replacement left Left     LUMBAR FUSION N/A 5/14/2024    Procedure: **LOOP X** L2 to pelvis posterior instrumented fusion;  Surgeon: Eric Chester MD;  Location: [facility] OR [address] [place] [address];  Service: Neurosurgery;  Laterality: N/A;  L2 to pelvis posterior instrumented fusion  anestheisa: general  brace: TLSO  radiology: C-arm  AIRO (if avail, if not LOOP X)  neuro mon: EMG/SEP  position: prone  bed: [place] 4 post  headrest: prone view  misc: smallwood    NECK SURGERY          Family History   Problem Relation Name Age of Onset    COPD Mother      Heart disease Father          MI    Hypertension Brother         Social History     Socioeconomic History    Marital status:    Occupational  History     Employer: [employer]   Tobacco Use    Smoking status: Never    Smokeless tobacco: Never   Substance and Sexual Activity    Alcohol use: Yes     Alcohol/week: 1.0 standard drink of alcohol     Types: 1 Glasses of wine per week     Comment: 1 glass of wine daily    Drug use: No    Sexual activity: Yes     Partners: Male     Social Determinants of Health     Financial Resource Strain: Low Risk  (5/15/2024)    Overall Financial Resource Strain (CARDIA)     Difficulty of Paying Living Expenses: Not hard at all   Food Insecurity: No Food Insecurity (5/15/2024)    Hunger Vital Sign     Worried About Running Out of Food in the Last Year: Never true     Ran Out of Food in the Last Year: Never true   Transportation Needs: No Transportation Needs (5/15/2024)    TRANSPORTATION NEEDS     Transportation : No   Physical Activity: Inactive (5/15/2024)    Exercise Vital Sign     Days of Exercise per Week: 0 days     Minutes of Exercise per Session: 0 min   Stress: Stress Concern Present (5/15/2024)    [place] of [employer]ational [employer] - Occupational Stress Questionnaire     Feeling of Stress : Rather much   Housing Stability: Low Risk  (5/15/2024)    Housing Stability Vital Sign     Unable to Pay for Housing in the Last Year: No     Homeless in the Last Year: No       Review of Systems:  Per HPI    OBJECTIVE:     Vital Signs (Most Recent):  Vitals:    06/27/24 1521   BP: 121/82   Pulse: 83   Temp: 97.8 °F (36.6 °C)   TempSrc: Temporal       Physical Exam:  General: well developed, well nourished, no distress.   Head: normocephalic, atraumatic  Neurologic: Alert and oriented. Thought content appropriate.  GCS: Motor: 6/Verbal: 5/Eyes: 4 GCS Total: 15  Mental Status: Awake, Alert, Oriented x 4  Language: No aphasia  Speech: No dysarthria  Cranial nerves: face symmetric, tongue midline, CN II-XII grossly intact.   Eyes: pupils equal, round, reactive to light with accomodation, EOMI.  Pulmonary: normal  respirations, no signs of respiratory distress  Sensory: intact to light touch throughout  Motor Strength: Moves all extremities spontaneously with good tone.  Full strength upper and lower extremities. No abnormal movements seen.     Strength  Deltoids Triceps Biceps Wrist Extension Wrist Flexion Hand    Upper: R 5/5 5/5 5/5 5/5 5/5 5/5    L 5/5 5/5 5/5 5/5 5/5 5/5     Iliopsoas Quadriceps Knee  Flexion Tibialis  anterior Gastro- cnemius EHL   Lower: R 5/5 5/5 5/5 5/5 5/5 5/5    L 5/5 5/5 5/5 5/5 5/5 5/5     Skin: Skin is warm, dry and intact.  Gait: normal    Incision well healed   No TTP to knee, no erythema or edema.    Diagnostic Results:  I have personally reviewed all pertinent imaging.    XR lumbar spine 6/27/24:  - satisfactory hardware placement     ASSESSMENT/PLAN:     Marylin Morel is a 59 y.o. female s/p L2-pelvis fusion 5/14/2024 with Dr. Chseter who presents for 6 week post-op visit. Seen by Dr. Chester. Will monitor right knee pain and neck pain. Patient to notify if worsening and we can obtain Xrs. F/u in 6 weeks with CT lumbar spine to assess for fusion. We discussed concerning signs and symptoms that would prompt return to clinic or urgent medical attention, patient v/u. All questions answered. Encouraged to call the clinic with questions/concerns prior to the next visit.      Jessica Arnold PA-C  Neurosurgery      Note dictated with voice recognition software, please excuse any grammatical errors.

## 2024-06-28 ENCOUNTER — E-VISIT (OUTPATIENT)
Dept: FAMILY MEDICINE | Facility: CLINIC | Age: 59
End: 2024-06-28
Payer: COMMERCIAL

## 2024-06-28 DIAGNOSIS — I10 HYPERTENSION, UNSPECIFIED TYPE: Primary | ICD-10-CM

## 2024-06-29 ENCOUNTER — PATIENT MESSAGE (OUTPATIENT)
Dept: SPINE | Facility: CLINIC | Age: 59
End: 2024-06-29
Payer: COMMERCIAL

## 2024-07-01 RX ORDER — METHOCARBAMOL 500 MG/1
500 TABLET, FILM COATED ORAL 4 TIMES DAILY
Qty: 40 TABLET | Refills: 0 | Status: SHIPPED | OUTPATIENT
Start: 2024-07-01 | End: 2024-07-11

## 2024-07-01 NOTE — PROGRESS NOTES
Patient ID: Marylin Morel is a 59 y.o. female.    Chief Complaint: Hypertension (Entered automatically based on patient selection in Patient Portal.)    The patient initiated a request through [employer] on 6/28/2024 for evaluation and management with a chief complaint of Hypertension (Entered automatically based on patient selection in Patient Portal.)     I evaluated the questionnaire submission on 7/1/24.    Ohs Peq Evisit Hypertension    6/28/2024  5:11 PM CDT - Filed by Patient   Do you agree to participate in an E-Visit? Yes   If you have any of the following symptoms, please do not complete an E-Visit. Instead, schedule an appointment with your provider I acknowledge   Choose the state of your primary residence [place]   What would you like addressed about your blood pressure? Recent blood pressure medication change   What is the main issue you would like addressed today? None, blood pressure is under control   How would you classify your blood pressure? Well controlled   Are you having any of the following symptoms from your high blood pressure? None of the above   Are you taking any of the following medications? Female hormones;  Thyroid pills;  Over-the-counter pain and fever relievers   The following factors can make high blood pressure worse or harder to control. Which of them might be contributing to your high blood pressure?  None of these   Have you taken blood pressure medications in the past that caused you problems or side effects? No   Are you currently taking medication(s) for your blood pressure? Yes   Have you recently started a new medication or changed your dose? Yes   How often are you taking your medication per week?  Every day   Have you had any side effects from your current blood pressure medication? No   Are you able to take your blood pressure? Yes   Reading 1 102/68-6/28/24-4:50pm   Reading 2 121/82-6/27/82-4:00pm   Reading 3 129/78-6/27/24-11:43 am   Reading 4 117/73-6/26/24-11:30  am   Reading 5 104/70-24-10:30 am Started new meds on 24   If you are able to take your pulse, please provide it below. 88   Provide any additional information you feel is important. Started back on Lisinopril/HCTZ 24 BP that am 151/90   Please attach any relevant images or files    Are you able to take any other vitals? No         No diagnosis found.     No orders of the defined types were placed in this encounter.           No follow-ups on file.      E-Visit Time Trackin mins

## 2024-07-03 ENCOUNTER — DOCUMENT SCAN (OUTPATIENT)
Dept: HOME HEALTH SERVICES | Facility: HOSPITAL | Age: 59
End: 2024-07-03
Payer: COMMERCIAL

## 2024-07-15 ENCOUNTER — PATIENT MESSAGE (OUTPATIENT)
Dept: NEUROSURGERY | Facility: CLINIC | Age: 59
End: 2024-07-15
Payer: COMMERCIAL

## 2024-07-26 ENCOUNTER — PATIENT MESSAGE (OUTPATIENT)
Dept: SPINE | Facility: CLINIC | Age: 59
End: 2024-07-26
Payer: COMMERCIAL

## 2024-08-01 ENCOUNTER — PATIENT MESSAGE (OUTPATIENT)
Dept: NEUROSURGERY | Facility: CLINIC | Age: 59
End: 2024-08-01
Payer: COMMERCIAL

## 2024-08-02 ENCOUNTER — OFFICE VISIT (OUTPATIENT)
Dept: NEUROSURGERY | Facility: CLINIC | Age: 59
End: 2024-08-02
Payer: COMMERCIAL

## 2024-08-02 ENCOUNTER — HOSPITAL ENCOUNTER (OUTPATIENT)
Dept: RADIOLOGY | Facility: HOSPITAL | Age: 59
Discharge: HOME | End: 2024-08-02
Attending: PHYSICIAN ASSISTANT
Payer: COMMERCIAL

## 2024-08-02 VITALS — DIASTOLIC BLOOD PRESSURE: 69 MMHG | SYSTOLIC BLOOD PRESSURE: 109 MMHG | HEART RATE: 86 BPM

## 2024-08-02 DIAGNOSIS — M43.27 FUSION OF SPINE, LUMBOSACRAL REGION: ICD-10-CM

## 2024-08-02 DIAGNOSIS — Z98.1 S/P LUMBAR SPINAL FUSION: Primary | ICD-10-CM

## 2024-08-02 PROCEDURE — 99999 PR PBB SHADOW E&M-EST. PATIENT-LVL III: CPT | Mod: PBBFAC,,, | Performed by: NURSE PRACTITIONER

## 2024-08-02 PROCEDURE — 72131 CT LUMBAR SPINE W/O DYE: CPT | Mod: 26,,, | Performed by: RADIOLOGY

## 2024-08-02 PROCEDURE — 72131 CT LUMBAR SPINE W/O DYE: CPT | Mod: TC

## 2024-08-02 RX ORDER — METHOCARBAMOL 500 MG/1
500 TABLET, FILM COATED ORAL 4 TIMES DAILY
Qty: 40 TABLET | Refills: 2 | Status: SHIPPED | OUTPATIENT
Start: 2024-08-02 | End: 2024-08-12

## 2024-08-02 NOTE — PROGRESS NOTES
Neurosurgery  Established Patient    SUBJECTIVE:     History of Present Illness: Marylin Morel is a 59 y.o. female s/p L2-pelvis fusion 5/14/2024 with Dr. Chester who presents for  the 3 month post-op evaluation. States that she continues to be doing well. Reports 0 pain. States that she is pleased with the results and eager to return to work. Denies new neurological deficits, fever, or chills. She remains active with home exercises and is compliant with the bone stimulator.     Review of patient's allergies indicates:   Allergen Reactions    Insect venom Anaphylaxis     Ant bites    Bactrim [sulfamethoxazole-trimethoprim] Blisters     Blisters/rash on torso and extremities        Current Outpatient Medications   Medication Sig Dispense Refill    cetirizine (ZYRTEC) 10 MG tablet Take 10 mg by mouth once daily.      COMBIPATCH 0.05-0.14 mg/24 hr twice a week.      EPINEPHrine (EPIPEN) 0.3 mg/0.3 mL AtIn Inject 0.3 mg into the muscle.      fluticasone propionate (FLONASE) 50 mcg/actuation nasal spray INSTILL ONE SPRAY INTO EACH NOSTRIL ONCE DAILY 16 g 11    levothyroxine (SYNTHROID) 25 MCG tablet Take 1 tablet (25 mcg total) by mouth once daily. 90 tablet 3    lisinopriL-hydrochlorothiazide (PRINZIDE,ZESTORETIC) 20-25 mg Tab Take 1 tablet by mouth once daily. 90 tablet 3    meclizine (ANTIVERT) 25 mg tablet 25 mg as needed for Dizziness.      multivitamin capsule Take 1 capsule by mouth once daily.      olopatadine (PATADAY) 0.2 % Drop Place into both eyes once daily.      omega-3 fatty acids/fish oil (FISH OIL-OMEGA-3 FATTY ACIDS) 300-1,000 mg capsule Take by mouth once daily.      omeprazole (PRILOSEC) 40 MG capsule Take 1 capsule (40 mg total) by mouth once daily. 90 capsule 3    simethicone (GAS-X EXTRA STRENGTH) 125 mg Cap capsule Take 1 capsule (125 mg total) by mouth 4 (four) times daily as needed for Flatulence. 30 capsule 1    vitamin D (VITAMIN D3) 1000 units Tab Take 1,000 Units by mouth once daily.       pregabalin (LYRICA) 75 MG capsule Take 1 capsule (75 mg total) by mouth 2 (two) times daily. 60 capsule 3     No current facility-administered medications for this visit.       Past Medical History:   Diagnosis Date    Allergy     ants    GERD (gastroesophageal reflux disease)     Hypertension     Thyroid disease      Past Surgical History:   Procedure Laterality Date    AUGMENTATION OF BREAST      BREAST SURGERY      COSMETIC SURGERY      ENDOSCOPIC FENESTRATION OF ARACHNOID CYST N/A 12/18/2023    Procedure: FENESTRATION, CYST, ARACHNOID, ENDOSCOPIC- terence hole for colloid cyst resection and septostomy;  Surgeon: Clement Alamo MD;  [facility];  Service: Neurosurgery;  Laterality: N/A;    hip replacement left Left     LUMBAR FUSION N/A 5/14/2024    Procedure: **LOOP X** L2 to pelvis posterior instrumented fusion;  Surgeon: Eric Chester MD;  [facility];  Service: Neurosurgery;  Laterality: N/A;  L2 to pelvis posterior instrumented fusion  anestheisa: general  brace: TLSO  radiology: C-arm  AIRO (if avail, if not LOOP X)  neuro mon: EMG/SEP  position: prone  bed: [place] [address] post  headrest: prone view  misc: smallwood    NECK SURGERY       Family History       Problem Relation (Age of Onset)    COPD Mother    Heart disease Father    Hypertension Brother          Social History     Socioeconomic History    Marital status:    Occupational History     Employer: [employer]   Tobacco Use    Smoking status: Never    Smokeless tobacco: Never   Substance and Sexual Activity    Alcohol use: Yes     Alcohol/week: 1.0 standard drink of alcohol     Types: 1 Glasses of wine per week     Comment: 1 glass of wine daily    Drug use: No    Sexual activity: Yes     Partners: Male     Social Determinants of Health     Financial Resource Strain: Low Risk  (5/15/2024)    Overall Financial Resource Strain (CARDIA)     Difficulty of Paying Living Expenses: Not hard at all   Food  Insecurity: No Food Insecurity (5/15/2024)    Hunger Vital Sign     Worried About Running Out of Food in the Last Year: Never true     Ran Out of Food in the Last Year: Never true   Transportation Needs: No Transportation Needs (5/15/2024)    TRANSPORTATION NEEDS     Transportation : No   Physical Activity: Inactive (5/15/2024)    Exercise Vital Sign     Days of Exercise per Week: 0 days     Minutes of Exercise per Session: 0 min   Stress: Stress Concern Present (5/15/2024)    [place] of Occupational Health - Occupational Stress Questionnaire     Feeling of Stress : Rather much   Housing Stability: Low Risk  (5/15/2024)    Housing Stability Vital Sign     Unable to Pay for Housing in the Last Year: No     Homeless in the Last Year: No       Review of Systems    OBJECTIVE:     Vital Signs  Pulse: 86  BP: 109/69  Pain Score: 0-No pain  There is no height or weight on file to calculate BMI.    Neurosurgery Physical Exam  General: well developed, well nourished, no distress.   Head: normocephalic, atraumatic  Neurologic: Alert and oriented. Thought content appropriate.  GCS: Motor: 6/Verbal: 5/Eyes: 4 GCS Total: 15  Mental Status: Awake, Alert, Oriented x 4  Language: No aphasia  Speech: No dysarthria  Cranial nerves: face symmetric, tongue midline, CN II-XII grossly intact.   Eyes: pupils equal, round, reactive to light with accomodation, EOMI.   Pulmonary: normal respirations, no signs of respiratory distress  Abdomen: soft, non-distended  Skin: Skin is warm, dry and intact.  Motor Strength:Moves all extremities spontaneously with good tone.       Diagnostic Results:  I have personally reviewed the CT lumbar spine dated 8/2/24 which shows PSF L2-pelvis with hardware in stable position and evidence of bony fusion.     ASSESSMENT/PLAN:     Marylin Morel is a 59 y.o. female s/p L2-pelvis fusion 5/14/2024 with Dr. Chester who presents for  the 3 month post-op evaluation. She is doing great since surgery and  is pleased with the results. I have released her to return to work full duty without restrictions. She was instructed to wean the LSO brace as tolerated and to continue wearing the bone stimulator.     I would like the patient to follow-up in clinic for the 1 year post-op evaluation. I have encouraged her to contact the clinic with any questions, concerns, or adverse clinical changes. She verbalized understanding.      CADY Arriaga-LAWANDA  Neurosurgery  [facility].      Note dictated with voice recognition software, please excuse any grammatical errors.

## 2024-08-12 ENCOUNTER — PATIENT MESSAGE (OUTPATIENT)
Dept: NEUROSURGERY | Facility: CLINIC | Age: 59
End: 2024-08-12
Payer: COMMERCIAL

## 2024-08-28 ENCOUNTER — TELEPHONE (OUTPATIENT)
Dept: FAMILY MEDICINE | Facility: CLINIC | Age: 59
End: 2024-08-28
Payer: COMMERCIAL

## 2024-09-24 ENCOUNTER — PATIENT MESSAGE (OUTPATIENT)
Dept: NEUROSURGERY | Facility: CLINIC | Age: 59
End: 2024-09-24
Payer: COMMERCIAL

## 2024-09-25 RX ORDER — METHOCARBAMOL 500 MG/1
500 TABLET, FILM COATED ORAL 4 TIMES DAILY
Qty: 40 TABLET | Refills: 0 | Status: SHIPPED | OUTPATIENT
Start: 2024-09-25 | End: 2024-10-05

## 2024-10-15 RX ORDER — METHOCARBAMOL 500 MG/1
TABLET, FILM COATED ORAL
Qty: 40 TABLET | Refills: 0 | Status: SHIPPED | OUTPATIENT
Start: 2024-10-15

## 2024-11-04 RX ORDER — METHOCARBAMOL 500 MG/1
TABLET, FILM COATED ORAL
Qty: 40 TABLET | Refills: 0 | Status: SHIPPED | OUTPATIENT
Start: 2024-11-04

## 2024-11-15 ENCOUNTER — TELEPHONE (OUTPATIENT)
Dept: ORTHOPEDICS | Facility: CLINIC | Age: 59
End: 2024-11-15
Payer: COMMERCIAL

## 2024-12-03 ENCOUNTER — TELEPHONE (OUTPATIENT)
Dept: ORTHOPEDICS | Facility: CLINIC | Age: 59
End: 2024-12-03
Payer: COMMERCIAL

## 2024-12-18 ENCOUNTER — OFFICE VISIT (OUTPATIENT)
Dept: FAMILY MEDICINE | Facility: CLINIC | Age: 59
End: 2024-12-18
Payer: COMMERCIAL

## 2024-12-18 VITALS
HEIGHT: 64 IN | TEMPERATURE: 99 F | WEIGHT: 146.81 LBS | RESPIRATION RATE: 16 BRPM | BODY MASS INDEX: 25.06 KG/M2 | DIASTOLIC BLOOD PRESSURE: 80 MMHG | SYSTOLIC BLOOD PRESSURE: 124 MMHG | OXYGEN SATURATION: 98 % | HEART RATE: 82 BPM

## 2024-12-18 DIAGNOSIS — R79.9 ABNORMAL BLOOD FINDING: ICD-10-CM

## 2024-12-18 DIAGNOSIS — Z12.11 SCREENING FOR COLON CANCER: ICD-10-CM

## 2024-12-18 DIAGNOSIS — I10 ESSENTIAL HYPERTENSION, BENIGN: ICD-10-CM

## 2024-12-18 DIAGNOSIS — M54.9 DORSALGIA: ICD-10-CM

## 2024-12-18 DIAGNOSIS — E03.9 HYPOTHYROIDISM, UNSPECIFIED TYPE: Primary | ICD-10-CM

## 2024-12-18 PROCEDURE — 99396 PREV VISIT EST AGE 40-64: CPT | Mod: S$GLB,,, | Performed by: FAMILY MEDICINE

## 2024-12-18 PROCEDURE — 99999 PR PBB SHADOW E&M-EST. PATIENT-LVL V: CPT | Mod: PBBFAC,,, | Performed by: FAMILY MEDICINE

## 2024-12-18 PROCEDURE — 3008F BODY MASS INDEX DOCD: CPT | Mod: CPTII,S$GLB,, | Performed by: FAMILY MEDICINE

## 2024-12-18 PROCEDURE — 3079F DIAST BP 80-89 MM HG: CPT | Mod: CPTII,S$GLB,, | Performed by: FAMILY MEDICINE

## 2024-12-18 PROCEDURE — 4010F ACE/ARB THERAPY RXD/TAKEN: CPT | Mod: CPTII,S$GLB,, | Performed by: FAMILY MEDICINE

## 2024-12-18 PROCEDURE — 3074F SYST BP LT 130 MM HG: CPT | Mod: CPTII,S$GLB,, | Performed by: FAMILY MEDICINE

## 2024-12-18 PROCEDURE — 1159F MED LIST DOCD IN RCRD: CPT | Mod: CPTII,S$GLB,, | Performed by: FAMILY MEDICINE

## 2024-12-18 RX ORDER — METHOCARBAMOL 500 MG/1
500 TABLET, FILM COATED ORAL 3 TIMES DAILY PRN
Qty: 40 TABLET | Refills: 0 | Status: SHIPPED | OUTPATIENT
Start: 2024-12-18

## 2024-12-19 ENCOUNTER — HOSPITAL ENCOUNTER (OUTPATIENT)
Dept: RADIOLOGY | Facility: HOSPITAL | Age: 59
Discharge: HOME | End: 2024-12-19
Attending: NURSE PRACTITIONER
Payer: COMMERCIAL

## 2024-12-19 ENCOUNTER — PATIENT MESSAGE (OUTPATIENT)
Dept: NEUROSURGERY | Facility: CLINIC | Age: 59
End: 2024-12-19
Payer: COMMERCIAL

## 2024-12-19 ENCOUNTER — TELEPHONE (OUTPATIENT)
Dept: NEUROSURGERY | Facility: CLINIC | Age: 59
End: 2024-12-19
Payer: COMMERCIAL

## 2024-12-19 DIAGNOSIS — Z98.1 S/P LUMBAR SPINAL FUSION: ICD-10-CM

## 2024-12-19 DIAGNOSIS — Z98.1 S/P LUMBAR SPINAL FUSION: Primary | ICD-10-CM

## 2024-12-19 PROCEDURE — 72114 X-RAY EXAM L-S SPINE BENDING: CPT | Mod: 26,,, | Performed by: RADIOLOGY

## 2024-12-19 PROCEDURE — 72114 X-RAY EXAM L-S SPINE BENDING: CPT | Mod: TC

## 2024-12-19 RX ORDER — METHOCARBAMOL 500 MG/1
500 TABLET, FILM COATED ORAL 4 TIMES DAILY
Qty: 40 TABLET | Refills: 0 | Status: SHIPPED | OUTPATIENT
Start: 2024-12-19 | End: 2024-12-29

## 2024-12-19 RX ORDER — METHYLPREDNISOLONE 4 MG/1
TABLET ORAL
Qty: 21 EACH | Refills: 0 | Status: SHIPPED | OUTPATIENT
Start: 2024-12-19 | End: 2025-01-09

## 2024-12-20 ENCOUNTER — PATIENT MESSAGE (OUTPATIENT)
Dept: NEUROSURGERY | Facility: CLINIC | Age: 59
End: 2024-12-20
Payer: COMMERCIAL

## 2024-12-23 NOTE — PROGRESS NOTES
Subjective:   Patient ID: Marylin Morel is a 59 y.o. female     Chief Complaint:Follow-up      Here for checkup    Follow-up  Pertinent negatives include no abdominal pain, arthralgias, chest pain, chills, coughing, fever, headaches, sore throat or weakness.     Review of Systems   Constitutional:  Negative for chills and fever.   HENT:  Negative for sore throat and trouble swallowing.    Respiratory:  Negative for cough and shortness of breath.    Cardiovascular:  Negative for chest pain and leg swelling.   Gastrointestinal:  Negative for abdominal distention and abdominal pain.   Genitourinary:  Negative for dysuria and flank pain.   Musculoskeletal:  Negative for arthralgias and back pain.   Skin:  Negative for color change and pallor.   Neurological:  Negative for weakness and headaches.   Psychiatric/Behavioral:  Negative for agitation and confusion.      Past Medical History:   Diagnosis Date    Allergy     ants    GERD (gastroesophageal reflux disease)     Hypertension     Thyroid disease      Past Surgical History:   Procedure Laterality Date    AUGMENTATION OF BREAST      BREAST SURGERY      COSMETIC SURGERY      ENDOSCOPIC FENESTRATION OF ARACHNOID CYST N/A 12/18/2023    Procedure: FENESTRATION, CYST, ARACHNOID, ENDOSCOPIC- terence hole for colloid cyst resection and septostomy;  Surgeon: Clement Alamo MD;  Location: [address];  Service: Neurosurgery;  Laterality: N/A;    hip replacement left Left     LUMBAR FUSION N/A 5/14/2024    Procedure: **LOOP X** L2 to pelvis posterior instrumented fusion;  Surgeon: Eric Chester MD;  Location: [facility] OR [address] [place] [address];  Service: Neurosurgery;  Laterality: N/A;  L2 to pelvis posterior instrumented fusion  anestheisa: general  brace: TLSO  radiology: C-arm  AIRO (if avail, if not LOOP X)  neuro mon: EMG/SEP  position: prone  bed: [place] [address] post  headrest: prone view  misc: smallwood    NECK SURGERY       Objective:     Vitals:    12/18/24 1455   BP: 124/80   Pulse: 82    Resp: 16   Temp: 99.1 °F (37.3 °C)     Body mass index is 25.6 kg/m².  Physical Exam  Vitals and nursing note reviewed.   Constitutional:       Appearance: She is well-developed.   HENT:      Head: Normocephalic and atraumatic.   Eyes:      General: No scleral icterus.     Conjunctiva/sclera: Conjunctivae normal.   Cardiovascular:      Heart sounds: No murmur heard.  Pulmonary:      Effort: Pulmonary effort is normal. No respiratory distress.   Musculoskeletal:         General: No deformity. Normal range of motion.      Cervical back: Normal range of motion and neck supple.   Skin:     Coloration: Skin is not pale.      Findings: No rash.   Neurological:      Mental Status: She is alert and oriented to person, place, and time.   Psychiatric:         Behavior: Behavior normal.         Thought Content: Thought content normal.         Judgment: Judgment normal.       Assessment:     1. Hypothyroidism, unspecified type    2. Dorsalgia    3. Essential hypertension, benign    4. Abnormal blood finding    5. Screening for colon cancer      Plan:   Hypothyroidism, unspecified type  -     Lipid Panel; Future; Expected date: 12/18/2024  -     TSH; Future; Expected date: 12/18/2024    Dorsalgia  -     methocarbamoL (ROBAXIN) 500 MG Tab; Take 1 tablet (500 mg total) by mouth 3 (three) times daily as needed.  Dispense: 40 tablet; Refill: 0    Essential hypertension, benign  -     Comprehensive Metabolic Panel; Future; Expected date: 12/18/2024  -     CBC Auto Differential; Future; Expected date: 12/18/2024    Abnormal blood finding  -     Hemoglobin A1C; Future; Expected date: 12/18/2024    Screening for colon cancer  -     Cancel: Ambulatory referral/consult to Gastroenterology; Future; Expected date: 12/25/2024  -     Ambulatory referral/consult to Gastroenterology; Future; Expected date: 12/25/2024            Total time spent of Greater than 30 minutes minutes on the day of the visit.This includes face to face time and  preparing to see the patient, obtaining and reviewing separately obtained history, documenting clinical information in the electronic or other health record, independently interpreting results and communicating results to the patient/family/caregiver, or care coordinator.    Established patient with me has been instructed that must see me at least 1 time yearly (every 365 days) for refills of medications. Seeing other providers in this clinic is fine but expectation is to see me yearly.    Gabriel Mccabe MD  12/23/2024    Portions of this note have been dictated with MERVIN Hurtado

## 2025-01-03 ENCOUNTER — PATIENT MESSAGE (OUTPATIENT)
Dept: NEUROSURGERY | Facility: CLINIC | Age: 60
End: 2025-01-03
Payer: COMMERCIAL

## 2025-01-03 ENCOUNTER — TELEPHONE (OUTPATIENT)
Dept: NEUROSURGERY | Facility: CLINIC | Age: 60
End: 2025-01-03
Payer: COMMERCIAL

## 2025-01-03 NOTE — TELEPHONE ENCOUNTER
----- Message from Gabriela Watson NP sent at 1/3/2025  9:44 AM CST -----  That is the best option pain management and she can add NSAID.  ----- Message -----  From: Lacy King, SARAH  Sent: 1/2/2025   2:40 PM CST  To: Gabriela Watson NP; Lacy King RN    Pt. Showed up today.  She had a L2-p in may 2024.   When seen for her 3 mo f/u all was fine.  No pain.     Apparently last few weeks she's been having back pain on side & back.  An xray was done 12/20 and steriods given. Which helped but now that steroids are done the pain has returned.  Has an appt with her pain mgmt md dr. Rose on 1/9.  (I told her that was prob. The best option).  She has been trying heat/ice/robaxin and someone gave her gabapentin but its not really helping.  She was asking if we had any other recommendations.

## 2025-01-31 ENCOUNTER — OFFICE VISIT (OUTPATIENT)
Dept: URGENT CARE | Facility: CLINIC | Age: 60
End: 2025-01-31
Payer: COMMERCIAL

## 2025-01-31 VITALS
TEMPERATURE: 99 F | WEIGHT: 146 LBS | SYSTOLIC BLOOD PRESSURE: 139 MMHG | BODY MASS INDEX: 24.92 KG/M2 | HEART RATE: 97 BPM | OXYGEN SATURATION: 99 % | RESPIRATION RATE: 16 BRPM | DIASTOLIC BLOOD PRESSURE: 82 MMHG | HEIGHT: 64 IN

## 2025-01-31 DIAGNOSIS — J02.9 SORE THROAT: ICD-10-CM

## 2025-01-31 DIAGNOSIS — J02.9 VIRAL PHARYNGITIS: ICD-10-CM

## 2025-01-31 DIAGNOSIS — R05.9 COUGH, UNSPECIFIED TYPE: ICD-10-CM

## 2025-01-31 DIAGNOSIS — J06.9 VIRAL URI WITH COUGH: Primary | ICD-10-CM

## 2025-01-31 LAB
CTP QC/QA: YES
FLUAV AG NPH QL: NEGATIVE
FLUBV AG NPH QL: NEGATIVE
S PYO RRNA THROAT QL PROBE: NEGATIVE
SARS-COV-2 AG RESP QL IA.RAPID: NEGATIVE

## 2025-01-31 PROCEDURE — 87880 STREP A ASSAY W/OPTIC: CPT | Mod: QW,,, | Performed by: NURSE PRACTITIONER

## 2025-01-31 PROCEDURE — 87811 SARS-COV-2 COVID19 W/OPTIC: CPT | Mod: QW,S$GLB,, | Performed by: NURSE PRACTITIONER

## 2025-01-31 PROCEDURE — 96372 THER/PROPH/DIAG INJ SC/IM: CPT | Mod: S$GLB,,, | Performed by: NURSE PRACTITIONER

## 2025-01-31 PROCEDURE — 87804 INFLUENZA ASSAY W/OPTIC: CPT | Mod: QW,,, | Performed by: NURSE PRACTITIONER

## 2025-01-31 PROCEDURE — 99214 OFFICE O/P EST MOD 30 MIN: CPT | Mod: 25,S$GLB,, | Performed by: NURSE PRACTITIONER

## 2025-01-31 RX ORDER — PREDNISONE 20 MG/1
20 TABLET ORAL DAILY
Qty: 3 TABLET | Refills: 0 | Status: SHIPPED | OUTPATIENT
Start: 2025-01-31 | End: 2025-02-03

## 2025-01-31 RX ORDER — PROMETHAZINE HYDROCHLORIDE AND DEXTROMETHORPHAN HYDROBROMIDE 6.25; 15 MG/5ML; MG/5ML
5 SYRUP ORAL EVERY 8 HOURS PRN
Qty: 150 ML | Refills: 0 | Status: SHIPPED | OUTPATIENT
Start: 2025-01-31 | End: 2025-02-10

## 2025-01-31 RX ORDER — DEXAMETHASONE SODIUM PHOSPHATE 4 MG/ML
8 INJECTION, SOLUTION INTRA-ARTICULAR; INTRALESIONAL; INTRAMUSCULAR; INTRAVENOUS; SOFT TISSUE
Status: COMPLETED | OUTPATIENT
Start: 2025-01-31 | End: 2025-01-31

## 2025-01-31 RX ADMIN — DEXAMETHASONE SODIUM PHOSPHATE 8 MG: 4 INJECTION, SOLUTION INTRA-ARTICULAR; INTRALESIONAL; INTRAMUSCULAR; INTRAVENOUS; SOFT TISSUE at 03:01

## 2025-01-31 NOTE — PATIENT INSTRUCTIONS
May take afrin nasal spray which can be purchased over the counter for 3 days ONLY! For sinus congestion. Do not take more than 3 days.

## 2025-01-31 NOTE — PROGRESS NOTES
"Subjective:      Patient ID: Marylin Morel is a 59 y.o. female.    Vitals:  height is 5' 3.5" (1.613 m) and weight is 66.2 kg (146 lb). Her oral temperature is 98.7 °F (37.1 °C). Her blood pressure is 139/82 and her pulse is 97. Her respiration is 16 and oxygen saturation is 99%.     Chief Complaint: Cough    Ms. Morel is a 59 year old female with a history of HTN, hypothyroidism, and vertigo who presents today with complaints of sore throat. It is mild. It is associated with cough, sinus drip, congestion, and fatigue. She denies fever, chills, N/V/D, chest pain, SOB, dizziness, or LOC. Symptoms have been present for 2 days. Prior infections with covid have presented similarly.     Cough  Episode onset: x 2 days. Associated symptoms include postnasal drip and a sore throat. Pertinent negatives include no chest pain, fever or shortness of breath.       Constitution: Negative for appetite change and fever.   HENT:  Positive for congestion, postnasal drip and sore throat.    Neck: Negative for neck stiffness.   Cardiovascular:  Negative for chest pain.   Eyes:  Negative for blurred vision.   Respiratory:  Positive for cough. Negative for shortness of breath.    Gastrointestinal:  Negative for nausea, vomiting and diarrhea.   Genitourinary:  Negative for dysuria.   Musculoskeletal:  Negative for back pain.   Skin:  Negative for hives.   Allergic/Immunologic: Negative for hives.   Neurological:  Negative for dizziness and light-headedness.      Objective:     Physical Exam   Constitutional: She is oriented to person, place, and time. She appears well-developed. She is cooperative.  Non-toxic appearance. She does not appear ill. No distress.   HENT:   Head: Normocephalic and atraumatic.   Ears:   Right Ear: Hearing and external ear normal.   Left Ear: Hearing and external ear normal.   Nose: Congestion present. No mucosal edema or nasal deformity. No epistaxis. Right sinus exhibits no maxillary sinus tenderness and " no frontal sinus tenderness. Left sinus exhibits no maxillary sinus tenderness and no frontal sinus tenderness.   Mouth/Throat: Uvula is midline and mucous membranes are normal. No trismus in the jaw. Normal dentition. No uvula swelling. Posterior oropharyngeal erythema present.      Comments: Mild posterior oropharyngeal erythema pinpoint white exudate worse on the right.   Eyes: Conjunctivae and lids are normal. Right eye exhibits no discharge. Left eye exhibits no discharge. No scleral icterus.   Neck: Trachea normal and phonation normal. Neck supple.   Cardiovascular: Normal rate.   Pulmonary/Chest: Effort normal. No respiratory distress.   Abdominal: Normal appearance. She exhibits no distension and no pulsatile midline mass.   Musculoskeletal: Normal range of motion.         General: No deformity. Normal range of motion.   Neurological: She is alert and oriented to person, place, and time. She exhibits normal muscle tone. Coordination normal.   Skin: Skin is warm, dry, intact, not diaphoretic and not pale.   Psychiatric: Her speech is normal and behavior is normal. Judgment and thought content normal.   Nursing note and vitals reviewed.      Assessment:     1. Viral URI with cough    2. Sore throat    3. Cough, unspecified type    4. Viral pharyngitis        Plan:       Viral URI with cough  -     dexAMETHasone injection 8 mg  -     predniSONE (DELTASONE) 20 MG tablet; Take 1 tablet (20 mg total) by mouth once daily. for 3 days  Dispense: 3 tablet; Refill: 0    Sore throat  -     POCT rapid strep A    Cough, unspecified type  -     POCT Influenza A/B Rapid Antigen  -     SARS Coronavirus 2 Antigen, POCT Manual Read  -     promethazine-dextromethorphan (PROMETHAZINE-DM) 6.25-15 mg/5 mL Syrp; Take 5 mLs by mouth every 8 (eight) hours as needed.  Dispense: 150 mL; Refill: 0    Viral pharyngitis    Rapid flu/covid/strep are negative. Pt informed in clinic. Given duration of symptoms, likely viral etiology. Risk  and benefit of steroid treatment discussed with patient. Through shared decision making between patient and provider, opted to trial a short course of steroids for symptom management. Strict return prxn provided.  Discussed risk and benefit in taking steroids including but not limited to: decreased immune response to other viruses and bacteria, increased BP, and increased blood sugar. Pt verbalizes understanding and accepts risks.     Strict return prxn provided.

## 2025-02-03 ENCOUNTER — OFFICE VISIT (OUTPATIENT)
Dept: FAMILY MEDICINE | Facility: CLINIC | Age: 60
End: 2025-02-03
Payer: COMMERCIAL

## 2025-02-03 VITALS
RESPIRATION RATE: 16 BRPM | BODY MASS INDEX: 24.84 KG/M2 | SYSTOLIC BLOOD PRESSURE: 102 MMHG | WEIGHT: 145.5 LBS | OXYGEN SATURATION: 99 % | HEIGHT: 64 IN | HEART RATE: 99 BPM | DIASTOLIC BLOOD PRESSURE: 68 MMHG | TEMPERATURE: 98 F

## 2025-02-03 DIAGNOSIS — J01.90 ACUTE SINUSITIS, RECURRENCE NOT SPECIFIED, UNSPECIFIED LOCATION: Primary | ICD-10-CM

## 2025-02-03 PROBLEM — Q04.6 COLLOID CYST OF BRAIN: Status: RESOLVED | Noted: 2023-12-18 | Resolved: 2025-02-03

## 2025-02-03 PROCEDURE — 3074F SYST BP LT 130 MM HG: CPT | Mod: CPTII,S$GLB,, | Performed by: FAMILY MEDICINE

## 2025-02-03 PROCEDURE — 3078F DIAST BP <80 MM HG: CPT | Mod: CPTII,S$GLB,, | Performed by: FAMILY MEDICINE

## 2025-02-03 PROCEDURE — 1159F MED LIST DOCD IN RCRD: CPT | Mod: CPTII,S$GLB,, | Performed by: FAMILY MEDICINE

## 2025-02-03 PROCEDURE — 3008F BODY MASS INDEX DOCD: CPT | Mod: CPTII,S$GLB,, | Performed by: FAMILY MEDICINE

## 2025-02-03 PROCEDURE — 99214 OFFICE O/P EST MOD 30 MIN: CPT | Mod: S$GLB,,, | Performed by: FAMILY MEDICINE

## 2025-02-03 PROCEDURE — G2211 COMPLEX E/M VISIT ADD ON: HCPCS | Mod: S$GLB,,, | Performed by: FAMILY MEDICINE

## 2025-02-03 PROCEDURE — 99999 PR PBB SHADOW E&M-EST. PATIENT-LVL IV: CPT | Mod: PBBFAC,,, | Performed by: FAMILY MEDICINE

## 2025-02-03 RX ORDER — AMOXICILLIN AND CLAVULANATE POTASSIUM 875; 125 MG/1; MG/1
1 TABLET, FILM COATED ORAL EVERY 12 HOURS
Qty: 20 TABLET | Refills: 0 | Status: SHIPPED | OUTPATIENT
Start: 2025-02-03 | End: 2025-02-13

## 2025-02-03 NOTE — PROGRESS NOTES
Subjective:   Patient ID: Marylin Morel is a 59 y.o. female     Chief Complaint:Sore Throat, Cough, and Nasal Congestion      Sore Throat   Associated symptoms include coughing. Pertinent negatives include no abdominal pain or shortness of breath.   Cough  Associated symptoms include a sore throat. Pertinent negatives include no chest pain, fever or shortness of breath.   Review of Systems   Constitutional:  Negative for fever.   HENT:  Positive for sore throat.    Respiratory:  Positive for cough. Negative for shortness of breath.    Cardiovascular:  Negative for chest pain.   Gastrointestinal:  Negative for abdominal pain.   Genitourinary:  Negative for dysuria.   Past Medical History:   Diagnosis Date    Allergy     ants    GERD (gastroesophageal reflux disease)     Hypertension     Thyroid disease      Past Surgical History:   Procedure Laterality Date    AUGMENTATION OF BREAST      BREAST SURGERY      COSMETIC SURGERY      ENDOSCOPIC FENESTRATION OF ARACHNOID CYST N/A 12/18/2023    Procedure: FENESTRATION, CYST, ARACHNOID, ENDOSCOPIC- terence hole for colloid cyst resection and septostomy;  Surgeon: Clement Alamo MD;  Location: [facility] OR [address] [place] [address];  Service: Neurosurgery;  Laterality: N/A;    hip replacement left Left     LUMBAR FUSION N/A 5/14/2024    Procedure: **LOOP X** L2 to pelvis posterior instrumented fusion;  Surgeon: Eric Chester MD;  Location: [facility] OR [address] [place] [address];  Service: Neurosurgery;  Laterality: N/A;  L2 to pelvis posterior instrumented fusion  anestheisa: general  brace: TLSO  radiology: C-arm  AIRO (if avail, if not LOOP X)  neuro mon: EMG/SEP  position: prone  bed: [place] [id number] post  headrest: prone view  misc: smallwood    NECK SURGERY       Objective:     Vitals:    02/03/25 0851   BP: 102/68   Pulse: 99   Resp: 16   Temp: 97.9 °F (36.6 °C)     Body mass index is 25.37 kg/m².  Physical Exam  Vitals and nursing note reviewed.   Constitutional:       Appearance: She is well-developed.    HENT:      Head: Normocephalic and atraumatic.      Mouth/Throat:      Pharynx: Posterior oropharyngeal erythema present.   Eyes:      General: No scleral icterus.     Conjunctiva/sclera: Conjunctivae normal.   Cardiovascular:      Heart sounds: No murmur heard.  Pulmonary:      Effort: Pulmonary effort is normal. No respiratory distress.      Breath sounds: No wheezing or rales.   Musculoskeletal:         General: No deformity. Normal range of motion.      Cervical back: Normal range of motion and neck supple.   Skin:     Coloration: Skin is not pale.      Findings: No rash.   Neurological:      Mental Status: She is alert and oriented to person, place, and time.   Psychiatric:         Behavior: Behavior normal.         Thought Content: Thought content normal.         Judgment: Judgment normal.   Assessment:     1. Acute sinusitis, recurrence not specified, unspecified location      Plan:   Acute sinusitis, recurrence not specified, unspecified location  -     amoxicillin-clavulanate 875-125mg (AUGMENTIN) 875-125 mg per tablet; Take 1 tablet by mouth every 12 (twelve) hours. for 10 days  Dispense: 20 tablet; Refill: 0            Total time spent of Less than 30 minutes minutes on the day of the visit.This includes face to face time and preparing to see the patient, obtaining and reviewing separately obtained history, documenting clinical information in the electronic or other health record, independently interpreting results and communicating results to the patient/family/caregiver, or care coordinator.    Established patient with me has been instructed that must see me at least 1 time yearly (every 365 days) for refills of medications. Seeing other providers in this clinic is fine but expectation is to see me yearly.    Gabriel Mccabe MD  02/03/2025    Portions of this note have been dictated with MERVIN Hurtado

## 2025-03-03 DIAGNOSIS — Z12.31 ENCOUNTER FOR SCREENING MAMMOGRAM FOR MALIGNANT NEOPLASM OF BREAST: Primary | ICD-10-CM

## 2025-03-05 ENCOUNTER — HOSPITAL ENCOUNTER (OUTPATIENT)
Dept: RADIOLOGY | Facility: HOSPITAL | Age: 60
Discharge: HOME | End: 2025-03-05
Attending: SPECIALIST
Payer: COMMERCIAL

## 2025-03-05 DIAGNOSIS — Z12.31 ENCOUNTER FOR SCREENING MAMMOGRAM FOR MALIGNANT NEOPLASM OF BREAST: ICD-10-CM

## 2025-03-05 PROCEDURE — 77063 BREAST TOMOSYNTHESIS BI: CPT | Mod: 26,,, | Performed by: RADIOLOGY

## 2025-03-05 PROCEDURE — 77067 SCR MAMMO BI INCL CAD: CPT | Mod: 26,,, | Performed by: RADIOLOGY

## 2025-03-05 PROCEDURE — 77063 BREAST TOMOSYNTHESIS BI: CPT | Mod: TC,PO

## 2025-04-07 DIAGNOSIS — J30.2 SEASONAL ALLERGIES: ICD-10-CM

## 2025-04-07 RX ORDER — FLUTICASONE PROPIONATE 50 MCG
1 SPRAY, SUSPENSION (ML) NASAL
Qty: 16 G | Refills: 0 | Status: SHIPPED | OUTPATIENT
Start: 2025-04-07

## 2025-05-29 ENCOUNTER — PATIENT OUTREACH (OUTPATIENT)
Dept: ADMINISTRATIVE | Facility: HOSPITAL | Age: 60
End: 2025-05-29
Payer: COMMERCIAL

## 2025-05-29 ENCOUNTER — PATIENT MESSAGE (OUTPATIENT)
Dept: ADMINISTRATIVE | Facility: HOSPITAL | Age: 60
End: 2025-05-29
Payer: COMMERCIAL

## 2025-06-26 ENCOUNTER — OFFICE VISIT (OUTPATIENT)
Dept: FAMILY MEDICINE | Facility: CLINIC | Age: 60
End: 2025-06-26
Payer: COMMERCIAL

## 2025-06-26 VITALS
WEIGHT: 143.31 LBS | DIASTOLIC BLOOD PRESSURE: 80 MMHG | OXYGEN SATURATION: 97 % | HEART RATE: 83 BPM | HEIGHT: 64 IN | RESPIRATION RATE: 18 BRPM | SYSTOLIC BLOOD PRESSURE: 122 MMHG | BODY MASS INDEX: 24.46 KG/M2

## 2025-06-26 DIAGNOSIS — J30.2 SEASONAL ALLERGIES: ICD-10-CM

## 2025-06-26 DIAGNOSIS — K21.9 GASTROESOPHAGEAL REFLUX DISEASE, UNSPECIFIED WHETHER ESOPHAGITIS PRESENT: ICD-10-CM

## 2025-06-26 DIAGNOSIS — Z12.11 SCREENING FOR COLON CANCER: ICD-10-CM

## 2025-06-26 DIAGNOSIS — E03.9 HYPOTHYROIDISM, UNSPECIFIED TYPE: Primary | ICD-10-CM

## 2025-06-26 PROCEDURE — 1159F MED LIST DOCD IN RCRD: CPT | Mod: CPTII,S$GLB,,

## 2025-06-26 PROCEDURE — 99999 PR PBB SHADOW E&M-EST. PATIENT-LVL IV: CPT | Mod: PBBFAC,,,

## 2025-06-26 PROCEDURE — 3079F DIAST BP 80-89 MM HG: CPT | Mod: CPTII,S$GLB,,

## 2025-06-26 PROCEDURE — 4010F ACE/ARB THERAPY RXD/TAKEN: CPT | Mod: CPTII,S$GLB,,

## 2025-06-26 PROCEDURE — 99214 OFFICE O/P EST MOD 30 MIN: CPT | Mod: S$GLB,,,

## 2025-06-26 PROCEDURE — 3008F BODY MASS INDEX DOCD: CPT | Mod: CPTII,S$GLB,,

## 2025-06-26 PROCEDURE — 1160F RVW MEDS BY RX/DR IN RCRD: CPT | Mod: CPTII,S$GLB,,

## 2025-06-26 PROCEDURE — 3074F SYST BP LT 130 MM HG: CPT | Mod: CPTII,S$GLB,,

## 2025-06-26 RX ORDER — PANTOPRAZOLE SODIUM 40 MG/1
40 TABLET, DELAYED RELEASE ORAL DAILY
Qty: 90 TABLET | Refills: 3 | Status: SHIPPED | OUTPATIENT
Start: 2025-06-26 | End: 2026-06-26

## 2025-06-26 RX ORDER — FLUTICASONE PROPIONATE 50 MCG
1 SPRAY, SUSPENSION (ML) NASAL 2 TIMES DAILY
Qty: 16 G | Refills: 11 | Status: SHIPPED | OUTPATIENT
Start: 2025-06-26

## 2025-06-26 NOTE — PROGRESS NOTES
Subjective:       Patient ID: Marylin Morel is a 60 y.o. female.    Chief Complaint: Follow-up      History of Present Illness    CHIEF COMPLAINT:  Ms. Morel presents today for six-month follow-up.    GERD AND ENT SYMPTOMS:  She reports increased frequency of heartburn with scratchy sensation in the back of her throat. She experiences persistent tickle and dryness in the throat, particularly when lying down, along with post-nasal drip. She notes she has to work on drinking more water over the summer. She currently takes Prilosec and Flonase for symptom management.    CURRENT MEDICATIONS:  She takes Prilosec (Omeprazole), Zyrtec daily, and Flonase as needed for GERD and allergic rhinitis symptoms.    MEDICAL HISTORY:  She has a history of recurrent urinary tract infections occurring every 2-3 months that resolved after back surgery which alleviated nerve compression causing incomplete bladder emptying. Last colonoscopy was in 2014.             Past Medical History:   Diagnosis Date    Allergy     ants    GERD (gastroesophageal reflux disease)     Hypertension     Thyroid disease        Review of patient's allergies indicates:   Allergen Reactions    Insect venom Anaphylaxis     Ant bites    Bactrim [sulfamethoxazole-trimethoprim] Blisters     Blisters/rash on torso and extremities        Current Medications[1]    Review of Systems   Constitutional:  Negative for appetite change and fatigue.   Respiratory:  Negative for shortness of breath.    Cardiovascular:  Negative for chest pain and palpitations.   Gastrointestinal:  Negative for abdominal pain, constipation and diarrhea.        +reflux   Neurological:  Negative for dizziness, light-headedness and headaches.        Objective:        Physical Exam  Vitals reviewed.   Constitutional:       Appearance: Normal appearance.   HENT:      Head: Normocephalic and atraumatic.      Right Ear: Tympanic membrane and ear canal normal.      Left Ear: Ear canal normal.       "Ears:      Comments: Cerumen obstructing view of entire TM     Mouth/Throat:      Mouth: Mucous membranes are moist.      Pharynx: No oropharyngeal exudate or posterior oropharyngeal erythema.   Eyes:      Conjunctiva/sclera: Conjunctivae normal.   Cardiovascular:      Rate and Rhythm: Normal rate and regular rhythm.      Heart sounds: Normal heart sounds.   Pulmonary:      Effort: Pulmonary effort is normal.      Breath sounds: Normal breath sounds. No wheezing, rhonchi or rales.   Musculoskeletal:         General: Normal range of motion.      Cervical back: Normal range of motion.   Lymphadenopathy:      Cervical: No cervical adenopathy.   Skin:     General: Skin is warm and dry.   Neurological:      Mental Status: She is alert and oriented to person, place, and time.   Psychiatric:         Behavior: Behavior normal.           Visit Vitals  /80 (BP Location: Right arm, Patient Position: Sitting)   Pulse 83   Resp 18   Ht 5' 3.5" (1.613 m)   Wt 65 kg (143 lb 4.8 oz)   LMP 02/05/2015 (Approximate)   SpO2 97%   BMI 24.99 kg/m²      Assessment:         1. Hypothyroidism, unspecified type    2. Gastroesophageal reflux disease, unspecified whether esophagitis present    3. Seasonal allergies    4. Screening for colon cancer        Plan:         Assessment & Plan    Assessed complaint of throat tickle and dryness, considering possible acid reflux as cause.  Evaluated current medication regimen, deciding to switch from Omeprazole to Protonix 40 mg daily for better symptom management.  Suggested potential change in antihistamine if symptoms persist after PPI switch.  Considered patient's history of UTIs, noting improvement post-back surgery.  Reviewed need for routine colonoscopy given last procedure in 2014.  Assessed overall health status, including breathing, chest pain, and general well-being.          Marylin was seen today for follow-up.    Diagnoses and all orders for this visit:      Hypothyroidism, " unspecified type       -    Continue Synthroid. TSH as ordered.    Gastroesophageal reflux disease, unspecified whether esophagitis present  -     pantoprazole (PROTONIX) 40 MG tablet; Take 1 tablet (40 mg total) by mouth once daily.  -      Stop Omeprazole. Start Protonix.    Seasonal allergies  -     fluticasone propionate (FLONASE) 50 mcg/actuation nasal spray; 1 spray (50 mcg total) by Each Nostril route 2 (two) times a day.  -     If no improvement of symptoms with switch in PPI, consider changing to alternative antihistamine.     Screening for colon cancer         -    Pt follows with Dr. Lobo. She will call gastro group to schedule.    Follow up in 6 months. Labs sometime soon.          Family Medicine Physician Assistant     Future Appointments       Date Provider Specialty Appt Notes    12/8/2025 Gabriel Mccabe MD Family Medicine 12 month f/u             Tests to Keep You Healthy    Mammogram: Met on 3/5/2025  Colon Cancer Screening: DUE  Cervical Cancer Screening: Met on 10/14/2024  Last Blood Pressure <= 139/89 (6/26/2025): Yes       I spent a total of 20 minutes on the day of the visit.This includes face to face time and non-face to face time preparing to see the patient (eg, review of tests), obtaining and/or reviewing separately obtained history, documenting clinical information in the electronic or other health record, independently interpreting results and communicating results to the patient/family/caregiver, or care coordinator.    This note was generated with the assistance of ambient listening technology. Verbal consent was obtained by the patient and accompanying visitor(s) for the recording of patient appointment to facilitate this note. I attest to having reviewed and edited the generated note for accuracy, though some syntax or spelling errors may persist. Please contact the author of this note for any clarification.         [1]   Current Outpatient Medications:     cetirizine  (ZYRTEC) 10 MG tablet, Take 10 mg by mouth once daily., Disp: , Rfl:     COMBIPATCH 0.05-0.14 mg/24 hr, twice a week., Disp: , Rfl:     EPINEPHrine (EPIPEN) 0.3 mg/0.3 mL AtIn, Inject 0.3 mg into the muscle., Disp: , Rfl:     levothyroxine (SYNTHROID) 25 MCG tablet, Take 1 tablet by mouth once daily, Disp: 90 tablet, Rfl: 3    lisinopriL-hydrochlorothiazide (PRINZIDE,ZESTORETIC) 20-25 mg Tab, Take 1 tablet by mouth once daily., Disp: 90 tablet, Rfl: 3    meclizine (ANTIVERT) 25 mg tablet, 25 mg as needed for Dizziness., Disp: , Rfl:     methocarbamoL (ROBAXIN) 500 MG Tab, Take 1 tablet (500 mg total) by mouth 3 (three) times daily as needed., Disp: 40 tablet, Rfl: 0    multivitamin capsule, Take 1 capsule by mouth once daily., Disp: , Rfl:     olopatadine (PATADAY) 0.2 % Drop, Place into both eyes once daily., Disp: , Rfl:     omega-3 fatty acids/fish oil (FISH OIL-OMEGA-3 FATTY ACIDS) 300-1,000 mg capsule, Take by mouth once daily., Disp: , Rfl:     RED YEAST RICE ORAL, Take by mouth., Disp: , Rfl:     simethicone (GAS-X EXTRA STRENGTH) 125 mg Cap capsule, Take 1 capsule (125 mg total) by mouth 4 (four) times daily as needed for Flatulence., Disp: 30 capsule, Rfl: 1    vitamin D (VITAMIN D3) 1000 units Tab, Take 1,000 Units by mouth once daily., Disp: , Rfl:     fluticasone propionate (FLONASE) 50 mcg/actuation nasal spray, 1 spray (50 mcg total) by Each Nostril route 2 (two) times a day., Disp: 16 g, Rfl: 11    pantoprazole (PROTONIX) 40 MG tablet, Take 1 tablet (40 mg total) by mouth once daily., Disp: 90 tablet, Rfl: 3

## 2025-08-26 DIAGNOSIS — I10 ESSENTIAL HYPERTENSION, BENIGN: ICD-10-CM

## 2025-08-27 RX ORDER — LISINOPRIL AND HYDROCHLOROTHIAZIDE 20; 25 MG/1; MG/1
1 TABLET ORAL DAILY
Qty: 90 TABLET | Refills: 0 | Status: SHIPPED | OUTPATIENT
Start: 2025-08-27

## (undated) DEVICE — ADAPTER TUBING 15G 25/BX

## (undated) DEVICE — KIT SPINAL PATIENT CARE JACK

## (undated) DEVICE — KIT SURGIFLO HEMOSTATIC MATRIX

## (undated) DEVICE — DRESSING AQUACEL FOAM 4 X 12

## (undated) DEVICE — TAP 6MM SPINAL POWER

## (undated) DEVICE — BUR BONE CUT MICRO TPS 3X3.8MM

## (undated) DEVICE — BLADE 4IN EDGE INSULATED

## (undated) DEVICE — TIP YANKAUERS BULB NO VENT

## (undated) DEVICE — DIFFUSER

## (undated) DEVICE — SUT VICRYL+ 1 CT1 18IN

## (undated) DEVICE — DRAPE ABDOMINAL TIBURON 14X11

## (undated) DEVICE — DRAPE C-ARM ELAS CLIP 42X120IN

## (undated) DEVICE — DRESSING MEPILEX BORDER 4 X 4

## (undated) DEVICE — TAP 8MM SPINAL POWER

## (undated) DEVICE — SYR IRRIGATION BULB STER 60ML

## (undated) DEVICE — DRAPE TOP 53X102IN

## (undated) DEVICE — CATH IV INTROCAN 14G X 2.

## (undated) DEVICE — DRAPE CRANIOTOMY T SURG STRL

## (undated) DEVICE — COVER PROXIMA MAYO STAND

## (undated) DEVICE — TRAY NEURO OMC

## (undated) DEVICE — CASSETTE CORE IRRIGATION

## (undated) DEVICE — BLADE MILL BONE MEDIUM

## (undated) DEVICE — ELECTRODE BLADE INSULATED 1 IN

## (undated) DEVICE — SUT STRATAFIX PDS PLUS VIO

## (undated) DEVICE — SUT D SPECIAL VICRYL 2-0

## (undated) DEVICE — BIT DRILL PERFORATOR DISP 14MM

## (undated) DEVICE — SUT 3-0 12-18IN SILK

## (undated) DEVICE — NDL STRAIGHT 4CM LEIBINGER

## (undated) DEVICE — SUT MCRYL PLUS 4-0 PS2 27IN

## (undated) DEVICE — NDL SPINAL 18GX3.5 SPINOCAN

## (undated) DEVICE — CANNULA EXPEDIUM OPN 16GX160MM

## (undated) DEVICE — DRESSING AQUACEL FOAM 5 X 5

## (undated) DEVICE — ADHESIVE MASTISOL VIAL 48/BX

## (undated) DEVICE — DRESSING AQUACEL SACRAL 9 X 9

## (undated) DEVICE — PAD CURAD NONADH 3X4IN

## (undated) DEVICE — DRESSING AQUACEL FOAM 3 X 3

## (undated) DEVICE — TRAY CATH 1-LYR URIMTR 16FR

## (undated) DEVICE — STAPLER SKIN PROXIMATE WIDE

## (undated) DEVICE — DRESSING AQUACEL AG 3.5X10IN

## (undated) DEVICE — CLIP MED TICALL

## (undated) DEVICE — SUT CTD VICRYL 2-0 CR/CT-2

## (undated) DEVICE — SPONGE NEURO 1/4X1/4

## (undated) DEVICE — KIT EVACUATOR 3-SPRING 1/8 DRN

## (undated) DEVICE — DURAPREP SURG SCRUB 26ML

## (undated) DEVICE — SUT SILK 3-0 BLK BR SH 30IN

## (undated) DEVICE — TUBE FRAZIER 5MM 2FT SOFT TIP

## (undated) DEVICE — SUT 4/0 18IN NUROLON BLK B

## (undated) DEVICE — CARTRIDGE OIL

## (undated) DEVICE — DRAPE C-ARMOR EQUIPMENT COVER

## (undated) DEVICE — CORD BIPOLAR 12 FOOT

## (undated) DEVICE — DRAPE STERI-DRAPE 1000 17X11IN

## (undated) DEVICE — COVER BACK TBL HD 2-TIER 72IN

## (undated) DEVICE — SPONGE LAP 4X18 PREWASHED

## (undated) DEVICE — SUT VICRYL PLUS 3-0 SH 18IN

## (undated) DEVICE — SPHERE MARKER REFLECTIVE DISP

## (undated) DEVICE — SPONGE GAUZE 16PLY 4X4

## (undated) DEVICE — MARKER SKIN STND TIP BLUE BARR

## (undated) DEVICE — RUBBERBAND STERILE 3X1/8IN

## (undated) DEVICE — DRAPE STERI INSTRUMENT 1018

## (undated) DEVICE — MARKER SKIN RULER STERILE

## (undated) DEVICE — CORD BIPOLAR 10/CASE

## (undated) DEVICE — DRAPE INCISE IOBAN 2 23X17IN

## (undated) DEVICE — SYR DISP LL 5CC

## (undated) DEVICE — CHLORAPREP W TINT 26ML APPL

## (undated) DEVICE — DRESSING SURGICAL 1/2X1/2

## (undated) DEVICE — SPONGE GELFOAM 12-7MM

## (undated) DEVICE — ELECTRODE REM PLYHSV RETURN 9

## (undated) DEVICE — SPONGE COTTON TRAY 4X4IN

## (undated) DEVICE — BLADE SURG CARBON STEEL SZ11

## (undated) DEVICE — DRAPE THREE-QTR REINF 53X77IN

## (undated) DEVICE — Device

## (undated) DEVICE — CAUTERY BOVIE PENCIL